# Patient Record
Sex: FEMALE | Race: WHITE | NOT HISPANIC OR LATINO | Employment: OTHER | URBAN - METROPOLITAN AREA
[De-identification: names, ages, dates, MRNs, and addresses within clinical notes are randomized per-mention and may not be internally consistent; named-entity substitution may affect disease eponyms.]

---

## 2017-01-09 ENCOUNTER — OFFICE VISIT (OUTPATIENT)
Dept: OPTOMETRY | Facility: CLINIC | Age: 82
End: 2017-01-09
Payer: MEDICARE

## 2017-01-09 DIAGNOSIS — Z96.1 PSEUDOPHAKIA: ICD-10-CM

## 2017-01-09 DIAGNOSIS — H43.393 VITREOUS FLOATERS, BILATERAL: ICD-10-CM

## 2017-01-09 DIAGNOSIS — Z98.41 S/P CATARACT SURGERY, RIGHT: ICD-10-CM

## 2017-01-09 DIAGNOSIS — H52.203 ASTIGMATISM, BILATERAL: ICD-10-CM

## 2017-01-09 DIAGNOSIS — Z98.42 S/P CATARACT SURGERY, LEFT: ICD-10-CM

## 2017-01-09 DIAGNOSIS — H35.372 EPIRETINAL MEMBRANE, LEFT EYE: Primary | ICD-10-CM

## 2017-01-09 PROCEDURE — 92015 DETERMINE REFRACTIVE STATE: CPT | Mod: S$GLB,,, | Performed by: OPTOMETRIST

## 2017-01-09 PROCEDURE — 99999 PR PBB SHADOW E&M-EST. PATIENT-LVL III: CPT | Mod: PBBFAC,,, | Performed by: OPTOMETRIST

## 2017-01-09 PROCEDURE — 92014 COMPRE OPH EXAM EST PT 1/>: CPT | Mod: S$GLB,,, | Performed by: OPTOMETRIST

## 2017-01-09 NOTE — MR AVS SNAPSHOT
[facility]  [address]  [facility] [address]  Phone: [phone]  Fax: [phone]                  Laila Hanna   2017 10:45 AM   Office Visit    Description:  Female : 1931   Provider:  Norberto Gabriel OD   Department:  [facility]           Reason for Visit     Concerns About Ocular Health                To Do List           Goals (5 Years of Data)     None      [facility]     [facility] On Call Nurse Care Line -  Assistance  Registered nurses in the [facility] On Call Center provide clinical advisement, health education, appointment booking, and other advisory services.  Call for this free service at [phone].             Medications           Message regarding Medications     Verify the changes and/or additions to your medication regime listed below are the same as discussed with your clinician today.  If any of these changes or additions are incorrect, please notify your healthcare provider.             Verify that the below list of medications is an accurate representation of the medications you are currently taking.  If none reported, the list may be blank. If incorrect, please contact your healthcare provider. Carry this list with you in case of emergency.           Current Medications     amlodipine (NORVASC) 5 MG tablet Take 1 tablet (5 mg total) by mouth once daily.    aspirin (ECOTRIN) 81 MG EC tablet Take 1 tablet (81 mg total) by mouth once daily.    atorvastatin (LIPITOR) 40 MG tablet One tablet daily    calcium-vitamin D3 (CALCIUM 500 + D) 500 mg(1,250mg) -200 unit per tablet Take 1 tablet by mouth once daily.     carvedilol (COREG) 12.5 MG tablet Take 1 tablet (12.5 mg total) by mouth 2 (two) times daily.    cyanocobalamin (VITAMIN B-12) 250 MCG tablet Take 1 tablet (250 mcg total) by mouth once daily.    dextran 70-hypromellose (ARTIFICIAL TEARS) ophthalmic solution Place 1 drop into both eyes every 2 (two) hours as needed.     levothyroxine (SYNTHROID) 150 MCG tablet Take 1 tablet (150 mcg total) by mouth once daily.    magnesium hydroxide 400 mg/5 ml (MILK OF MAGNESIA) 400 mg/5 mL Susp Take 30 mLs (2,400 mg total) by mouth every evening.    polyethylene glycol (MIRALAX) 17 gram PwPk Take 17 g by mouth 2 (two) times daily.    alprazolam (XANAX) 0.25 MG tablet Take 1 tablet (0.25 mg total) by mouth 2 (two) times daily.           Clinical Reference Information           Vital Signs - Last Recorded     LMP                   (LMP Unknown)           Allergies as of 1/9/2017     Thiazides      Immunizations Administered on Date of Encounter - 1/9/2017     None      [facility] Sign-Up     Activating your [facility] account is as easy as 1-2-3!     1) Visit [email or web address], select Sign Up Now, enter this activation code and your date of birth, then select Next.  [email or web address]  Expires: 2/23/2017 12:46 PM      2) Create a username and password to use when you visit [facility] in the future and select a security question in case you lose your password and select Next.    3) Enter your e-mail address and click Sign Up!    Additional Information  If you have questions, please e-mail [email or web address] or call [phone] to talk to our [facility] staff. Remember, [facility] is NOT to be used for urgent needs. For medical emergencies, dial 911.         Instructions    S/P cataract surgery in both eyes, with bilateral posterior chamber intraocular lens.  Residual astigmatic refractive error in each eye.  Good correctable VA in each eye.  New spectacle lens Rx issued for use as desired.  Recommend full-time wear.  Dry eye symptoms in both eyes.  Suggest regular and diligent use of a medium viscosity artificial tear eyedrops several times (every three to four hours minimally) in both eyes.  Call/return after two to three weeks of diligent use of artificial tears if symptoms not satisfactorily relieved, to discuss alternative dry eye  treatment(s).  Otherwise, continue regular use of artificial tears in both eyes.   Borderline intraocular pressure in each eye, without evidence of glaucomatous changes in the appearance of the optic nerve head in either eye.  Monitor only.  Repeat general eye examination and refraction in one year.

## 2017-01-09 NOTE — PROGRESS NOTES
HPI     Concerns About Ocular Health    Additional comments: Eye exam and refraction.  Uses glasses for reading,   but states that she notes that TV is clearer with glasses than without   glasses            Comments   Patient's age: 85 y.o.   Occupation: Retired   Approximate date of last eye examination: 02/10/2016  Name of last eye doctor seen: Dr. Ran Medina   [facility]State: [facility]   Wears glasses? Yes      If yes, wears  Full-time or part-time?  Part-time   Present glasses are: Bifocal, SV Distance, SV Reading?  Lined Bifocal   Approximate age of present glasses:  1-2 years old   Got new glasses following last exam, or subsequently?:  No    Any problem with VA with glasses?  Pt st she feels she is more dependant   on glasses now   Wears CLs?:  No   Headaches?  No   Eye pain/discomfort?  No                                                                                     Flashes?  No   Floaters?  No   Diplopia/Double vision?  No   Patient's Ocular History:          Any eye surgeries? Phaco WIOL OU X 2000          Any eye injury?  No          Any treatment for eye disease?  No   Family history of eye disease?   Maternal Aunt + Macular Degneration   Significant patient medical history:         1. Diabetes?  No   2. HBP?  Yes, controlled by medication and diet               3. Other (describe):      ! OTC eyedrops currently using:  None    ! Prescription eye meds currently using:  None    ! Any history of allergy/adverse reaction to any eye meds used   previously?  No    ! Any history of allergy/adverse reaction to eyedrops used during prior   eye exam(s)? No    ! Any history of allergy/adverse reaction to Novacaine or similar meds?   No    ! Any history of allergy/adverse reaction to Epinephrine or similar meds?   No    ! Patient okay with use of anesthetic eyedrops to check eye pressure?    Yes        ! Patient okay with use of eyedrops to dilate pupils today?  Yes    !  Allergies/Medications/Medical  "History/Family History reviewed today?    Yes       PD =   62/58   Desired reading distance =  14.5"                          Last edited by Norberto Gabriel, OD on 1/9/2017 12:02 PM. (History)            Assessment /Plan     For exam results, see Encounter Report.    1. Epiretinal membrane, left eye     2. S/P cataract surgery, left     3. S/P cataract surgery, right     4. Pseudophakia     5. Vitreous floaters, bilateral     6. Astigmatism, bilateral                  S/P cataract surgery in both eyes, with bilateral posterior chamber intraocular lens.  Residual astigmatic refractive error in each eye.  Good correctable VA in each eye.  New spectacle lens Rx issued for use as desired.  Recommend full-time wear.  Dry eye symptoms in both eyes.  Suggest regular and diligent use of a medium viscosity artificial tear eyedrops several times (every three to four hours minimally) in both eyes.  Call/return after two to three weeks of diligent use of artificial tears if symptoms not satisfactorily relieved, to discuss alternative dry eye treatment(s).  Otherwise, continue regular use of artificial tears in both eyes.   Borderline intraocular pressure in each eye, without evidence of glaucomatous changes in the appearance of the optic nerve head in either eye.  Monitor only.  Repeat general eye examination and refraction in one year.         "

## 2017-01-09 NOTE — PATIENT INSTRUCTIONS
S/P cataract surgery in both eyes, with bilateral posterior chamber intraocular lens.  Residual astigmatic refractive error in each eye.  Good correctable VA in each eye.  New spectacle lens Rx issued for use as desired.  Recommend full-time wear.  Dry eye symptoms in both eyes.  Suggest regular and diligent use of a medium viscosity artificial tear eyedrops several times (every three to four hours minimally) in both eyes.  Call/return after two to three weeks of diligent use of artificial tears if symptoms not satisfactorily relieved, to discuss alternative dry eye treatment(s).  Otherwise, continue regular use of artificial tears in both eyes.   Borderline intraocular pressure in each eye, without evidence of glaucomatous changes in the appearance of the optic nerve head in either eye.  Monitor only.  Repeat general eye examination and refraction in one year.

## 2017-05-01 ENCOUNTER — TELEPHONE (OUTPATIENT)
Dept: INTERNAL MEDICINE | Facility: CLINIC | Age: 82
End: 2017-05-01

## 2017-05-01 NOTE — TELEPHONE ENCOUNTER
----- Message from Samantha Morris MA sent at 5/1/2017  2:38 PM CDT -----  Contact: Anamika / [place] - [phone]   Type: Sooner appointment than  is able to schedule    When is the first available appointment?  6/5/17     What is the nature of the appointment? F/u - [facility]    What appointment type: EP     Comments: within 2 weeks. Please call. Thanks!

## 2017-05-08 ENCOUNTER — LAB VISIT (OUTPATIENT)
Dept: LAB | Facility: HOSPITAL | Age: 82
End: 2017-05-08
Attending: INTERNAL MEDICINE
Payer: MEDICARE

## 2017-05-08 ENCOUNTER — OFFICE VISIT (OUTPATIENT)
Dept: INTERNAL MEDICINE | Facility: CLINIC | Age: 82
End: 2017-05-08
Payer: MEDICARE

## 2017-05-08 DIAGNOSIS — I10 ESSENTIAL HYPERTENSION: ICD-10-CM

## 2017-05-08 DIAGNOSIS — F32.A DEPRESSION, UNSPECIFIED DEPRESSION TYPE: Primary | ICD-10-CM

## 2017-05-08 DIAGNOSIS — E03.4 HYPOTHYROIDISM DUE TO ACQUIRED ATROPHY OF THYROID: Chronic | ICD-10-CM

## 2017-05-08 DIAGNOSIS — D32.9 MENINGIOMA: ICD-10-CM

## 2017-05-08 LAB
ALBUMIN SERPL BCP-MCNC: 3.4 G/DL
ALP SERPL-CCNC: 85 U/L
ALT SERPL W/O P-5'-P-CCNC: 12 U/L
ANION GAP SERPL CALC-SCNC: 9 MMOL/L
AST SERPL-CCNC: 20 U/L
BASOPHILS # BLD AUTO: 0.03 K/UL
BASOPHILS NFR BLD: 0.3 %
BILIRUB SERPL-MCNC: 0.8 MG/DL
BUN SERPL-MCNC: 17 MG/DL
CALCIUM SERPL-MCNC: 8.9 MG/DL
CHLORIDE SERPL-SCNC: 98 MMOL/L
CO2 SERPL-SCNC: 26 MMOL/L
CREAT SERPL-MCNC: 0.9 MG/DL
DIFFERENTIAL METHOD: ABNORMAL
EOSINOPHIL # BLD AUTO: 0.2 K/UL
EOSINOPHIL NFR BLD: 1.7 %
ERYTHROCYTE [DISTWIDTH] IN BLOOD BY AUTOMATED COUNT: 14.2 %
EST. GFR  (AFRICAN AMERICAN): >60 ML/MIN/1.73 M^2
EST. GFR  (NON AFRICAN AMERICAN): 58.5 ML/MIN/1.73 M^2
GLUCOSE SERPL-MCNC: 102 MG/DL
HCT VFR BLD AUTO: 38.5 %
HGB BLD-MCNC: 12.8 G/DL
LYMPHOCYTES # BLD AUTO: 1.3 K/UL
LYMPHOCYTES NFR BLD: 14 %
MCH RBC QN AUTO: 31.7 PG
MCHC RBC AUTO-ENTMCNC: 33.2 %
MCV RBC AUTO: 95 FL
MONOCYTES # BLD AUTO: 0.8 K/UL
MONOCYTES NFR BLD: 9.4 %
NEUTROPHILS # BLD AUTO: 6.7 K/UL
NEUTROPHILS NFR BLD: 74.4 %
PLATELET # BLD AUTO: 234 K/UL
PMV BLD AUTO: 9.9 FL
POTASSIUM SERPL-SCNC: 4.8 MMOL/L
PROT SERPL-MCNC: 7.1 G/DL
RBC # BLD AUTO: 4.04 M/UL
SODIUM SERPL-SCNC: 133 MMOL/L
T4 FREE SERPL-MCNC: 1.18 NG/DL
TSH SERPL DL<=0.005 MIU/L-ACNC: 17.8 UIU/ML
WBC # BLD AUTO: 8.96 K/UL

## 2017-05-08 PROCEDURE — 86039 ANTINUCLEAR ANTIBODIES (ANA): CPT

## 2017-05-08 PROCEDURE — 99999 PR PBB SHADOW E&M-EST. PATIENT-LVL V: CPT | Mod: PBBFAC,,, | Performed by: INTERNAL MEDICINE

## 2017-05-08 PROCEDURE — 3079F DIAST BP 80-89 MM HG: CPT | Mod: S$GLB,,, | Performed by: INTERNAL MEDICINE

## 2017-05-08 PROCEDURE — 86235 NUCLEAR ANTIGEN ANTIBODY: CPT | Mod: 59

## 2017-05-08 PROCEDURE — 86038 ANTINUCLEAR ANTIBODIES: CPT

## 2017-05-08 PROCEDURE — 1160F RVW MEDS BY RX/DR IN RCRD: CPT | Mod: S$GLB,,, | Performed by: INTERNAL MEDICINE

## 2017-05-08 PROCEDURE — 80053 COMPREHEN METABOLIC PANEL: CPT

## 2017-05-08 PROCEDURE — 86225 DNA ANTIBODY NATIVE: CPT

## 2017-05-08 PROCEDURE — 3075F SYST BP GE 130 - 139MM HG: CPT | Mod: S$GLB,,, | Performed by: INTERNAL MEDICINE

## 2017-05-08 PROCEDURE — 84443 ASSAY THYROID STIM HORMONE: CPT

## 2017-05-08 PROCEDURE — 1159F MED LIST DOCD IN RCRD: CPT | Mod: S$GLB,,, | Performed by: INTERNAL MEDICINE

## 2017-05-08 PROCEDURE — 99215 OFFICE O/P EST HI 40 MIN: CPT | Mod: S$GLB,,, | Performed by: INTERNAL MEDICINE

## 2017-05-08 PROCEDURE — 84439 ASSAY OF FREE THYROXINE: CPT

## 2017-05-08 PROCEDURE — 85025 COMPLETE CBC W/AUTO DIFF WBC: CPT

## 2017-05-08 PROCEDURE — 36415 COLL VENOUS BLD VENIPUNCTURE: CPT

## 2017-05-08 PROCEDURE — 1126F AMNT PAIN NOTED NONE PRSNT: CPT | Mod: S$GLB,,, | Performed by: INTERNAL MEDICINE

## 2017-05-08 RX ORDER — CARVEDILOL 12.5 MG/1
12.5 TABLET ORAL 2 TIMES DAILY
Qty: 180 TABLET | Refills: 1 | Status: SHIPPED | OUTPATIENT
Start: 2017-05-08 | End: 2017-09-25 | Stop reason: SDUPTHER

## 2017-05-08 RX ORDER — BUPROPION HYDROCHLORIDE 100 MG/1
100 TABLET, EXTENDED RELEASE ORAL 2 TIMES DAILY
COMMUNITY
End: 2017-05-22 | Stop reason: SDUPTHER

## 2017-05-08 RX ORDER — HYDRALAZINE HYDROCHLORIDE 25 MG/1
25 TABLET, FILM COATED ORAL 3 TIMES DAILY
COMMUNITY
End: 2017-05-08 | Stop reason: SDUPTHER

## 2017-05-08 RX ORDER — CITALOPRAM 20 MG/1
20 TABLET, FILM COATED ORAL DAILY
COMMUNITY
End: 2018-01-26

## 2017-05-08 RX ORDER — LISINOPRIL 2.5 MG/1
2.5 TABLET ORAL DAILY
Qty: 30 TABLET | Refills: 1 | Status: SHIPPED | OUTPATIENT
Start: 2017-05-08 | End: 2017-07-03 | Stop reason: SDUPTHER

## 2017-05-08 RX ORDER — CETIRIZINE HYDROCHLORIDE 10 MG/1
10 TABLET ORAL DAILY
COMMUNITY
End: 2017-07-03

## 2017-05-08 RX ORDER — LISINOPRIL 2.5 MG/1
2.5 TABLET ORAL DAILY
COMMUNITY
End: 2017-05-08 | Stop reason: SDUPTHER

## 2017-05-08 RX ORDER — OLANZAPINE 2.5 MG/1
2.5 TABLET ORAL NIGHTLY
COMMUNITY
End: 2017-05-22

## 2017-05-08 RX ORDER — HYDRALAZINE HYDROCHLORIDE 25 MG/1
25 TABLET, FILM COATED ORAL 3 TIMES DAILY
Qty: 90 TABLET | Refills: 0 | Status: SHIPPED | OUTPATIENT
Start: 2017-05-08 | End: 2017-05-22

## 2017-05-08 RX ORDER — LEVOTHYROXINE SODIUM 150 UG/1
150 TABLET ORAL DAILY
Qty: 90 TABLET | Refills: 1 | Status: SHIPPED | OUTPATIENT
Start: 2017-05-08 | End: 2017-09-25 | Stop reason: SDUPTHER

## 2017-05-08 NOTE — MR AVS SNAPSHOT
[facility] - Internal Medicine  [address]  [place] [address]  Phone: [phone]  Fax: [phone]                  Laila Hanna   2017 10:30 AM   Office Visit    Description:  Female : 1931   Provider:  Ama Graham MD   Department:  [facility] - Internal Medicine           Reason for Visit     Follow-up           Diagnoses this Visit        Comments    Depression, unspecified depression type    -  Primary     Hypothyroidism due to acquired atrophy of thyroid         Essential hypertension         Meningioma                To Do List           Future Appointments        Provider Department Dept Phone    2017 11:30 AM LAB, APPOINTMENT [facility] [phone]    2017 1:00 PM Ama Graham MD [facility] Internal Medicine [phone]      Goals (5 Years of Data)     None      Follow-Up and Disposition     Return for [facility]  17 at 1:00.       These Medications        Disp Refills Start End    levothyroxine (SYNTHROID) 150 MCG tablet 90 tablet 1 2017    Take 1 tablet (150 mcg total) by mouth once daily. - Oral    [facility] [address] Ph #: [phone]       carvedilol (COREG) 12.5 MG tablet 180 tablet 1 2017    Take 1 tablet (12.5 mg total) by mouth 2 (two) times daily. - Oral    [facility] [facility], [place] [address] Ph #: [phone]       lisinopril (PRINIVIL,ZESTRIL) 2.5 MG tablet 30 tablet 1 2017     Take 1 tablet (2.5 mg total) by mouth once daily. - Oral    [facility] [facility] [place] [address] Ph #: [phone]       hydrALAZINE (APRESOLINE) 25 MG tablet 90 tablet 0 2017     Take 1 tablet (25 mg total) by mouth 3 (three) times daily. - Oral    [facility] [place], [place] [address] - [address] [place] [address] Ph #: [phone]          [facility]     [facility] Nurse Care Line - 24/7 Assistance  Unless otherwise directed by your provider, please contact [facility], our nurse care line that is available for 24/7 assistance.     Registered nurses in the [facility] Center provide: appointment scheduling, clinical advisement, health education, and other advisory services.  Call: [phone] (toll free)               Medications           Message regarding Medications     Verify the changes and/or additions to your medication regime listed below are the same as discussed with your clinician today.  If any of these changes or additions are incorrect, please notify your healthcare provider.        START taking these NEW medications        Refills    lisinopril (PRINIVIL,ZESTRIL) 2.5 MG tablet 1    Sig: Take 1 tablet (2.5 mg total) by mouth once daily.    Class: Normal    Route: Oral    hydrALAZINE (APRESOLINE) 25 MG tablet 0    Sig: Take 1 tablet (25 mg total) by mouth 3 (three) times daily.    Class: Normal    Route: Oral      STOP taking these medications     alprazolam (XANAX) 0.25 MG tablet Take 1 tablet (0.25 mg total) by mouth 2 (two) times daily.    amlodipine (NORVASC) 5 MG tablet Take 1 tablet (5 mg total) by mouth once daily.           Verify that the below list of medications is an accurate representation of the medications you are currently taking.  If none reported, the list may be blank. If incorrect, please contact your healthcare provider. Carry this list with you in case of emergency.           Current Medications     aspirin (ECOTRIN) 81 MG EC tablet Take 1 tablet (81 mg total) by mouth once daily.    atorvastatin (LIPITOR) 40 MG tablet One tablet daily    buPROPion (WELLBUTRIN SR) 100 MG TBSR 12 hr tablet Take 100 mg by mouth 2 (two) times daily.    calcium-vitamin D3 (CALCIUM 500 + D) 500 mg(1,250mg) -200 unit per tablet Take 1 tablet by mouth once daily.     carvedilol (COREG) 12.5 MG tablet Take 1 tablet  "(12.5 mg total) by mouth 2 (two) times daily.    cetirizine (ZYRTEC) 10 MG tablet Take 10 mg by mouth once daily.    citalopram (CELEXA) 20 MG tablet Take 20 mg by mouth once daily.    cyanocobalamin (VITAMIN B-12) 250 MCG tablet Take 1 tablet (250 mcg total) by mouth once daily.    dextran 70-hypromellose (ARTIFICIAL TEARS) ophthalmic solution Place 1 drop into both eyes every 2 (two) hours as needed.    hydrALAZINE (APRESOLINE) 25 MG tablet Take 1 tablet (25 mg total) by mouth 3 (three) times daily.    levothyroxine (SYNTHROID) 150 MCG tablet Take 1 tablet (150 mcg total) by mouth once daily.    lisinopril (PRINIVIL,ZESTRIL) 2.5 MG tablet Take 1 tablet (2.5 mg total) by mouth once daily.    olanzapine (ZYPREXA) 2.5 MG tablet Take 2.5 mg by mouth every evening.    polyethylene glycol (MIRALAX) 17 gram PwPk Take 17 g by mouth 2 (two) times daily.    magnesium hydroxide 400 mg/5 ml (MILK OF MAGNESIA) 400 mg/5 mL Susp Take 30 mLs (2,400 mg total) by mouth every evening.           Clinical Reference Information           Your Vitals Were     BP Pulse Temp Height Weight Last Period    140/88 (BP Location: Left arm, Patient Position: Sitting, BP Method: Manual) 58 97.9 °F (36.6 °C) (Oral) 5' 6" (1.676 m) 95.7 kg (211 lb) (LMP Unknown)    SpO2 BMI             96% 34.06 kg/m2         Blood Pressure          Most Recent Value    BP  (!)  140/88      Allergies as of 5/8/2017     Thiazides      Immunizations Administered on Date of Encounter - 5/8/2017     None      Orders Placed During Today's Visit      Normal Orders This Visit    Ambulatory consult to Neurology     Ambulatory consult to Psychiatry     Future Labs/Procedures Expected by Traci    LEROY  5/8/2017 7/7/2018    CBC auto differential  5/8/2017 7/7/2018    Comprehensive metabolic panel  5/8/2017 5/8/2018    TSH  5/8/2017 7/7/2018      [facility] Sign-Up     Activating your [facility] account is as easy as 1-2-3!     1) Visit [email or web address], select Sign Up Now, enter " this activation code and your date of birth, then select Next.  [email or web address]  Expires: 6/22/2017 11:18 AM      2) Create a username and password to use when you visit [facility] in the future and select a security question in case you lose your password and select Next.    3) Enter your e-mail address and click Sign Up!    Additional Information  If you have questions, please e-mail [email or web address] or call [phone] to talk to our [facility] staff. Remember, [facility] is NOT to be used for urgent needs. For medical emergencies, dial 911.         Language Assistance Services     ATTENTION: Language assistance services are available, free of charge. Please call [phone].      ATENCIÓN: Si sabrinala edson, tiene a knott disposición servicios gratuitos de asistencia lingüística. Llame al [phone].     CHÚ Ý: N?u b?n nói Ti?ng Vi?t, có các d?ch v? h? tr? ngôn ng? mi?n phí dành cho b?n. G?i s? [phone].         [facility] complies with applicable Federal civil rights laws and does not discriminate on the basis of race, color, national origin, age, disability, or sex.

## 2017-05-09 LAB
ANA SER QL IF: POSITIVE
ANA TITR SER IF: NORMAL {TITER}

## 2017-05-12 LAB
ANTI SM ANTIBODY: 0.82 EU
ANTI SM/RNP ANTIBODY: 26.54 EU
ANTI-SM INTERPRETATION: NEGATIVE
ANTI-SM/RNP INTERPRETATION: POSITIVE
ANTI-SSA ANTIBODY: 3.64 EU
ANTI-SSA INTERPRETATION: NEGATIVE
ANTI-SSB ANTIBODY: 0.18 EU
ANTI-SSB INTERPRETATION: NEGATIVE
DSDNA AB SER-ACNC: ABNORMAL [IU]/ML

## 2017-05-13 ENCOUNTER — TELEPHONE (OUTPATIENT)
Dept: INTERNAL MEDICINE | Facility: CLINIC | Age: 82
End: 2017-05-13

## 2017-05-13 VITALS
OXYGEN SATURATION: 96 % | WEIGHT: 211 LBS | SYSTOLIC BLOOD PRESSURE: 138 MMHG | TEMPERATURE: 98 F | DIASTOLIC BLOOD PRESSURE: 88 MMHG | BODY MASS INDEX: 33.91 KG/M2 | HEART RATE: 62 BPM | HEIGHT: 66 IN

## 2017-05-13 NOTE — TELEPHONE ENCOUNTER
Contacted patient about her lab result. Will have her discontinue hydralazine. Return to clinic in 1 month blood pressure check.  She had not been taking her synthroid regularly. Stressed the importance of medication compliance. Check TSH in 6 weeks

## 2017-05-14 NOTE — PROGRESS NOTES
Subjective:       Patient ID: Laila Hanna is a 85 y.o. female.    Chief Complaint: Hypertension    HPI: She returns for management of hypertension.  She has had hypertension for over a year.  Current treatment has included medications outlined in medication list.  She denies chest pain or shortness of breath.  No palpitations.  Denies left arm or neck pain.    Past medical history: Hypertension, coronary artery disease, TIA, hyperlipidemia, aortic stenosis, meningioma, osteoporosis, hypothyroidism, migraine headache,, status post cholecystectomy, status post thyroidectomy, colon adenoma. She had a colonoscopy April 2013     Medications: one aspirin daily, Lipitor 40 mg daily, Synthroid 0.15 mg daily, coreg 12.5 mg twice a day, Wellbutrin, lisinopril 2.5 mg daily, Celexa 20 mg daily, hydralazine     ALLERGIES: Thiazides       Review of Systems   Constitutional: Negative for chills, fatigue, fever and unexpected weight change.   Respiratory: Negative for chest tightness and shortness of breath.    Cardiovascular: Negative for chest pain and palpitations.   Gastrointestinal: Negative for abdominal pain and blood in stool.   Neurological: Negative for dizziness, syncope, numbness and headaches.       Objective:      Physical Exam   HENT:   Right Ear: External ear normal.   Left Ear: External ear normal.   Nose: Nose normal.   Mouth/Throat: Oropharynx is clear and moist.   Eyes: Pupils are equal, round, and reactive to light.   Neck: Normal range of motion.   Cardiovascular: Normal rate and regular rhythm.    Murmur heard.  Pulmonary/Chest: Breath sounds normal.   Abdominal: She exhibits no distension. There is no hepatosplenomegaly. There is no tenderness.   Lymphadenopathy:     She has no cervical adenopathy.     She has no axillary adenopathy.   Neurological: She has normal strength and normal reflexes. No cranial nerve deficit or sensory deficit.       Assessment:     assessment and plan: Hypertension: Check CMP,  TSH, CBC, LEROY.  She was here for an urgent care only appointment.  She will return to clinic for a physical      Plan:       As above

## 2017-05-22 ENCOUNTER — TELEPHONE (OUTPATIENT)
Dept: CARDIOLOGY | Facility: CLINIC | Age: 82
End: 2017-05-22

## 2017-05-22 ENCOUNTER — OFFICE VISIT (OUTPATIENT)
Dept: INTERNAL MEDICINE | Facility: CLINIC | Age: 82
End: 2017-05-22
Payer: MEDICARE

## 2017-05-22 VITALS
WEIGHT: 209 LBS | DIASTOLIC BLOOD PRESSURE: 82 MMHG | OXYGEN SATURATION: 96 % | TEMPERATURE: 98 F | SYSTOLIC BLOOD PRESSURE: 124 MMHG | HEART RATE: 60 BPM | BODY MASS INDEX: 33.59 KG/M2 | HEIGHT: 66 IN

## 2017-05-22 DIAGNOSIS — I25.10 CORONARY ARTERY DISEASE, ANGINA PRESENCE UNSPECIFIED, UNSPECIFIED VESSEL OR LESION TYPE, UNSPECIFIED WHETHER NATIVE OR TRANSPLANTED HEART: Primary | ICD-10-CM

## 2017-05-22 DIAGNOSIS — L98.9 SKIN LESION: ICD-10-CM

## 2017-05-22 DIAGNOSIS — M81.0 OSTEOPOROSIS, UNSPECIFIED OSTEOPOROSIS TYPE, UNSPECIFIED PATHOLOGICAL FRACTURE PRESENCE: ICD-10-CM

## 2017-05-22 DIAGNOSIS — F03.90 DEMENTIA WITHOUT BEHAVIORAL DISTURBANCE, UNSPECIFIED DEMENTIA TYPE: ICD-10-CM

## 2017-05-22 DIAGNOSIS — E78.5 HYPERLIPIDEMIA, UNSPECIFIED HYPERLIPIDEMIA TYPE: ICD-10-CM

## 2017-05-22 PROCEDURE — 99999 PR PBB SHADOW E&M-EST. PATIENT-LVL V: CPT | Mod: PBBFAC,,, | Performed by: INTERNAL MEDICINE

## 2017-05-22 PROCEDURE — 1126F AMNT PAIN NOTED NONE PRSNT: CPT | Mod: S$GLB,,, | Performed by: INTERNAL MEDICINE

## 2017-05-22 PROCEDURE — 1159F MED LIST DOCD IN RCRD: CPT | Mod: S$GLB,,, | Performed by: INTERNAL MEDICINE

## 2017-05-22 PROCEDURE — 99215 OFFICE O/P EST HI 40 MIN: CPT | Mod: S$GLB,,, | Performed by: INTERNAL MEDICINE

## 2017-05-22 RX ORDER — BUPROPION HYDROCHLORIDE 100 MG/1
100 TABLET, EXTENDED RELEASE ORAL 2 TIMES DAILY
Qty: 60 TABLET | Refills: 3 | Status: SHIPPED | OUTPATIENT
Start: 2017-05-22 | End: 2017-07-03 | Stop reason: SDUPTHER

## 2017-05-22 NOTE — TELEPHONE ENCOUNTER
----- Message from Minoo Wilson sent at 5/22/2017  2:03 PM CDT -----  Contact: Self  Please contact pt to schedule an apt with Dr. Severino     Thank You!

## 2017-05-26 ENCOUNTER — HOSPITAL ENCOUNTER (OUTPATIENT)
Dept: RADIOLOGY | Facility: CLINIC | Age: 82
Discharge: HOME | End: 2017-05-26
Attending: INTERNAL MEDICINE
Payer: MEDICARE

## 2017-05-26 DIAGNOSIS — M81.0 OSTEOPOROSIS, UNSPECIFIED OSTEOPOROSIS TYPE, UNSPECIFIED PATHOLOGICAL FRACTURE PRESENCE: ICD-10-CM

## 2017-05-26 PROCEDURE — 77080 DXA BONE DENSITY AXIAL: CPT | Mod: 26,,, | Performed by: INTERNAL MEDICINE

## 2017-05-26 PROCEDURE — 77080 DXA BONE DENSITY AXIAL: CPT | Mod: TC

## 2017-05-27 NOTE — PROGRESS NOTES
Subjective:       Patient ID: Laila Hanna is a 85 y.o. female.    Chief Complaint: Follow-up    HPI: She has hypothyroidism.  Currently on Synthroid  Review of Systems   Constitutional: Negative for chills, fatigue, fever and unexpected weight change.   Respiratory: Negative for chest tightness and shortness of breath.    Cardiovascular: Negative for chest pain and palpitations.   Gastrointestinal: Negative for abdominal pain and blood in stool.   Neurological: Negative for dizziness, syncope, numbness and headaches.       Objective:      Physical Exam   HENT:   Right Ear: External ear normal.   Left Ear: External ear normal.   Nose: Nose normal.   Mouth/Throat: Oropharynx is clear and moist.   Eyes: Pupils are equal, round, and reactive to light.   Neck: Normal range of motion.   Cardiovascular: Normal rate and regular rhythm.    Murmur heard.  Pulmonary/Chest: Breath sounds normal.   Abdominal: She exhibits no distension. There is no hepatosplenomegaly. There is no tenderness.   Lymphadenopathy:     She has no cervical adenopathy.     She has no axillary adenopathy.   Neurological: She has normal strength and normal reflexes. No cranial nerve deficit or sensory deficit.       Assessment:         assessment and plan: Hypothyroidism: Check TSH, lipid panel, B12.  Schedule bone density.  Discussed Pap smear, pelvic exam, rectal exam, mammogram, breast exam.  She refused all  Plan:       As above

## 2017-06-02 ENCOUNTER — TELEPHONE (OUTPATIENT)
Dept: INTERNAL MEDICINE | Facility: CLINIC | Age: 82
End: 2017-06-02

## 2017-06-02 RX ORDER — ALENDRONATE SODIUM 70 MG/1
70 TABLET ORAL
Qty: 4 TABLET | Refills: 3 | Status: SHIPPED | OUTPATIENT
Start: 2017-06-02 | End: 2017-09-18 | Stop reason: SDUPTHER

## 2017-06-02 NOTE — TELEPHONE ENCOUNTER
Contacted patient about her bone density result. Recommended adequate calcium and vitamin D.Start [id number] once a week

## 2017-06-13 ENCOUNTER — OFFICE VISIT (OUTPATIENT)
Dept: NEUROLOGY | Facility: CLINIC | Age: 82
End: 2017-06-13
Payer: MEDICARE

## 2017-06-13 ENCOUNTER — TELEPHONE (OUTPATIENT)
Dept: INTERNAL MEDICINE | Facility: CLINIC | Age: 82
End: 2017-06-13

## 2017-06-13 VITALS
HEART RATE: 64 BPM | WEIGHT: 207 LBS | BODY MASS INDEX: 33.27 KG/M2 | SYSTOLIC BLOOD PRESSURE: 137 MMHG | DIASTOLIC BLOOD PRESSURE: 78 MMHG | HEIGHT: 66 IN

## 2017-06-13 DIAGNOSIS — D32.9 MENINGIOMA: Primary | ICD-10-CM

## 2017-06-13 DIAGNOSIS — L60.0 INGROWN TOENAIL: Primary | ICD-10-CM

## 2017-06-13 PROCEDURE — 99213 OFFICE O/P EST LOW 20 MIN: CPT | Mod: S$GLB,,, | Performed by: PSYCHIATRY & NEUROLOGY

## 2017-06-13 PROCEDURE — 1159F MED LIST DOCD IN RCRD: CPT | Mod: S$GLB,,, | Performed by: PSYCHIATRY & NEUROLOGY

## 2017-06-13 PROCEDURE — 1126F AMNT PAIN NOTED NONE PRSNT: CPT | Mod: S$GLB,,, | Performed by: PSYCHIATRY & NEUROLOGY

## 2017-06-13 PROCEDURE — 99999 PR PBB SHADOW E&M-EST. PATIENT-LVL III: CPT | Mod: PBBFAC,,, | Performed by: PSYCHIATRY & NEUROLOGY

## 2017-06-13 RX ORDER — LEVOTHYROXINE SODIUM 75 UG/1
TABLET ORAL
COMMUNITY
Start: 2017-03-22 | End: 2017-09-25

## 2017-06-13 RX ORDER — MOMETASONE FUROATE 1 MG/G
CREAM TOPICAL
COMMUNITY
Start: 2017-03-20 | End: 2018-02-16 | Stop reason: CLARIF

## 2017-06-13 RX ORDER — POLYETHYLENE GLYCOL 3350 17 G/17G
POWDER, FOR SOLUTION ORAL
COMMUNITY
Start: 2017-04-14 | End: 2018-01-26 | Stop reason: SDUPTHER

## 2017-06-13 RX ORDER — PERMETHRIN 50 MG/G
CREAM TOPICAL
COMMUNITY
Start: 2017-03-22 | End: 2018-02-16 | Stop reason: CLARIF

## 2017-06-13 RX ORDER — DESOXIMETASONE 2.5 MG/G
CREAM TOPICAL
Refills: 0 | COMMUNITY
Start: 2017-06-01 | End: 2018-02-16 | Stop reason: CLARIF

## 2017-06-13 NOTE — TELEPHONE ENCOUNTER
----- Message from Katherin Vo sent at 6/13/2017  4:24 PM CDT -----  Contact: Care Giver/Maria G/[phone]  Patient called is asking for referral to a podiatrist for her ingrow toe nails. Please call and advise.        Thank you

## 2017-06-13 NOTE — LETTER
June 13, 2017      Ama Graham MD  [address]  [facility] [address]           [facility] - Neurology Epilepsy  [address]  [facility] [address]  Phone: [phone]  Fax: [phone]          Patient: Laila Hanna   MR Number: [id number]   YOB: 1931   Date of Visit: 6/13/2017       Dear Dr. Ama Graham:    Thank you for referring Laila Hanna to me for evaluation. Attached you will find relevant portions of my assessment and plan of care.    If you have questions, please do not hesitate to call me. I look forward to following Laila Hanna along with you.    Sincerely,    Juliano Mcnamara MD    Enclosure  CC:  No Recipients    If you would like to receive this communication electronically, please contact [email or web address] or [phone] to request more information on [facility] access.    For providers and/or their staff who would like to refer a patient to [facility], please contact us through our one-stop-shop provider referral line, [facility] [place], at [phone].    If you feel you have received this communication in error or would no longer like to receive these types of communications, please e-mail [email or web address]

## 2017-06-13 NOTE — PROGRESS NOTES
Chief Complaint: Neurologic Problem    Follow up:   No falls   HA rare   Vision - good   Memory - good   No new symptoms   Medications compliance - pill box by daughter     Prior note: She presents with cousin   She feels confused all the time   This started since Mother's day according to cousin    Able to feed and bath   Can not cook, drive   Can remember people.   Has trouble remembering apt and with decision making   zonegran stopped in the hospital      NO note 2/10/16   After discussion with Ms. Hanna, her visual auras are more consistent with mass effects of the meningioma over her right occipital cortex rather than late onset migraine. She has no visual field loss that I would attribute to the meningioma and I would not recommend intervention surgically. I found no visual damage from the clinoid meningioma. I would recommend observation at this point. She can continue her regular eye examinations with Dr. Gabriel. She will return to me as needed.     Follow up:   Silver zigzag lines in her vision which last 7-20 min. L or R side   Dates:  Dec   2,4,6, 11, 12, 23, 25, 29, 31  Greg:   3, 9, 11  No HA   No post ictal symptoms      Prior note:   No new symptoms. Lives alone, no trouble with ADLs. No ER visits.   No visual issues. Only few black spots.   Rare HA   Reports unsteady gait for last 2 months      HPI   The patient is an 82-year-old right-handed female who presents to the Neurology Clinic for evaluation of visual symptoms. She reports that first time she experienced this was approximately 10 years ago. She describes this as a   chrome-colored flashing vertical light, which moves across her left visual field. This is not associated with any pain. The symptoms last approximately 2 minutes. In the last 10 years, she has been to the Emergency Room three times for evaluation of these symptoms and to rule out stroke. In May 2014, she had an unusual episode wherein the symptoms lasted approximately one hour  and were followed by a right frontal throbbing headache. She presented to the Emergency Room and was admitted for observation for 24 hours. She was discharged with a prescription for Fioricet. Subsequently, she had occasional headaches on the right side with throbbing quality for which she took Fioricet with a good response. She also reports brief right-sided flashing pressure sensations lasting anywhere between 30 to 60 seconds. She does not take any medications for these.     Admit Date: 5/27/2014 10:57 PM  Discharge Date and Time: 5/28/2014  Admitting MD: Logan Serra MD  Discharge MD: Logan Serra MD  History of Present Illness:   Patient is a 82 y.o. female who presents with visual changes which began a couple days ago. Pt states she has had this change before, where she sees a chrome color flashing light on the left periphery of her left eye. It was resolving on its own previously, however lastnight at 8pm it began and would not go away. 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 She was recently admitted one month ago for numbness of the L hand which she states was attributed to a TIA. She denies any headaches, nausea, vomiting, weakness, numbness or tingling.   Hospital Course: Patient was admitted to the stroke service for further workup of vision changes. There was no evidence of a new lesion on Brain MRI and MRA was unremarkable, so it was determined that patient's symptoms were not caused by a stroke. It was felt that patient's vision changes were caused by a migraine, as patient reports recent diagnosis of optic nerve migraine. Vision changes improved over hospital course.. Patient was discharged with prescription for Fioricet and recommendation for follow-up with general neurology.     Review of Systems   Constitutional: Negative.   HENT: Negative.   Eyes: Negative.   Respiratory: Negative.   Cardiovascular: Negative.   Gastrointestinal: Negative.   Endocrine: Negative.    Genitourinary: Negative.   Musculoskeletal: Negative.   Skin: Negative.   Allergic/Immunologic: Negative.   Neurological: confusion +   Hematological: Negative.   Psychiatric/Behavioral: Negative.   Objective:     Physical Exam   Constitutional: She is oriented to person, place, and time. She appears well-developed and well-nourished.   EOMI  visual fields intact OU             Assessment:       1.  Meningioma     2.  Cognitive dysfunction - executive dysfunction. Pseudo-dementia    3.  Migraine aura without headache (onset - 2004)    Auras and occasional R sided HA likely related to meningioma                 Impression      Relatively stable enhancing extra-axial lesions right paraclinoid region and overlying the right occipital convexity most compatible with meningiomas. Allowing for limitations detailed above these lesions are relatively stable.    No evidence for new lesion.    Superimposed age-appropriate generalized volume loss with scattered few foci of T2/flair signal hyperintensity suggestive for chronic ischemic change. No evidence for acute infarction.    Electronically signed by: WALLY KEVIN DO  Date: 07/30/15  Time: 10:23       Impression    No acute intracranial pathology is appreciated.    Stable right paraclinoid and right occipital meningiomas.  ______________________________________     Electronically signed by resident: ASTRID MCCLURE  Date: 05/27/16  Time: 20:48        MRI   Age-appropriate cerebral volume loss with stable few scattered punctate foci of T2/flair signal abnormality supratentorial white matter all nonspecific suggestive for mild chronic ischemic change.  No evidence for acute infarction.  Stable homogeneously enhancing extra-axial lesion right paraclinoid region as well as more subtle extra-axial focus along the right aspect of the occipital lobe and possibly within the distal superior sagittal sinus most compatible with meningiomas.  No evidence for new lesion..       MRA  Unremarkable MRA of the head specifically without evidence for focal stenosis or intracranial aneurysm.  Unremarkable MRA of the neck without evidence for significant focal stenosis or occlusion.       Plan:     1, cont ASA, BB  2, Breakthrough HA - Tylenol 1000 mg  3, If she has visual loss - go to ER immediately   4, repeat MRI brain in 1 yr

## 2017-06-20 ENCOUNTER — TELEPHONE (OUTPATIENT)
Dept: PODIATRY | Facility: CLINIC | Age: 82
End: 2017-06-20

## 2017-06-20 NOTE — TELEPHONE ENCOUNTER
----- Message from Maribeth Olivas sent at 6/20/2017  1:13 PM CDT -----  Contact: Daughter  Daughter states that patient will keep appointment for 6/21 at 3:30p

## 2017-06-21 ENCOUNTER — OFFICE VISIT (OUTPATIENT)
Dept: PODIATRY | Facility: CLINIC | Age: 82
End: 2017-06-21
Payer: MEDICARE

## 2017-06-21 VITALS
HEIGHT: 66 IN | HEART RATE: 67 BPM | DIASTOLIC BLOOD PRESSURE: 77 MMHG | SYSTOLIC BLOOD PRESSURE: 131 MMHG | BODY MASS INDEX: 32.62 KG/M2 | WEIGHT: 203 LBS

## 2017-06-21 DIAGNOSIS — L60.0 ONYCHOCRYPTOSIS: ICD-10-CM

## 2017-06-21 DIAGNOSIS — B35.1 ONYCHOMYCOSIS DUE TO DERMATOPHYTE: ICD-10-CM

## 2017-06-21 PROCEDURE — 99204 OFFICE O/P NEW MOD 45 MIN: CPT | Mod: S$GLB,,, | Performed by: PODIATRIST

## 2017-06-21 PROCEDURE — 1159F MED LIST DOCD IN RCRD: CPT | Mod: S$GLB,,, | Performed by: PODIATRIST

## 2017-06-21 PROCEDURE — 1125F AMNT PAIN NOTED PAIN PRSNT: CPT | Mod: S$GLB,,, | Performed by: PODIATRIST

## 2017-06-21 PROCEDURE — 99999 PR PBB SHADOW E&M-EST. PATIENT-LVL IV: CPT | Mod: PBBFAC,,, | Performed by: PODIATRIST

## 2017-06-21 NOTE — PROGRESS NOTES
"Subjective:      Patient ID: Laila Hanna is a 85 y.o. female.    Chief Complaint: Ingrown Toenail (patient thinks it's a ingrown lt great toe rt also toes are press together)    Laila is a 85 y.o. female  has a past medical history of Basal cell carcinoma; Benign essential hypertension; Cerebral microvascular disease; Closed fracture of distal phalanx of left hand with routine healing; Closed mallet fracture of distal phalanx of finger with routine healing; Coronary artery disease; DDD (degenerative disc disease), lumbar; Fall at home; Hyperlipidemia; Hypothyroidism due to acquired atrophy of thyroid; Meningiomas, multiple; MI (myocardial infarction); Migraine aura without headache; Post PTCA; and Transient cerebral ischemia.  Pt presents to the clinic complaining of painful ingrown toenail on both feet. Pt relates she normally goes to [facility] to get her toenails cut but "they have not been doing a good job". Pt relates to increased pain and states she believes she has ingrown toe nails to 1st and 2nd toes of both feet. States she has never had ingrown toenails before. Pt daughter relates pt shoes might be too tight. No other complaints at this time.       Past Medical History:   Diagnosis Date    Basal cell carcinoma     nose    Benign essential hypertension     Cerebral microvascular disease 9/8/2014    Closed fracture of distal phalanx of left hand with routine healing 9/17/2015    Closed mallet fracture of distal phalanx of finger with routine healing 10/6/2015    Coronary artery disease     DDD (degenerative disc disease), lumbar 4/12/2016    Fall at home 5/30/2016    Hyperlipidemia     Hypothyroidism due to acquired atrophy of thyroid     Meningiomas, multiple     MI (myocardial infarction) 2004    80% blockage per patient    Migraine aura without headache 12/1/2014    Post PTCA 12/12/2012    Transient cerebral ischemia 5/4/2014         Current Outpatient Prescriptions on File Prior to " Visit   Medication Sig Dispense Refill    alendronate (FOSAMAX) 70 MG tablet Take 1 tablet (70 mg total) by mouth every 7 days. 4 tablet 3    aspirin (ECOTRIN) 81 MG EC tablet Take 1 tablet (81 mg total) by mouth once daily.      atorvastatin (LIPITOR) 40 MG tablet One tablet daily 90 tablet 4    buPROPion (WELLBUTRIN SR) 100 MG TBSR 12 hr tablet Take 1 tablet (100 mg total) by mouth 2 (two) times daily. 60 tablet 3    calcium-vitamin D3 (CALCIUM 500 + D) 500 mg(1,250mg) -200 unit per tablet Take 1 tablet by mouth once daily.       carvedilol (COREG) 12.5 MG tablet Take 1 tablet (12.5 mg total) by mouth 2 (two) times daily. 180 tablet 1    cetirizine (ZYRTEC) 10 MG tablet Take 10 mg by mouth once daily.      citalopram (CELEXA) 20 MG tablet Take 20 mg by mouth once daily.      cyanocobalamin (VITAMIN B-12) 250 MCG tablet Take 1 tablet (250 mcg total) by mouth once daily.      desoximetasone (TOPICORT) 0.25 % cream APPLY TO BODY TWICE A DAY  0    dextran 70-hypromellose (ARTIFICIAL TEARS) ophthalmic solution Place 1 drop into both eyes every 2 (two) hours as needed.  0    levothyroxine (SYNTHROID) 150 MCG tablet Take 1 tablet (150 mcg total) by mouth once daily. 90 tablet 1    levothyroxine (SYNTHROID) 75 MCG tablet       lisinopril (PRINIVIL,ZESTRIL) 2.5 MG tablet Take 1 tablet (2.5 mg total) by mouth once daily. 30 tablet 1    magnesium hydroxide 400 mg/5 ml (MILK OF MAGNESIA) 400 mg/5 mL Susp Take 30 mLs (2,400 mg total) by mouth every evening.      mometasone 0.1% (ELOCON) 0.1 % cream       permethrin (ELIMITE) 5 % cream       polyethylene glycol (GLYCOLAX) 17 gram/dose powder       polyethylene glycol (MIRALAX) 17 gram PwPk Take 17 g by mouth 2 (two) times daily.       No current facility-administered medications on file prior to visit.        Review of patient's allergies indicates:   Allergen Reactions    Thiazides Other (See Comments)     Severe hyponatremia           Review of Systems    Constitution: Negative.   HENT: Negative.    Eyes: Negative.    Cardiovascular: Negative.    Respiratory: Negative.    Endocrine: Negative.    Hematologic/Lymphatic: Negative.    Skin: Positive for color change and nail changes.   Musculoskeletal: Negative.    Gastrointestinal: Negative.    Genitourinary: Negative.    Neurological: Negative.    Psychiatric/Behavioral: Negative.    Allergic/Immunologic: Negative.            Objective:      Physical Exam   Constitutional: She is oriented to person, place, and time. She appears well-developed and well-nourished.   HENT:   Head: Normocephalic and atraumatic.   Eyes: EOM are normal. Pupils are equal, round, and reactive to light.   Neck: Normal range of motion.   Cardiovascular:   DP and PT pulses 1/4, arnulfo. CFT <3 seconds to digits 1-5, arnulfo. No erythema or edema noted, arnulfo.    Pulmonary/Chest: Effort normal.   Musculoskeletal: Normal range of motion.   ROM 5/5 to arnulfo LE muscles with inversion, eversion, plantarflexion and dorsiflexion. Mild POP noted to distal digit adjacent to offending ingrown toenail border.    Neurological: She is alert and oriented to person, place, and time. She has normal reflexes.   Sensation intact via SWMF 10/10.    Skin: Skin is warm and dry. Capillary refill takes 2 to 3 seconds. No pallor.   Arnulfo hallux nail border and arnulfo 2nd digit nail border incurvated with no paronychia, no abscess, no infection. No associated open lesion noted. Nails 1-5 arnulfo appear discolored and mycotic.    Psychiatric: She has a normal mood and affect. Her behavior is normal.             Assessment:       Encounter Diagnoses   Name Primary?    Onychocryptosis     Onychomycosis due to dermatophyte         Onychocryptosis  Onychomycosis      Plan:       Laila was seen today for ingrown toenail.    Diagnoses and all orders for this visit:    Onychocryptosis    Onychomycosis due to dermatophyte      I counseled the patient on her conditions, their implications and  medical management.  With patient's permission,  I trimmed the corner of the ingrown as a courtesy.   No complications.  Consider Kerasal Nail.  Available OTC.  Call or return to clinic prn if these symptoms worsen or fail to improve as anticipated, for procedure brief (non-covered service to trim toenails)

## 2017-06-21 NOTE — LETTER
June 24, 2017      Ama Graham MD  [address]  [place] [address]           [facility]  [address]  [place] [address]  Phone: [phone]          Patient: Laila Hanna   MR Number: [id number]   YOB: 1931   Date of Visit: 6/21/2017       Dear Dr. Ama Garham:    Thank you for referring Laila Hanna to me for evaluation. Attached you will find relevant portions of my assessment and plan of care.    If you have questions, please do not hesitate to call me. I look forward to following Laila Hanna along with you.    Sincerely,    Chelsie Lopez DPM    Enclosure  CC:  No Recipients    If you would like to receive this communication electronically, please contact [email or web address] or [phone] to request more information on [employer] access.    For providers and/or their staff who would like to refer a patient to [facility], please contact us through our one-stop-shop provider referral line, [facility], at [phone].    If you feel you have received this communication in error or would no longer like to receive these types of communications, please e-mail [email or web address]

## 2017-06-22 ENCOUNTER — LAB VISIT (OUTPATIENT)
Dept: LAB | Facility: HOSPITAL | Age: 82
End: 2017-06-22
Attending: INTERNAL MEDICINE
Payer: MEDICARE

## 2017-06-22 DIAGNOSIS — E78.5 HYPERLIPIDEMIA, UNSPECIFIED HYPERLIPIDEMIA TYPE: ICD-10-CM

## 2017-06-22 DIAGNOSIS — F03.90 DEMENTIA WITHOUT BEHAVIORAL DISTURBANCE, UNSPECIFIED DEMENTIA TYPE: ICD-10-CM

## 2017-06-22 LAB
CHOLEST/HDLC SERPL: 2.6 {RATIO}
HDL/CHOLESTEROL RATIO: 37.8 %
HDLC SERPL-MCNC: 111 MG/DL
HDLC SERPL-MCNC: 42 MG/DL
LDLC SERPL CALC-MCNC: 53.2 MG/DL
NONHDLC SERPL-MCNC: 69 MG/DL
T4 FREE SERPL-MCNC: 1.35 NG/DL
TRIGL SERPL-MCNC: 79 MG/DL
TSH SERPL DL<=0.005 MIU/L-ACNC: 5.01 UIU/ML
VIT B12 SERPL-MCNC: 383 PG/ML

## 2017-06-22 PROCEDURE — 80061 LIPID PANEL: CPT

## 2017-06-22 PROCEDURE — 36415 COLL VENOUS BLD VENIPUNCTURE: CPT

## 2017-06-22 PROCEDURE — 82607 VITAMIN B-12: CPT

## 2017-06-22 PROCEDURE — 84439 ASSAY OF FREE THYROXINE: CPT

## 2017-06-22 PROCEDURE — 84443 ASSAY THYROID STIM HORMONE: CPT

## 2017-07-03 ENCOUNTER — OFFICE VISIT (OUTPATIENT)
Dept: CARDIOLOGY | Facility: CLINIC | Age: 82
End: 2017-07-03
Payer: MEDICARE

## 2017-07-03 VITALS
WEIGHT: 200.81 LBS | HEART RATE: 72 BPM | BODY MASS INDEX: 32.27 KG/M2 | DIASTOLIC BLOOD PRESSURE: 60 MMHG | SYSTOLIC BLOOD PRESSURE: 120 MMHG | HEIGHT: 66 IN

## 2017-07-03 DIAGNOSIS — E03.4 HYPOTHYROIDISM DUE TO ACQUIRED ATROPHY OF THYROID: Chronic | ICD-10-CM

## 2017-07-03 DIAGNOSIS — I25.10 CORONARY ARTERY DISEASE INVOLVING NATIVE CORONARY ARTERY OF NATIVE HEART WITHOUT ANGINA PECTORIS: ICD-10-CM

## 2017-07-03 DIAGNOSIS — E78.00 PURE HYPERCHOLESTEROLEMIA: ICD-10-CM

## 2017-07-03 DIAGNOSIS — I10 ESSENTIAL HYPERTENSION: Primary | ICD-10-CM

## 2017-07-03 DIAGNOSIS — F09 COGNITIVE DYSFUNCTION: ICD-10-CM

## 2017-07-03 DIAGNOSIS — Z98.61 HISTORY OF PTCA: ICD-10-CM

## 2017-07-03 PROCEDURE — 1126F AMNT PAIN NOTED NONE PRSNT: CPT | Mod: S$GLB,,, | Performed by: INTERNAL MEDICINE

## 2017-07-03 PROCEDURE — 99999 PR PBB SHADOW E&M-EST. PATIENT-LVL III: CPT | Mod: PBBFAC,,, | Performed by: INTERNAL MEDICINE

## 2017-07-03 PROCEDURE — 1159F MED LIST DOCD IN RCRD: CPT | Mod: S$GLB,,, | Performed by: INTERNAL MEDICINE

## 2017-07-03 PROCEDURE — 99214 OFFICE O/P EST MOD 30 MIN: CPT | Mod: S$GLB,,, | Performed by: INTERNAL MEDICINE

## 2017-07-03 RX ORDER — LISINOPRIL 2.5 MG/1
2.5 TABLET ORAL DAILY
Qty: 30 TABLET | Refills: 1 | Status: SHIPPED | OUTPATIENT
Start: 2017-07-03 | End: 2017-09-25 | Stop reason: SDUPTHER

## 2017-07-03 RX ORDER — BUPROPION HYDROCHLORIDE 100 MG/1
100 TABLET, EXTENDED RELEASE ORAL 2 TIMES DAILY
Qty: 60 TABLET | Refills: 3 | Status: SHIPPED | OUTPATIENT
Start: 2017-07-03 | End: 2017-09-25 | Stop reason: SDUPTHER

## 2017-07-03 NOTE — PROGRESS NOTES
"Subjective:    Patient ID:  Laila Hanna is a 85 y.o. female who presents for follow-up of Hypertension      HPI            85 year old female followed for management of hypertension and CAD,post PCI to mid LAD 2004. Since her last visit she was in a nursing home but now is at home with a sitter. Her last visit was 5/16/16. She was seen by a Glazer at [facility] since her last visit and everything was "OK". She reports very occasional non exertional chest pressure but no SOB. She as lost 17# since her last visit but has good appetite accortin to her sitter who has accompanied her. She exercises daily. Her memory remains good.        Lab Results   Component Value Date     (L) 05/08/2017    K 4.8 05/08/2017    CL 98 05/08/2017    CO2 26 05/08/2017    BUN 17 05/08/2017    CREATININE 0.9 05/08/2017     05/08/2017    HGBA1C 5.8 01/29/2016    MG 1.8 07/03/2016    AST 20 05/08/2017    ALT 12 05/08/2017    ALBUMIN 3.4 (L) 05/08/2017    PROT 7.1 05/08/2017    BILITOT 0.8 05/08/2017    WBC 8.96 05/08/2017    HGB 12.8 05/08/2017    HCT 38.5 05/08/2017    MCV 95 05/08/2017     05/08/2017    INR 1.1 06/30/2016    INR 2.4 (H) 09/19/2008    TSH 5.011 (H) 06/22/2017         Lab Results   Component Value Date    CHOL 111 (L) 06/22/2017    HDL 42 06/22/2017    TRIG 79 06/22/2017       Lab Results   Component Value Date    LDLCALC 53.2 (L) 06/22/2017     Past Medical History:   Diagnosis Date    Basal cell carcinoma     nose    Benign essential hypertension     Cerebral microvascular disease 9/8/2014    Closed fracture of distal phalanx of left hand with routine healing 9/17/2015    Closed mallet fracture of distal phalanx of finger with routine healing 10/6/2015    Coronary artery disease     DDD (degenerative disc disease), lumbar 4/12/2016    Fall at home 5/30/2016    Hyperlipidemia     Hypothyroidism due to acquired atrophy of thyroid     Meningiomas, multiple     MI (myocardial infarction) 2004    " 80% blockage per patient    Migraine aura without headache 12/1/2014    Post PTCA 12/12/2012    Transient cerebral ischemia 5/4/2014     Current Outpatient Prescriptions   Medication Sig    alendronate (FOSAMAX) 70 MG tablet Take 1 tablet (70 mg total) by mouth every 7 days.    aspirin (ECOTRIN) 81 MG EC tablet Take 1 tablet (81 mg total) by mouth once daily.    atorvastatin (LIPITOR) 40 MG tablet One tablet daily    buPROPion (WELLBUTRIN SR) 100 MG TBSR 12 hr tablet Take 1 tablet (100 mg total) by mouth 2 (two) times daily.    calcium-vitamin D3 (CALCIUM 500 + D) 500 mg(1,250mg) -200 unit per tablet Take 1 tablet by mouth once daily.     carvedilol (COREG) 12.5 MG tablet Take 1 tablet (12.5 mg total) by mouth 2 (two) times daily.    cetirizine (ZYRTEC) 10 MG tablet Take 10 mg by mouth once daily.    citalopram (CELEXA) 20 MG tablet Take 20 mg by mouth once daily.    cyanocobalamin (VITAMIN B-12) 250 MCG tablet Take 1 tablet (250 mcg total) by mouth once daily.    desoximetasone (TOPICORT) 0.25 % cream APPLY TO BODY TWICE A DAY    dextran 70-hypromellose (ARTIFICIAL TEARS) ophthalmic solution Place 1 drop into both eyes every 2 (two) hours as needed.    levothyroxine (SYNTHROID) 150 MCG tablet Take 1 tablet (150 mcg total) by mouth once daily.    levothyroxine (SYNTHROID) 75 MCG tablet     lisinopril (PRINIVIL,ZESTRIL) 2.5 MG tablet Take 1 tablet (2.5 mg total) by mouth once daily.    magnesium hydroxide 400 mg/5 ml (MILK OF MAGNESIA) 400 mg/5 mL Susp Take 30 mLs (2,400 mg total) by mouth every evening.    mometasone 0.1% (ELOCON) 0.1 % cream     permethrin (ELIMITE) 5 % cream     polyethylene glycol (GLYCOLAX) 17 gram/dose powder     polyethylene glycol (MIRALAX) 17 gram PwPk Take 17 g by mouth 2 (two) times daily.     No current facility-administered medications for this visit.        Hypertension Medications                          Review of Systems   Constitution: Positive for weight loss.  Negative for decreased appetite, diaphoresis, fever, weakness, malaise/fatigue and weight gain.   HENT: Negative for congestion, ear discharge, ear pain, headaches and nosebleeds.    Eyes: Negative for blurred vision, double vision and visual disturbance.   Cardiovascular: Negative for chest pain, claudication, cyanosis, dyspnea on exertion, irregular heartbeat, leg swelling, near-syncope, orthopnea, palpitations, paroxysmal nocturnal dyspnea and syncope.   Respiratory: Negative for cough, hemoptysis, shortness of breath, sleep disturbances due to breathing, snoring, sputum production and wheezing.    Endocrine: Negative for polydipsia, polyphagia and polyuria.   Hematologic/Lymphatic: Negative for adenopathy and bleeding problem. Does not bruise/bleed easily.   Skin: Negative for color change, nail changes, poor wound healing and rash.   Musculoskeletal: Negative for muscle cramps and muscle weakness.   Gastrointestinal: Negative for abdominal pain, anorexia, change in bowel habit, hematochezia, nausea and vomiting.   Genitourinary: Negative for dysuria, frequency and hematuria.   Neurological: Negative for brief paralysis, difficulty with concentration, excessive daytime sleepiness, dizziness, focal weakness, light-headedness, seizures and vertigo.   Psychiatric/Behavioral: Negative for altered mental status and depression.   Allergic/Immunologic: Negative for persistent infections.         Physical Exam   Constitutional: She is oriented to person, place, and time. She appears well-developed and well-nourished.   HENT:   Head: Normocephalic.   Right Ear: External ear normal.   Left Ear: External ear normal.   Nose: Nose normal.   Inspection of lips, teeth and gums normal   Eyes: Conjunctivae and EOM are normal. Pupils are equal, round, and reactive to light. No scleral icterus.   Neck: Normal range of motion. No JVD present. No tracheal deviation present. No thyromegaly present.   Cardiovascular: Normal rate,  "regular rhythm and intact distal pulses.  Exam reveals no gallop and no friction rub.    Murmur heard.   Harsh midsystolic murmur is present  at the upper right sternal border radiating to the neck  Pulses:       Dorsalis pedis pulses are 0 on the right side, and 0 on the left side.        Posterior tibial pulses are 0 on the left side.   Pulmonary/Chest: Effort normal and breath sounds normal. No respiratory distress. She has no wheezes. She has no rales. She exhibits no tenderness.   Abdominal: Soft. Bowel sounds are normal. She exhibits no distension. There is no hepatosplenomegaly. There is no tenderness. There is no guarding.   Musculoskeletal: Normal range of motion. She exhibits no edema or tenderness.   Lymphadenopathy:   Palpation of lymph nodes of neck and groin normal   Neurological: She is oriented to person, place, and time. No cranial nerve deficit. She exhibits normal muscle tone. Coordination normal.   Skin: Skin is warm and dry. No rash noted. No erythema. No pallor.   Palpation of skin normal   Psychiatric: She has a normal mood and affect. Her behavior is normal. Judgment and thought content normal.    /60   Pulse 72   Ht 5' 6" (1.676 m)   Wt 91.1 kg (200 lb 13.4 oz)   LMP  (LMP Unknown)   BMI 32.42 kg/m²       Assessment:.       No diagnosis found.     Plan:       There are no diagnoses linked to this encounter.            "

## 2017-07-10 ENCOUNTER — TELEPHONE (OUTPATIENT)
Dept: INTERNAL MEDICINE | Facility: CLINIC | Age: 82
End: 2017-07-10

## 2017-07-10 DIAGNOSIS — H57.89 EYE DRAINAGE: Primary | ICD-10-CM

## 2017-07-10 NOTE — TELEPHONE ENCOUNTER
----- Message from Caitlin Menezes sent at 7/10/2017 11:59 AM CDT -----  Contact: Self [phone]  Patient would like to get a referral.  Does the patient already have the specialty clinic appointment scheduled:    If yes, what date is the appointment scheduled:   no  Referral to what specialty:  Optometry  Reason (be specific):  Drainage from   Does the patient want the referral with a specific physician: Dr Nobrerto Gabriel  Is this [facility]   Comments:

## 2017-07-11 ENCOUNTER — OFFICE VISIT (OUTPATIENT)
Dept: OPTOMETRY | Facility: CLINIC | Age: 82
End: 2017-07-11
Payer: MEDICARE

## 2017-07-11 DIAGNOSIS — H04.123 BILATERAL DRY EYES: ICD-10-CM

## 2017-07-11 DIAGNOSIS — H57.89 IRRITATION OF RIGHT EYE: Primary | ICD-10-CM

## 2017-07-11 PROCEDURE — 99999 PR PBB SHADOW E&M-EST. PATIENT-LVL II: CPT | Mod: PBBFAC,,, | Performed by: OPTOMETRIST

## 2017-07-11 PROCEDURE — 92012 INTRM OPH EXAM EST PATIENT: CPT | Mod: S$GLB,,, | Performed by: OPTOMETRIST

## 2017-07-11 NOTE — PATIENT INSTRUCTIONS
Please continue using the artificial tears (such as Refresh Optive) every 3-4 hours in both eyes.   Please let me know if your eyes are not better in about 1 week.

## 2017-07-11 NOTE — PROGRESS NOTES
HPI     Ms. Hanna is here for an urgent visit.    DLS:  1/9/17 Dr Gabriel    She reports yellow discharge in the morning OD which is getting worse,   onset a few weeks ago. She also feels like there might be a hair in her   right eye. She usually gets mild discharge in the mornings upon waking,   but it has been worse in the right eye recently.   She also says her eyes are dry. She denies burning or pain OD.  Pt states   occasional itching OD.  She uses Systane (unsure what type) 1-2 times per   day, but has not noticed any change in symptoms.       Last edited by Sheridan Guzman, OD on 7/11/2017  4:20 PM. (History)            Assessment /Plan     For exam results, see Encounter Report.    Irritation of right eye   Fine hair removed from RLL (hair was touching globe) in-office without complications. Continue artificial tears (see plan below). RTC if symptoms do not improve in 1 week.    Bilateral dry eyes   Continue artificial tears (recommended Refresh Optive) but increase to q3-4 hours as directed by Dr. Gabriel. If no improvement, then discuss other dry eye treatment with Dr. Gabriel.        RTC prn  Next comprehensive eye exam due January 2018 with Dr. Gabriel

## 2017-07-19 DIAGNOSIS — E78.5 HYPERLIPIDEMIA, UNSPECIFIED HYPERLIPIDEMIA TYPE: ICD-10-CM

## 2017-07-19 NOTE — TELEPHONE ENCOUNTER
----- Message from Katherin Vo sent at 7/19/2017 10:07 AM CDT -----  Patient called in regards needing a refill on atorvastatin (LIPITOR) 40 MG tablet.  [facility] - [place], [place] - [address]. Please call and advise.       Thank you!!!

## 2017-07-20 ENCOUNTER — TELEPHONE (OUTPATIENT)
Dept: OPHTHALMOLOGY | Facility: CLINIC | Age: 82
End: 2017-07-20

## 2017-07-20 DIAGNOSIS — E78.5 HYPERLIPIDEMIA, UNSPECIFIED HYPERLIPIDEMIA TYPE: ICD-10-CM

## 2017-07-20 RX ORDER — ATORVASTATIN CALCIUM 40 MG/1
TABLET, FILM COATED ORAL
Qty: 90 TABLET | Refills: 4 | Status: CANCELLED | OUTPATIENT
Start: 2017-07-20

## 2017-07-20 RX ORDER — ATORVASTATIN CALCIUM 40 MG/1
TABLET, FILM COATED ORAL
Qty: 30 TABLET | Refills: 0 | Status: SHIPPED | OUTPATIENT
Start: 2017-07-20 | End: 2017-07-21 | Stop reason: SDUPTHER

## 2017-07-20 NOTE — TELEPHONE ENCOUNTER
----- Message from Martha Bond sent at 7/20/2017  1:52 PM CDT -----  Contact: self/[phone]  Pt called in regards to checking the status of the pt Rx for atorvastatin (LIPITOR) 40 MG tablet.        Please advise

## 2017-07-20 NOTE — TELEPHONE ENCOUNTER
----- Message from Tomi Yi sent at 7/20/2017  2:23 PM CDT -----  Contact: Laila Hanna would like for you to call her. Dr. Guzman wanted her to call in about a week. She can be reached at [phone]

## 2017-07-21 DIAGNOSIS — E78.5 HYPERLIPIDEMIA, UNSPECIFIED HYPERLIPIDEMIA TYPE: ICD-10-CM

## 2017-07-21 RX ORDER — ATORVASTATIN CALCIUM 40 MG/1
TABLET, FILM COATED ORAL
Qty: 30 TABLET | Refills: 0 | Status: SHIPPED | OUTPATIENT
Start: 2017-07-21 | End: 2017-09-25 | Stop reason: SDUPTHER

## 2017-08-25 ENCOUNTER — OFFICE VISIT (OUTPATIENT)
Dept: OPTOMETRY | Facility: CLINIC | Age: 82
End: 2017-08-25
Payer: MEDICARE

## 2017-08-25 DIAGNOSIS — H00.015 HORDEOLUM EXTERNUM OF LEFT LOWER EYELID: Primary | ICD-10-CM

## 2017-08-25 DIAGNOSIS — Z98.41 S/P CATARACT SURGERY, RIGHT: ICD-10-CM

## 2017-08-25 DIAGNOSIS — Z98.42 S/P CATARACT SURGERY, LEFT: ICD-10-CM

## 2017-08-25 DIAGNOSIS — Z96.1 PSEUDOPHAKIA: ICD-10-CM

## 2017-08-25 PROCEDURE — 99999 PR PBB SHADOW E&M-EST. PATIENT-LVL III: CPT | Mod: PBBFAC,,, | Performed by: OPTOMETRIST

## 2017-08-25 PROCEDURE — 92012 INTRM OPH EXAM EST PATIENT: CPT | Mod: S$GLB,,, | Performed by: OPTOMETRIST

## 2017-08-25 RX ORDER — TOBRAMYCIN 3 MG/ML
1 SOLUTION/ DROPS OPHTHALMIC 3 TIMES DAILY
Qty: 5 ML | Refills: 0 | Status: SHIPPED | OUTPATIENT
Start: 2017-08-25 | End: 2017-09-04

## 2017-08-25 RX ORDER — CEPHALEXIN 500 MG/1
500 CAPSULE ORAL EVERY 12 HOURS
Qty: 20 CAPSULE | Refills: 0 | Status: SHIPPED | OUTPATIENT
Start: 2017-08-25 | End: 2017-09-04

## 2017-08-25 NOTE — PATIENT INSTRUCTIONS
S/P cataract surgery in both eyes, with bilateral posterior chamber intraocular lens.  External hordeolum lower left eyelid.  Prescribed Keflex 500 mg capsules.  Take one capsule every twelve hours.  Prescribed Tobramycin opthalmic solution for use in the left eye two to three times per day.  Warm compresses to LLL several times per day.  Recheck in one week if still symptomatic, or prior if any apparent worsening of signs/symptoms in the interim.

## 2017-08-25 NOTE — PROGRESS NOTES
HPI     Patient is in stating her OS is red, swollen and has a FB sensation (   duration 1 day ). Patient has been using refresh eye drops no relief.    Last edited by Tina Bernard on 8/25/2017 10:25 AM. (History)            Assessment /Plan     For exam results, see Encounter Report.    1. Hordeolum externum of left lower eyelid  cephALEXin (KEFLEX) 500 MG capsule    tobramycin sulfate 0.3% (TOBREX) 0.3 % ophthalmic solution   2. S/P cataract surgery, left     3. S/P cataract surgery, right     4. Pseudophakia                      S/P cataract surgery in both eyes, with bilateral posterior chamber intraocular lens.  External hordeolum lower left eyelid.  Prescribed Keflex 500 mg capsules.  Take one capsule every twelve hours.  Prescribed Tobramycin opthalmic solution for use in the left eye two to three times per day.  Warm compresses to LLL several times per day.  Recheck in one week if still symptomatic, or prior if any apparent worsening of signs/symptoms in the interim.

## 2017-09-18 RX ORDER — ALENDRONATE SODIUM 70 MG/1
TABLET ORAL
Qty: 4 TABLET | Refills: 3 | Status: SHIPPED | OUTPATIENT
Start: 2017-09-18 | End: 2017-09-25 | Stop reason: SDUPTHER

## 2017-09-19 ENCOUNTER — OFFICE VISIT (OUTPATIENT)
Dept: OPTOMETRY | Facility: CLINIC | Age: 82
End: 2017-09-19
Payer: MEDICARE

## 2017-09-19 DIAGNOSIS — Z98.41 S/P CATARACT SURGERY, RIGHT: ICD-10-CM

## 2017-09-19 DIAGNOSIS — Z98.42 S/P CATARACT SURGERY, LEFT: ICD-10-CM

## 2017-09-19 DIAGNOSIS — Z96.1 PSEUDOPHAKIA: ICD-10-CM

## 2017-09-19 DIAGNOSIS — H10.402 CHRONIC CONJUNCTIVITIS OF LEFT EYE, UNSPECIFIED CHRONIC CONJUNCTIVITIS TYPE: Primary | ICD-10-CM

## 2017-09-19 PROCEDURE — 92012 INTRM OPH EXAM EST PATIENT: CPT | Mod: S$GLB,,, | Performed by: OPTOMETRIST

## 2017-09-19 PROCEDURE — 99999 PR PBB SHADOW E&M-EST. PATIENT-LVL II: CPT | Mod: PBBFAC,,, | Performed by: OPTOMETRIST

## 2017-09-19 NOTE — PATIENT INSTRUCTIONS
Recent history of external hordeolum of the left lower eyelid - now resolved.    Ms. Hanna has finished treatment regimen of Keflex taken orally.  But, she continue to use tobramycin eyedrops recommended for use in the left eye prophylactically.    Possible sensitivity to component in the tobramycin eyedrops that she continues to use in the left eye, even thought the previously diagnosed stye/external hordeolum has resolved.  Suggest discontinue use of tobramycin drops in the left eye.  Continue to instill (Optive) artificial tears into both eyes as needed.  Monitor signs/symptoms in the left eye.  If left eye still symptomatic in seven to ten days, return for re-evaluation.  Otherwise, return for annual general eye examination and refraction in January, 2018.

## 2017-09-19 NOTE — PROGRESS NOTES
HPI     Concerns About Ocular Health    Additional comments: f/u            Comments   Patient is in for a 3 week f/u.   Patient states her stye is gone, but her OS is still red, itchy.   No   longer taking oral antibiotic (Keflex 500 mg two times per day).  Mrs. Hanna reports persistent redness in the left eye.    Patient states OD has a FB sensation. Patient has been using Tobramycin   2-3 time a day OS , slight relief.           Last edited by Norberto Gabriel, OD on 9/19/2017 12:20 PM. (History)            Assessment /Plan     For exam results, see Encounter Report.    1. Chronic conjunctivitis of left eye, unspecified chronic conjunctivitis type     2. S/P cataract surgery, left     3. S/P cataract surgery, right     4. Pseudophakia                Recent history of external hordeolum of the left lower eyelid - now resolved.    Ms. Hanna has finished treatment regimen of Keflex taken orally.  But, she continue to use tobramycin eyedrops recommended for use in the left eye prophylactically.    Possible sensitivity to component in the tobramycin eyedrops that she continues to use in the left eye, even thought the previously diagnosed stye/external hordeolum has resolved.  Suggest discontinue use of tobramycin drops in the left eye.  Continue to instill (Optive) artificial tears into both eyes as needed.  Monitor signs/symptoms in the left eye.  If left eye still symptomatic in seven to ten days, return for re-evaluation.  Otherwise, return for annual general eye examination and refraction in January, 2018.

## 2017-09-25 ENCOUNTER — IMMUNIZATION (OUTPATIENT)
Dept: INTERNAL MEDICINE | Facility: CLINIC | Age: 82
End: 2017-09-25
Payer: MEDICARE

## 2017-09-25 ENCOUNTER — OFFICE VISIT (OUTPATIENT)
Dept: INTERNAL MEDICINE | Facility: CLINIC | Age: 82
End: 2017-09-25
Payer: MEDICARE

## 2017-09-25 VITALS
HEART RATE: 60 BPM | TEMPERATURE: 98 F | OXYGEN SATURATION: 97 % | HEIGHT: 66 IN | BODY MASS INDEX: 32.14 KG/M2 | DIASTOLIC BLOOD PRESSURE: 80 MMHG | WEIGHT: 200 LBS | SYSTOLIC BLOOD PRESSURE: 126 MMHG

## 2017-09-25 DIAGNOSIS — E03.4 HYPOTHYROIDISM DUE TO ACQUIRED ATROPHY OF THYROID: Chronic | ICD-10-CM

## 2017-09-25 DIAGNOSIS — I10 ESSENTIAL HYPERTENSION: ICD-10-CM

## 2017-09-25 DIAGNOSIS — R13.10 DYSPHAGIA, UNSPECIFIED TYPE: Primary | ICD-10-CM

## 2017-09-25 DIAGNOSIS — L98.9 SKIN LESION: ICD-10-CM

## 2017-09-25 DIAGNOSIS — E78.5 HYPERLIPIDEMIA, UNSPECIFIED HYPERLIPIDEMIA TYPE: ICD-10-CM

## 2017-09-25 DIAGNOSIS — F41.9 ANXIETY: ICD-10-CM

## 2017-09-25 PROCEDURE — 3079F DIAST BP 80-89 MM HG: CPT | Mod: S$GLB,,, | Performed by: INTERNAL MEDICINE

## 2017-09-25 PROCEDURE — 90662 IIV NO PRSV INCREASED AG IM: CPT | Mod: S$GLB,,, | Performed by: INTERNAL MEDICINE

## 2017-09-25 PROCEDURE — 1159F MED LIST DOCD IN RCRD: CPT | Mod: S$GLB,,, | Performed by: INTERNAL MEDICINE

## 2017-09-25 PROCEDURE — G0008 ADMIN INFLUENZA VIRUS VAC: HCPCS | Mod: S$GLB,,, | Performed by: INTERNAL MEDICINE

## 2017-09-25 PROCEDURE — 3074F SYST BP LT 130 MM HG: CPT | Mod: S$GLB,,, | Performed by: INTERNAL MEDICINE

## 2017-09-25 PROCEDURE — 3008F BODY MASS INDEX DOCD: CPT | Mod: S$GLB,,, | Performed by: INTERNAL MEDICINE

## 2017-09-25 PROCEDURE — 99215 OFFICE O/P EST HI 40 MIN: CPT | Mod: S$GLB,,, | Performed by: INTERNAL MEDICINE

## 2017-09-25 PROCEDURE — 99999 PR PBB SHADOW E&M-EST. PATIENT-LVL V: CPT | Mod: PBBFAC,,, | Performed by: INTERNAL MEDICINE

## 2017-09-25 PROCEDURE — 1126F AMNT PAIN NOTED NONE PRSNT: CPT | Mod: S$GLB,,, | Performed by: INTERNAL MEDICINE

## 2017-09-25 RX ORDER — LISINOPRIL 2.5 MG/1
2.5 TABLET ORAL DAILY
Qty: 90 TABLET | Refills: 1 | Status: SHIPPED | OUTPATIENT
Start: 2017-09-25 | End: 2018-01-26 | Stop reason: SDUPTHER

## 2017-09-25 RX ORDER — CARVEDILOL 12.5 MG/1
12.5 TABLET ORAL 2 TIMES DAILY
Qty: 180 TABLET | Refills: 1 | Status: SHIPPED | OUTPATIENT
Start: 2017-09-25 | End: 2018-01-26 | Stop reason: SDUPTHER

## 2017-09-25 RX ORDER — BUPROPION HYDROCHLORIDE 100 MG/1
100 TABLET, EXTENDED RELEASE ORAL 2 TIMES DAILY
Qty: 180 TABLET | Refills: 1 | Status: SHIPPED | OUTPATIENT
Start: 2017-09-25 | End: 2018-01-26 | Stop reason: SDUPTHER

## 2017-09-25 RX ORDER — LEVOTHYROXINE SODIUM 150 UG/1
150 TABLET ORAL DAILY
Qty: 90 TABLET | Refills: 1 | Status: SHIPPED | OUTPATIENT
Start: 2017-09-25 | End: 2018-01-26 | Stop reason: SDUPTHER

## 2017-09-25 RX ORDER — ALENDRONATE SODIUM 70 MG/1
70 TABLET ORAL WEEKLY
Qty: 12 TABLET | Refills: 1 | Status: SHIPPED | OUTPATIENT
Start: 2017-09-25 | End: 2018-01-26 | Stop reason: SDUPTHER

## 2017-09-25 RX ORDER — ATORVASTATIN CALCIUM 40 MG/1
TABLET, FILM COATED ORAL
Qty: 90 TABLET | Refills: 1 | Status: SHIPPED | OUTPATIENT
Start: 2017-09-25 | End: 2018-01-26 | Stop reason: SDUPTHER

## 2017-09-30 NOTE — PROGRESS NOTES
Subjective:       Patient ID: Laila Hanna is a 85 y.o. female.    Chief Complaint: Hypertension    HPI: She returns for management of hypertension.  She has had hypertension for over a year.  Current treatment has included medications outlined in medication list.  She denies chest pain or shortness of breath.  No palpitations.  Denies left arm or neck pain.  She complains of dysphagia    Past medical history: Hypertension, coronary artery disease, TIA, hyperlipidemia, aortic stenosis, meningioma, osteoporosis, hypothyroidism, migraine headache,, status post cholecystectomy, status post thyroidectomy, colon adenoma. She had a colonoscopy April 2013     Medications: one aspirin daily, Lipitor 40 mg daily, Synthroid 0.15 mg daily, coreg 12.5 mg twice a day, Wellbutrin, lisinopril 2.5 mg daily, Fosamax 70 mg once a week     ALLERGIES: Thiazides       Review of Systems   Constitutional: Negative for chills, fatigue, fever and unexpected weight change.   Respiratory: Negative for chest tightness and shortness of breath.    Cardiovascular: Negative for chest pain and palpitations.   Gastrointestinal: Negative for abdominal pain and blood in stool.   Neurological: Negative for dizziness, syncope, numbness and headaches.       Objective:      Physical Exam   HENT:   Right Ear: External ear normal.   Left Ear: External ear normal.   Nose: Nose normal.   Mouth/Throat: Oropharynx is clear and moist.   Eyes: Pupils are equal, round, and reactive to light.   Neck: Normal range of motion.   Cardiovascular: Normal rate and regular rhythm.    Murmur heard.  Pulmonary/Chest: Breath sounds normal.   Abdominal: She exhibits no distension. There is no hepatosplenomegaly. There is no tenderness.   Lymphadenopathy:     She has no cervical adenopathy.     She has no axillary adenopathy.   Neurological: She has normal strength and normal reflexes. No cranial nerve deficit or sensory deficit.       Assessment:     assessment and plan: 1.   Hypertension: Check CMP and LEROY  2.  Dysphagia: Schedule gastroenterology appointment      Plan:       As above

## 2017-10-07 ENCOUNTER — TELEPHONE (OUTPATIENT)
Dept: INTERNAL MEDICINE | Facility: CLINIC | Age: 82
End: 2017-10-07

## 2017-10-07 NOTE — TELEPHONE ENCOUNTER
Have attempted to contact patient by phone numerous times about her lab result- no answer.Will mail letter asking her to contact our office

## 2017-10-18 ENCOUNTER — OFFICE VISIT (OUTPATIENT)
Dept: GASTROENTEROLOGY | Facility: CLINIC | Age: 82
End: 2017-10-18
Payer: MEDICARE

## 2017-10-18 VITALS
HEART RATE: 71 BPM | WEIGHT: 201.75 LBS | SYSTOLIC BLOOD PRESSURE: 114 MMHG | HEIGHT: 66 IN | DIASTOLIC BLOOD PRESSURE: 67 MMHG | BODY MASS INDEX: 32.42 KG/M2

## 2017-10-18 DIAGNOSIS — R13.19 ESOPHAGEAL DYSPHAGIA: Primary | ICD-10-CM

## 2017-10-18 PROCEDURE — 99999 PR PBB SHADOW E&M-EST. PATIENT-LVL IV: CPT | Mod: PBBFAC,,, | Performed by: INTERNAL MEDICINE

## 2017-10-18 PROCEDURE — 99204 OFFICE O/P NEW MOD 45 MIN: CPT | Mod: S$GLB,,, | Performed by: INTERNAL MEDICINE

## 2017-10-18 RX ORDER — ALPRAZOLAM 0.25 MG/1
TABLET ORAL
Refills: 0 | COMMUNITY
Start: 2017-08-17 | End: 2018-05-29 | Stop reason: SDUPTHER

## 2017-10-18 RX ORDER — HYDROXYZINE HYDROCHLORIDE 25 MG/1
TABLET, FILM COATED ORAL
Refills: 0 | COMMUNITY
Start: 2017-08-17 | End: 2018-02-16 | Stop reason: CLARIF

## 2017-10-18 NOTE — LETTER
October 18, 2017      Ama Graham MD  [address]  [place] [address]           [facility]enter[facility]  [address]  [place] [address]  Phone: [phone]  Fax: [phone]          Patient: Laila Hanna   MR Number: [id number]   YOB: 1931   Date of Visit: 10/18/2017       Dear Dr. Ama Graham:    Thank you for referring Laila Hanna to me for evaluation. Attached you will find relevant portions of my assessment and plan of care.    If you have questions, please do not hesitate to call me. I look forward to following Laila Hanna along with you.    Sincerely,    Jayjay Retana MD    Enclosure  CC:  No Recipients    If you would like to receive this communication electronically, please contact [email or web address] or [phone] to request more information on [employer] access.    For providers and/or their staff who would like to refer a patient to [facility], please contact us through our one-stop-shop provider referral line, [facility], at [phone].    If you feel you have received this communication in error or would no longer like to receive these types of communications, please e-mail [email or web address]

## 2017-10-18 NOTE — PROGRESS NOTES
REASON FOR VISIT:  Dysphagia.    HISTORY OF PRESENT ILLNESS:  Ms. Hanna is an 85-year-old who has been   complaining of difficulty with swallowing for several months now.  She describes   it as trouble with mostly bread or dry food.  She has difficulty initiating the   swallow.  She feels like there is a lot of mucus in the back of her throat.    Once the food goes down the throat, there is not much difficulty.  She has no   trouble with liquids.  Denies any odynophagia or hoarseness of voice.  No recent   surgeries on the neck or any radiation treatments to her chest.  She does take   alendronate once a week.    PAST MEDICAL, SURGICAL, SOCIAL AND FAMILY HISTORY:  Reviewed.    MEDICATIONS AND ALLERGIES:  Reviewed.    REVIEW OF SYSTEMS:  CONSTITUTIONAL:  No fever.  No chills.  No recent weight loss.  Appetite is   normal.  EYES:  No visual changes.  ENT:  No odynophagia or hoarseness of voice.  CARDIOVASCULAR:  No angina or palpitations.  RESPIRATORY:  No shortness of breath or wheezing.  GENITOURINARY:  No dysuria or frequency.  MUSCULOSKELETAL:  Arthralgias limiting her mobility, although she can move   around with a cane or walker.  SKIN:  No pruritus or eczema.  NEUROLOGIC:  No headache or seizures.  PSYCHIATRIC:  No anxiety or depression.  GASTROINTESTINAL:  No heartburn, acid regurgitation, abdominal pain, nausea or   vomiting.  No blood in the stool.  She does take MiraLax daily to help with   constipation, which is a chronic issue for her.    PHYSICAL EXAMINATION:  VITAL SIGNS:  See EPIC.  GENERAL:  Awake, alert and oriented x3, in no acute distress.  NECK:  Supple.  No carotid bruit.  ABDOMEN:  Obese, soft, nontender and nondistended.  No masses palpable.  No   hepatosplenomegaly appreciated.  Bowel sounds are normal.  EYES:  Conjunctivae are anicteric.  ENT:  Oropharynx, mucosa moist, Mallampati 3.  CARDIOVASCULAR:  S1 and S2 normal.  RESPIRATORY:  Bilateral air entry equal.  SKIN:  No palmar erythema or  spider angiomata.  NEUROLOGIC:  No asterixis.  PSYCHIATRY:  Affect is appropriate.  MUSCULOSKELETAL:  Lower extremity, no pedal edema.  Kyphosis noted.    IMPRESSION:  Recent onset of dysphagia, mostly difficulty with initiation of   swallow and complains of mucus in the back of the throat.    RECOMMENDATIONS:  1.  We will proceed with a barium esophagram and an upper endoscopy, especially   with history of Fosamax use.  2.  If the above investigation is unrevealing, the patient will follow up with   the Ear, Nose and Throat physician for further evaluation.      ACT/IN  dd: 10/18/2017 09:50:45 (CDT)  td: 10/18/2017 22:32:08 (CDT)  Doc ID   #[id number]  Job ID #[id number]    CC:

## 2017-10-19 ENCOUNTER — TELEPHONE (OUTPATIENT)
Dept: RADIOLOGY | Facility: HOSPITAL | Age: 82
End: 2017-10-19

## 2017-10-20 ENCOUNTER — HOSPITAL ENCOUNTER (OUTPATIENT)
Dept: RADIOLOGY | Facility: HOSPITAL | Age: 82
Discharge: HOME | End: 2017-10-20
Attending: INTERNAL MEDICINE
Payer: MEDICARE

## 2017-10-20 ENCOUNTER — TELEPHONE (OUTPATIENT)
Dept: GASTROENTEROLOGY | Facility: CLINIC | Age: 82
End: 2017-10-20

## 2017-10-20 DIAGNOSIS — R13.19 ESOPHAGEAL DYSPHAGIA: ICD-10-CM

## 2017-10-20 PROCEDURE — 74220 X-RAY XM ESOPHAGUS 1CNTRST: CPT | Mod: 26,,, | Performed by: RADIOLOGY

## 2017-10-20 PROCEDURE — 74220 X-RAY XM ESOPHAGUS 1CNTRST: CPT | Mod: TC

## 2017-10-20 NOTE — TELEPHONE ENCOUNTER
----- Message from Jayjay Retana MD sent at 10/20/2017  1:44 PM CDT -----  Barium esophagram does not reveal any blockage. Will proceed with endoscopy.

## 2017-11-01 ENCOUNTER — OFFICE VISIT (OUTPATIENT)
Dept: OPTOMETRY | Facility: CLINIC | Age: 82
End: 2017-11-01
Payer: MEDICARE

## 2017-11-01 DIAGNOSIS — Z98.42 S/P CATARACT SURGERY, LEFT: ICD-10-CM

## 2017-11-01 DIAGNOSIS — Z96.1 PSEUDOPHAKIA: ICD-10-CM

## 2017-11-01 DIAGNOSIS — H57.11 EYE PAIN, RIGHT: ICD-10-CM

## 2017-11-01 DIAGNOSIS — Z98.41 S/P CATARACT SURGERY, RIGHT: ICD-10-CM

## 2017-11-01 DIAGNOSIS — H02.003 ENTROPION AND TRICHIASIS OF EYELID, RIGHT: Primary | ICD-10-CM

## 2017-11-01 PROCEDURE — 99999 PR PBB SHADOW E&M-EST. PATIENT-LVL III: CPT | Mod: PBBFAC,,, | Performed by: OPTOMETRIST

## 2017-11-01 PROCEDURE — 92012 INTRM OPH EXAM EST PATIENT: CPT | Mod: S$GLB,,, | Performed by: OPTOMETRIST

## 2017-11-01 NOTE — PATIENT INSTRUCTIONS
S/P cataract surgery in both eyes, with bilateral pseudophakia.  VA satisfactory in each eye with present glasses.    Mrs. 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  Discussed findings with Mrs. Hanna and with care-taker.  Advised of need for surgery to correct entropion of RLL.  Generate referral to Dr. Alvarez for consult regard need for entropion repair surgery of right lower eyelid.  In the interim, use either Systane Ultra or Liquigel eyedrops in right eye to help reduce discomfort - okay to use in both eyes if desired.

## 2017-11-01 NOTE — PROGRESS NOTES
HPI     Patient is in stating her OD has been red, irritated with a FB sensation   for the past 4 days. Patient has been using Refresh Optive no relief.     Last edited by Tina Bernard on 11/1/2017 10:17 AM. (History)            Assessment /Plan     For exam results, see Encounter Report.    1. Entropion and trichiasis of eyelid, right  Ambulatory Referral to Ophthalmology   2. Eye pain, right  Ambulatory Referral to Ophthalmology   3. S/P cataract surgery, left     4. S/P cataract surgery, right     5. Pseudophakia                      S/P cataract surgery in both eyes, with bilateral pseudophakia.  VA satisfactory in each eye with present glasses.    Mrs. 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  Discussed findings with Mrs. Hanna and with care-taker.  Advised of need for surgery to correct entropion of RLL.  Generate referral to Dr. Alvarez for consult regard need for entropion repair surgery of right lower eyelid.  In the interim, use either Systane Ultra or Liquigel eyedrops in right eye to help reduce discomfort - okay to use in both eyes if desired.

## 2017-11-22 ENCOUNTER — SURGERY (OUTPATIENT)
Age: 82
End: 2017-11-22

## 2017-11-22 ENCOUNTER — ANESTHESIA EVENT (OUTPATIENT)
Dept: ENDOSCOPY | Facility: HOSPITAL | Age: 82
End: 2017-11-22
Payer: MEDICARE

## 2017-11-22 ENCOUNTER — ANESTHESIA (OUTPATIENT)
Dept: ENDOSCOPY | Facility: HOSPITAL | Age: 82
End: 2017-11-22
Payer: MEDICARE

## 2017-11-22 ENCOUNTER — HOSPITAL ENCOUNTER (OUTPATIENT)
Facility: HOSPITAL | Age: 82
Discharge: HOME | End: 2017-11-22
Attending: INTERNAL MEDICINE | Admitting: INTERNAL MEDICINE
Payer: MEDICARE

## 2017-11-22 VITALS
TEMPERATURE: 98 F | RESPIRATION RATE: 18 BRPM | SYSTOLIC BLOOD PRESSURE: 119 MMHG | HEIGHT: 66 IN | BODY MASS INDEX: 32.14 KG/M2 | DIASTOLIC BLOOD PRESSURE: 65 MMHG | HEART RATE: 72 BPM | OXYGEN SATURATION: 99 % | WEIGHT: 200 LBS

## 2017-11-22 DIAGNOSIS — R13.10 DYSPHAGIA: ICD-10-CM

## 2017-11-22 DIAGNOSIS — R13.19 ESOPHAGEAL DYSPHAGIA: Primary | ICD-10-CM

## 2017-11-22 PROCEDURE — 25000003 PHARM REV CODE 250: Performed by: NURSE ANESTHETIST, CERTIFIED REGISTERED

## 2017-11-22 PROCEDURE — D9220A PRA ANESTHESIA: Mod: ANES,,, | Performed by: ANESTHESIOLOGY

## 2017-11-22 PROCEDURE — 43249 ESOPH EGD DILATION <30 MM: CPT | Performed by: INTERNAL MEDICINE

## 2017-11-22 PROCEDURE — 63600175 PHARM REV CODE 636 W HCPCS: Performed by: NURSE ANESTHETIST, CERTIFIED REGISTERED

## 2017-11-22 PROCEDURE — 43249 ESOPH EGD DILATION <30 MM: CPT | Mod: GC,,, | Performed by: INTERNAL MEDICINE

## 2017-11-22 PROCEDURE — 37000008 HC ANESTHESIA 1ST 15 MINUTES: Performed by: INTERNAL MEDICINE

## 2017-11-22 PROCEDURE — 25000003 PHARM REV CODE 250: Performed by: INTERNAL MEDICINE

## 2017-11-22 PROCEDURE — D9220A PRA ANESTHESIA: Mod: CRNA,,, | Performed by: NURSE ANESTHETIST, CERTIFIED REGISTERED

## 2017-11-22 PROCEDURE — C1726 CATH, BAL DIL, NON-VASCULAR: HCPCS | Performed by: INTERNAL MEDICINE

## 2017-11-22 PROCEDURE — 37000009 HC ANESTHESIA EA ADD 15 MINS: Performed by: INTERNAL MEDICINE

## 2017-11-22 RX ORDER — GLYCOPYRROLATE 0.2 MG/ML
INJECTION INTRAMUSCULAR; INTRAVENOUS
Status: DISCONTINUED | OUTPATIENT
Start: 2017-11-22 | End: 2017-11-22

## 2017-11-22 RX ORDER — PROPOFOL 10 MG/ML
VIAL (ML) INTRAVENOUS
Status: DISCONTINUED | OUTPATIENT
Start: 2017-11-22 | End: 2017-11-22

## 2017-11-22 RX ORDER — SODIUM CHLORIDE 9 MG/ML
INJECTION, SOLUTION INTRAVENOUS CONTINUOUS
Status: DISCONTINUED | OUTPATIENT
Start: 2017-11-22 | End: 2017-11-22 | Stop reason: HOSPADM

## 2017-11-22 RX ORDER — LIDOCAINE HCL/PF 100 MG/5ML
SYRINGE (ML) INTRAVENOUS
Status: DISCONTINUED | OUTPATIENT
Start: 2017-11-22 | End: 2017-11-22

## 2017-11-22 RX ADMIN — SODIUM CHLORIDE: 900 INJECTION, SOLUTION INTRAVENOUS at 09:11

## 2017-11-22 RX ADMIN — LIDOCAINE HYDROCHLORIDE 100 MG: 20 INJECTION, SOLUTION INTRAVENOUS at 10:11

## 2017-11-22 RX ADMIN — PROPOFOL 25 MG: 10 INJECTION, EMULSION INTRAVENOUS at 10:11

## 2017-11-22 RX ADMIN — PROPOFOL 50 MG: 10 INJECTION, EMULSION INTRAVENOUS at 10:11

## 2017-11-22 RX ADMIN — GLYCOPYRROLATE 0.2 MG: 0.2 INJECTION, SOLUTION INTRAMUSCULAR; INTRAVENOUS at 09:11

## 2017-11-22 NOTE — PLAN OF CARE
Discharge instructions discussed with patient and patients caregiver. Both verbalized understanding and had no questions.

## 2017-11-22 NOTE — ANESTHESIA PREPROCEDURE EVALUATION
11/22/2017  Laila Hanna is a 85 y.o., female.    Anesthesia Evaluation    I have reviewed the Patient Summary Reports.     I have reviewed the Medications.     Review of Systems  Anesthesia Hx:  History of prior surgery of interest to airway management or planning: Previous anesthesia: General   Social:  Former Smoker, Social Alcohol Use    Hematology/Oncology:  Hematology Normal   Oncology Normal     EENT/Dental:EENT/Dental Normal   Cardiovascular:   Exercise tolerance: poor Hypertension, well controlled Past MI CAD asymptomatic     Pulmonary:  Pulmonary Normal    Renal/:  Renal/ Normal     Hepatic/GI:  Hepatic/GI Normal    Musculoskeletal:   Arthritis     Neurological:   Headaches    Endocrine:  Endocrine Normal    Dermatological:  Skin Normal    Psych:  Psychiatric Normal           Physical Exam  General:  Obesity    Airway/Jaw/Neck:  Airway Findings: Mouth Opening: Normal Tongue: Normal  General Airway Assessment: Adult  Mallampati: II  TM Distance: Normal, at least 6 cm  Jaw/Neck Findings:  Neck ROM: Normal ROM     Eyes/Ears/Nose:  Eyes/Ears/Nose Findings: R eye injected. Pt states from eyelash.    Dental:  Dental Findings: In tact        Mental Status:  Mental Status Findings:  Cooperative         Anesthesia Plan  Type of Anesthesia, risks & benefits discussed:  Anesthesia Type:  general  Patient's Preference: GA  Intra-op Monitoring Plan: standard ASA monitors  Intra-op Monitoring Plan Comments:   Post Op Pain Control Plan:   Post Op Pain Control Plan Comments:   Induction:    Beta Blocker:  Patient is on a Beta-Blocker and has received one dose within the past 24 hours (No further documentation required).       Informed Consent: Patient understands risks and agrees with Anesthesia plan.  Questions answered. Anesthesia consent signed with patient.  ASA Score: 3     Day of Surgery Review of  History & Physical:  There are no significant changes.  H&P update referred to the provider.         Ready For Surgery From Anesthesia Perspective.

## 2017-11-22 NOTE — ANESTHESIA POSTPROCEDURE EVALUATION
"Anesthesia Post Evaluation    Patient: Laila Hanna    Procedure(s) Performed: Procedure(s) (LRB):  ESOPHAGOGASTRODUODENOSCOPY (EGD) (N/A)    Final Anesthesia Type: general  Patient location during evaluation: PACU  Patient participation: Yes- Able to Participate  Level of consciousness: awake and alert  Post-procedure vital signs: reviewed and stable  Pain management: adequate  Airway patency: patent  PONV status at discharge: No PONV  Anesthetic complications: no      Cardiovascular status: blood pressure returned to baseline  Respiratory status: unassisted  Hydration status: euvolemic  Follow-up not needed.        Visit Vitals  /65 (BP Location: Left arm, Patient Position: Lying)   Pulse 72   Temp 36.7 °C (98 °F) (Temporal)   Resp 18   Ht 5' 6" (1.676 m)   Wt 90.7 kg (200 lb)   LMP  (LMP Unknown)   SpO2 99%   Breastfeeding? No   BMI 32.28 kg/m²       Pain/Anne Marie Score: Pain Assessment Performed: Yes (11/22/2017 10:50 AM)  Presence of Pain: denies (11/22/2017 10:50 AM)  Anne Marie Score: 10 (11/22/2017 10:50 AM)      "

## 2017-11-22 NOTE — H&P
Short Stay Endoscopy History and Physical    PCP - Ama Graham MD    Procedure - EGD  Sedation: GA  ASA - per anesthesia  Mallampati - per anesthesia  History of Anesthesia problems - no  Family history Anesthesia problems -  no     HPI:  This is a 85 y.o. female here for evaluation of : Dysphagia    Reflux - no  Dysphagia - yes  Abdominal pain - no  Diarrhea - no    ROS:  Constitutional: No fevers, chills, No weight loss  ENT: No allergies  CV: No chest pain  Pulm: No cough, No shortness of breath  Ophtho: No vision changes  GI: see HPI  Medical History:  has a past medical history of Basal cell carcinoma; Benign essential hypertension; Cerebral microvascular disease (9/8/2014); Closed fracture of distal phalanx of left hand with routine healing (9/17/2015); Closed mallet fracture of distal phalanx of finger with routine healing (10/6/2015); Coronary artery disease; DDD (degenerative disc disease), lumbar (4/12/2016); Fall at home (5/30/2016); Hyperlipidemia; Hypothyroidism due to acquired atrophy of thyroid; Meningiomas, multiple; MI (myocardial infarction) (2004); Migraine aura without headache (12/1/2014); Post PTCA (12/12/2012); and Transient cerebral ischemia (5/4/2014).    Surgical History:  has a past surgical history that includes Cholecystectomy; Total thyroidectomy; Cardiac catheterization (03/29/2004); Coronary angioplasty with stent (2004); and Cataract extraction w/  intraocular lens implant (Bilateral, 2000).    Family History: family history includes Cancer in her brother and sister; Colon cancer in her brother; Diabetes in her mother and paternal grandmother; Glaucoma in her maternal aunt; Hypertension in her father; No Known Problems in her maternal grandfather, maternal grandmother, maternal uncle, paternal aunt, paternal grandfather, and paternal uncle; Skin cancer in her brother; Stroke in her father and mother.. Otherwise no colon cancer, inflammatory bowel disease, or GI  malignancies.    Social History:  reports that she has quit smoking. Her smoking use included Cigarettes. She has a 4.50 pack-year smoking history. She has never used smokeless tobacco. She reports that she drinks alcohol. She reports that she does not use drugs.    Review of patient's allergies indicates:   Allergen Reactions    Thiazides Other (See Comments)     Severe hyponatremia       Medications:   Prescriptions Prior to Admission   Medication Sig Dispense Refill Last Dose    alendronate (FOSAMAX) 70 MG tablet Take 1 tablet (70 mg total) by mouth once a week. 12 tablet 1 Past Week at Unknown time    alprazolam (XANAX) 0.25 MG tablet take 1 tablet by mouth every 6 to 8 hours if needed  0 11/21/2017 at Unknown time    aspirin (ECOTRIN) 81 MG EC tablet Take 1 tablet (81 mg total) by mouth once daily.   Past Week at Unknown time    atorvastatin (LIPITOR) 40 MG tablet One tablet daily 90 tablet 1 11/21/2017 at Unknown time    buPROPion (WELLBUTRIN SR) 100 MG TBSR 12 hr tablet Take 1 tablet (100 mg total) by mouth 2 (two) times daily. 180 tablet 1 11/22/2017 at Unknown time    calcium-vitamin D3 (CALCIUM 500 + D) 500 mg(1,250mg) -200 unit per tablet Take 1 tablet by mouth once daily.    Past Week at Unknown time    carvedilol (COREG) 12.5 MG tablet Take 1 tablet (12.5 mg total) by mouth 2 (two) times daily. 180 tablet 1 11/22/2017 at Unknown time    hydrOXYzine HCl (ATARAX) 25 MG tablet take 1 tablet by mouth every 6 to 8 hours if needed  0 Past Week at Unknown time    levothyroxine (SYNTHROID) 150 MCG tablet Take 1 tablet (150 mcg total) by mouth once daily. 90 tablet 1 11/21/2017 at Unknown time    lisinopril (PRINIVIL,ZESTRIL) 2.5 MG tablet Take 1 tablet (2.5 mg total) by mouth once daily. 90 tablet 1 11/22/2017 at Unknown time    citalopram (CELEXA) 20 MG tablet Take 20 mg by mouth once daily.   More than a month at Unknown time    cyanocobalamin (VITAMIN B-12) 250 MCG tablet Take 1 tablet (250 mcg  total) by mouth once daily.   More than a month at Unknown time    desoximetasone (TOPICORT) 0.25 % cream APPLY TO BODY TWICE A DAY  0 Taking    dextran 70-hypromellose (ARTIFICIAL TEARS) ophthalmic solution Place 1 drop into both eyes every 2 (two) hours as needed.  0 Taking    magnesium hydroxide 400 mg/5 ml (MILK OF MAGNESIA) 400 mg/5 mL Susp Take 30 mLs (2,400 mg total) by mouth every evening.   More than a month at Unknown time    mometasone 0.1% (ELOCON) 0.1 % cream    Taking    permethrin (ELIMITE) 5 % cream    Taking    polyethylene glycol (GLYCOLAX) 17 gram/dose powder    Taking    polyethylene glycol (MIRALAX) 17 gram PwPk Take 17 g by mouth 2 (two) times daily.   Taking       Objective Findings:    Vital Signs: Per nursing notes.    Physical Exam:  General Appearance: Well appearing in no acute distress  Head:   Normocephalic, without obvious abnormality  Eyes:    No scleral icterus  Airway: Open  Neck: No restriction in mobility  Lungs: CTA bilaterally in anterior and posterior fields, no wheezes, no crackles.  Heart:  Regular rate and rhythm, S1, S2 normal, no murmurs heard  Abdomen: Soft, non tender, non distended      Labs:  Lab Results   Component Value Date    WBC 8.96 05/08/2017    HGB 12.8 05/08/2017    HCT 38.5 05/08/2017     05/08/2017    CHOL 111 (L) 06/22/2017    TRIG 79 06/22/2017    HDL 42 06/22/2017    ALT 14 09/25/2017    AST 16 09/25/2017     (L) 09/25/2017    K 5.0 09/25/2017    CL 97 09/25/2017    CREATININE 0.9 09/25/2017    BUN 19 09/25/2017    CO2 28 09/25/2017    TSH 5.011 (H) 06/22/2017    INR 1.1 06/30/2016    GLUF 112 (H) 04/20/2009    HGBA1C 5.8 01/29/2016         I have explained the risks and benefits of endoscopy procedures to the patient including but not limited to bleeding, perforation, infection, and death.    Thank you so much for allowing me to participate in the care of Laila Retana MD

## 2017-11-22 NOTE — TRANSFER OF CARE
"Anesthesia Transfer of Care Note    Patient: Laila Hanna    Procedure(s) Performed: Procedure(s) (LRB):  ESOPHAGOGASTRODUODENOSCOPY (EGD) (N/A)    Patient location: PACU    Anesthesia Type: general    Transport from OR: Transported from OR on room air with adequate spontaneous ventilation    Post pain: adequate analgesia    Post assessment: no apparent anesthetic complications and tolerated procedure well    Post vital signs: stable    Level of consciousness: sedated    Nausea/Vomiting: no nausea/vomiting    Complications: none    Transfer of care protocol was followed      Last vitals:   Visit Vitals  BP (!) 147/68 (Patient Position: Lying)   Pulse 60   Temp 36.6 °C (97.9 °F) (Temporal)   Resp 16   Ht 5' 6" (1.676 m)   Wt 90.7 kg (200 lb)   LMP  (LMP Unknown)   SpO2 97%   Breastfeeding? No   BMI 32.28 kg/m²     "

## 2017-11-22 NOTE — PATIENT INSTRUCTIONS
Discharge Summary/Instructions after an Endoscopic Procedure  Patient Name: Laila Hanna  Patient MRN: [id number]  Patient YOB: 1931 Wednesday, November 22, 2017  Jayjay Retana MD  RESTRICTIONS:  During your procedure today, you received medications for sedation.  These   medications may affect your judgment, balance and coordination.  Therefore,   for 24 hours, you have the following restrictions:   - DO NOT drive a car, operate machinery, make legal/financial decisions,   sign important papers or drink alcohol.    ACTIVITY:  The following day: return to full activity including work, except no heavy   lifting, straining or running for 3 days if polyps were removed.  DIET:  Eat and drink normally unless instructed otherwise.     TREATMENT FOR COMMON SIDE EFFECTS:  - Mild abdominal pain, belching, bloating or excessive gas: rest, eat   lightly and use a heating pad.  - Sore Throat: treat with throat lozenges and/or gargle with warm salt   water.  SYMPTOMS TO WATCH FOR AND REPORT TO YOUR PHYSICIAN:  1. Abdominal pain or bloating, other than gas cramps.  2. Chest pain.  3. Back pain.  4. Chills or fever occurring within 24 hours after the procedure.  5. Rectal bleeding, which would show as bright red, maroon, or black stools.   (A tablespoon of blood from the rectum is not serious, especially if   hemorrhoids are present.)  6. Vomiting.  7. Weakness or dizziness.  8. Because air was used during the procedure, expelling large amounts of air   from your rectum or belching is normal.  9. If a bowel prep was taken, you may not have a bowel movement for 1-3   days.  This is normal.  GO DIRECTLY TO THE NEAREST EMERGENCY ROOM IF YOU HAVE ANY OF THE FOLLOWING:      Difficulty breathing  Chills and/or fever over 101 F   Persistent vomiting and/or vomiting blood   Severe abdominal pain   Severe chest pain   Black, tarry stools   Bleeding- more than one tablespoon   Any other symptom or condition that you may feel  needs urgent attention  Your doctor recommends these additional instructions:  If any biopsies were taken, your doctor s clinic will contact you in 1 to 2   weeks with any results.  You have a contact number available for emergencies.  The signs and symptoms   of potential delayed complications were discussed with you.  You may return   to normal activities tomorrow.  Written discharge instructions were   provided to you.   You are being discharged to home.   Resume your previous diet.   Continue your present medications.  For questions, problems or results please call your physician - Jayjay Retana MD at Work:  [phone].  [facility] [place], EMERGENCY ROOM PHONE NUMBER: [phone]  IF A COMPLICATION OR EMERGENCY SITUATION ARISES AND YOU ARE UNABLE TO REACH   YOUR PHYSICIAN - GO DIRECTLY TO THE EMERGENCY ROOM.  Jayjay Retana MD  11/22/2017 10:26:07 AM  This report has been verified and signed electronically.

## 2017-11-29 ENCOUNTER — TELEPHONE (OUTPATIENT)
Dept: ENDOSCOPY | Facility: HOSPITAL | Age: 82
End: 2017-11-29

## 2017-12-05 ENCOUNTER — OFFICE VISIT (OUTPATIENT)
Dept: OPTOMETRY | Facility: CLINIC | Age: 82
End: 2017-12-05
Payer: MEDICARE

## 2017-12-05 ENCOUNTER — INITIAL CONSULT (OUTPATIENT)
Dept: DERMATOLOGY | Facility: CLINIC | Age: 82
End: 2017-12-05
Payer: MEDICARE

## 2017-12-05 DIAGNOSIS — Z96.1 PSEUDOPHAKIA: ICD-10-CM

## 2017-12-05 DIAGNOSIS — L72.0 MILIA: ICD-10-CM

## 2017-12-05 DIAGNOSIS — H02.003 ENTROPION AND TRICHIASIS OF EYELID, RIGHT: Primary | ICD-10-CM

## 2017-12-05 DIAGNOSIS — D18.00 HEMANGIOMA: ICD-10-CM

## 2017-12-05 DIAGNOSIS — Z98.41 S/P CATARACT SURGERY, RIGHT: ICD-10-CM

## 2017-12-05 DIAGNOSIS — L29.9 PRURITUS: Primary | ICD-10-CM

## 2017-12-05 DIAGNOSIS — H57.11 EYE PAIN, RIGHT: ICD-10-CM

## 2017-12-05 DIAGNOSIS — Z98.42 S/P CATARACT SURGERY, LEFT: ICD-10-CM

## 2017-12-05 DIAGNOSIS — Z85.828 PERSONAL HISTORY OF OTHER MALIGNANT NEOPLASM OF SKIN (CODE): ICD-10-CM

## 2017-12-05 PROCEDURE — 92012 INTRM OPH EXAM EST PATIENT: CPT | Mod: S$GLB,,, | Performed by: OPTOMETRIST

## 2017-12-05 PROCEDURE — 99999 PR PBB SHADOW E&M-EST. PATIENT-LVL II: CPT | Mod: PBBFAC,,, | Performed by: OPTOMETRIST

## 2017-12-05 PROCEDURE — 99214 OFFICE O/P EST MOD 30 MIN: CPT | Mod: S$GLB,,, | Performed by: DERMATOLOGY

## 2017-12-05 PROCEDURE — 99999 PR PBB SHADOW E&M-EST. PATIENT-LVL II: CPT | Mod: PBBFAC,,, | Performed by: DERMATOLOGY

## 2017-12-05 NOTE — PROGRESS NOTES
Subjective:       Patient ID:  Laila Hanna is a 85 y.o. female who presents for   Chief Complaint   Patient presents with    Itching     on R upper arm x sev mo      HPI   84 yo F presents with her caregiver for evaluation of itching mostly located on upper extremities, back, and chest. She had scabies back in March and has been treated with permethrin x2 (last treatment in August) Denies any changes in medications or rash associated with itch. Uses Purex detergent and uses Lubriderm. Using mometasone 0.1 % from [facility] dermatologist which has improved itching. Per care giver also was given xanax to help with itching as seems to be more itchy when agitated or anxious.     Also concerned about spots on her chest that have been there for several months. The bumps are asymptomatic and no treatments have been tried in the past.     Past Medical History:   Diagnosis Date    Basal cell carcinoma     nose    Benign essential hypertension     Cerebral microvascular disease 9/8/2014    Closed fracture of distal phalanx of left hand with routine healing 9/17/2015    Closed mallet fracture of distal phalanx of finger with routine healing 10/6/2015    Coronary artery disease     DDD (degenerative disc disease), lumbar 4/12/2016    Fall at home 5/30/2016    Hyperlipidemia     Hypothyroidism due to acquired atrophy of thyroid     Meningiomas, multiple     MI (myocardial infarction) 2004    80% blockage per patient    Migraine aura without headache 12/1/2014    Post PTCA 12/12/2012    Transient cerebral ischemia 5/4/2014       Review of Systems   Constitutional: Negative for fever, chills, fatigue and malaise.   Skin: Positive for itching. Negative for rash.   Hematologic/Lymphatic: Negative for adenopathy.   Allergic/Immunologic: Positive for environmental allergies.        Objective:    Physical Exam   Constitutional: She appears well-developed and well-nourished. No distress.   Neurological: She is alert and  oriented to person, place, and time. She is not disoriented.   Psychiatric: She has a normal mood and affect.   Skin:   Areas Examined (abnormalities noted in diagram):   Head / Face Inspection Performed  Neck Inspection Performed  Chest / Axilla Inspection Performed  Abdomen Inspection Performed  Back Inspection Performed  RUE Inspected  LUE Inspection Performed                   Diagram Legend     Erythematous scaling macule/papule c/w actinic keratosis       Vascular papule c/w angioma      Pigmented verrucoid papule/plaque c/w seborrheic keratosis      Yellow umbilicated papule c/w sebaceous hyperplasia      Irregularly shaped tan macule c/w lentigo     1-2 mm smooth white papules consistent with Milia      Movable subcutaneous cyst with punctum c/w epidermal inclusion cyst      Subcutaneous movable cyst c/w pilar cyst      Firm pink to brown papule c/w dermatofibroma      Pedunculated fleshy papule(s) c/w skin tag(s)      Evenly pigmented macule c/w junctional nevus     Mildly variegated pigmented, slightly irregular-bordered macule c/w mildly atypical nevus      Flesh colored to evenly pigmented papule c/w intradermal nevus       Pink pearly papule/plaque c/w basal cell carcinoma      Erythematous hyperkeratotic cursted plaque c/w SCC      Surgical scar with no sign of skin cancer recurrence      Open and closed comedones      Inflammatory papules and pustules      Verrucoid papule consistent consistent with wart     Erythematous eczematous patches and plaques     Dystrophic onycholytic nail with subungual debris c/w onychomycosis     Umbilicated papule    Erythematous-base heme-crusted tan verrucoid plaque consistent with inflamed seborrheic keratosis     Erythematous Silvery Scaling Plaque c/w Psoriasis     See annotation      Assessment / Plan:    Pruritus. No associated dermatitis.   -Dry skin tips provided  -Recommended Cetaphil with pramoxine. Instructed to leave in refrigerator and use prn   -May  continue to use mometasone prn to itchy areas     Milia  -Benign. Reassurance given   -Discussed extraction if patient desires     Angioma  -Benign. Reassurance given     Personal history of other malignant neoplasm of skin  Scar condition and fibrosis of skin  - No evidence of recurrence of BCC   - No other concerning lesions on UBSE today.  - Will continue q 1 year skin checks.  - Patient instructed in importance in daily sun protection of at least spf 30. Sun avoidance and topical protection discussed.      RTC in 2 months

## 2017-12-05 NOTE — PATIENT INSTRUCTIONS
S/P cataract surgery in both eyes, with bilateral pseudophakia.  VA satisfactory in each eye with present glasses.     Mrs. 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    Not scheduled to see Dr. Alvarez until 01/11/2018 (rescheduled from December, 2017).  Discussed treatment for chronic irritation of the right eye.    Suggested taping lower eyelid with paper tape to help prevent entropion of right lower lid - demonstrated to caregiver how to apply tape, but Ms. Hanna states that she does not want to do this, as she finds the tape bothersome.  Alternatively, suggest application of Systane ointment into the lower cul-de-sac on the right eye in the evening, or as many times per day as she would like to achieve comfort.  However, if she finds the Systane ointment too bothersome to vision, then okay to use gel drops, or Refresh Optive artificial tears during the day.    Keep appointment with Dr. Alvarez as scheduled.   Return sooner, if she notes any other problems in the interim.

## 2017-12-05 NOTE — PROGRESS NOTES
HPI     Patient is in stating her OD has been red, irritated with a mucous   discharge. Patient has been using Refresh Optive no relief.     Last edited by Tina Bernard on 12/5/2017 11:23 AM. (History)            Assessment /Plan     For exam results, see Encounter Report.    1. Entropion and trichiasis of eyelid, right     2. Eye pain, right     3. S/P cataract surgery, left     4. S/P cataract surgery, right     5. Pseudophakia                      S/P cataract surgery in both eyes, with bilateral pseudophakia.  VA satisfactory in each eye with present glasses.     Mrs. 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    Not scheduled to see Dr. Alvarez until 01/11/2018 (rescheduled from December, 2017).  Discussed treatment for chronic irritation of the right eye.    Suggested taping lower eyelid with paper tape to help prevent entropion of right lower lid - demonstrated to caregiver how to apply tape, but Ms. Hanna states that she does not want to do this, as she finds the tape bothersome.  Alternatively, suggest application of Systane ointment into the lower cul-de-sac on the right eye in the evening, or as many times per day as she would like to achieve comfort.  However, if she finds the Systane ointment too bothersome to vision, then okay to use gel drops, or Refresh Optive artificial tears during the day.    Keep appointment with Dr. Alvarez as scheduled.   Return sooner, if she notes any other problems in the interim.

## 2017-12-05 NOTE — LETTER
December 7, 2017      Ama Graham MD  [address]  [place] [address]           [facility] - Dermatology  [address]  [place] [address]  Phone: [phone]  Fax: [phone]          Patient: Laila Hanna   MR Number: [id number]   YOB: 1931   Date of Visit: 12/5/2017       Dear Dr. Ama Graham:    Thank you for referring Laila Hanna to me for evaluation. Attached you will find relevant portions of my assessment and plan of care.    If you have questions, please do not hesitate to call me. I look forward to following Laila Hanna along with you.    Sincerely,    Sanam Kumar MD    Enclosure  CC:  No Recipients    If you would like to receive this communication electronically, please contact [email or web address] or [phone] to request more information on [facility] Link access.    For providers and/or their staff who would like to refer a patient to [facility], please contact us through our one-stop-shop provider referral line, [facility], at [phone].    If you feel you have received this communication in error or would no longer like to receive these types of communications, please e-mail [email or web address]

## 2017-12-26 ENCOUNTER — INITIAL CONSULT (OUTPATIENT)
Dept: OPHTHALMOLOGY | Facility: CLINIC | Age: 82
End: 2017-12-26
Payer: MEDICARE

## 2017-12-26 DIAGNOSIS — H02.003 ENTROPION AND TRICHIASIS OF EYELID, RIGHT: Primary | ICD-10-CM

## 2017-12-26 PROCEDURE — 92285 EXTERNAL OCULAR PHOTOGRAPHY: CPT | Mod: S$GLB,,, | Performed by: OPHTHALMOLOGY

## 2017-12-26 PROCEDURE — 92014 COMPRE OPH EXAM EST PT 1/>: CPT | Mod: S$GLB,,, | Performed by: OPHTHALMOLOGY

## 2017-12-26 PROCEDURE — 99999 PR PBB SHADOW E&M-EST. PATIENT-LVL III: CPT | Mod: PBBFAC,,, | Performed by: OPHTHALMOLOGY

## 2017-12-26 NOTE — PROGRESS NOTES
HPI     Eye Problem    Additional comments: Entropion OD            Comments   Mrs. Ulloa is here today with her caregiver Maria G. She states she saw Dr. Gabriel in October and he stated she needed to see a specialist as her   lower eyelid is turning inward. She states her right eye is constantly   scratchy and irritated with a discharge since 9/2017. She denies any eye   pain. She is using Optive OD QID.        Last edited by Derrick Harding MD on 12/26/2017  8:56 AM. (History)            Assessment /Plan      For exam results, see Encounter Report.      Entropion Right lower lid with trichiasis  - symptomatic onset 9/2017, persistent despite AT's QID, PEE on exam  -Informed consent obtained after extensive risks/benefits/alternatives were discussed with the patient including but not limited to pain, bleeding, infection, ocular injury, loss of the eye, asymmetry, need for revision in future, scarring.  Alternatives such as taping and waiting were discussed.  All questions were answered.    - Plan for right lower eyelid entropion repair.   - Hx of CAD s/p stenting, currently on ASA daily. Hold ASA, NSAIDS, and fish oil 5 to 7 days prior to procedure.    I have reviewed and concur with the resident's history, physical, assessment, and plan.  I have personally interviewed and examined the patient.

## 2017-12-26 NOTE — LETTER
December 26, 2017      Norberto Gabriel, OD  [address]  [place] [address]           [facility] - Ophthalmology  [address]  [place] [address]  Phone: [phone]  Fax: [phone]          Patient: Laila Hanna   MR Number: [id number]   YOB: 1931   Date of Visit: 12/26/2017       Dear Dr. Norberto Gabriel:    Thank you for referring Laila Hanna to me for evaluation. Attached you will find relevant portions of my assessment and plan of care.    If you have questions, please do not hesitate to call me. I look forward to following Laila Hanna along with you.    Sincerely,    Funmilayo Alvarez MD    Enclosure  CC:  No Recipients    If you would like to receive this communication electronically, please contact [email or web address] or [phone] to request more information on [email or web address] Link access.    For providers and/or their staff who would like to refer a patient to [facility], please contact us through our one-stop-shop provider referral line, [facility], at [phone].    If you feel you have received this communication in error or would no longer like to receive these types of communications, please e-mail [email or web address]

## 2018-01-03 ENCOUNTER — TELEPHONE (OUTPATIENT)
Dept: OPHTHALMOLOGY | Facility: CLINIC | Age: 83
End: 2018-01-03

## 2018-01-03 DIAGNOSIS — H02.003 ENTROPION OF RIGHT EYELID: Primary | ICD-10-CM

## 2018-01-12 ENCOUNTER — PES CALL (OUTPATIENT)
Dept: ADMINISTRATIVE | Facility: CLINIC | Age: 83
End: 2018-01-12

## 2018-01-15 ENCOUNTER — OFFICE VISIT (OUTPATIENT)
Dept: OPHTHALMOLOGY | Facility: CLINIC | Age: 83
End: 2018-01-15
Payer: MEDICARE

## 2018-01-15 DIAGNOSIS — H00.022 HORDEOLUM INTERNUM OF RIGHT LOWER EYELID: ICD-10-CM

## 2018-01-15 PROCEDURE — 92012 INTRM OPH EXAM EST PATIENT: CPT | Mod: S$GLB,,, | Performed by: OPHTHALMOLOGY

## 2018-01-15 PROCEDURE — 99999 PR PBB SHADOW E&M-EST. PATIENT-LVL III: CPT | Mod: PBBFAC,,, | Performed by: OPHTHALMOLOGY

## 2018-01-15 RX ORDER — ERYTHROMYCIN 5 MG/G
OINTMENT OPHTHALMIC 3 TIMES DAILY
Qty: 1 TUBE | Refills: 1 | Status: SHIPPED | OUTPATIENT
Start: 2018-01-15 | End: 2018-01-22

## 2018-01-15 NOTE — PATIENT INSTRUCTIONS
Warm compresses up to four times a day.  Apply erythromycin ointment to infected area three times a day for one week.  Return in one week if not healed or sooner if worsens.

## 2018-01-15 NOTE — PROGRESS NOTES
HPI     Triage pt  Hx of Entropion OD.(Schedule surg 02/19)  Pt here w/caretaker which states bump RLL that has gotten irritated and   swollen.  OD feels scratchy due to the lashes rubbing.    Eye Drops:Systane gel daily OD    I have personally interviewed the patient, reviewed the history and   examined the patient and agree with the technician's exam.    Last edited by Ran Medina MD on 1/15/2018  1:40 PM. (History)            Assessment /Plan     For exam results, see Encounter Report.    Hordeolum internum of right lower eyelid  -     erythromycin (ROMYCIN) ophthalmic ointment; Place into the right eye 3 (three) times daily.  Dispense: 1 Tube; Refill: 1      Warm compresses up to four times a day.  Apply erythromycin ointment to infected area three times a day for one week.  Return in one week if not healed or sooner if worsens.

## 2018-01-24 ENCOUNTER — TELEPHONE (OUTPATIENT)
Dept: OPHTHALMOLOGY | Facility: CLINIC | Age: 83
End: 2018-01-24

## 2018-01-26 ENCOUNTER — OFFICE VISIT (OUTPATIENT)
Dept: INTERNAL MEDICINE | Facility: CLINIC | Age: 83
End: 2018-01-26
Payer: MEDICARE

## 2018-01-26 ENCOUNTER — LAB VISIT (OUTPATIENT)
Dept: LAB | Facility: HOSPITAL | Age: 83
End: 2018-01-26
Attending: INTERNAL MEDICINE
Payer: MEDICARE

## 2018-01-26 VITALS
WEIGHT: 197.31 LBS | SYSTOLIC BLOOD PRESSURE: 122 MMHG | BODY MASS INDEX: 31.71 KG/M2 | DIASTOLIC BLOOD PRESSURE: 62 MMHG | HEIGHT: 66 IN | HEART RATE: 65 BPM | OXYGEN SATURATION: 97 %

## 2018-01-26 VITALS
HEIGHT: 66 IN | DIASTOLIC BLOOD PRESSURE: 62 MMHG | HEART RATE: 65 BPM | SYSTOLIC BLOOD PRESSURE: 122 MMHG | BODY MASS INDEX: 31.74 KG/M2 | WEIGHT: 197.5 LBS

## 2018-01-26 DIAGNOSIS — E03.4 HYPOTHYROIDISM DUE TO ACQUIRED ATROPHY OF THYROID: Chronic | ICD-10-CM

## 2018-01-26 DIAGNOSIS — F09 COGNITIVE DYSFUNCTION: ICD-10-CM

## 2018-01-26 DIAGNOSIS — I70.0 AORTIC ATHEROSCLEROSIS: ICD-10-CM

## 2018-01-26 DIAGNOSIS — Z00.00 ENCOUNTER FOR PREVENTIVE HEALTH EXAMINATION: Primary | ICD-10-CM

## 2018-01-26 DIAGNOSIS — I10 ESSENTIAL HYPERTENSION: ICD-10-CM

## 2018-01-26 DIAGNOSIS — I27.9 PULMONARY HEART DISEASE: ICD-10-CM

## 2018-01-26 DIAGNOSIS — R13.10 DYSPHAGIA, UNSPECIFIED TYPE: ICD-10-CM

## 2018-01-26 DIAGNOSIS — Z86.73 HISTORY OF TRANSIENT ISCHEMIC ATTACK (TIA): ICD-10-CM

## 2018-01-26 DIAGNOSIS — F33.42 RECURRENT MAJOR DEPRESSIVE DISORDER, IN FULL REMISSION: ICD-10-CM

## 2018-01-26 DIAGNOSIS — I51.89 LEFT VENTRICULAR DIASTOLIC DYSFUNCTION WITH PRESERVED SYSTOLIC FUNCTION: ICD-10-CM

## 2018-01-26 DIAGNOSIS — I25.10 CORONARY ARTERY DISEASE INVOLVING NATIVE CORONARY ARTERY OF NATIVE HEART WITHOUT ANGINA PECTORIS: ICD-10-CM

## 2018-01-26 DIAGNOSIS — N18.30 STAGE 3 CHRONIC KIDNEY DISEASE: ICD-10-CM

## 2018-01-26 DIAGNOSIS — H61.20 IMPACTED CERUMEN, UNSPECIFIED LATERALITY: ICD-10-CM

## 2018-01-26 DIAGNOSIS — I77.9 BILATERAL CAROTID ARTERY DISEASE: ICD-10-CM

## 2018-01-26 DIAGNOSIS — D32.9 MENINGIOMA: ICD-10-CM

## 2018-01-26 DIAGNOSIS — R76.8 POSITIVE ANA (ANTINUCLEAR ANTIBODY): Primary | ICD-10-CM

## 2018-01-26 DIAGNOSIS — I35.0 NONRHEUMATIC AORTIC VALVE STENOSIS: ICD-10-CM

## 2018-01-26 DIAGNOSIS — M81.0 SENILE OSTEOPOROSIS: ICD-10-CM

## 2018-01-26 DIAGNOSIS — Z98.61 HISTORY OF PTCA: ICD-10-CM

## 2018-01-26 DIAGNOSIS — E87.1 HYPONATREMIA: ICD-10-CM

## 2018-01-26 DIAGNOSIS — E78.5 HYPERLIPIDEMIA, UNSPECIFIED HYPERLIPIDEMIA TYPE: ICD-10-CM

## 2018-01-26 DIAGNOSIS — E22.2 SIADH (SYNDROME OF INAPPROPRIATE ADH PRODUCTION): ICD-10-CM

## 2018-01-26 LAB
ALBUMIN SERPL BCP-MCNC: 3.3 G/DL
ALP SERPL-CCNC: 99 U/L
ALT SERPL W/O P-5'-P-CCNC: 13 U/L
ANION GAP SERPL CALC-SCNC: 6 MMOL/L
AST SERPL-CCNC: 17 U/L
BILIRUB SERPL-MCNC: 0.4 MG/DL
BUN SERPL-MCNC: 15 MG/DL
CALCIUM SERPL-MCNC: 9.6 MG/DL
CHLORIDE SERPL-SCNC: 95 MMOL/L
CO2 SERPL-SCNC: 29 MMOL/L
CREAT SERPL-MCNC: 0.8 MG/DL
EST. GFR  (AFRICAN AMERICAN): >60 ML/MIN/1.73 M^2
EST. GFR  (NON AFRICAN AMERICAN): >60 ML/MIN/1.73 M^2
GLUCOSE SERPL-MCNC: 108 MG/DL
POTASSIUM SERPL-SCNC: 4.5 MMOL/L
PROT SERPL-MCNC: 7 G/DL
SODIUM SERPL-SCNC: 130 MMOL/L

## 2018-01-26 PROCEDURE — 36415 COLL VENOUS BLD VENIPUNCTURE: CPT

## 2018-01-26 PROCEDURE — 1159F MED LIST DOCD IN RCRD: CPT | Mod: S$GLB,,, | Performed by: INTERNAL MEDICINE

## 2018-01-26 PROCEDURE — 99214 OFFICE O/P EST MOD 30 MIN: CPT | Mod: S$GLB,,, | Performed by: INTERNAL MEDICINE

## 2018-01-26 PROCEDURE — 1126F AMNT PAIN NOTED NONE PRSNT: CPT | Mod: S$GLB,,, | Performed by: INTERNAL MEDICINE

## 2018-01-26 PROCEDURE — G0439 PPPS, SUBSEQ VISIT: HCPCS | Mod: S$GLB,,, | Performed by: NURSE PRACTITIONER

## 2018-01-26 PROCEDURE — 80053 COMPREHEN METABOLIC PANEL: CPT

## 2018-01-26 PROCEDURE — 3008F BODY MASS INDEX DOCD: CPT | Mod: S$GLB,,, | Performed by: INTERNAL MEDICINE

## 2018-01-26 PROCEDURE — 99499 UNLISTED E&M SERVICE: CPT | Mod: S$GLB,,, | Performed by: NURSE PRACTITIONER

## 2018-01-26 PROCEDURE — 99999 PR PBB SHADOW E&M-EST. PATIENT-LVL V: CPT | Mod: PBBFAC,,, | Performed by: NURSE PRACTITIONER

## 2018-01-26 PROCEDURE — 99999 PR PBB SHADOW E&M-EST. PATIENT-LVL V: CPT | Mod: PBBFAC,,, | Performed by: INTERNAL MEDICINE

## 2018-01-26 PROCEDURE — 99499 UNLISTED E&M SERVICE: CPT | Mod: S$GLB,,, | Performed by: INTERNAL MEDICINE

## 2018-01-26 RX ORDER — BUPROPION HYDROCHLORIDE 100 MG/1
100 TABLET, EXTENDED RELEASE ORAL 2 TIMES DAILY
Qty: 180 TABLET | Refills: 1 | Status: SHIPPED | OUTPATIENT
Start: 2018-01-26 | End: 2018-05-29 | Stop reason: ALTCHOICE

## 2018-01-26 RX ORDER — ATORVASTATIN CALCIUM 40 MG/1
TABLET, FILM COATED ORAL
Qty: 90 TABLET | Refills: 1 | Status: SHIPPED | OUTPATIENT
Start: 2018-01-26 | End: 2018-05-29 | Stop reason: SDUPTHER

## 2018-01-26 RX ORDER — LISINOPRIL 2.5 MG/1
2.5 TABLET ORAL DAILY
Qty: 90 TABLET | Refills: 1 | Status: SHIPPED | OUTPATIENT
Start: 2018-01-26 | End: 2018-01-27

## 2018-01-26 RX ORDER — POLYETHYLENE GLYCOL 3350 17 G/17G
POWDER, FOR SOLUTION ORAL DAILY PRN
COMMUNITY
End: 2018-02-16 | Stop reason: CLARIF

## 2018-01-26 RX ORDER — LEVOTHYROXINE SODIUM 150 UG/1
150 TABLET ORAL DAILY
Qty: 90 TABLET | Refills: 1 | Status: SHIPPED | OUTPATIENT
Start: 2018-01-26 | End: 2018-05-29 | Stop reason: SDUPTHER

## 2018-01-26 RX ORDER — ALENDRONATE SODIUM 70 MG/1
70 TABLET ORAL WEEKLY
Qty: 12 TABLET | Refills: 1 | Status: SHIPPED | OUTPATIENT
Start: 2018-01-26 | End: 2018-05-29

## 2018-01-26 RX ORDER — POLYETHYLENE GLYCOL 3350 17 G/17G
17 POWDER, FOR SOLUTION ORAL DAILY PRN
Qty: 1 BOTTLE | Refills: 0 | Status: SHIPPED | OUTPATIENT
Start: 2018-01-26 | End: 2018-01-26 | Stop reason: SDUPTHER

## 2018-01-26 RX ORDER — KETOCONAZOLE 20 MG/G
CREAM TOPICAL DAILY PRN
Qty: 15 G | Refills: 1 | Status: SHIPPED | OUTPATIENT
Start: 2018-01-26 | End: 2018-02-16 | Stop reason: CLARIF

## 2018-01-26 RX ORDER — CARVEDILOL 12.5 MG/1
12.5 TABLET ORAL 2 TIMES DAILY
Qty: 180 TABLET | Refills: 1 | Status: SHIPPED | OUTPATIENT
Start: 2018-01-26 | End: 2018-05-29 | Stop reason: SDUPTHER

## 2018-01-26 NOTE — PATIENT INSTRUCTIONS
Counseling and Referral of Other Preventative  (Italic type indicates deductible and co-insurance are waived)    Patient Name: Laila Hanna  Today's Date: 1/26/2018    Health Maintenance       Date Due Completion Date    TETANUS VACCINE 03/28/2018 3/28/2008    Lipid Panel 06/22/2018 6/22/2017    DEXA SCAN 05/26/2019 5/26/2017    Override on 8/23/2012: Not Clinically Appropriate        No orders of the defined types were placed in this encounter.    The following information is provided to all patients.  This information is to help you find resources for any of the problems found today that may be affecting your health:                Living healthy guide: www.[facility].[place].[facility]      Understanding Diabetes: www.diabetes.org      Eating healthy: www.cdc.gov/healthyweight      CDC home safety checklist: www.cdc.gov/steadi/patient.html      Agency on Aging: www.goea.[place]      Alcoholics anonymous (AA): www.aa.org      Physical Activity: www.sage.nih.gov/vd0fruy      Tobacco use: www.quitwithusla.org

## 2018-01-26 NOTE — PROGRESS NOTES
"Laila Hanna presented for a  Medicare AWV and comprehensive Health Risk Assessment today. The following components were reviewed and updated:    · Medical history  · Family History  · Social history  · Allergies and Current Medications  · Health Risk Assessment  · Health Maintenance  · Care Team     ** See Completed Assessments for Annual Wellness Visit within the encounter summary.**       The following assessments were completed:  · Living Situation  · CAGE  · Depression Screening  · Timed Get Up and Go  · Whisper Test  · Cognitive Function Screening - not administered secondary to diagnosis of cognitive dysfunction  · Nutrition Screening  · ADL Screening  · PAQ Screening    Vitals:    01/26/18 1459   BP: 122/62   BP Location: Left arm   Patient Position: Sitting   BP Method: Large (Manual)   Pulse: 65   Weight: 89.6 kg (197 lb 8 oz)   Height: 5' 6" (1.676 m)     Body mass index is 31.88 kg/m².  Physical Exam   Constitutional: She is oriented to person, place, and time. She appears well-developed and well-nourished.   Musculoskeletal:   Gait antalgic, ambulates with cane   Neurological: She is alert and oriented to person, place, and time.   Skin: Skin is warm and dry.   Psychiatric: She has a normal mood and affect.   Nursing note and vitals reviewed.        Diagnoses and health risks identified today and associated recommendations/orders:    1. Encounter for preventive health examination  Here for Health Risk Assessment/Annual Wellness Visit.  Follow up in one year.    2. Essential hypertension  Chronic, stable on current medications. Followed by PCP.    3. Aortic atherosclerosis  Chronic, stable on current medications. Noted on CXR 5/31/16. Followed by PCP.    4. Coronary artery disease involving native coronary artery of native heart without angina pectoris  Chronic, stable on current medications. Followed by Cardiology.    5. History of PTCA  Stable on current medications. Followed by Cardiology.    6. Left " ventricular diastolic dysfunction with preserved systolic function  Chronic, stable. Noted on ECHO 5/03/16. Followed by Cardiology.    7. Pulmonary heart disease  Chronic, stable. Estimated PA Systolic pressure 43 noted on ECHO 5/03/16. . Followed by Cardiology.    8. Nonrheumatic aortic valve stenosis  Chronic, stable. Followed by Cardiology.    9. Bilateral carotid artery disease  Chronic, stable. 1-39% stenosis bilaterally noted on U/S 6/04/16. Followed by PCP.    10. Cognitive dysfunction  Chronic, stable. Followed by Neurology.    11. Meningioma  Chronic, stable.  Followed by Neurology    12. Recurrent major depressive disorder, in full remission  Chronic, stable on current medications. {HQ-2 score 0. Followed by PCP.    13. SIADH (syndrome of inappropriate ADH production)  Chronic, stable. Followed by PCP.    14. Hyponatremia  Chronic, stable. Followed by PCP.    15. History of transient ischemic attack (TIA)  Stable on current medications. Followed by PCP, Neurology.    16. Dysphagia, unspecified type  Chronic, stable.  Followed by Gastroenterology.    17. Hypothyroidism due to acquired atrophy of thyroid  Chronic, stable on current medication. Followed by PCP.    18. Stage 3 chronic kidney disease  Chronic, stable. Followed by PCP.    19. Senile osteoporosis  Chronic, stable on current medication. Followed by PCP.      Provided Laila with a 5-10 year written screening schedule and personal prevention plan. Recommendations were developed using the USPSTF age appropriate recommendations. Education, counseling, and referrals were provided as needed. After Visit Summary printed and given to patient which includes a list of additional screenings\tests needed.    Follow-up in 4 months (on 5/29/2018).with PCP    Radha Zurita NP

## 2018-01-27 ENCOUNTER — TELEPHONE (OUTPATIENT)
Dept: INTERNAL MEDICINE | Facility: CLINIC | Age: 83
End: 2018-01-27

## 2018-01-27 NOTE — TELEPHONE ENCOUNTER
Please contact patient and inform her that her sodium is slightly decreased. Please advise her to discontinue lisinopril.

## 2018-01-30 ENCOUNTER — OFFICE VISIT (OUTPATIENT)
Dept: OPHTHALMOLOGY | Facility: CLINIC | Age: 83
End: 2018-01-30
Payer: MEDICARE

## 2018-01-30 DIAGNOSIS — H00.022 HORDEOLUM INTERNUM OF RIGHT LOWER EYELID: Primary | ICD-10-CM

## 2018-01-30 PROCEDURE — 99999 PR PBB SHADOW E&M-EST. PATIENT-LVL III: CPT | Mod: PBBFAC,,, | Performed by: OPHTHALMOLOGY

## 2018-01-30 PROCEDURE — 92012 INTRM OPH EXAM EST PATIENT: CPT | Mod: S$GLB,,, | Performed by: OPHTHALMOLOGY

## 2018-01-30 NOTE — PROGRESS NOTES
HPI     DLS:01/18/2018 Carol  Patient here w/caretaker which states patient having anxiety about having   surgery.  Pt states OD seem to be getting better.    Eye Drops:Erythromycin TID OD                    Optivia daily OD                    Warm compresses     I have personally interviewed the patient, reviewed the history and   examined the patient and agree with the technician's exam.    Last edited by Ran Medina MD on 1/30/2018 11:21 AM. (History)            Assessment /Plan     For exam results, see Encounter Report.    Hordeolum internum of right lower eyelid      Continue erythromycin ointment and lubricating ointment.  Continue hot compresses.  Keep appointment for eyelid surgery with Dr. Alvarez.  Return to me as requested.

## 2018-01-30 NOTE — PATIENT INSTRUCTIONS
Continue hot compresses.  Continue erythromycin ointment.  Keep appointment for surgery with Dr. Alvarez.  Return to me as requested.

## 2018-02-02 NOTE — PROGRESS NOTES
Subjective:       Patient ID: Laila Hanna is a 86 y.o. female.    Chief Complaint: Hypertension    HPI  She returns for management of hypertension.  She has had hypertension for over a year.  Current treatment has included medications outlined in medication list.  She denies chest pain or shortness of breath.  No palpitations.  Denies left arm or neck pain.    Past medical history: Hypertension, coronary artery disease, TIA, hyperlipidemia, aortic stenosis, meningioma, osteoporosis, hypothyroidism, migraine headache,, status post cholecystectomy, status post thyroidectomy, colon adenoma. She had a colonoscopy April 2013     Medications: one aspirin daily, Lipitor 40 mg daily, Synthroid 0.15 mg daily, coreg 12.5 mg twice a day, Wellbutrin, lisinopril 2.5 mg daily, Fosamax 70 mg once a week     ALLERGIES: Thiazides       Review of Systems   Constitutional: Negative for chills, fatigue, fever and unexpected weight change.   Respiratory: Negative for chest tightness and shortness of breath.    Cardiovascular: Negative for chest pain and palpitations.   Gastrointestinal: Negative for abdominal pain and blood in stool.   Neurological: Negative for dizziness, syncope, numbness and headaches.       Objective:      Physical Exam   HENT:   Right Ear: External ear normal.   Left Ear: External ear normal.   Nose: Nose normal.   Mouth/Throat: Oropharynx is clear and moist.   Eyes: Pupils are equal, round, and reactive to light.   Neck: Normal range of motion.   Cardiovascular: Normal rate and regular rhythm.    Murmur heard.  Pulmonary/Chest: Breath sounds normal.   Abdominal: She exhibits no distension. There is no hepatosplenomegaly. There is no tenderness.   Lymphadenopathy:     She has no cervical adenopathy.     She has no axillary adenopathy.   Neurological: She has normal strength and normal reflexes. No cranial nerve deficit or sensory deficit.       Assessment/Plan       Assessment and plan: Hypertension: Check  CMP

## 2018-02-12 ENCOUNTER — TELEPHONE (OUTPATIENT)
Dept: OPHTHALMOLOGY | Facility: CLINIC | Age: 83
End: 2018-02-12

## 2018-02-12 NOTE — TELEPHONE ENCOUNTER
----- Message from Payton Retana sent at 2/12/2018  9:24 AM CST -----  Contact: Maria G (zeferino)  Zeferino calling to confirm if everything is a go for her surgery on 02/19/18.  She can be reached at [phone].

## 2018-02-14 ENCOUNTER — TELEPHONE (OUTPATIENT)
Dept: OPHTHALMOLOGY | Facility: CLINIC | Age: 83
End: 2018-02-14

## 2018-02-15 ENCOUNTER — TELEPHONE (OUTPATIENT)
Dept: OPHTHALMOLOGY | Facility: CLINIC | Age: 83
End: 2018-02-15

## 2018-02-19 ENCOUNTER — HOSPITAL ENCOUNTER (OUTPATIENT)
Facility: HOSPITAL | Age: 83
Discharge: HOME | End: 2018-02-19
Attending: OPHTHALMOLOGY | Admitting: OPHTHALMOLOGY
Payer: MEDICARE

## 2018-02-19 ENCOUNTER — SURGERY (OUTPATIENT)
Age: 83
End: 2018-02-19

## 2018-02-19 ENCOUNTER — ANESTHESIA (OUTPATIENT)
Dept: SURGERY | Facility: HOSPITAL | Age: 83
End: 2018-02-19
Payer: MEDICARE

## 2018-02-19 ENCOUNTER — ANESTHESIA EVENT (OUTPATIENT)
Dept: SURGERY | Facility: HOSPITAL | Age: 83
End: 2018-02-19
Payer: MEDICARE

## 2018-02-19 VITALS
RESPIRATION RATE: 16 BRPM | HEIGHT: 66 IN | SYSTOLIC BLOOD PRESSURE: 154 MMHG | BODY MASS INDEX: 32.14 KG/M2 | OXYGEN SATURATION: 98 % | WEIGHT: 200 LBS | HEART RATE: 70 BPM | DIASTOLIC BLOOD PRESSURE: 76 MMHG | TEMPERATURE: 98 F

## 2018-02-19 DIAGNOSIS — H02.003 ENTROPION OF RIGHT EYELID: Primary | ICD-10-CM

## 2018-02-19 PROCEDURE — 27201423 OPTIME MED/SURG SUP & DEVICES STERILE SUPPLY: Performed by: OPHTHALMOLOGY

## 2018-02-19 PROCEDURE — 37000009 HC ANESTHESIA EA ADD 15 MINS: Performed by: OPHTHALMOLOGY

## 2018-02-19 PROCEDURE — 63600175 PHARM REV CODE 636 W HCPCS: Performed by: NURSE ANESTHETIST, CERTIFIED REGISTERED

## 2018-02-19 PROCEDURE — 25000003 PHARM REV CODE 250: Performed by: NURSE ANESTHETIST, CERTIFIED REGISTERED

## 2018-02-19 PROCEDURE — D9220A PRA ANESTHESIA: Mod: ANES,,, | Performed by: ANESTHESIOLOGY

## 2018-02-19 PROCEDURE — 67924 REPAIR EYELID DEFECT: CPT | Mod: E4,,, | Performed by: OPHTHALMOLOGY

## 2018-02-19 PROCEDURE — 36000706: Performed by: OPHTHALMOLOGY

## 2018-02-19 PROCEDURE — 71000033 HC RECOVERY, INTIAL HOUR: Performed by: OPHTHALMOLOGY

## 2018-02-19 PROCEDURE — 25000003 PHARM REV CODE 250: Performed by: OPHTHALMOLOGY

## 2018-02-19 PROCEDURE — 63600175 PHARM REV CODE 636 W HCPCS: Performed by: OPHTHALMOLOGY

## 2018-02-19 PROCEDURE — 36000707: Performed by: OPHTHALMOLOGY

## 2018-02-19 PROCEDURE — 25000003 PHARM REV CODE 250: Performed by: ANESTHESIOLOGY

## 2018-02-19 PROCEDURE — S0020 INJECTION, BUPIVICAINE HYDRO: HCPCS | Performed by: OPHTHALMOLOGY

## 2018-02-19 PROCEDURE — 71000015 HC POSTOP RECOV 1ST HR: Performed by: OPHTHALMOLOGY

## 2018-02-19 PROCEDURE — D9220A PRA ANESTHESIA: Mod: CRNA,,, | Performed by: NURSE ANESTHETIST, CERTIFIED REGISTERED

## 2018-02-19 PROCEDURE — 37000008 HC ANESTHESIA 1ST 15 MINUTES: Performed by: OPHTHALMOLOGY

## 2018-02-19 RX ORDER — PROPARACAINE HYDROCHLORIDE 5 MG/ML
SOLUTION/ DROPS OPHTHALMIC
Status: DISCONTINUED | OUTPATIENT
Start: 2018-02-19 | End: 2018-02-19 | Stop reason: HOSPADM

## 2018-02-19 RX ORDER — HYDROCODONE BITARTRATE AND ACETAMINOPHEN 5; 325 MG/1; MG/1
1 TABLET ORAL EVERY 4 HOURS PRN
Status: DISCONTINUED | OUTPATIENT
Start: 2018-02-19 | End: 2018-02-19 | Stop reason: HOSPADM

## 2018-02-19 RX ORDER — SODIUM CHLORIDE, SODIUM LACTATE, POTASSIUM CHLORIDE, CALCIUM CHLORIDE 600; 310; 30; 20 MG/100ML; MG/100ML; MG/100ML; MG/100ML
INJECTION, SOLUTION INTRAVENOUS CONTINUOUS
Status: DISCONTINUED | OUTPATIENT
Start: 2018-02-19 | End: 2018-02-19 | Stop reason: HOSPADM

## 2018-02-19 RX ORDER — NEOMYCIN SULFATE, POLYMYXIN B SULFATE, AND DEXAMETHASONE 3.5; 10000; 1 MG/G; [USP'U]/G; MG/G
OINTMENT OPHTHALMIC 3 TIMES DAILY
Qty: 1 TUBE | Refills: 2 | Status: SHIPPED | OUTPATIENT
Start: 2018-02-19 | End: 2018-02-26

## 2018-02-19 RX ORDER — SODIUM CHLORIDE 0.9 % (FLUSH) 0.9 %
3 SYRINGE (ML) INJECTION
Status: DISCONTINUED | OUTPATIENT
Start: 2018-02-19 | End: 2018-02-19 | Stop reason: HOSPADM

## 2018-02-19 RX ORDER — LIDOCAINE HYDROCHLORIDE 10 MG/ML
1 INJECTION, SOLUTION EPIDURAL; INFILTRATION; INTRACAUDAL; PERINEURAL ONCE
Status: DISCONTINUED | OUTPATIENT
Start: 2018-02-19 | End: 2018-02-19 | Stop reason: HOSPADM

## 2018-02-19 RX ORDER — LIDOCAINE HYDROCHLORIDE 10 MG/ML
1 INJECTION, SOLUTION EPIDURAL; INFILTRATION; INTRACAUDAL; PERINEURAL ONCE
Status: COMPLETED | OUTPATIENT
Start: 2018-02-19 | End: 2018-02-19

## 2018-02-19 RX ORDER — TETRACAINE HYDROCHLORIDE 5 MG/ML
1 SOLUTION OPHTHALMIC
Status: DISCONTINUED | OUTPATIENT
Start: 2018-02-19 | End: 2018-02-19 | Stop reason: HOSPADM

## 2018-02-19 RX ORDER — LIDOCAINE HCL/EPINEPHRINE/PF 2%-1:200K
VIAL (ML) INJECTION
Status: DISCONTINUED | OUTPATIENT
Start: 2018-02-19 | End: 2018-02-19 | Stop reason: HOSPADM

## 2018-02-19 RX ORDER — LIDOCAINE HCL/EPINEPHRINE/PF 2%-1:200K
VIAL (ML) INJECTION
Status: DISCONTINUED
Start: 2018-02-19 | End: 2018-02-19 | Stop reason: HOSPADM

## 2018-02-19 RX ORDER — ONDANSETRON 2 MG/ML
4 INJECTION INTRAMUSCULAR; INTRAVENOUS EVERY 12 HOURS PRN
Status: DISCONTINUED | OUTPATIENT
Start: 2018-02-19 | End: 2018-02-19 | Stop reason: HOSPADM

## 2018-02-19 RX ORDER — FENTANYL CITRATE 50 UG/ML
INJECTION, SOLUTION INTRAMUSCULAR; INTRAVENOUS
Status: DISCONTINUED | OUTPATIENT
Start: 2018-02-19 | End: 2018-02-19

## 2018-02-19 RX ORDER — GLYCOPYRROLATE 0.2 MG/ML
INJECTION INTRAMUSCULAR; INTRAVENOUS
Status: DISCONTINUED | OUTPATIENT
Start: 2018-02-19 | End: 2018-02-19

## 2018-02-19 RX ORDER — ACETAMINOPHEN 325 MG/1
650 TABLET ORAL EVERY 4 HOURS PRN
Status: DISCONTINUED | OUTPATIENT
Start: 2018-02-19 | End: 2018-02-19 | Stop reason: HOSPADM

## 2018-02-19 RX ORDER — BUPIVACAINE HYDROCHLORIDE 5 MG/ML
INJECTION, SOLUTION EPIDURAL; INTRACAUDAL
Status: DISCONTINUED
Start: 2018-02-19 | End: 2018-02-19 | Stop reason: HOSPADM

## 2018-02-19 RX ORDER — BUPIVACAINE HYDROCHLORIDE 5 MG/ML
INJECTION, SOLUTION EPIDURAL; INTRACAUDAL
Status: DISCONTINUED | OUTPATIENT
Start: 2018-02-19 | End: 2018-02-19 | Stop reason: HOSPADM

## 2018-02-19 RX ORDER — CEFAZOLIN SODIUM 1 G/3ML
INJECTION, POWDER, FOR SOLUTION INTRAMUSCULAR; INTRAVENOUS
Status: DISCONTINUED | OUTPATIENT
Start: 2018-02-19 | End: 2018-02-19

## 2018-02-19 RX ORDER — TETRACAINE HYDROCHLORIDE 5 MG/ML
SOLUTION OPHTHALMIC
Status: DISCONTINUED
Start: 2018-02-19 | End: 2018-02-19 | Stop reason: HOSPADM

## 2018-02-19 RX ORDER — LIDOCAINE HCL/PF 100 MG/5ML
SYRINGE (ML) INTRAVENOUS
Status: DISCONTINUED | OUTPATIENT
Start: 2018-02-19 | End: 2018-02-19

## 2018-02-19 RX ORDER — PROPOFOL 10 MG/ML
VIAL (ML) INTRAVENOUS
Status: DISCONTINUED | OUTPATIENT
Start: 2018-02-19 | End: 2018-02-19

## 2018-02-19 RX ORDER — NEOMYCIN SULFATE, POLYMYXIN B SULFATE AND DEXAMETHASONE 3.5; 10000; 1 MG/ML; [USP'U]/ML; MG/ML
1 SUSPENSION/ DROPS OPHTHALMIC EVERY 6 HOURS
Qty: 5 ML | Refills: 0 | Status: SHIPPED | OUTPATIENT
Start: 2018-02-19 | End: 2018-04-24

## 2018-02-19 RX ORDER — HYDROCODONE BITARTRATE AND ACETAMINOPHEN 5; 325 MG/1; MG/1
1 TABLET ORAL EVERY 6 HOURS PRN
Qty: 16 TABLET | Refills: 0 | Status: SHIPPED | OUTPATIENT
Start: 2018-02-19 | End: 2018-03-14

## 2018-02-19 RX ORDER — SODIUM CHLORIDE 9 MG/ML
INJECTION, SOLUTION INTRAVENOUS CONTINUOUS
Status: DISCONTINUED | OUTPATIENT
Start: 2018-02-19 | End: 2018-02-19 | Stop reason: HOSPADM

## 2018-02-19 RX ORDER — OXYCODONE HYDROCHLORIDE 5 MG/1
10 TABLET ORAL EVERY 4 HOURS PRN
Status: DISCONTINUED | OUTPATIENT
Start: 2018-02-19 | End: 2018-02-19 | Stop reason: HOSPADM

## 2018-02-19 RX ADMIN — SODIUM CHLORIDE: 0.9 INJECTION, SOLUTION INTRAVENOUS at 12:02

## 2018-02-19 RX ADMIN — LIDOCAINE HYDROCHLORIDE 50 MG: 20 INJECTION, SOLUTION INTRAVENOUS at 12:02

## 2018-02-19 RX ADMIN — BUPIVACAINE HYDROCHLORIDE 4.5 ML: 5 INJECTION, SOLUTION EPIDURAL; INTRACAUDAL; PERINEURAL at 01:02

## 2018-02-19 RX ADMIN — TOBRAMYCIN AND DEXAMETHASONE 1 APPLICATION: 3; 1 OINTMENT OPHTHALMIC at 01:02

## 2018-02-19 RX ADMIN — ONDANSETRON 4 MG: 2 INJECTION INTRAMUSCULAR; INTRAVENOUS at 02:02

## 2018-02-19 RX ADMIN — CEFAZOLIN 2 G: 330 INJECTION, POWDER, FOR SOLUTION INTRAMUSCULAR; INTRAVENOUS at 01:02

## 2018-02-19 RX ADMIN — LIDOCAINE HYDROCHLORIDE: 10 INJECTION, SOLUTION EPIDURAL; INFILTRATION; INTRACAUDAL; PERINEURAL at 11:02

## 2018-02-19 RX ADMIN — LIDOCAINE HYDROCHLORIDE AND EPINEPHRINE 4.5 ML: 20; 5 INJECTION, SOLUTION EPIDURAL; INFILTRATION; INTRACAUDAL; PERINEURAL at 01:02

## 2018-02-19 RX ADMIN — TETRACAINE HYDROCHLORIDE 1 DROP: 5 SOLUTION OPHTHALMIC at 01:02

## 2018-02-19 RX ADMIN — ACETAMINOPHEN 650 MG: 325 TABLET ORAL at 02:02

## 2018-02-19 RX ADMIN — HYALURONIDASE, OVINE 1 ML: 200 INJECTION, SOLUTION SUBCUTANEOUS at 01:02

## 2018-02-19 RX ADMIN — PROPOFOL 100 MG: 10 INJECTION, EMULSION INTRAVENOUS at 12:02

## 2018-02-19 RX ADMIN — FENTANYL CITRATE 50 MCG: 50 INJECTION, SOLUTION INTRAMUSCULAR; INTRAVENOUS at 01:02

## 2018-02-19 RX ADMIN — GLYCOPYRROLATE 0.2 MG: 0.2 INJECTION, SOLUTION INTRAMUSCULAR; INTRAVENOUS at 12:02

## 2018-02-19 NOTE — H&P
Past Medical History:   Diagnosis Date    Basal cell carcinoma     nose    Benign essential hypertension     Cerebral microvascular disease 9/8/2014    Closed fracture of distal phalanx of left hand with routine healing 9/17/2015    Closed mallet fracture of distal phalanx of finger with routine healing 10/6/2015    Coronary artery disease     DDD (degenerative disc disease), lumbar 4/12/2016    Depression     Fall at home 5/30/2016    Hyperlipidemia     Hypothyroidism due to acquired atrophy of thyroid     Meningiomas, multiple     MI (myocardial infarction) 2004    80% blockage per patient    Migraine aura without headache 12/1/2014    Post PTCA 12/12/2012    Transient cerebral ischemia 5/4/2014       Past Surgical History:   Procedure Laterality Date    CARDIAC CATHETERIZATION  03/29/2004    S/P LAD PTCA, coated stent    CATARACT EXTRACTION W/  INTRAOCULAR LENS IMPLANT Bilateral 2000    Dr Youssef     CHOLECYSTECTOMY      CORONARY ANGIOPLASTY WITH STENT PLACEMENT  2004    LAD    EYE SURGERY      TOTAL THYROIDECTOMY         Family History   Problem Relation Age of Onset    Stroke Mother     Diabetes Mother     Hypertension Father     Stroke Father     Cancer Brother      colon    Skin cancer Brother     Colon cancer Brother     Diabetes Paternal Grandmother     Cancer Sister      breast    Glaucoma Maternal Aunt     No Known Problems Maternal Uncle     No Known Problems Paternal Aunt     No Known Problems Paternal Uncle     No Known Problems Maternal Grandmother     No Known Problems Maternal Grandfather     No Known Problems Paternal Grandfather     Amblyopia Neg Hx     Blindness Neg Hx     Cataracts Neg Hx     Macular degeneration Neg Hx     Retinal detachment Neg Hx     Strabismus Neg Hx     Thyroid disease Neg Hx     Esophageal cancer Neg Hx        Social History     Social History    Marital status: Single     Spouse name: N/A    Number of children: N/A     Years of education: N/A     Occupational History     [email or web address]     Social History Main Topics    Smoking status: Former Smoker     Packs/day: 0.30     Years: 15.00     Types: Cigarettes     Quit date: 1/26/1973    Smokeless tobacco: Never Used    Alcohol use No    Drug use: No    Sexual activity: No     Other Topics Concern    None     Social History Narrative    None       Current Facility-Administered Medications   Medication Dose Route Frequency Provider Last Rate Last Dose    0.9%  NaCl infusion   Intravenous Continuous Poppy Corona MD        lactated ringers infusion   Intravenous Continuous Funmilayo Alvarez MD        lidocaine (PF) 10 mg/ml (1%) injection 10 mg  1 mL Intradermal Once Poppy Corona MD        sodium chloride 0.9% flush 3 mL  3 mL Intravenous PRN Poppy Corona MD        tetracaine HCl (PF) 0.5 % Drop 1 drop  1 drop Right Eye On Call Procedure Funmilayo Alvarez MD           Review of patient's allergies indicates:   Allergen Reactions    Thiazides Other (See Comments)     Severe hyponatremia       86 y.o. Female with right lower eyelid entropion causing chronic irritation and tearing.     PE:    HEENT: Normocephalic, atraumatic  CV: RRR nl s1 and s2  Chest: CTA-B  Abd: s/nt/nd  Ext: warm, perfused    A/p: To OR for right lower eyelid entropion repair  Informed consent obtained after extensive risks/benefits/alternatives were discussed with the patient including but not limited to pain, bleeding, infection, ocular injury, loss of the eye, asymmetry, need for revision in future, scarring.  Alternatives such as waiting were discussed.  All questions were answered.

## 2018-02-19 NOTE — PLAN OF CARE
Patient is in bed awake and alert. No acute distress is noted. Respirations are even and unlabored on room air. Plan of care reviewed with patient. No questions or concerns expressed at this time. Patient denies pain. Bed is in low, locked position with side rails upx2. Call light is in reach. Will continue to monitor

## 2018-02-19 NOTE — OP NOTE
Attending: Funmilayo Alvarez  Resident: none  Pre-op Dx: right lower lid entropion  Post-op Dx: same  Procedure: right lower lid entropion repair  Anesthesia: Local and General 2% lidocaine with epinephrine, 0.5% marcaine, and vitrase  Ebl: less than 5 cc  Specimens: none   Complications: none     Indications for surgery: 86 y.o. Female with chronic irritation and tearing of the right eye from her right lower eyelid entropion. Informed consent obtained after extensive risks/benefits/alternatives were discussed with the patient including but not limited to pain, bleeding, infection, ocular injury, loss of the eye, asymmetry, need for revision in future, scarring.  Alternatives such as waiting were discussed.  All questions were answered.     Procedure in detail: Informed consent was obtained prior to surgery. The patient was brought to the operating room. After adequate anesthesia was achieved, subcutaneous lidocaine 2% with epinephrine with 0.5% marcaine and vitrase was injected into the right lower eyelid and right lateral canthus. The face was prepped and draped in sterile fashion using topical Betadine.A lateral canthal incision was made using a #15 blade. Straight Bermudez scissors were used to perform a canthotomy and monopolar cautery was used to incise the inferior ricarda of the lateral canthal tendon. Hemostasis was maintained.     A 4-0 silk traction suture was placed in the lower lid margin. Desmarres retractors were used to polly the lower eyelid. The conjunctiva was marked using a sterile marking pen at the level of the inferior border of the tarsus. A coated Jaegar lid plate was used to protect the eye. Monopolar electrocautery was used to incise the conjunctiva and lid retractors. The pretarsal orbicularis muscle was identified and tented using Bishops forceps. Monopolar electrocautery was used to excise a strip of pretarsal orbicularis muscle. Two interrupted 6-0 Vicryl sutures in buried fashion were passed  from the edge of the conjunctiva and retractors to the anterior inferior edge of the tarsus.      The eyelid was draped laterally over the zygoma, and a full thickness lid excision was taken of the portion of the lid overlying the zygoma. To fixate the lid down to the rim, a 5-0 Vicryl suture on P-2 semicircular needle was passed anterior-inferiorly through the tarsus exiting through the tarsus post-sup, then passed 3 mm posterior to the orbital rim and passed back through the superior ricarda of the lateral canthal tendon and tied tightly, allowing the knot to lie flat as a horizontal mattress suture. To reform the lateral canthal angle, a 6-0 Vicryl suture on a S-14 was sewn through the wound exiting the grayline of the lower lid and and then in the reverse through the upper lid. The skin of the lateral canthal incision was then re-apposed using a running 6-0 Prolene suture. Copious Tobradex ointment was placed onto the wound and into the right eye. The patient tolerated the procedure well without any complication.      The lateral canthal incision was closed with a running 6-0 Prolene suture. Tobradex ointment was applied to the eye and eyelid. The patient tolerated the procedure well without complications. She was awoken and extubated and taken to the recovery room in stable condition.

## 2018-02-19 NOTE — DISCHARGE SUMMARY
Discharge Note  Short Stay      SUMMARY     Admit Date: 2/19/2018    Attending Physician: Funmilayo Alvarez MD    Discharge Physician: Funmilayo Alvarez MD    Discharge Date: 2/19/2018 2:09 PM    Final Diagnosis: Post-Op Diagnosis Codes:     * Entropion of right eyelid [H02.003]    Hospital Course: The patient underwent uncomplicated eyelid surgery under general anesthesia and was discharged after recovery to be followed as an outpatient in the clinic.    Disposition: discharged home    Patient Instructions:   Current Discharge Medication List      START taking these medications    Details   hydrocodone-acetaminophen 5-325mg (NORCO) 5-325 mg per tablet Take 1 tablet by mouth every 6 (six) hours as needed for Pain.  Qty: 16 tablet, Refills: 0      neomycin-polymyxin-dexamethasone (DEXACINE) 3.5 mg/g-10,000 unit/g-0.1 % Oint Place into the right eye 3 (three) times daily.  Qty: 1 Tube, Refills: 2      neomycin-polymyxin-dexamethasone (MAXITROL) 3.5mg/mL-10,000 unit/mL-0.1 % DrpS Place 1 drop into the right eye every 6 (six) hours.  Qty: 5 mL, Refills: 0         CONTINUE these medications which have NOT CHANGED    Details   alendronate (FOSAMAX) 70 MG tablet Take 1 tablet (70 mg total) by mouth once a week.  Qty: 12 tablet, Refills: 1      alprazolam (XANAX) 0.25 MG tablet take 1 tablet by mouth every 6 to 8 hours if needed  Refills: 0      aspirin (ECOTRIN) 81 MG EC tablet Take 1 tablet (81 mg total) by mouth once daily.      atorvastatin (LIPITOR) 40 MG tablet One tablet daily  Qty: 90 tablet, Refills: 1    Associated Diagnoses: Hyperlipidemia, unspecified hyperlipidemia type      buPROPion (WELLBUTRIN SR) 100 MG TBSR 12 hr tablet Take 1 tablet (100 mg total) by mouth 2 (two) times daily.  Qty: 180 tablet, Refills: 1      calcium-vitamin D3 (CALCIUM 500 + D) 500 mg(1,250mg) -200 unit per tablet Take 1 tablet by mouth once daily.       carvedilol (COREG) 12.5 MG tablet Take 1 tablet (12.5 mg total) by mouth 2 (two) times  daily.  Qty: 180 tablet, Refills: 1    Associated Diagnoses: Essential hypertension      dextran 70-hypromellose (ARTIFICIAL TEARS) ophthalmic solution Place 1 drop into both eyes every 2 (two) hours as needed.  Refills: 0    Associated Diagnoses: Sicca syndrome      levothyroxine (SYNTHROID) 150 MCG tablet Take 1 tablet (150 mcg total) by mouth once daily.  Qty: 90 tablet, Refills: 1    Associated Diagnoses: Hypothyroidism due to acquired atrophy of thyroid      polyethylene glycol (MIRALAX) 17 gram PwPk Take 17 g by mouth 2 (two) times daily.    Associated Diagnoses: Slow transit constipation      cyanocobalamin (VITAMIN B-12) 250 MCG tablet Take 1 tablet (250 mcg total) by mouth once daily.      magnesium hydroxide 400 mg/5 ml (MILK OF MAGNESIA) 400 mg/5 mL Susp Take 30 mLs (2,400 mg total) by mouth every evening.    Associated Diagnoses: Slow transit constipation             Discharge Procedure Orders (must include Diet, Follow-up, Activity):    Discharge Procedure Orders (must include Diet, Follow-up, Activity)  Diet general     Activity as tolerated     Keep surgical extremity elevated     Ice to affected area     Lifting restrictions     Call MD for:  temperature >100.4     Call MD for:  persistent nausea and vomiting     Call MD for:  severe uncontrolled pain     Call MD for:  difficulty breathing, headache or visual disturbances     Call MD for:  redness, tenderness, or signs of infection (pain, swelling, redness, odor or green/yellow discharge around incision site)     Call MD for:  hives     Call MD for:  persistent dizziness or light-headedness     Call MD for:  extreme fatigue

## 2018-02-19 NOTE — ANESTHESIA RELEASE NOTE
"Anesthesia Release from PACU Note    Patient: Laila Hanna    Procedure(s) Performed: Procedure(s) (LRB):  REPAIR-ENTROPION/RIGHT EYE (Right)    Anesthesia type: general    Post pain: Adequate analgesia    Post assessment: no apparent anesthetic complications    Last Vitals:   Visit Vitals  BP (!) 154/76   Pulse 70   Temp 36.6 °C (97.9 °F) (Temporal)   Resp 16   Ht 5' 6" (1.676 m)   Wt 90.7 kg (200 lb)   LMP  (LMP Unknown)   SpO2 98%   Breastfeeding? No   BMI 32.28 kg/m²       Post vital signs: stable    Level of consciousness: awake    Nausea/Vomiting: no nausea/no vomiting    Complications: none    Airway Patency: patent    Respiratory: unassisted       Cardiovascular: stable    Hydration: euvolemic  "

## 2018-02-19 NOTE — BRIEF OP NOTE
Attending: Funmilayo Alvarez  Resident: none  Pre-op Dx: right lower lid entropion  Post-op Dx: same  Procedure: right lower lid entropion repair  Anesthesia: Local and General 2% lidocaine with epinephrine, 0.5% marcaine, and vitrase  Ebl: less than 5 cc  Specimens: none   Complications: none

## 2018-02-19 NOTE — ANESTHESIA POSTPROCEDURE EVALUATION
"Anesthesia Post Evaluation    Patient: Laila Hanna    Procedure(s) Performed: Procedure(s) (LRB):  REPAIR-ENTROPION/RIGHT EYE (Right)    Final Anesthesia Type: general  Patient location during evaluation: PACU  Patient participation: Yes- Able to Participate  Level of consciousness: awake and alert  Post-procedure vital signs: reviewed and stable  Pain management: adequate  Airway patency: patent  PONV status at discharge: No PONV  Anesthetic complications: no      Cardiovascular status: blood pressure returned to baseline  Respiratory status: unassisted  Hydration status: euvolemic  Follow-up not needed.        Visit Vitals  BP (!) 154/76   Pulse 70   Temp 36.6 °C (97.9 °F) (Temporal)   Resp 16   Ht 5' 6" (1.676 m)   Wt 90.7 kg (200 lb)   LMP  (LMP Unknown)   SpO2 98%   Breastfeeding? No   BMI 32.28 kg/m²       Pain/Anne Marie Score: Pain Assessment Performed: Yes (2/19/2018  3:01 PM)  Presence of Pain: denies (2/19/2018  3:01 PM)  Pain Rating Prior to Med Admin: 3 (2/19/2018  2:33 PM)  Anne Marie Score: 10 (2/19/2018  2:07 PM)      "

## 2018-02-19 NOTE — TRANSFER OF CARE
"Anesthesia Transfer of Care Note    Patient: Laila Hanna    Procedure(s) Performed: Procedure(s) (LRB):  REPAIR-ENTROPION/RIGHT EYE (Right)    Patient location: PACU    Anesthesia Type: general    Transport from OR: Transported from OR on 2-3 L/min O2 by NC with adequate spontaneous ventilation    Post pain: adequate analgesia    Post assessment: no apparent anesthetic complications    Post vital signs: stable    Level of consciousness: awake and alert    Nausea/Vomiting: no nausea/vomiting    Complications: none    Transfer of care protocol was followed      Last vitals:   Visit Vitals  BP (!) 185/83 (BP Location: Right arm, Patient Position: Lying)   Pulse (!) 56   Temp 36.7 °C (98 °F) (Oral)   Resp 18   Ht 5' 6" (1.676 m)   Wt 90.7 kg (200 lb)   LMP  (LMP Unknown)   SpO2 98%   Breastfeeding? No   BMI 32.28 kg/m²     "

## 2018-02-19 NOTE — ANESTHESIA PREPROCEDURE EVALUATION
02/19/2018  Laila Hanna is a 86 y.o., female with extensive medical history including mild aortic stenosis who has been in her usual state of health, ambulating without complaints of chest pain, SOB or other. Today here for correction of ectropion.                                            Patient Active Problem List   Diagnosis    Hypothyroidism due to acquired atrophy of thyroid    Hyponatremia    Meningioma    Low back pain without sciatica    SIADH (syndrome of inappropriate ADH production)    Essential hypertension    Anxiety    Hyperlipemia    Slow transit constipation    Cognitive dysfunction    Onychocryptosis    Onychomycosis due to dermatophyte    Coronary artery disease involving native coronary artery of native heart without angina pectoris    History of PTCA    Dysphagia    Hordeolum internum of right lower eyelid    Aortic atherosclerosis    Left ventricular diastolic dysfunction with preserved systolic function    Pulmonary heart disease    Bilateral carotid artery disease    Recurrent major depressive disorder, in full remission    History of transient ischemic attack (TIA)    Stage 3 chronic kidney disease    Senile osteoporosis    Nonrheumatic aortic valve stenosis    Entropion of right eyelid     Pre-operative evaluation for REPAIR-ENTROPION/RIGHT EYE (Right)    Chief Complaint:    PMH:    Past Surgical History:   Procedure Laterality Date    CARDIAC CATHETERIZATION  03/29/2004    S/P LAD PTCA, coated stent    CATARACT EXTRACTION W/  INTRAOCULAR LENS IMPLANT Bilateral 2000    Dr Youssef     CHOLECYSTECTOMY      CORONARY ANGIOPLASTY WITH STENT PLACEMENT  2004    LAD    EYE SURGERY      TOTAL THYROIDECTOMY           Vital Signs Range (Last 24H):  Temp:  [36.7 °C (98 °F)]   Pulse:  [56]   Resp:  [18]   BP: (185)/(83)   SpO2:  [98 %]       CBC:   No  results for input(s): WBC, RBC, HGB, HCT, PLT, MCV, MCH, MCHC in the last 720 hours.    CMP:   Recent Labs  Lab 18  1430   *   K 4.5   CL 95   CO2 29   BUN 15   CREATININE 0.8      CALCIUM 9.6   ALBUMIN 3.3*   PROT 7.0   ALKPHOS 99   ALT 13   AST 17   BILITOT 0.4       INR:  No results for input(s): PT, INR, PROTIME, APTT in the last 720 hours.      Diagnostic Studies:      EKD Echo:      Anesthesia Evaluation    I have reviewed the Patient Summary Reports.     I have reviewed the Nursing Notes.   I have reviewed the Medications.     Review of Systems  Anesthesia Hx:  No problems with previous Anesthesia   Social:  Former Smoker    Hematology/Oncology:  Hematology Normal   Oncology Normal     EENT/Dental:EENT/Dental Normal   Cardiovascular:   Hypertension Valvular problems/Murmurs, AS Past MI CAD  CABG/stent  hyperlipidemia Mild AS   Pulmonary:  Pulmonary Normal    Renal/:   Chronic Renal Disease, CRI    Hepatic/GI:  Hepatic/GI Normal    Musculoskeletal:  Musculoskeletal Normal    Neurological:   TIA, Headaches    Endocrine:   Hypothyroidism    Dermatological:  Skin Normal    Psych:   anxiety depression          Physical Exam  General:  Obesity, Well nourished    Airway/Jaw/Neck:  Airway Findings: Mouth Opening: Normal Tongue: Normal  General Airway Assessment: Adult  Mallampati: II  Improves to II with phonation.  TM Distance: Normal, at least 6 cm      Dental:  Dental Findings: In tact   Chest/Lungs:  Chest/Lungs Findings: Clear to auscultation     Heart/Vascular:  Heart Findings: Rate: Normal  Rhythm: Regular Rhythm  Sounds: Normal        Mental Status:  Mental Status Findings:  Cooperative, Alert and Oriented         Anesthesia Plan  Type of Anesthesia, risks & benefits discussed:  Anesthesia Type:  general  Patient's Preference:   Intra-op Monitoring Plan: standard ASA monitors  Intra-op Monitoring Plan Comments:   Post Op Pain Control Plan: multimodal analgesia, IV/PO Opioids PRN  and per primary service following discharge from PACU  Post Op Pain Control Plan Comments:   Induction:   IV  Beta Blocker:  Patient is on a Beta-Blocker and has received one dose within the past 24 hours (No further documentation required).       Informed Consent: Patient understands risks and agrees with Anesthesia plan.  Questions answered. Anesthesia consent signed with patient.  ASA Score: 3     Day of Surgery Review of History & Physical:            Ready For Surgery From Anesthesia Perspective.

## 2018-02-23 RX ORDER — POLYETHYLENE GLYCOL 3350 17 G/17G
POWDER, FOR SOLUTION ORAL
Qty: 527 G | Refills: 0 | Status: SHIPPED | OUTPATIENT
Start: 2018-02-23 | End: 2018-05-29

## 2018-02-26 ENCOUNTER — OFFICE VISIT (OUTPATIENT)
Dept: OPHTHALMOLOGY | Facility: CLINIC | Age: 83
End: 2018-02-26
Payer: MEDICARE

## 2018-02-26 DIAGNOSIS — Z98.890 POST-OPERATIVE STATE: Primary | ICD-10-CM

## 2018-02-26 DIAGNOSIS — H02.003 ENTROPION OF RIGHT EYELID: ICD-10-CM

## 2018-02-26 PROCEDURE — 99024 POSTOP FOLLOW-UP VISIT: CPT | Mod: S$GLB,,, | Performed by: OPHTHALMOLOGY

## 2018-02-26 PROCEDURE — 99999 PR PBB SHADOW E&M-EST. PATIENT-LVL II: CPT | Mod: PBBFAC,,, | Performed by: OPHTHALMOLOGY

## 2018-03-06 ENCOUNTER — HOSPITAL ENCOUNTER (OUTPATIENT)
Dept: RADIOLOGY | Facility: HOSPITAL | Age: 83
Discharge: HOME | End: 2018-03-06
Attending: PHYSICIAN ASSISTANT
Payer: MEDICARE

## 2018-03-06 ENCOUNTER — OFFICE VISIT (OUTPATIENT)
Dept: INTERNAL MEDICINE | Facility: CLINIC | Age: 83
End: 2018-03-06
Payer: MEDICARE

## 2018-03-06 VITALS
SYSTOLIC BLOOD PRESSURE: 136 MMHG | HEIGHT: 67 IN | DIASTOLIC BLOOD PRESSURE: 78 MMHG | BODY MASS INDEX: 31.38 KG/M2 | HEART RATE: 61 BPM | WEIGHT: 199.94 LBS

## 2018-03-06 DIAGNOSIS — S69.91XA HAND INJURY, RIGHT, INITIAL ENCOUNTER: Primary | ICD-10-CM

## 2018-03-06 DIAGNOSIS — S69.91XA HAND INJURY, RIGHT, INITIAL ENCOUNTER: ICD-10-CM

## 2018-03-06 PROCEDURE — 73130 X-RAY EXAM OF HAND: CPT | Mod: 26,RT,, | Performed by: RADIOLOGY

## 2018-03-06 PROCEDURE — 73130 X-RAY EXAM OF HAND: CPT | Mod: TC,RT

## 2018-03-06 PROCEDURE — 99213 OFFICE O/P EST LOW 20 MIN: CPT | Mod: S$GLB,,, | Performed by: PHYSICIAN ASSISTANT

## 2018-03-06 PROCEDURE — 99999 PR PBB SHADOW E&M-EST. PATIENT-LVL V: CPT | Mod: PBBFAC,,, | Performed by: PHYSICIAN ASSISTANT

## 2018-03-06 NOTE — PROGRESS NOTES
Subjective:       Patient ID: Laila Hanna is a 86 y.o. female.        Chief Complaint: Hand Injury    Laila Hanna is an established patient of Ama Graham MD here today for urgent care visit.    Hit right hand on sink quite hard.  Occurred 3 days ago.  Immediately with pain after.  Developed swelling that has improved significantly.  She has bruising on the hand as well, which looks better today.  No numbness/tingling.  No weakness.             Review of Systems   Constitutional: Negative for chills, diaphoresis, fatigue and fever.   HENT: Negative for congestion and sore throat.    Eyes: Negative for visual disturbance.   Respiratory: Negative for cough, chest tightness and shortness of breath.    Cardiovascular: Negative for chest pain, palpitations and leg swelling.   Gastrointestinal: Negative for abdominal pain, blood in stool, constipation, diarrhea, nausea and vomiting.   Genitourinary: Negative for dysuria, frequency, hematuria and urgency.   Musculoskeletal: Positive for arthralgias and joint swelling. Negative for back pain.   Skin: Positive for color change (bruising). Negative for rash.   Neurological: Negative for dizziness, syncope, weakness and headaches.   Psychiatric/Behavioral: Negative for dysphoric mood and sleep disturbance. The patient is not nervous/anxious.        Objective:      Physical Exam   Constitutional: She appears well-developed and well-nourished. No distress.   HENT:   Head: Normocephalic and atraumatic.   Right Ear: External ear normal.   Left Ear: External ear normal.   Neck: Neck supple.   Pulmonary/Chest: Effort normal.   Abdominal: Soft.   Musculoskeletal: She exhibits no edema.        Hands:  Neurological: She is alert.   Skin: Skin is warm and dry. She is not diaphoretic.   Psychiatric: She has a normal mood and affect.       Assessment:       1. Hand injury, right, initial encounter        Plan:       Laila was seen today for hand injury.    Diagnoses and all  "orders for this visit:    Hand injury, right, initial encounter  -     X-Ray Hand Complete Right; Future    X-ray negative for fracture.  Rest, ice, elevation, f/u poor results.    Pt has been given instructions populated from [employer] database and has verbalized understanding of the after visit summary and information contained wherein.    Follow up with a primary care provider. May go to ER for acute shortness of breath, lightheadedness, fever, or any other emergent complaints or changes in condition.    "This note will be shared with the patient"    Future Appointments  Date Time Provider Department Center   3/7/2018 10:00 AM [facility]   3/14/2018 3:00 PM Bubba Severino MD [facility] CARDIO [facility]   3/20/2018 9:00 AM Funmilayo Alvarez MD [facility] OPHTHAL [facility]   5/29/2018 1:30 PM Ama Graham MD [facility]               "

## 2018-03-06 NOTE — PATIENT INSTRUCTIONS
RICE     Rest an injury, elevate it, and use ice and compression as directed.   RICE stands for rest, ice, compression, and elevation. These can limit pain and swelling after an injury. RICE may be recommended to help treat fractures, sprains, strains, and bruises or bumps.   Home care  The following explain the details of RICE:  · Rest. Limit the use of the injured body part. This helps prevent further damage to the body part and gives it time to heal. In some cases, you may need a sling, brace, splint, or cast to help keep the body part still until it has healed.  · Ice. Applying ice right after an injury helps relieve pain and swelling. Wrap a bag of ice in a thin towel. Then, place it over the injured area. Do this for 10 to 15 minutes every 3 to 4 hours. Continue for the next 1 to 3 days or until your symptoms improve. Never put ice directly on your skin or ice an area longer than 15 minutes at a time.  · Compression. Putting pressure on an injury helps reduce swelling and provides support. Wrap the injured area firmly with an elastic bandage/wrap. Make sure not to wrap the bandage too tightly or you will cut off blood flow to the injured area. If your bandage loosens, rewrap it.  · Elevation. Keeping an injury raised above the level of your heart reduces swelling, pain, and throbbing. For instance, if you have a broken leg, it may help to rest your leg on several pillows when sitting or lying down. Try to keep the injured area elevated for at least 2 to 3 hours per day.  Follow-up care  Follow up with your healthcare provider, or as advised.  When to seek medical advice  Call your healthcare provider right away if any of these occur:  · Fever of 100.4°F (38°C) or higher, or as directed by your healthcare provider  · Increased pain or swelling in the injured body part  · Injured body part becomes cold, blue, numb, or tingly  · Signs of infection. These include warmth in the skin, redness, drainage, or bad  smell coming from the injured body part.  Date Last Reviewed: 1/18/2016  © 9844-7044 [employer]. [address] [place] [address], [place] [address]. All rights reserved. This information is not intended as a substitute for professional medical care. Always follow your healthcare professional's instructions.

## 2018-03-07 ENCOUNTER — LAB VISIT (OUTPATIENT)
Dept: LAB | Facility: HOSPITAL | Age: 83
End: 2018-03-07
Attending: INTERNAL MEDICINE
Payer: MEDICARE

## 2018-03-07 DIAGNOSIS — E03.4 HYPOTHYROIDISM DUE TO ACQUIRED ATROPHY OF THYROID: Chronic | ICD-10-CM

## 2018-03-07 DIAGNOSIS — I10 ESSENTIAL HYPERTENSION: ICD-10-CM

## 2018-03-07 DIAGNOSIS — I25.10 CORONARY ARTERY DISEASE INVOLVING NATIVE CORONARY ARTERY OF NATIVE HEART WITHOUT ANGINA PECTORIS: ICD-10-CM

## 2018-03-07 DIAGNOSIS — E78.00 PURE HYPERCHOLESTEROLEMIA: ICD-10-CM

## 2018-03-07 LAB
ALBUMIN SERPL BCP-MCNC: 3.4 G/DL
ALP SERPL-CCNC: 84 U/L
ALT SERPL W/O P-5'-P-CCNC: 12 U/L
ANION GAP SERPL CALC-SCNC: 7 MMOL/L
AST SERPL-CCNC: 16 U/L
BASOPHILS # BLD AUTO: 0.02 K/UL
BASOPHILS NFR BLD: 0.2 %
BILIRUB SERPL-MCNC: 0.7 MG/DL
BUN SERPL-MCNC: 16 MG/DL
CALCIUM SERPL-MCNC: 9.1 MG/DL
CHLORIDE SERPL-SCNC: 99 MMOL/L
CHOLEST SERPL-MCNC: 115 MG/DL
CHOLEST/HDLC SERPL: 2.4 {RATIO}
CO2 SERPL-SCNC: 28 MMOL/L
CREAT SERPL-MCNC: 0.8 MG/DL
DIFFERENTIAL METHOD: ABNORMAL
EOSINOPHIL # BLD AUTO: 0.2 K/UL
EOSINOPHIL NFR BLD: 2.3 %
ERYTHROCYTE [DISTWIDTH] IN BLOOD BY AUTOMATED COUNT: 13.7 %
EST. GFR  (AFRICAN AMERICAN): >60 ML/MIN/1.73 M^2
EST. GFR  (NON AFRICAN AMERICAN): >60 ML/MIN/1.73 M^2
GLUCOSE SERPL-MCNC: 112 MG/DL
HCT VFR BLD AUTO: 39.4 %
HDLC SERPL-MCNC: 47 MG/DL
HDLC SERPL: 40.9 %
HGB BLD-MCNC: 13.1 G/DL
LDLC SERPL CALC-MCNC: 51.4 MG/DL
LYMPHOCYTES # BLD AUTO: 1.2 K/UL
LYMPHOCYTES NFR BLD: 15.1 %
MCH RBC QN AUTO: 30.9 PG
MCHC RBC AUTO-ENTMCNC: 33.2 G/DL
MCV RBC AUTO: 93 FL
MONOCYTES # BLD AUTO: 0.8 K/UL
MONOCYTES NFR BLD: 10 %
NEUTROPHILS # BLD AUTO: 5.9 K/UL
NEUTROPHILS NFR BLD: 71.9 %
NONHDLC SERPL-MCNC: 68 MG/DL
PLATELET # BLD AUTO: 252 K/UL
PMV BLD AUTO: 11.5 FL
POTASSIUM SERPL-SCNC: 4.3 MMOL/L
PROT SERPL-MCNC: 6.9 G/DL
RBC # BLD AUTO: 4.24 M/UL
SODIUM SERPL-SCNC: 134 MMOL/L
T4 FREE SERPL-MCNC: 1.38 NG/DL
TRIGL SERPL-MCNC: 83 MG/DL
TSH SERPL DL<=0.005 MIU/L-ACNC: 4.3 UIU/ML
WBC # BLD AUTO: 8.23 K/UL

## 2018-03-07 PROCEDURE — 84443 ASSAY THYROID STIM HORMONE: CPT

## 2018-03-07 PROCEDURE — 80053 COMPREHEN METABOLIC PANEL: CPT

## 2018-03-07 PROCEDURE — 85025 COMPLETE CBC W/AUTO DIFF WBC: CPT

## 2018-03-07 PROCEDURE — 36415 COLL VENOUS BLD VENIPUNCTURE: CPT

## 2018-03-07 PROCEDURE — 84439 ASSAY OF FREE THYROXINE: CPT

## 2018-03-07 PROCEDURE — 80061 LIPID PANEL: CPT

## 2018-03-09 NOTE — PROGRESS NOTES
HPI     Mrs. Ulloa is here today for a post-op visit, right lower lid entropion   repair on 2/19/2018. She states she is doing well, she does complain of   diplopia when she glances to the left. She has some pain OD (scale 2). She   is using  Dexacine SHAHLA OD TID and Maxitrol OD TID drops.     Last edited by Ila Lyon, PCT on 2/26/2018 10:28 AM. (History)            Assessment /Plan     For exam results, see Encounter Report.    Post-operative state  -     External/Slit Lamp Photography    Entropion of right eyelid      Patient doing well! Post-operative instructions reviewed. Sutures removed. All questions answered.  Return in 3 weeks prn sooner any worsening of vision/symptoms or any concerns.

## 2018-03-14 ENCOUNTER — OFFICE VISIT (OUTPATIENT)
Dept: CARDIOLOGY | Facility: CLINIC | Age: 83
End: 2018-03-14
Payer: MEDICARE

## 2018-03-14 VITALS
HEIGHT: 67 IN | WEIGHT: 203.25 LBS | SYSTOLIC BLOOD PRESSURE: 142 MMHG | HEART RATE: 61 BPM | DIASTOLIC BLOOD PRESSURE: 67 MMHG | BODY MASS INDEX: 31.9 KG/M2 | OXYGEN SATURATION: 98 %

## 2018-03-14 DIAGNOSIS — Z98.61 HISTORY OF PTCA: ICD-10-CM

## 2018-03-14 DIAGNOSIS — I35.0 NONRHEUMATIC AORTIC VALVE STENOSIS: ICD-10-CM

## 2018-03-14 DIAGNOSIS — I25.10 CORONARY ARTERY DISEASE INVOLVING NATIVE CORONARY ARTERY OF NATIVE HEART WITHOUT ANGINA PECTORIS: ICD-10-CM

## 2018-03-14 DIAGNOSIS — I77.9 BILATERAL CAROTID ARTERY DISEASE: ICD-10-CM

## 2018-03-14 DIAGNOSIS — D32.9 MENINGIOMA: ICD-10-CM

## 2018-03-14 DIAGNOSIS — F09 COGNITIVE DYSFUNCTION: ICD-10-CM

## 2018-03-14 DIAGNOSIS — E78.00 PURE HYPERCHOLESTEROLEMIA: ICD-10-CM

## 2018-03-14 DIAGNOSIS — I10 ESSENTIAL HYPERTENSION: Primary | ICD-10-CM

## 2018-03-14 DIAGNOSIS — E03.4 HYPOTHYROIDISM DUE TO ACQUIRED ATROPHY OF THYROID: ICD-10-CM

## 2018-03-14 DIAGNOSIS — I70.0 AORTIC ATHEROSCLEROSIS: ICD-10-CM

## 2018-03-14 PROCEDURE — 99499 UNLISTED E&M SERVICE: CPT | Mod: S$GLB,,, | Performed by: INTERNAL MEDICINE

## 2018-03-14 PROCEDURE — 99214 OFFICE O/P EST MOD 30 MIN: CPT | Mod: S$GLB,,, | Performed by: INTERNAL MEDICINE

## 2018-03-14 PROCEDURE — 99999 PR PBB SHADOW E&M-EST. PATIENT-LVL III: CPT | Mod: PBBFAC,,, | Performed by: INTERNAL MEDICINE

## 2018-03-14 NOTE — PROGRESS NOTES
Subjective:    Patient ID:  Laila Hanna is a 86 y.o. female who presents for follow-up of Hypertension (6 month f/u ) and Shortness of Breath (with exertion )      HPI   85 year old female followed for management of hypertension and CAD,post PCI to mid LAD 2004. She continues to do well and reports not chest pain but has mild AMOS.  Lab Results   Component Value Date     (L) 03/07/2018    K 4.3 03/07/2018    CL 99 03/07/2018    CO2 28 03/07/2018    BUN 16 03/07/2018    CREATININE 0.8 03/07/2018     (H) 03/07/2018    HGBA1C 5.8 01/29/2016    MG 1.8 07/03/2016    AST 16 03/07/2018    ALT 12 03/07/2018    ALBUMIN 3.4 (L) 03/07/2018    PROT 6.9 03/07/2018    BILITOT 0.7 03/07/2018    WBC 8.23 03/07/2018    HGB 13.1 03/07/2018    HCT 39.4 03/07/2018    MCV 93 03/07/2018     03/07/2018    INR 1.1 06/30/2016    INR 2.4 (H) 09/19/2008    TSH 4.299 (H) 03/07/2018         Lab Results   Component Value Date    CHOL 115 (L) 03/07/2018    HDL 47 03/07/2018    TRIG 83 03/07/2018       Lab Results   Component Value Date    LDLCALC 51.4 (L) 03/07/2018       Past Medical History:   Diagnosis Date    Basal cell carcinoma     nose    Benign essential hypertension     Cerebral microvascular disease 9/8/2014    Closed fracture of distal phalanx of left hand with routine healing 9/17/2015    Closed mallet fracture of distal phalanx of finger with routine healing 10/6/2015    Coronary artery disease     DDD (degenerative disc disease), lumbar 4/12/2016    Depression     Fall at home 5/30/2016    Hyperlipidemia     Hypothyroidism due to acquired atrophy of thyroid     Meningiomas, multiple     MI (myocardial infarction) 2004    80% blockage per patient    Migraine aura without headache 12/1/2014    Post PTCA 12/12/2012    Transient cerebral ischemia 5/4/2014       Current Outpatient Prescriptions:     alendronate (FOSAMAX) 70 MG tablet, Take 1 tablet (70 mg total) by mouth once a week., Disp: 12  tablet, Rfl: 1    alprazolam (XANAX) 0.25 MG tablet, take 1 tablet by mouth every 6 to 8 hours if needed, Disp: , Rfl: 0    aspirin (ECOTRIN) 81 MG EC tablet, Take 1 tablet (81 mg total) by mouth once daily., Disp: , Rfl:     atorvastatin (LIPITOR) 40 MG tablet, One tablet daily, Disp: 90 tablet, Rfl: 1    buPROPion (WELLBUTRIN SR) 100 MG TBSR 12 hr tablet, Take 1 tablet (100 mg total) by mouth 2 (two) times daily., Disp: 180 tablet, Rfl: 1    calcium-vitamin D3 (CALCIUM 500 + D) 500 mg(1,250mg) -200 unit per tablet, Take 1 tablet by mouth once daily. , Disp: , Rfl:     carvedilol (COREG) 12.5 MG tablet, Take 1 tablet (12.5 mg total) by mouth 2 (two) times daily., Disp: 180 tablet, Rfl: 1    dextran 70-hypromellose (ARTIFICIAL TEARS) ophthalmic solution, Place 1 drop into both eyes every 2 (two) hours as needed., Disp: , Rfl: 0    levothyroxine (SYNTHROID) 150 MCG tablet, Take 1 tablet (150 mcg total) by mouth once daily., Disp: 90 tablet, Rfl: 1    magnesium hydroxide 400 mg/5 ml (MILK OF MAGNESIA) 400 mg/5 mL Susp, Take 30 mLs (2,400 mg total) by mouth every evening. (Patient taking differently: Take 30 mLs by mouth daily as needed. ), Disp: , Rfl:     neomycin-polymyxin-dexamethasone (MAXITROL) 3.5mg/mL-10,000 unit/mL-0.1 % DrpS, Place 1 drop into the right eye every 6 (six) hours., Disp: 5 mL, Rfl: 0    polyethylene glycol (GLYCOLAX) 17 gram/dose powder, MIX 1 CAPFUL (17GM) IN LIQUID AND DRINK EVERY DAY AS NEEDED, Disp: 527 g, Rfl: 0        Review of Systems   Constitution: Negative for decreased appetite, diaphoresis, fever, weakness, malaise/fatigue, weight gain and weight loss.   HENT: Negative for congestion, ear discharge, ear pain and nosebleeds.    Eyes: Negative for blurred vision, double vision and visual disturbance.   Cardiovascular: Positive for dyspnea on exertion. Negative for chest pain, claudication, cyanosis, irregular heartbeat, leg swelling, near-syncope, orthopnea, palpitations,  "paroxysmal nocturnal dyspnea and syncope.   Respiratory: Negative for cough, hemoptysis, shortness of breath, sleep disturbances due to breathing, snoring, sputum production and wheezing.    Endocrine: Negative for polydipsia, polyphagia and polyuria.   Hematologic/Lymphatic: Negative for adenopathy and bleeding problem. Does not bruise/bleed easily.   Skin: Negative for color change, nail changes, poor wound healing and rash.   Musculoskeletal: Negative for muscle cramps and muscle weakness.   Gastrointestinal: Negative for abdominal pain, anorexia, change in bowel habit, hematochezia, nausea and vomiting.   Genitourinary: Negative for dysuria, frequency and hematuria.   Neurological: Negative for brief paralysis, difficulty with concentration, excessive daytime sleepiness, dizziness, focal weakness, headaches, light-headedness, seizures and vertigo.   Psychiatric/Behavioral: Positive for memory loss. Negative for altered mental status and depression. The patient has insomnia.    Allergic/Immunologic: Negative for persistent infections.        Objective:BP (!) 142/67 (BP Location: Left arm, Patient Position: Sitting, BP Method: Large (Automatic))   Pulse 61   Ht 5' 7" (1.702 m)   Wt 92.2 kg (203 lb 4.2 oz)   LMP  (LMP Unknown) Comment: years ago  SpO2 98%   BMI 31.84 kg/m²           Physical Exam   Constitutional: She is oriented to person, place, and time. She appears well-developed and well-nourished.   HENT:   Head: Normocephalic.   Right Ear: External ear normal.   Left Ear: External ear normal.   Nose: Nose normal.   Inspection of lips, teeth and gums normal   Eyes: Conjunctivae and EOM are normal. Pupils are equal, round, and reactive to light. No scleral icterus.   Neck: Normal range of motion. No JVD present. No tracheal deviation present. No thyromegaly present.   Cardiovascular: Normal rate, regular rhythm, normal heart sounds and intact distal pulses.  Exam reveals no gallop and no friction rub.  "   No murmur heard.  Pulmonary/Chest: Effort normal and breath sounds normal. No respiratory distress. She has no wheezes. She has no rales. She exhibits no tenderness.   Abdominal: Soft. Bowel sounds are normal. She exhibits no distension. There is no hepatosplenomegaly. There is no tenderness. There is no guarding.   Musculoskeletal: Normal range of motion. She exhibits no edema or tenderness.   Lymphadenopathy:   Palpation of lymph nodes of neck and groin normal   Neurological: She is oriented to person, place, and time. No cranial nerve deficit. She exhibits normal muscle tone. Coordination normal.   Skin: Skin is warm and dry. No rash noted. No erythema. No pallor.   Palpation of skin normal   Psychiatric: She has a normal mood and affect. Her behavior is normal. Judgment and thought content normal.         Assessment:       1. Essential hypertension    2. Coronary artery disease involving native coronary artery of native heart without angina pectoris    3. Hypothyroidism due to acquired atrophy of thyroid    4. Pure hypercholesterolemia    5. History of PTCA    6. Cognitive dysfunction    7. Meningioma    8. Aortic atherosclerosis    9. Bilateral carotid artery disease    10. Nonrheumatic aortic valve stenosis         Plan:       Laila was seen today for hypertension and shortness of breath.    Diagnoses and all orders for this visit:    Essential hypertension  -     CBC auto differential; Future; Expected date: 09/13/2018  -     Comprehensive metabolic panel; Future; Expected date: 09/13/2018    Coronary artery disease involving native coronary artery of native heart without angina pectoris  -     Lipid panel; Future; Expected date: 09/13/2018    Hypothyroidism due to acquired atrophy of thyroid    Pure hypercholesterolemia  -     Lipid panel; Future; Expected date: 09/13/2018    History of PTCA    Cognitive dysfunction    Meningioma    Aortic atherosclerosis    Bilateral carotid artery  disease    Nonrheumatic aortic valve stenosis

## 2018-03-19 RX ORDER — POLYETHYLENE GLYCOL 3350 17 G/17G
POWDER, FOR SOLUTION ORAL
Qty: 527 G | Refills: 0 | OUTPATIENT
Start: 2018-03-19

## 2018-03-20 ENCOUNTER — OFFICE VISIT (OUTPATIENT)
Dept: OPHTHALMOLOGY | Facility: CLINIC | Age: 83
End: 2018-03-20
Payer: MEDICARE

## 2018-03-20 DIAGNOSIS — Z98.890 POST-OPERATIVE STATE: Primary | ICD-10-CM

## 2018-03-20 DIAGNOSIS — H02.003 ENTROPION OF RIGHT EYELID: ICD-10-CM

## 2018-03-20 PROCEDURE — 99999 PR PBB SHADOW E&M-EST. PATIENT-LVL II: CPT | Mod: PBBFAC,,, | Performed by: OPHTHALMOLOGY

## 2018-03-20 PROCEDURE — 99024 POSTOP FOLLOW-UP VISIT: CPT | Mod: S$GLB,,, | Performed by: OPHTHALMOLOGY

## 2018-03-20 NOTE — PROGRESS NOTES
HPI       Mrs. Ulloa is here today for a  3 week post-op visit, right lower lid   entropion repair on 2/19/2018. She states she is doing well,She is using    Dexacine SHAHLA OD  q morning, finished Maxitrol yesterday, no complaints of   pain or vision changes     Last edited by Meghan Higgins on 3/20/2018  9:18 AM. (History)            Assessment /Plan     For exam results, see Encounter Report.    Post-operative state  -     External/Slit Lamp Photography    Entropion of right eyelid      Patient doing well! Suture removed (1 from lateral canthal angle). All questions answered. Off all drops. Return in 4 weeks prn sooner any worsening of vision/symptoms or any concerns.    I have reviewed and concur with the resident's history, physical, assessment, and plan.  I have personally interviewed and examined the patient.

## 2018-04-03 ENCOUNTER — TELEPHONE (OUTPATIENT)
Dept: OPHTHALMOLOGY | Facility: CLINIC | Age: 83
End: 2018-04-03

## 2018-04-03 RX ORDER — POLYETHYLENE GLYCOL 3350 17 G/17G
POWDER, FOR SOLUTION ORAL
Qty: 527 G | Refills: 0 | OUTPATIENT
Start: 2018-04-03

## 2018-04-03 NOTE — TELEPHONE ENCOUNTER
----- Message from Tomi Yi sent at 4/3/2018  9:03 AM CDT -----  Contact: Maria G Phillips was calling to find out if Ms. Hanna should still be using the drops or should she stop. Ms. Jacqueline can be reached at [phone]

## 2018-04-03 NOTE — TELEPHONE ENCOUNTER
----- Message from Palma Marina sent at 4/2/2018  1:36 PM CDT -----  Contact: Shay/Maria G [phone]  Prescription Request:     Name of medication: polyethylene glycol (GLYCOLAX) 17 gram/dose powder    Reason for request: Refill    Pharmacy: [facility] - [facility] [id number]    Please advise.    Thank You

## 2018-04-24 ENCOUNTER — OFFICE VISIT (OUTPATIENT)
Dept: OPHTHALMOLOGY | Facility: CLINIC | Age: 83
End: 2018-04-24
Payer: MEDICARE

## 2018-04-24 DIAGNOSIS — Z98.890 POST-OPERATIVE STATE: Primary | ICD-10-CM

## 2018-04-24 DIAGNOSIS — H01.003 BLEPHARITIS OF BOTH EYES, UNSPECIFIED EYELID, UNSPECIFIED TYPE: ICD-10-CM

## 2018-04-24 DIAGNOSIS — H01.006 BLEPHARITIS OF BOTH EYES, UNSPECIFIED EYELID, UNSPECIFIED TYPE: ICD-10-CM

## 2018-04-24 DIAGNOSIS — H02.003 ENTROPION OF RIGHT EYELID: ICD-10-CM

## 2018-04-24 PROCEDURE — 99024 POSTOP FOLLOW-UP VISIT: CPT | Mod: S$GLB,,, | Performed by: OPHTHALMOLOGY

## 2018-04-24 PROCEDURE — 99999 PR PBB SHADOW E&M-EST. PATIENT-LVL II: CPT | Mod: PBBFAC,,, | Performed by: OPHTHALMOLOGY

## 2018-04-24 RX ORDER — DOXYCYCLINE 100 MG/1
100 CAPSULE ORAL 2 TIMES DAILY
Qty: 60 CAPSULE | Refills: 0 | Status: SHIPPED | OUTPATIENT
Start: 2018-04-24 | End: 2018-04-24 | Stop reason: SDUPTHER

## 2018-04-24 RX ORDER — DOXYCYCLINE 100 MG/1
100 CAPSULE ORAL 2 TIMES DAILY
Qty: 60 CAPSULE | Refills: 0 | Status: SHIPPED | OUTPATIENT
Start: 2018-04-24 | End: 2018-05-24

## 2018-04-24 NOTE — PROGRESS NOTES
HPI     S/p entropion repair od 2/19/18  Pt states od still feels irritated, and dry. Using AT bid    Last edited by Franny Fountain on 4/24/2018 11:20 AM. (History)            Assessment /Plan     For exam results, see Encounter Report.    Post-operative state  -     External/Slit Lamp Photography    Blepharitis of both eyes, unspecified eyelid, unspecified type    Entropion of right eyelid    Other orders  -     Discontinue: doxycycline (MONODOX) 100 MG capsule; Take 1 capsule (100 mg total) by mouth 2 (two) times daily.  Dispense: 60 capsule; Refill: 0  -     doxycycline (MONODOX) 100 MG capsule; Take 1 capsule (100 mg total) by mouth 2 (two) times daily.  Dispense: 60 capsule; Refill: 0      Status-post entropion repair OD on 02/19/2018    Patient doing well! All questions answered.      Patient is having some blepharitis, will start WC and lid hygiene. Also recommend Doxycyline 100mg BID for 30 days. Given precautions (GI and sensitivity to sunlight).    Return in 1 month sooner prn if any worsening of vision/symptoms or any concerns.      I have reviewed and concur with the resident's history, physical, assessment, and plan.  I have personally interviewed and examined the patient.

## 2018-05-02 RX ORDER — POLYETHYLENE GLYCOL 3350 17 G/17G
POWDER, FOR SOLUTION ORAL
Qty: 527 G | Refills: 0 | OUTPATIENT
Start: 2018-05-02

## 2018-05-02 NOTE — TELEPHONE ENCOUNTER
"----- Message from Paty Krishna sent at 5/2/2018  8:42 AM CDT -----  Contact:  care giver/shady [phone]  RX request - refill or new RX.  Is this a refill or new RX: refill   RX name and strength: polyethylene glycol (GLYCOLAX) 17 gram/dose powder  Directions: MIX 1 CAPFUL (17GM) IN LIQUID AND DRINK EVERY DAY AS NEEDED  Is this a 30 day or 90 day RX:    Pharmacy name and phone # (DON'T enter "on file" or "in chart"): [facility] - [place], [place] - [address] [phone] (Phone)  [phone] (Fax)      Comments:       "

## 2018-05-29 ENCOUNTER — LAB VISIT (OUTPATIENT)
Dept: LAB | Facility: HOSPITAL | Age: 83
End: 2018-05-29
Attending: INTERNAL MEDICINE
Payer: MEDICARE

## 2018-05-29 ENCOUNTER — OFFICE VISIT (OUTPATIENT)
Dept: INTERNAL MEDICINE | Facility: CLINIC | Age: 83
End: 2018-05-29
Payer: MEDICARE

## 2018-05-29 VITALS
HEART RATE: 60 BPM | TEMPERATURE: 97 F | SYSTOLIC BLOOD PRESSURE: 132 MMHG | HEIGHT: 67 IN | BODY MASS INDEX: 32.49 KG/M2 | WEIGHT: 207 LBS | DIASTOLIC BLOOD PRESSURE: 80 MMHG | OXYGEN SATURATION: 97 %

## 2018-05-29 DIAGNOSIS — N39.0 URINARY TRACT INFECTION WITHOUT HEMATURIA, SITE UNSPECIFIED: Primary | ICD-10-CM

## 2018-05-29 DIAGNOSIS — I10 ESSENTIAL HYPERTENSION: ICD-10-CM

## 2018-05-29 DIAGNOSIS — E03.4 HYPOTHYROIDISM DUE TO ACQUIRED ATROPHY OF THYROID: Chronic | ICD-10-CM

## 2018-05-29 DIAGNOSIS — E78.5 HYPERLIPIDEMIA, UNSPECIFIED HYPERLIPIDEMIA TYPE: ICD-10-CM

## 2018-05-29 LAB
ALBUMIN SERPL BCP-MCNC: 3.3 G/DL
ALP SERPL-CCNC: 80 U/L
ALT SERPL W/O P-5'-P-CCNC: 15 U/L
ANION GAP SERPL CALC-SCNC: 6 MMOL/L
AST SERPL-CCNC: 20 U/L
BILIRUB SERPL-MCNC: 0.4 MG/DL
BUN SERPL-MCNC: 13 MG/DL
CALCIUM SERPL-MCNC: 9.2 MG/DL
CHLORIDE SERPL-SCNC: 94 MMOL/L
CO2 SERPL-SCNC: 29 MMOL/L
CREAT SERPL-MCNC: 0.8 MG/DL
EST. GFR  (AFRICAN AMERICAN): >60 ML/MIN/1.73 M^2
EST. GFR  (NON AFRICAN AMERICAN): >60 ML/MIN/1.73 M^2
GLUCOSE SERPL-MCNC: 97 MG/DL
POTASSIUM SERPL-SCNC: 4.6 MMOL/L
PROT SERPL-MCNC: 7 G/DL
SODIUM SERPL-SCNC: 129 MMOL/L

## 2018-05-29 PROCEDURE — 36415 COLL VENOUS BLD VENIPUNCTURE: CPT

## 2018-05-29 PROCEDURE — 86235 NUCLEAR ANTIGEN ANTIBODY: CPT | Mod: 59

## 2018-05-29 PROCEDURE — 86039 ANTINUCLEAR ANTIBODIES (ANA): CPT

## 2018-05-29 PROCEDURE — 99215 OFFICE O/P EST HI 40 MIN: CPT | Mod: S$GLB,,, | Performed by: INTERNAL MEDICINE

## 2018-05-29 PROCEDURE — 99499 UNLISTED E&M SERVICE: CPT | Mod: S$GLB,,, | Performed by: INTERNAL MEDICINE

## 2018-05-29 PROCEDURE — 86038 ANTINUCLEAR ANTIBODIES: CPT

## 2018-05-29 PROCEDURE — 80053 COMPREHEN METABOLIC PANEL: CPT

## 2018-05-29 PROCEDURE — 99999 PR PBB SHADOW E&M-EST. PATIENT-LVL III: CPT | Mod: PBBFAC,,, | Performed by: INTERNAL MEDICINE

## 2018-05-29 RX ORDER — LEVOTHYROXINE SODIUM 150 UG/1
150 TABLET ORAL DAILY
Qty: 90 TABLET | Refills: 1 | Status: SHIPPED | OUTPATIENT
Start: 2018-05-29 | End: 2019-01-30 | Stop reason: SDUPTHER

## 2018-05-29 RX ORDER — ALPRAZOLAM 0.25 MG/1
TABLET ORAL
Qty: 20 TABLET | Refills: 1 | Status: SHIPPED | OUTPATIENT
Start: 2018-05-29 | End: 2018-08-07

## 2018-05-29 RX ORDER — VENLAFAXINE HYDROCHLORIDE 37.5 MG/1
37.5 CAPSULE, EXTENDED RELEASE ORAL DAILY
Qty: 90 CAPSULE | Refills: 1 | Status: SHIPPED | OUTPATIENT
Start: 2018-05-29 | End: 2018-08-07

## 2018-05-29 RX ORDER — ATORVASTATIN CALCIUM 40 MG/1
TABLET, FILM COATED ORAL
Qty: 90 TABLET | Refills: 1 | Status: SHIPPED | OUTPATIENT
Start: 2018-05-29 | End: 2018-10-15 | Stop reason: SDUPTHER

## 2018-05-29 RX ORDER — CARVEDILOL 12.5 MG/1
12.5 TABLET ORAL 2 TIMES DAILY
Qty: 180 TABLET | Refills: 1 | Status: SHIPPED | OUTPATIENT
Start: 2018-05-29 | End: 2019-01-30 | Stop reason: SDUPTHER

## 2018-05-30 LAB
ANA SER QL IF: POSITIVE
ANA TITR SER IF: NORMAL {TITER}

## 2018-05-31 LAB
ANTI SM ANTIBODY: 1.1 EU
ANTI SM/RNP ANTIBODY: 30.02 EU
ANTI-SM INTERPRETATION: NEGATIVE
ANTI-SM/RNP INTERPRETATION: POSITIVE
ANTI-SSA ANTIBODY: 5.75 EU
ANTI-SSA INTERPRETATION: NEGATIVE
ANTI-SSB ANTIBODY: 0 EU
ANTI-SSB INTERPRETATION: NEGATIVE
DSDNA AB SER-ACNC: ABNORMAL [IU]/ML

## 2018-06-03 NOTE — PROGRESS NOTES
Subjective:       Patient ID: Laila Hanna is a 86 y.o. female.    Chief Complaint: Hypertension    HPI  She returns for management of hypertension.  She has had hypertension for over a year.  Current treatment has included medications outlined in medication list.  She denies chest pain or shortness of breath.  No palpitations.  Denies left arm or neck pain.    Past medical history: Hypertension, coronary artery disease, TIA, hyperlipidemia, aortic stenosis, meningioma, osteoporosis, hypothyroidism, migraine headache,, status post cholecystectomy, status post thyroidectomy, colon adenoma. She had a colonoscopy April 2013     Medications: one aspirin daily, Lipitor 40 mg daily, Synthroid 0.15 mg daily, coreg 12.5 mg twice a day, effexor     ALLERGIES: Thiazides       Review of Systems   Constitutional: Negative for chills, fatigue, fever and unexpected weight change.   Respiratory: Negative for chest tightness and shortness of breath.    Cardiovascular: Negative for chest pain and palpitations.   Gastrointestinal: Negative for abdominal pain and blood in stool.   Neurological: Negative for dizziness, syncope, numbness and headaches.       Objective:      Physical Exam   HENT:   Right Ear: External ear normal.   Left Ear: External ear normal.   Nose: Nose normal.   Mouth/Throat: Oropharynx is clear and moist.   Eyes: Pupils are equal, round, and reactive to light.   Neck: Normal range of motion.   Cardiovascular: Normal rate and regular rhythm.    Murmur heard.  Pulmonary/Chest: Breath sounds normal.   Abdominal: She exhibits no distension. There is no hepatosplenomegaly. There is no tenderness.   Lymphadenopathy:     She has no cervical adenopathy.     She has no axillary adenopathy.   Neurological: She has normal strength and normal reflexes. No cranial nerve deficit or sensory deficit.       Assessment/Plan       Assessment and plan:  Hypertension:  Check CMP and LEROY.  Urine sent for urinalysis and culture.   Discussed colonoscopy, Pap smear, pelvic exam, mammogram, breast exam.  She declined all

## 2018-06-04 ENCOUNTER — OFFICE VISIT (OUTPATIENT)
Dept: OTOLARYNGOLOGY | Facility: CLINIC | Age: 83
End: 2018-06-04
Payer: MEDICARE

## 2018-06-04 ENCOUNTER — TELEPHONE (OUTPATIENT)
Dept: INTERNAL MEDICINE | Facility: CLINIC | Age: 83
End: 2018-06-04

## 2018-06-04 VITALS
BODY MASS INDEX: 31.64 KG/M2 | WEIGHT: 202 LBS | DIASTOLIC BLOOD PRESSURE: 82 MMHG | SYSTOLIC BLOOD PRESSURE: 153 MMHG | HEART RATE: 67 BPM

## 2018-06-04 DIAGNOSIS — R76.8 POSITIVE ANA (ANTINUCLEAR ANTIBODY): Primary | ICD-10-CM

## 2018-06-04 DIAGNOSIS — H93.8X3 EAR FULLNESS, BILATERAL: Primary | ICD-10-CM

## 2018-06-04 PROCEDURE — 99999 PR PBB SHADOW E&M-EST. PATIENT-LVL III: CPT | Mod: PBBFAC,,, | Performed by: OTOLARYNGOLOGY

## 2018-06-04 PROCEDURE — 69210 REMOVE IMPACTED EAR WAX UNI: CPT | Mod: S$GLB,,, | Performed by: OTOLARYNGOLOGY

## 2018-06-04 PROCEDURE — 99213 OFFICE O/P EST LOW 20 MIN: CPT | Mod: 25,S$GLB,, | Performed by: OTOLARYNGOLOGY

## 2018-06-04 NOTE — PROGRESS NOTES
Subjective:       Patient ID: Laila Hanna is a 86 y.o. female.    Chief Complaint: Ear Fullness    Ear Fullness    There is pain in both ears. This is a recurrent problem. The current episode started 1 to 4 weeks ago. The problem occurs constantly. The problem has been unchanged. There has been no fever. The patient is experiencing no pain. Associated symptoms include hearing loss. Pertinent negatives include no coughing, diarrhea, ear discharge, headaches, neck pain, rash, rhinorrhea or vomiting. She has tried nothing for the symptoms. Her past medical history is significant for hearing loss.     Review of Systems   Constitutional: Negative for activity change, appetite change and fever.   HENT: Positive for hearing loss. Negative for congestion, ear discharge, ear pain, nosebleeds, postnasal drip, rhinorrhea and sneezing.    Eyes: Negative for redness and visual disturbance.   Respiratory: Negative for apnea, cough, shortness of breath and wheezing.    Cardiovascular: Negative for chest pain and palpitations.   Gastrointestinal: Negative for diarrhea and vomiting.   Genitourinary: Negative for difficulty urinating and frequency.   Musculoskeletal: Negative for arthralgias, back pain, gait problem and neck pain.   Skin: Negative for color change and rash.   Neurological: Negative for dizziness, speech difficulty, weakness and headaches.   Hematological: Negative for adenopathy. Does not bruise/bleed easily.   Psychiatric/Behavioral: Negative for agitation and behavioral problems.       Objective:        Constitutional:   Vital signs are normal. She appears well-developed and well-nourished. She is active. Normal speech.      Head:  Normocephalic and atraumatic. Salivary glands normal.  Facial strength is normal.      Nose:  Nose normal including turbinates, nasal mucosa, sinuses and nasal septum.     Mouth/Throat  Oropharynx clear and moist without lesions or asymmetry and normal uvula midline.     Neck:  Neck  normal without thyromegaly masses, asymmetry, normal tracheal structure, crepitus, and tenderness, thyroid normal, trachea normal, phonation normal and full range of motion with neck supple.     Psychiatric:   She has a normal mood and affect. Her speech is normal and behavior is normal.     Neurological:   She has neurological normal, alert and oriented.     Skin:   No abrasions, lacerations, lesions, or rashes.           PROCEDURE NOTE:  Ceruminosis is noted in both EACs.  Wax was removed by manual debridement and suctioning utilizing the assistance of binocular microscopy revealing EACs and TMs WNL. The patient tolerated this procedure without difficulty. The subjective decrease noted in hearing pre-cleaning was resolved post-cleaning.      Assessment:       1. Ear fullness, bilateral        Plan:       1. Hearing conservation strongly recommended.  2. Trial of amplification bilaterally also recommended (patient not interested).  3. Re-check of hearing in 18-24 months or sooner if subjective change noted.  4. F/U with PCP as per schedule.

## 2018-06-05 ENCOUNTER — OFFICE VISIT (OUTPATIENT)
Dept: RHEUMATOLOGY | Facility: CLINIC | Age: 83
End: 2018-06-05
Payer: MEDICARE

## 2018-06-05 VITALS
HEIGHT: 66 IN | DIASTOLIC BLOOD PRESSURE: 85 MMHG | BODY MASS INDEX: 32.9 KG/M2 | SYSTOLIC BLOOD PRESSURE: 163 MMHG | HEART RATE: 61 BPM | WEIGHT: 204.69 LBS

## 2018-06-05 DIAGNOSIS — M35.00 SICCA SYNDROME: ICD-10-CM

## 2018-06-05 DIAGNOSIS — Z86.718 HISTORY OF DVT (DEEP VEIN THROMBOSIS): ICD-10-CM

## 2018-06-05 DIAGNOSIS — Z55.9 EDUCATIONAL CIRCUMSTANCE: ICD-10-CM

## 2018-06-05 DIAGNOSIS — R76.8 POSITIVE ANA (ANTINUCLEAR ANTIBODY): Primary | ICD-10-CM

## 2018-06-05 PROCEDURE — 99499 UNLISTED E&M SERVICE: CPT | Mod: S$GLB,,, | Performed by: INTERNAL MEDICINE

## 2018-06-05 PROCEDURE — 99999 PR PBB SHADOW E&M-EST. PATIENT-LVL III: CPT | Mod: PBBFAC,,, | Performed by: INTERNAL MEDICINE

## 2018-06-05 PROCEDURE — 99204 OFFICE O/P NEW MOD 45 MIN: CPT | Mod: S$GLB,,, | Performed by: INTERNAL MEDICINE

## 2018-06-05 SDOH — SOCIAL DETERMINANTS OF HEALTH (SDOH): PROBLEMS RELATED TO EDUCATION AND LITERACY, UNSPECIFIED: Z55.9

## 2018-06-05 ASSESSMENT — ROUTINE ASSESSMENT OF PATIENT INDEX DATA (RAPID3)
FATIGUE SCORE: 3.5
TOTAL RAPID3 SCORE: 2.44
MDHAQ FUNCTION SCORE: .7
PATIENT GLOBAL ASSESSMENT SCORE: 3
AM STIFFNESS SCORE: 0, NO
PSYCHOLOGICAL DISTRESS SCORE: 6.6
PAIN SCORE: 2

## 2018-06-05 NOTE — PROGRESS NOTES
Subjective:     Patient ID: Laila Hanna is a 86 y.o. female sent by Dr. Ama Graham     Chief Complaint: No chief complaint on file.       HPI   86 yr old lady with multiple morbidities who was apparently sent for a + LEROY. Patient does not know the reason.  Has had + LEROY with neg pro since last year; now RNP low+; also gives hx of upper dysphagia but denies weakness .  Has iatrogenic hypothyroidism due to goiter removal. Unknown if she had Grave's disease. Patient states she has some problems remembering things.  She had no joint pain or swelling. No AM stiffness Denies Raynaud's, tight skin, alopecia, oral ulcers, pleurisy, pericarditis, photosensitivity, skin rashes, miscarriages (0/0);  Has dry eyes and dry mouth and uses OTC artificial tears. Taking meds that can cause dryness.  Had what sounds like a DVT L leg following a fall 10 -15 yrs and was on coumadin x 6 months. No other thrombotic episodes. MI at age 72.  No known hx of CTD in family.    Current Outpatient Prescriptions   Medication Sig Dispense Refill    ALPRAZolam (XANAX) 0.25 MG tablet take 1 tablet by mouth BID PRN 20 tablet 1    aspirin (ECOTRIN) 81 MG EC tablet Take 1 tablet (81 mg total) by mouth once daily.      atorvastatin (LIPITOR) 40 MG tablet One tablet daily 90 tablet 1    calcium-vitamin D3 (CALCIUM 500 + D) 500 mg(1,250mg) -200 unit per tablet Take 1 tablet by mouth once daily.       carvedilol (COREG) 12.5 MG tablet Take 1 tablet (12.5 mg total) by mouth 2 (two) times daily. 180 tablet 1    dextran 70-hypromellose (ARTIFICIAL TEARS) ophthalmic solution Place 1 drop into both eyes every 2 (two) hours as needed.  0    levothyroxine (SYNTHROID) 150 MCG tablet Take 1 tablet (150 mcg total) by mouth once daily. 90 tablet 1    venlafaxine (EFFEXOR-XR) 37.5 MG 24 hr capsule Take 1 capsule (37.5 mg total) by mouth once daily. 90 capsule 1     No current facility-administered medications for this visit.        Review of patient's  allergies indicates:   Allergen Reactions    Thiazides Other (See Comments)     Severe hyponatremia       Review of Systems   Constitutional: Negative.  Negative for diaphoresis, fatigue and fever.   HENT: Positive for trouble swallowing. Negative for mouth sores, sore throat and tinnitus.         Dry mouth   Eyes: Positive for redness. Negative for visual disturbance.        Dry eyes   Respiratory: Negative.  Negative for cough, choking, chest tightness and shortness of breath.    Cardiovascular: Negative for chest pain, palpitations and leg swelling.   Gastrointestinal: Positive for constipation. Negative for abdominal distention, abdominal pain, blood in stool, diarrhea, nausea and vomiting.   Endocrine: Negative.  Negative for cold intolerance and heat intolerance.   Genitourinary: Negative.  Negative for frequency, hematuria and menstrual problem.   Musculoskeletal: Negative.  Negative for back pain, joint swelling, myalgias, neck pain and neck stiffness.   Skin: Negative.  Negative for rash.   Allergic/Immunologic: Negative.  Negative for food allergies.   Neurological: Negative.  Negative for dizziness, syncope, weakness, light-headedness and numbness.   Hematological: Negative.  Negative for adenopathy. Does not bruise/bleed easily.   Psychiatric/Behavioral: Positive for dysphoric mood and sleep disturbance. The patient is nervous/anxious.        Past Medical History:   Diagnosis Date    Basal cell carcinoma     nose    Benign essential hypertension     Cerebral microvascular disease 9/8/2014    Closed fracture of distal phalanx of left hand with routine healing 9/17/2015    Closed mallet fracture of distal phalanx of finger with routine healing 10/6/2015    Coronary artery disease     DDD (degenerative disc disease), lumbar 4/12/2016    Depression     Fall at home 5/30/2016    Hyperlipidemia     Hypothyroidism due to acquired atrophy of thyroid     Meningiomas, multiple     MI (myocardial  "infarction) 2004    80% blockage per patient    Migraine aura without headache 12/1/2014    Post PTCA 12/12/2012    Transient cerebral ischemia 5/4/2014       Past Surgical History:   Procedure Laterality Date    CARDIAC CATHETERIZATION  03/29/2004    S/P LAD PTCA, coated stent    CATARACT EXTRACTION W/  INTRAOCULAR LENS IMPLANT Bilateral 2000    Dr Youssef     CHOLECYSTECTOMY      CORONARY ANGIOPLASTY WITH STENT PLACEMENT  2004    LAD    EYE SURGERY      TOTAL THYROIDECTOMY         Family History   Problem Relation Age of Onset    Stroke Mother     Diabetes Mother     Hypertension Father     Stroke Father     Cancer Brother         colon    Skin cancer Brother     Colon cancer Brother     Diabetes Paternal Grandmother     Cancer Sister         breast    Glaucoma Maternal Aunt     No Known Problems Maternal Uncle     No Known Problems Paternal Aunt     No Known Problems Paternal Uncle     No Known Problems Maternal Grandmother     No Known Problems Maternal Grandfather     No Known Problems Paternal Grandfather     Amblyopia Neg Hx     Blindness Neg Hx     Cataracts Neg Hx     Macular degeneration Neg Hx     Retinal detachment Neg Hx     Strabismus Neg Hx     Thyroid disease Neg Hx     Esophageal cancer Neg Hx      Dementia & breast cancer in sister.    Social History   Substance Use Topics    Smoking status: Former Smoker     Packs/day: 0.30     Years: 15.00     Types: Cigarettes     Quit date: 1/26/1973    Smokeless tobacco: Never Used    Alcohol use No   Used to work for [employer]now [employer]  Lives alone but has attendant 10 hrs/day M-F    Objective:     LMP  (LMP Unknown) Comment: years ago  BP (!) 163/85   Pulse 61   Ht 5' 6" (1.676 m)   Wt 92.9 kg (204 lb 11.2 oz)   LMP  (LMP Unknown) Comment: years ago  BMI 33.04 kg/m²     Physical Exam   Vitals reviewed.  Constitutional: She is oriented to person, place, and time and well-developed, well-nourished, and in " no distress. No distress.   HENT:   Head: Normocephalic and atraumatic.   Mouth/Throat: Oropharynx is clear and moist. No oropharyngeal exudate.   No facial rashes  Parotids not enlarged  No oral ulcers  Adequate moisture of oral mucosa but no pooling.  Missing teeth.   Eyes: Conjunctivae and EOM are normal. Pupils are equal, round, and reactive to light. Right eye exhibits no discharge. Left eye exhibits no discharge. No scleral icterus.   Neck: Neck supple. No JVD present. No tracheal deviation present. No thyromegaly present.   Cardiovascular: Normal rate, regular rhythm and intact distal pulses.  Exam reveals no gallop and no friction rub.    Murmur heard.  Pulmonary/Chest: Effort normal and breath sounds normal. No respiratory distress. She has no wheezes. She has no rales. She exhibits no tenderness.   Abdominal: Soft. Bowel sounds are normal. She exhibits no distension and no mass. There is no splenomegaly or hepatomegaly. There is no tenderness. There is no rebound and no guarding.   Lymphadenopathy:     She has no cervical adenopathy.        Right: No inguinal adenopathy present.        Left: No inguinal adenopathy present.   Neurological: She is alert and oriented to person, place, and time. She has normal reflexes. No cranial nerve deficit. Gait normal.   Proximal and distal muscle strength 5/5.   Skin: Skin is warm and dry. No rash noted. She is not diaphoretic.     Psychiatric: Mood, affect and judgment normal.   Musculoskeletal: Normal range of motion. She exhibits edema. She exhibits no tenderness.   No synovitis anywhere.  Degenerative changes of hands and knees.          5/29/18:CMP Na 129; alb. 3.3; LEROY 1:160 H; +RNP; UA ok; U C&S multiple orgs  3/7/18: CBC ok; TSH 4.299; FT4 1.38    3/6/28: R hand: personally reviewed w patient: OA DIPs & PIPs w mild narrowing RC jt.  10/20/17: Esophagram: Incomplete clearing of contrast the primary and secondary esophageal contractions; tertiary esophageal  contractions present; small diverticular outpouching in the proximal 3rd of the esophagus; moderately sized hiatal hernia.  5/27/16: L hip: personally reviewed: bilateral hip jsn; SI joints & sacrum ok; OA lower LS.   3/9/16: R wrist: personally reviewed: ok;  Assessment:   Positive LEROY 1:160 H w low +RNP  Sicca syndrome--not Sjogren's  Hx of LLE DVT following a fall [id number]   On coumadin x 6 months.  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  However, doubt CTD. Likely LEROY represents state of autoimmunity (as possibly dose thyroid issue)  This was explained to patient and her attendant.  RTC prn.      CC: Ama Graham MD

## 2018-06-05 NOTE — LETTER
June 5, 2018      Ama Graham MD  [address]  [place] [address]           [facility] - Rheumatology  [address]  [place] [address]  Phone: [phone]  Fax: [phone]          Patient: Laila Hanna   MR Number: [id number]   YOB: 1931   Date of Visit: 6/5/2018       Dear Dr. Ama Graham:    Thank you for referring Laila Hanna to me for evaluation. Attached you will find relevant portions of my assessment and plan of care.    If you have questions, please do not hesitate to call me. I look forward to following Laila Hanna along with you.    Sincerely,    Marsha Flores MD    Enclosure  CC:  No Recipients    If you would like to receive this communication electronically, please contact [email or web address] or [phone] to request more information on [employer] Link access.    For providers and/or their staff who would like to refer a patient to [facility], please contact us through our one-stop-shop provider referral line, [facility], at [phone].    If you feel you have received this communication in error or would no longer like to receive these types of communications, please e-mail [email or web address]

## 2018-06-14 ENCOUNTER — OFFICE VISIT (OUTPATIENT)
Dept: OPHTHALMOLOGY | Facility: CLINIC | Age: 83
End: 2018-06-14
Payer: MEDICARE

## 2018-06-14 DIAGNOSIS — H00.022 HORDEOLUM INTERNUM OF RIGHT LOWER EYELID: Primary | ICD-10-CM

## 2018-06-14 DIAGNOSIS — H01.006 BLEPHARITIS OF BOTH EYES, UNSPECIFIED EYELID, UNSPECIFIED TYPE: ICD-10-CM

## 2018-06-14 DIAGNOSIS — H01.003 BLEPHARITIS OF BOTH EYES, UNSPECIFIED EYELID, UNSPECIFIED TYPE: ICD-10-CM

## 2018-06-14 PROCEDURE — 99499 UNLISTED E&M SERVICE: CPT | Mod: S$GLB,,, | Performed by: OPHTHALMOLOGY

## 2018-06-14 PROCEDURE — 99999 PR PBB SHADOW E&M-EST. PATIENT-LVL II: CPT | Mod: PBBFAC,,, | Performed by: OPHTHALMOLOGY

## 2018-06-14 NOTE — LETTER
June 14, 2018    Laila Hanna  [address] [facility] [address]             [facility] - Ophthalmology  [address]  [place] [address]  Phone: [phone]  Fax: [phone] Dear Mrs. Hanna:    Use warm compresses twice daily for about 5-10 minutes. Please use the Tobradex ointment on both eyes at bedtime. When showering use Tyrell's baby shampoo on your eyelashes.       If you have any questions or concerns, please don't hesitate to call.    Sincerely,        Ila Lyon OT

## 2018-06-14 NOTE — PROGRESS NOTES
HPI     Right eye is still red and irritated.no improvement with doxycycline  Has not used WC or done any lid hygiene  S/p entropion repair 2/9/18    Last edited by Franny Fountain on 6/14/2018 10:50 AM. (History)            Assessment /Plan     For exam results, see Encounter Report.    Hordeolum internum of right lower eyelid    Blepharitis of both eyes, unspecified eyelid, unspecified type      WC's, lid hygiene. Explained to patient that there is a focal area of inflammation which is the hordeolum. Tobradex pierre given to use qhs od. Reiterated importance of warm compresses and lid hygiene.    Return in one month prn sooner any worsening

## 2018-06-18 ENCOUNTER — TELEPHONE (OUTPATIENT)
Dept: INTERNAL MEDICINE | Facility: CLINIC | Age: 83
End: 2018-06-18

## 2018-06-18 ENCOUNTER — LAB VISIT (OUTPATIENT)
Dept: LAB | Facility: HOSPITAL | Age: 83
End: 2018-06-18
Attending: INTERNAL MEDICINE
Payer: MEDICARE

## 2018-06-18 DIAGNOSIS — M35.00 SICCA SYNDROME: ICD-10-CM

## 2018-06-18 DIAGNOSIS — E87.1 HYPONATREMIA: Primary | ICD-10-CM

## 2018-06-18 DIAGNOSIS — R76.8 POSITIVE ANA (ANTINUCLEAR ANTIBODY): ICD-10-CM

## 2018-06-18 DIAGNOSIS — Z86.718 HISTORY OF DVT (DEEP VEIN THROMBOSIS): ICD-10-CM

## 2018-06-18 LAB
ANION GAP SERPL CALC-SCNC: 6 MMOL/L
BUN SERPL-MCNC: 15 MG/DL
C3 SERPL-MCNC: 113 MG/DL
C4 SERPL-MCNC: 29 MG/DL
CALCIUM SERPL-MCNC: 9.3 MG/DL
CCP AB SER IA-ACNC: <0.5 U/ML
CHLORIDE SERPL-SCNC: 96 MMOL/L
CK SERPL-CCNC: 94 U/L
CO2 SERPL-SCNC: 27 MMOL/L
CREAT SERPL-MCNC: 0.8 MG/DL
CRP SERPL-MCNC: 1.3 MG/L
ERYTHROCYTE [SEDIMENTATION RATE] IN BLOOD BY WESTERGREN METHOD: 11 MM/HR
EST. GFR  (AFRICAN AMERICAN): >60 ML/MIN/1.73 M^2
EST. GFR  (NON AFRICAN AMERICAN): >60 ML/MIN/1.73 M^2
GLUCOSE SERPL-MCNC: 114 MG/DL
POTASSIUM SERPL-SCNC: 4.4 MMOL/L
RHEUMATOID FACT SERPL-ACNC: <10 IU/ML
SODIUM SERPL-SCNC: 129 MMOL/L

## 2018-06-18 PROCEDURE — 80048 BASIC METABOLIC PNL TOTAL CA: CPT

## 2018-06-18 PROCEDURE — 86160 COMPLEMENT ANTIGEN: CPT

## 2018-06-18 PROCEDURE — 86140 C-REACTIVE PROTEIN: CPT

## 2018-06-18 PROCEDURE — 86200 CCP ANTIBODY: CPT

## 2018-06-18 PROCEDURE — 86431 RHEUMATOID FACTOR QUANT: CPT

## 2018-06-18 PROCEDURE — 85613 RUSSELL VIPER VENOM DILUTED: CPT

## 2018-06-18 PROCEDURE — 86235 NUCLEAR ANTIGEN ANTIBODY: CPT

## 2018-06-18 PROCEDURE — 82550 ASSAY OF CK (CPK): CPT

## 2018-06-18 PROCEDURE — 86160 COMPLEMENT ANTIGEN: CPT | Mod: 59

## 2018-06-18 PROCEDURE — 86146 BETA-2 GLYCOPROTEIN ANTIBODY: CPT

## 2018-06-18 PROCEDURE — 85651 RBC SED RATE NONAUTOMATED: CPT

## 2018-06-18 PROCEDURE — 86147 CARDIOLIPIN ANTIBODY EA IG: CPT

## 2018-06-18 NOTE — TELEPHONE ENCOUNTER
Please contact patient and inform her that her sodium is low. I will need for her to see nephrology for this. Order in,please schedule

## 2018-06-19 LAB
CARDIOLIPIN IGG SER IA-ACNC: <9.4 GPL
CARDIOLIPIN IGM SER IA-ACNC: <9.4 MPL
LA PPP-IMP: NEGATIVE

## 2018-06-20 LAB — ENA SCL70 AB SER-ACNC: 9 UNITS

## 2018-06-21 ENCOUNTER — TELEPHONE (OUTPATIENT)
Dept: INTERNAL MEDICINE | Facility: CLINIC | Age: 83
End: 2018-06-21

## 2018-06-21 LAB
B2 GLYCOPROT1 IGA SER QL: <9 SAU
B2 GLYCOPROT1 IGG SER QL: <9 SGU
B2 GLYCOPROT1 IGM SER QL: <9 SMU

## 2018-06-21 NOTE — TELEPHONE ENCOUNTER
----- Message from Martha Bond sent at 6/21/2018  8:46 AM CDT -----  Contact: osvaldo gardiner/nishant/[phone]  Pt osvaldo gardiner called in regards to wanting to know if the pt appointment for the nEPHROLOGY (8-16-18) is ok or should the pt get a sooner appointment.      Please advise

## 2018-06-22 NOTE — TELEPHONE ENCOUNTER
Spoke with taylor in nephrology and she stated they didn't have anything sooner but pt was placed on the wait list and a message was sent to the head nurse ana who will call pt

## 2018-07-09 ENCOUNTER — TELEPHONE (OUTPATIENT)
Dept: INTERNAL MEDICINE | Facility: CLINIC | Age: 83
End: 2018-07-09

## 2018-07-09 ENCOUNTER — OFFICE VISIT (OUTPATIENT)
Dept: NEPHROLOGY | Facility: CLINIC | Age: 83
End: 2018-07-09
Payer: MEDICARE

## 2018-07-09 ENCOUNTER — LAB VISIT (OUTPATIENT)
Dept: LAB | Facility: HOSPITAL | Age: 83
End: 2018-07-09
Attending: INTERNAL MEDICINE
Payer: MEDICARE

## 2018-07-09 VITALS
HEART RATE: 72 BPM | HEIGHT: 66 IN | BODY MASS INDEX: 32.14 KG/M2 | DIASTOLIC BLOOD PRESSURE: 70 MMHG | SYSTOLIC BLOOD PRESSURE: 110 MMHG | WEIGHT: 200 LBS | OXYGEN SATURATION: 96 %

## 2018-07-09 DIAGNOSIS — E78.49 OTHER HYPERLIPIDEMIA: ICD-10-CM

## 2018-07-09 DIAGNOSIS — E87.1 HYPONATREMIA: ICD-10-CM

## 2018-07-09 DIAGNOSIS — I10 ESSENTIAL HYPERTENSION: ICD-10-CM

## 2018-07-09 DIAGNOSIS — E03.8 OTHER SPECIFIED HYPOTHYROIDISM: ICD-10-CM

## 2018-07-09 DIAGNOSIS — M89.8X9 METABOLIC BONE DISEASE: ICD-10-CM

## 2018-07-09 DIAGNOSIS — N18.2 CKD (CHRONIC KIDNEY DISEASE) STAGE 2, GFR 60-89 ML/MIN: ICD-10-CM

## 2018-07-09 DIAGNOSIS — N18.2 CKD (CHRONIC KIDNEY DISEASE) STAGE 2, GFR 60-89 ML/MIN: Primary | ICD-10-CM

## 2018-07-09 DIAGNOSIS — F32.A DEPRESSION, UNSPECIFIED DEPRESSION TYPE: Primary | ICD-10-CM

## 2018-07-09 PROBLEM — E03.9 HYPOTHYROID: Status: ACTIVE | Noted: 2018-07-09

## 2018-07-09 LAB
ALBUMIN SERPL BCP-MCNC: 3.5 G/DL
ALBUMIN SERPL BCP-MCNC: 3.5 G/DL
ANION GAP SERPL CALC-SCNC: 5 MMOL/L
ANION GAP SERPL CALC-SCNC: 5 MMOL/L
BUN SERPL-MCNC: 15 MG/DL
BUN SERPL-MCNC: 15 MG/DL
CALCIUM SERPL-MCNC: 9.6 MG/DL
CALCIUM SERPL-MCNC: 9.6 MG/DL
CHLORIDE SERPL-SCNC: 96 MMOL/L
CHLORIDE SERPL-SCNC: 96 MMOL/L
CO2 SERPL-SCNC: 31 MMOL/L
CO2 SERPL-SCNC: 31 MMOL/L
CORTIS SERPL-MCNC: 14 UG/DL
CREAT SERPL-MCNC: 0.8 MG/DL
CREAT SERPL-MCNC: 0.8 MG/DL
EST. GFR  (AFRICAN AMERICAN): >60 ML/MIN/1.73 M^2
EST. GFR  (AFRICAN AMERICAN): >60 ML/MIN/1.73 M^2
EST. GFR  (NON AFRICAN AMERICAN): >60 ML/MIN/1.73 M^2
EST. GFR  (NON AFRICAN AMERICAN): >60 ML/MIN/1.73 M^2
GLUCOSE SERPL-MCNC: 103 MG/DL
GLUCOSE SERPL-MCNC: 103 MG/DL
PHOSPHATE SERPL-MCNC: 3.7 MG/DL
PHOSPHATE SERPL-MCNC: 3.7 MG/DL
POTASSIUM SERPL-SCNC: 4.5 MMOL/L
POTASSIUM SERPL-SCNC: 4.5 MMOL/L
SODIUM SERPL-SCNC: 132 MMOL/L
SODIUM SERPL-SCNC: 132 MMOL/L
TSH SERPL DL<=0.005 MIU/L-ACNC: 3.05 UIU/ML
URATE SERPL-MCNC: 3.4 MG/DL

## 2018-07-09 PROCEDURE — 99204 OFFICE O/P NEW MOD 45 MIN: CPT | Mod: S$GLB,,, | Performed by: INTERNAL MEDICINE

## 2018-07-09 PROCEDURE — 84443 ASSAY THYROID STIM HORMONE: CPT

## 2018-07-09 PROCEDURE — 36415 COLL VENOUS BLD VENIPUNCTURE: CPT

## 2018-07-09 PROCEDURE — 82533 TOTAL CORTISOL: CPT

## 2018-07-09 PROCEDURE — 80069 RENAL FUNCTION PANEL: CPT

## 2018-07-09 PROCEDURE — 99999 PR PBB SHADOW E&M-EST. PATIENT-LVL III: CPT | Mod: PBBFAC,,, | Performed by: INTERNAL MEDICINE

## 2018-07-09 PROCEDURE — 84550 ASSAY OF BLOOD/URIC ACID: CPT

## 2018-07-09 NOTE — TELEPHONE ENCOUNTER
----- Message from Katherin Vo sent at 7/9/2018  2:30 PM CDT -----  Contact: Maria Isabel/Shay/[phone]  Maria Isabel called in regrds needing to talk with Dr Graham about patient was seen the Nephrologist and was told to be taken off from the antidepressant venlafaxine (EFFEXOR-XR) 37.5 MG 24 hr capsule. Please call and advise. Thank you

## 2018-07-09 NOTE — LETTER
July 9, 2018      Ama Graham MD  [address]  [place] [address]           [facility] - Nephrology  [address]  [place] [address]  Phone: [phone]  Fax: [phone]          Patient: Laila Hanna   MR Number: [id number]   YOB: 1931   Date of Visit: 7/9/2018       Dear Dr. Ama Graham:    Thank you for referring Laila Hanna to me for evaluation. Attached you will find relevant portions of my assessment and plan of care.    If you have questions, please do not hesitate to call me. I look forward to following Laila Hanna along with you.    Sincerely,    Jacquelin Bundy MD    Enclosure  CC:  No Recipients    If you would like to receive this communication electronically, please contact [email or web address] or [phone] to request more information on [employer] Link access.    For providers and/or their staff who would like to refer a patient to [facility], please contact us through our one-stop-shop provider referral line, [facility], at [phone].    If you feel you have received this communication in error or would no longer like to receive these types of communications, please e-mail [email or web address]

## 2018-07-09 NOTE — PATIENT INSTRUCTIONS
Lab and urine today and labs in one month     strongly advise stopping the effexor as this may contribute to low serum sodiums    Please call Dr. Graham and discuss other options to treat your depression    Please take at leas 2 extra sources of protein daily including  Fresh egg whites  beneprotein   zone or quest bars    rtc 3 mo labs prior

## 2018-07-09 NOTE — PROGRESS NOTES
Subjective:       Patient ID: Laila Hanna is a 86 y.o. White female who presents for new evaluation of renal function.She is using a walker, confused and unable to complete thoughts and sentences and is with her sitter. She is at her usual baseline as per the sitter. She lives by herself with a sitter during the week from 8-6 and at night she is alone.    HPI   85 yo wf with HTN, HPL, hypothyroidism, Chronic hyponatremia, intermittent dating back to at least 2006 with serum sodiums in past ranging from 130-136 and 5/2018 and 6/2018 it was 129.  She is not on a diuretic, TSH wnl,  cortisol NA. BNP in past not elevated, + hepatomegaly on abd US 2013, LFTs wnl, + veronika 1:160, DS DNA neg, SM/RNP+ 30, serum crt 0.8 bun 15, albumin 3.3, uric acid NA  She was switched from Wellbutrin to Effexor as an antidepressant in the last several months. She today complains of decreased memory and confusion   she uses miralax daily and at [facility] has loose stools   She has been on fluid restriction 1 bottle of water and 1-2  cups of liquid daily    Review of Systems   Constitutional: Positive for fatigue. Negative for appetite change, chills, fever and unexpected weight change.   HENT: Negative for congestion, facial swelling, hearing loss, nosebleeds and trouble swallowing.    Eyes: Negative for pain, discharge, redness and visual disturbance.   Respiratory: Negative for cough, chest tightness, shortness of breath and wheezing.    Cardiovascular: Negative for chest pain, palpitations and leg swelling.   Gastrointestinal: Negative for abdominal pain, constipation, diarrhea, nausea and vomiting.   Endocrine: Negative for cold intolerance, heat intolerance and polydipsia.   Genitourinary: Negative for decreased urine volume, difficulty urinating, dysuria, flank pain, hematuria and urgency.   Musculoskeletal: Negative for arthralgias, back pain, joint swelling and myalgias.   Skin: Negative for color change, pallor, rash and wound.  "  Neurological: Negative for dizziness, tremors, seizures, syncope, weakness and headaches.        Memory loss, not new but worse as per patient      Hematological: Negative for adenopathy. Does not bruise/bleed easily.   Psychiatric/Behavioral: Negative for agitation, behavioral problems, dysphoric mood, self-injury and sleep disturbance.       Objective:     Blood pressure 110/70, pulse 72, height 5' 6" (1.676 m), weight 90.7 kg (200 lb), SpO2 96 %.    Physical Exam   Constitutional: She is oriented to person, place, and time. She appears well-developed and well-nourished. No distress.   HENT:   Head: Normocephalic and atraumatic.   Mouth/Throat: No oropharyngeal exudate.   Eyes: Conjunctivae and EOM are normal. Pupils are equal, round, and reactive to light. Right eye exhibits no discharge. Left eye exhibits no discharge. No scleral icterus.   Neck: Normal range of motion. Neck supple. No JVD present. No tracheal deviation present. No thyromegaly present.   Cardiovascular: Normal rate, regular rhythm and intact distal pulses.  Exam reveals no gallop.    No murmur heard.  Pulmonary/Chest: Effort normal and breath sounds normal. No stridor. No respiratory distress. She has no wheezes. She has no rales. She exhibits no tenderness.   Abdominal: Soft. Bowel sounds are normal. She exhibits no distension and no mass. There is no tenderness. There is no rebound and no guarding. No hernia.   Musculoskeletal: She exhibits no edema or tenderness.   Lymphadenopathy:     She has no cervical adenopathy.   Neurological: She is alert and oriented to person, place, and time. She exhibits normal muscle tone.   Skin: Skin is warm and dry. No rash noted. She is not diaphoretic. No erythema.   Psychiatric: She has a normal mood and affect. Judgment and thought content normal.   Nursing note and vitals reviewed.      Assessment:       1. CKD (chronic kidney disease) stage 2, GFR 60-89 ml/min    2. Hyponatremia    3. Metabolic bone " disease    4. Essential hypertension    5. Other hyperlipidemia    6. Other specified hypothyroidism        Plan:   85 yo WF with chronic hyponatremia on SSRI now with worsening serum sodium, no history fo CHF, but possible underlying liver disease with hepatomegaly seen on US 2013, laxative use, and borderline hypoalbuminemia  With GFR > 60 cc/min, hypothyroidism,     Hyponatremia most likely she has an underlying concentrating/diluting defect of the renal tubules related to age manifesting as an SIADH like syndrome at baseline for several years,  and exacerbated by hypoalbuminemia, land    by watery stools from daily laxative use. Would repeat cortisol and TSH   SSRI's may have a greater propensity to contribute to hyponatrmeia in the  Elderly,and the recent change to effexor may also be contributing to worsening hyponatremia.    Suggest discontinuing all SSRI's now and considering other treatments for depression   check serum sodium today and guide further water restriction based on level.  Increase protein in diet     This patient with chronic hyponatremia, memory loss, confusion and gait instability is at significant risk for traumatic falls and bone fractures. I am concerned that she needs 24 hour supervision.    rtc 3 mo labs today, and 1 mo and as these labs dictate further workup

## 2018-07-09 NOTE — TELEPHONE ENCOUNTER
She can wean off of that by taking one pill every other day for a week, then discontinuing. However, will need for her to see psychiatry for other recommendations in place of the effexor. Order in,please schedule psychiatry appt

## 2018-07-10 NOTE — TELEPHONE ENCOUNTER
Spoke to zeferino she said she tried to call and their next available isn't until November. They want to know what to do in the mean time

## 2018-07-10 NOTE — TELEPHONE ENCOUNTER
They can contact their insurance company to see what psychiatrists are on their insurance who are not at [facility]- they may have earlier availablility

## 2018-07-10 NOTE — TELEPHONE ENCOUNTER
Spoke to pt.'s zeferino Cummins she verbalized understanding of the weaning off of Effexor but she wants to know if pt. Can take Wellbutrin she said pt. Has a lot of them

## 2018-07-14 ENCOUNTER — HOSPITAL ENCOUNTER (EMERGENCY)
Facility: HOSPITAL | Age: 83
Discharge: HOME | End: 2018-07-14
Attending: EMERGENCY MEDICINE
Payer: MEDICARE

## 2018-07-14 VITALS
BODY MASS INDEX: 31.39 KG/M2 | WEIGHT: 200 LBS | DIASTOLIC BLOOD PRESSURE: 88 MMHG | OXYGEN SATURATION: 96 % | RESPIRATION RATE: 18 BRPM | TEMPERATURE: 98 F | HEART RATE: 67 BPM | HEIGHT: 67 IN | SYSTOLIC BLOOD PRESSURE: 188 MMHG

## 2018-07-14 DIAGNOSIS — R21 RASH AND NONSPECIFIC SKIN ERUPTION: Primary | ICD-10-CM

## 2018-07-14 PROCEDURE — 99283 EMERGENCY DEPT VISIT LOW MDM: CPT

## 2018-07-14 PROCEDURE — 99284 EMERGENCY DEPT VISIT MOD MDM: CPT | Mod: ,,, | Performed by: EMERGENCY MEDICINE

## 2018-07-14 RX ORDER — CETIRIZINE HYDROCHLORIDE 10 MG/1
10 TABLET ORAL EVERY 12 HOURS PRN
Qty: 60 TABLET | Refills: 0 | Status: SHIPPED | OUTPATIENT
Start: 2018-07-14 | End: 2020-08-25

## 2018-07-14 RX ORDER — CLOTRIMAZOLE AND BETAMETHASONE DIPROPIONATE 10; .64 MG/G; MG/G
CREAM TOPICAL 2 TIMES DAILY
Qty: 45 G | Refills: 0 | Status: SHIPPED | OUTPATIENT
Start: 2018-07-14 | End: 2019-07-18 | Stop reason: ALTCHOICE

## 2018-07-14 NOTE — DISCHARGE INSTRUCTIONS
Apply lotrisone twice daily, with a telfa dressing over it. Cover with an ace wrap, coban or a glove.

## 2018-07-14 NOTE — ED NOTES
Patient identifiers verified and correct for Ms Hanna  C/C: Rash to outer right wrist   APPEARANCE: awake and alert in NAD.  SKIN: warm, dry. Pink color rash to right wrist with min edema, positive itching  MUSCULOSKELETAL: Patient moving all extremities spontaneously, no obvious swelling or deformities noted. Ambulates with assist, walker  RESPIRATORY: Denies shortness of breath.Respirations unlabored.   CARDIAC: Denies CP, 2+ distal pulses; no peripheral edema  ABDOMEN: S/ND/NT, Denies nausea  : voids spontaneously, denies difficulty  Neurologic: AAO x 4; follows commands equal strength in all extremities; denies numbness/tingling. Denies dizziness Denies weakness

## 2018-07-14 NOTE — ED PROVIDER NOTES
Encounter Date: 7/14/2018    SCRIBE #1 NOTE: I, Mirian English, am scribing for, and in the presence of, Dr. Millard.       History     Chief Complaint   Patient presents with    Skin Problem     right hand skin irritation. pt has been scratching.     Time seen by provider: 3:22 PM    This is a 86 y.o. female with medical conditions including HLD, meningiomas, CAD, MI, DDD, who presents with complaint of an itchy rash to the right hand, worsening over the past few weeks. The patient's family does report the patient has been scratching the area quite a bit and making worse. She also states the patient has a history of many skin issues and sees her dermatologist frequently. The patient denies any knowledge of a source of this rash. She is being weaned off of Effexor but otherwise, no medication changes.       The history is provided by the patient.     Review of patient's allergies indicates:   Allergen Reactions    Thiazides Other (See Comments)     Severe hyponatremia     Past Medical History:   Diagnosis Date    Basal cell carcinoma     nose    Benign essential hypertension     Cerebral microvascular disease 9/8/2014    Closed fracture of distal phalanx of left hand with routine healing 9/17/2015    Closed mallet fracture of distal phalanx of finger with routine healing 10/6/2015    Coronary artery disease     DDD (degenerative disc disease), lumbar 4/12/2016    Depression     Fall at home 5/30/2016    Hyperlipidemia     Hypothyroidism due to acquired atrophy of thyroid     Meningiomas, multiple     MI (myocardial infarction) 2004    80% blockage per patient    Migraine aura without headache 12/1/2014    Post PTCA 12/12/2012    Transient cerebral ischemia 5/4/2014     Past Surgical History:   Procedure Laterality Date    CARDIAC CATHETERIZATION  03/29/2004    S/P LAD PTCA, coated stent    CATARACT EXTRACTION W/  INTRAOCULAR LENS IMPLANT Bilateral 2000    Dr Youssef     CHOLECYSTECTOMY       CORONARY ANGIOPLASTY WITH STENT PLACEMENT  2004    LAD    EYE SURGERY      TOTAL THYROIDECTOMY       Family History   Problem Relation Age of Onset    Stroke Mother     Diabetes Mother     Hypertension Father     Stroke Father     Cancer Brother         colon    Skin cancer Brother     Colon cancer Brother     Diabetes Paternal Grandmother     Cancer Sister         breast    Glaucoma Maternal Aunt     No Known Problems Maternal Uncle     No Known Problems Paternal Aunt     No Known Problems Paternal Uncle     No Known Problems Maternal Grandmother     No Known Problems Maternal Grandfather     No Known Problems Paternal Grandfather     Amblyopia Neg Hx     Blindness Neg Hx     Cataracts Neg Hx     Macular degeneration Neg Hx     Retinal detachment Neg Hx     Strabismus Neg Hx     Thyroid disease Neg Hx     Esophageal cancer Neg Hx      Social History   Substance Use Topics    Smoking status: Former Smoker     Packs/day: 0.30     Years: 15.00     Types: Cigarettes     Quit date: 1/26/1973    Smokeless tobacco: Never Used    Alcohol use No     Review of Systems   Constitutional: Negative for chills and fever.   Skin: Positive for rash (to right hand ).   All other systems reviewed and are negative.      Physical Exam     Initial Vitals [07/14/18 1513]   BP Pulse Resp Temp SpO2   (!) 188/88 67 18 97.5 °F (36.4 °C) 96 %      MAP       --         Physical Exam    Nursing note and vitals reviewed.  Constitutional: She appears well-developed and well-nourished. No distress.   HENT:   Head: Normocephalic and atraumatic.   Mouth/Throat: Oropharynx is clear and moist.   Eyes: EOM are normal. Pupils are equal, round, and reactive to light.   Neck: Normal range of motion. Neck supple.   Cardiovascular: Normal rate.   Pulmonary/Chest: No respiratory distress.   Abdominal: She exhibits no distension.   Musculoskeletal: Normal range of motion. She exhibits no edema.   Neurological: She is alert.    Skin: Rash noted.   Superficial excoriations noted diffusely around right dorsal hand and right distal wrist. Mild ichthyosis. Few superficial wheals, central sparing. No epitrochlear lymph nodes or axillary lymphadenopathy.          ED Course   Procedures  Labs Reviewed - No data to display       Imaging Results    None          Medical Decision Making:   History:   Old Medical Records: I decided to obtain old medical records.  ED Management:  ? Contact derm, or psychogenic urticaria. Will treat with lotrisone and recc covering area to avoid further excoriation.             Scribe Attestation:   Scribe #1: I performed the above scribed service and the documentation accurately describes the services I performed. I attest to the accuracy of the note.               Clinical Impression:   The encounter diagnosis was Rash and nonspecific skin eruption.                             Laureano Millard MD  07/14/18 [id number]

## 2018-07-14 NOTE — ED TRIAGE NOTES
Patient with rash to right hand x 2 weeks, worse x 2-3 days, today with increased swelling (per family). Per patient itching and scratching hand. No Benadryl PTA.

## 2018-07-16 DIAGNOSIS — E87.1 HYPONATREMIA: Primary | ICD-10-CM

## 2018-07-19 ENCOUNTER — OFFICE VISIT (OUTPATIENT)
Dept: OPHTHALMOLOGY | Facility: CLINIC | Age: 83
End: 2018-07-19
Payer: MEDICARE

## 2018-07-19 DIAGNOSIS — H01.00B BLEPHARITIS OF UPPER AND LOWER EYELIDS OF BOTH EYES, UNSPECIFIED TYPE: Primary | ICD-10-CM

## 2018-07-19 DIAGNOSIS — H01.00A BLEPHARITIS OF UPPER AND LOWER EYELIDS OF BOTH EYES, UNSPECIFIED TYPE: Primary | ICD-10-CM

## 2018-07-19 DIAGNOSIS — H00.022 HORDEOLUM INTERNUM OF RIGHT LOWER EYELID: ICD-10-CM

## 2018-07-19 PROCEDURE — 92285 EXTERNAL OCULAR PHOTOGRAPHY: CPT | Mod: S$GLB,,, | Performed by: OPHTHALMOLOGY

## 2018-07-19 PROCEDURE — 99999 PR PBB SHADOW E&M-EST. PATIENT-LVL III: CPT | Mod: PBBFAC,,, | Performed by: OPHTHALMOLOGY

## 2018-07-19 PROCEDURE — 92012 INTRM OPH EXAM EST PATIENT: CPT | Mod: S$GLB,,, | Performed by: OPHTHALMOLOGY

## 2018-07-19 NOTE — PROGRESS NOTES
HPI     87 YO female presents today for a  3 week post-op visit, right lower lid   entropion repair on 2/19/2018. C/O gritty feeling in OS after putting in   Systane. Some tenderness to the touch in OD since surgery.     Last edited by Ila Lyon, PCT on 7/19/2018 10:59 AM. (History)            Assessment /Plan     For exam results, see Encounter Report.    Blepharitis of upper and lower eyelids of both eyes, unspecified type  -     External/Slit Lamp Photography    Hordeolum internum of right lower eyelid      Much improved. WC's bid ou. Continue Tyrell's baby shampoo ou. May have early entropion left lower eyelid. Unable to bring about entropion today with forced closure. No signs of keratopathy on exam today. If worsens, return for evaluation.

## 2018-08-01 ENCOUNTER — LAB VISIT (OUTPATIENT)
Dept: LAB | Facility: HOSPITAL | Age: 83
End: 2018-08-01
Attending: INTERNAL MEDICINE
Payer: MEDICARE

## 2018-08-01 DIAGNOSIS — E87.1 HYPONATREMIA: ICD-10-CM

## 2018-08-01 DIAGNOSIS — E87.1 HYPONATREMIA: Primary | ICD-10-CM

## 2018-08-01 LAB
ALBUMIN SERPL BCP-MCNC: 3.3 G/DL
ANION GAP SERPL CALC-SCNC: 5 MMOL/L
BUN SERPL-MCNC: 22 MG/DL
CALCIUM SERPL-MCNC: 9.2 MG/DL
CHLORIDE SERPL-SCNC: 98 MMOL/L
CO2 SERPL-SCNC: 30 MMOL/L
CREAT SERPL-MCNC: 0.7 MG/DL
EST. GFR  (AFRICAN AMERICAN): >60 ML/MIN/1.73 M^2
EST. GFR  (NON AFRICAN AMERICAN): >60 ML/MIN/1.73 M^2
GLUCOSE SERPL-MCNC: 93 MG/DL
PHOSPHATE SERPL-MCNC: 3.4 MG/DL
POTASSIUM SERPL-SCNC: 4.5 MMOL/L
SODIUM SERPL-SCNC: 133 MMOL/L

## 2018-08-01 PROCEDURE — 80069 RENAL FUNCTION PANEL: CPT

## 2018-08-01 PROCEDURE — 36415 COLL VENOUS BLD VENIPUNCTURE: CPT

## 2018-08-07 ENCOUNTER — LAB VISIT (OUTPATIENT)
Dept: LAB | Facility: HOSPITAL | Age: 83
End: 2018-08-07
Attending: INTERNAL MEDICINE
Payer: MEDICARE

## 2018-08-07 ENCOUNTER — OFFICE VISIT (OUTPATIENT)
Dept: INTERNAL MEDICINE | Facility: CLINIC | Age: 83
End: 2018-08-07
Payer: MEDICARE

## 2018-08-07 DIAGNOSIS — R41.3 MEMORY DISTURBANCE: Primary | ICD-10-CM

## 2018-08-07 DIAGNOSIS — R41.3 MEMORY DISTURBANCE: ICD-10-CM

## 2018-08-07 LAB
FOLATE SERPL-MCNC: 9.5 NG/ML
VIT B12 SERPL-MCNC: 293 PG/ML

## 2018-08-07 PROCEDURE — 82607 VITAMIN B-12: CPT

## 2018-08-07 PROCEDURE — 36415 COLL VENOUS BLD VENIPUNCTURE: CPT

## 2018-08-07 PROCEDURE — 82746 ASSAY OF FOLIC ACID SERUM: CPT

## 2018-08-07 PROCEDURE — 99215 OFFICE O/P EST HI 40 MIN: CPT | Mod: S$GLB,,, | Performed by: INTERNAL MEDICINE

## 2018-08-07 PROCEDURE — 99999 PR PBB SHADOW E&M-EST. PATIENT-LVL V: CPT | Mod: PBBFAC,,, | Performed by: INTERNAL MEDICINE

## 2018-08-08 ENCOUNTER — OUTPATIENT CASE MANAGEMENT (OUTPATIENT)
Dept: ADMINISTRATIVE | Facility: OTHER | Age: 83
End: 2018-08-08

## 2018-08-08 NOTE — PROGRESS NOTES
Thank you for the referral.  Patient has been assigned to Josi Srivastava LMSW for low risk screening for Outpatient Case Management.     Reason for referral: Memory disturbance    Please contact \[facility]\"" at ext. [phone] with any questions.    Thank you,    Saida Downing    \[facility]\""

## 2018-08-09 ENCOUNTER — OUTPATIENT CASE MANAGEMENT (OUTPATIENT)
Dept: ADMINISTRATIVE | Facility: OTHER | Age: 83
End: 2018-08-09

## 2018-08-09 NOTE — PROGRESS NOTES
This LMSW received a referral on the above patient.   Reason for referral: Low risk, Memory disturbance   Name of the community resource that was provided: Mental Health/Counseling Resources, [facility], [facility]   Resource given to: Patient's caregiver via E-mail to: [email or web address]     LMSW contacted patient's daughter, Ham regarding referral. Patient's daughter reports patient currently living alone and reports patient needs assistance with IADLs. Patient's daughter reports that patient does not use assistive devices to ambulate. Patient's daughter reports that patient has caregiver coming to her home 5 days week to assist patient with various activities. Patient's daughter reports patient has severe anxiety which has affected her ability to function. Patient's daughter state's that patient's anxiety is the primary issue and denies patient having major memory problems. Patient's daughter reports patient was tried living in assisted living facility in the past however she hated the experience and stated she never wants to go back to another facility. LMSW discussed options for patient getting help with her anxiety and recommended patient getting reconnected with psychiatry and also weekly mental health counseling. Patient's daughter was agreeable with recommendation and reports she will discuss these options with patient. No other needs identified. Referral source notified.

## 2018-08-11 VITALS
DIASTOLIC BLOOD PRESSURE: 72 MMHG | TEMPERATURE: 99 F | BODY MASS INDEX: 32.92 KG/M2 | WEIGHT: 204.81 LBS | HEART RATE: 62 BPM | HEIGHT: 66 IN | OXYGEN SATURATION: 97 % | SYSTOLIC BLOOD PRESSURE: 128 MMHG

## 2018-08-11 NOTE — PROGRESS NOTES
Subjective:       Patient ID: Laila Hanna is a 86 y.o. female.    Chief Complaint: Follow-up    HPI  She is having issues with her memory    Past medical history: Hypertension, coronary artery disease, TIA, hyperlipidemia, aortic stenosis, meningioma, osteoporosis, hypothyroidism, migraine headache,, status post cholecystectomy, status post thyroidectomy, colon adenoma. She had a colonoscopy April 2013     Medications: one aspirin daily, Lipitor 40 mg daily, Synthroid 0.15 mg daily, coreg 12.5 mg twice a day     ALLERGIES: Thiazides       Review of Systems   Constitutional: Negative for chills, fatigue, fever and unexpected weight change.   Respiratory: Negative for chest tightness and shortness of breath.    Cardiovascular: Negative for chest pain and palpitations.   Gastrointestinal: Negative for abdominal pain and blood in stool.   Neurological: Negative for dizziness, syncope, numbness and headaches.       Objective:      Physical Exam   HENT:   Right Ear: External ear normal.   Left Ear: External ear normal.   Nose: Nose normal.   Mouth/Throat: Oropharynx is clear and moist.   Eyes: Pupils are equal, round, and reactive to light.   Neck: Normal range of motion.   Cardiovascular: Normal rate and regular rhythm.    Murmur heard.  Pulmonary/Chest: Breath sounds normal.   Abdominal: She exhibits no distension. There is no hepatosplenomegaly. There is no tenderness.   Lymphadenopathy:     She has no cervical adenopathy.     She has no axillary adenopathy.   Neurological: She has normal strength and normal reflexes. No cranial nerve deficit or sensory deficit.       Assessment/Plan     assessment and plan:  Memory disturbance:  Check B12 folate.  Scheduled neurology appointment.  Recommended that she not live at home alone.  Consult case management

## 2018-08-16 ENCOUNTER — OFFICE VISIT (OUTPATIENT)
Dept: OPTOMETRY | Facility: CLINIC | Age: 83
End: 2018-08-16
Payer: MEDICARE

## 2018-08-16 DIAGNOSIS — H04.123 BILATERAL DRY EYES: Primary | ICD-10-CM

## 2018-08-16 DIAGNOSIS — H52.203 ASTIGMATISM OF BOTH EYES, UNSPECIFIED TYPE: ICD-10-CM

## 2018-08-16 DIAGNOSIS — Z96.1 PSEUDOPHAKIA: ICD-10-CM

## 2018-08-16 DIAGNOSIS — Z98.41 S/P CATARACT SURGERY, RIGHT: ICD-10-CM

## 2018-08-16 DIAGNOSIS — Z98.42 S/P CATARACT SURGERY, LEFT: ICD-10-CM

## 2018-08-16 DIAGNOSIS — H35.372 EPIRETINAL MEMBRANE, LEFT EYE: ICD-10-CM

## 2018-08-16 PROCEDURE — 99999 PR PBB SHADOW E&M-EST. PATIENT-LVL II: CPT | Mod: PBBFAC,,, | Performed by: OPTOMETRIST

## 2018-08-16 PROCEDURE — 92014 COMPRE OPH EXAM EST PT 1/>: CPT | Mod: S$GLB,,, | Performed by: OPTOMETRIST

## 2018-08-16 PROCEDURE — 92015 DETERMINE REFRACTIVE STATE: CPT | Mod: S$GLB,,, | Performed by: OPTOMETRIST

## 2018-08-16 NOTE — PROGRESS NOTES
HPI     Concerns About Ocular Health      Additional comments: yearly chk.Saw Dr. Alvarez on 07/19/2018 - diagnosed   blepharitis of upper and lower lid.  Was told to apply warm compresses,   and to continue lid scrubs.Was told to use ointment previously prescribed   as needed on lid margins.   S/p entropion repair RLL               Comments     Patient feels like she may want a new rx for glasses.    Patient's age: 86 y.o.   Occupation: Retired   Approximate date of last eye examination: 7/19/2018  Name of last eye doctor seen: Dr. Alvarez  City/State: [facility]   Wears glasses? Yes      If yes, wears  Full-time or part-time?  Part-time   Present glasses are: Bifocal, SV Distance, SV Reading?  Lined Bifocal   Approximate age of present glasses:  3 years old   Got new glasses following last exam, or subsequently?:  No    Any problem with VA with glasses? She may want new rx for glasses. Va   blurry at time for the distance and near.  Wears CLs?:  No   Headaches?  No   Eye pain/discomfort?  No                                                                                     Flashes?  No   Floaters?  No   Diplopia/Double vision?  No   Patient's Ocular History:          Any eye surgeries? Phaco WIOL OU X 2000, Lid sx Dr. Alvarez-Ectropion   Repair 2/2018         Any eye injury?  No          Any treatment for eye disease?  No   Family history of eye disease?   Maternal Aunt + Macular Degneration   Significant patient medical history:         1. Diabetes?  No   2. HBP?  Yes, controlled by medication and diet               3. Other (describe):      ! OTC eyedrops currently using:  None    ! Prescription eye meds currently using:  Systane PRN OU, Warm Compresses   for Blepharitis OU   ! Any history of allergy/adverse reaction to any eye meds used   previously?  No    ! Any history of allergy/adverse reaction to eyedrops used during prior   eye exam(s)? No    ! Any history of allergy/adverse reaction to Novacaine or similar meds?   No  "   ! Any history of allergy/adverse reaction to Epinephrine or similar meds?   No    ! Patient okay with use of anesthetic eyedrops to check eye pressure?    Yes        ! Patient okay with use of eyedrops to dilate pupils today?  Yes    !  Allergies/Medications/Medical History/Family History reviewed today?    Yes       PD =   62/58   Desired reading distance =  14.5"                             Last edited by Norberto Gabriel, OD on 8/16/2018 12:04 PM. (History)            Assessment /Plan     For exam results, see Encounter Report.    1. Bilateral dry eyes     2. S/P cataract surgery, left     3. S/P cataract surgery, right     4. Pseudophakia     5. Astigmatism of both eyes, unspecified type     6. Epiretinal membrane, left eye                Bilateral dry eye symptoms.  Encouraged Mrs. Hanna to use artificial tears in both eyes as often as desired throughout the day.  Prior diagnosis of bilateral blepharitis.  Lid margins appear satisfactory today.  Suggest regular (daily) lid scrubs, followed by one to two drops of artificial tears in both eyes.   S/P entropion repair of right lower eyelid (Dr. Alvarez).  Good surgical outcome.    Mild epiretinal membrane at macula/posteior pole of the left eye, as noted previously.  Monitor.    S/p cataract surgery in both eyes, with bilateral posterior chamber intraocular lens.   Residual astigmatic refractive error in each eye, with satisfactory best-corrected VA in each eye.  New spectacle lens Rx issued for full-time wear.    Return for recheck in twelve months - or prior if any problems noted in the interim         "

## 2018-08-16 NOTE — PATIENT INSTRUCTIONS
Bilateral dry eye symptoms.  Encouraged Mrs. Hanna to use artificial tears in both eyes as often as desired throughout the day.  Prior diagnosis of bilateral blepharitis.  Lid margins appear satisfactory today.  Suggest regular (daily) lid scrubs, followed by one to two drops of artificial tears in both eyes.   S/P entropion repair of right lower eyelid (Dr. Alvarez).  Good surgical outcome.    Mild epiretinal membrane at macula/posteior pole of the left eye, as noted previously.  Monitor.    S/p cataract surgery in both eyes, with bilateral posterior chamber intraocular lens.   Residual astigmatic refractive error in each eye, with satisfactory best-corrected VA in each eye.  New spectacle lens Rx issued for full-time wear.    Return for recheck in twelve months - or prior if any problems noted in the interim

## 2018-09-05 ENCOUNTER — OFFICE VISIT (OUTPATIENT)
Dept: NEUROLOGY | Facility: CLINIC | Age: 83
End: 2018-09-05
Payer: MEDICARE

## 2018-09-05 VITALS
SYSTOLIC BLOOD PRESSURE: 160 MMHG | WEIGHT: 206 LBS | HEIGHT: 66 IN | BODY MASS INDEX: 33.11 KG/M2 | DIASTOLIC BLOOD PRESSURE: 78 MMHG | HEART RATE: 65 BPM

## 2018-09-05 DIAGNOSIS — F33.41 RECURRENT MAJOR DEPRESSIVE DISORDER, IN PARTIAL REMISSION: ICD-10-CM

## 2018-09-05 DIAGNOSIS — F41.9 ANXIETY: ICD-10-CM

## 2018-09-05 DIAGNOSIS — E87.1 HYPONATREMIA: Primary | ICD-10-CM

## 2018-09-05 PROCEDURE — 99213 OFFICE O/P EST LOW 20 MIN: CPT | Mod: HCNC,GC,S$GLB, | Performed by: STUDENT IN AN ORGANIZED HEALTH CARE EDUCATION/TRAINING PROGRAM

## 2018-09-05 PROCEDURE — 99215 OFFICE O/P EST HI 40 MIN: CPT | Mod: PBBFAC | Performed by: STUDENT IN AN ORGANIZED HEALTH CARE EDUCATION/TRAINING PROGRAM

## 2018-09-05 PROCEDURE — 99999 PR PBB SHADOW E&M-EST. PATIENT-LVL V: CPT | Mod: PBBFAC,GC,, | Performed by: STUDENT IN AN ORGANIZED HEALTH CARE EDUCATION/TRAINING PROGRAM

## 2018-09-05 PROCEDURE — 1101F PT FALLS ASSESS-DOCD LE1/YR: CPT | Mod: CPTII,HCNC,GC,S$GLB | Performed by: STUDENT IN AN ORGANIZED HEALTH CARE EDUCATION/TRAINING PROGRAM

## 2018-09-05 RX ORDER — CYANOCOBALAMIN 1000 UG/ML
INJECTION, SOLUTION INTRAMUSCULAR; SUBCUTANEOUS
Qty: 23 ML | Refills: 0 | Status: SHIPPED | OUTPATIENT
Start: 2018-09-05 | End: 2018-09-19 | Stop reason: ALTCHOICE

## 2018-09-10 NOTE — PROGRESS NOTES
NEUROLOGY OUTPATIENT CLINIC NOTE    Patient Name:  Laila Hanna  Patient MRN:  [id number]    HPI:  Patient is a 86 y.o. female with PMHx of hypothyroidism, HLD, incidental finding of meningioma who presents to [facility] 9/9/2018 for concern of memory loss.  Caretaker is present today, she comes 5 days per week.  Patient lives alone, has a niece who is her POA but is not very involved in her life due to her own obligations.  Patient is aware of this and cites loneliness and anxiety.  She has noticed having trouble with finding words and expressing thoughts.  Patient's caretaker agrees with this and states that it gets better when she is in a comfortable environment.  She also had a trial on 0.25 mg alprazolam and this did wonders for her per the caretaker.  Patient also endorses this and thinks that she is mostly afraid.  When asked about ADLs, patient notes that she is able to dress herself and groom herself.  Her caretaker helps with cooking and cleaning though patient is capable of this.  Caretaker also puts out medications but patient will correct her if she is ever wrong, patient and caretaker agree that she is very astute and knows her medications well.  She pays her own bills with the help of the caretaker.  Does not drive anymore, but denies getting lost or having accidents.  Says she would be too afraid to drive.  No family hx of dementia.  Has an older brother who is 95, living on his own, calls her every weekend.  Patient denies AH/VH, gait disturbance (walker is for support during long walks so she feels more steady, she ambulates at home without it fairly frequently), tremors, incontinence.  Patient cites some depression related to living alone, but mostly notes anxiety.  Sleeps fairly well, some waking up throughout the night but gets back to sleep okay.  Patient has some psychomotor retardation during history and exam.    ROS:  General:  No fever, no chills, no fatigue, no change in  weight  HEENT:  No headache, no changes in vision  Respiratory:  No cough, no SOB  Cardiovascular:  No chest pain, no palpitations  GI:  No abdominal pain, no n/v/c/d  Skin:  No rashes, no pruritus, no wounds  Musculoskeletal:  No myalgias, no arthralgias  Hematologic:  No easy bruising or bleeding  Neuro:  No tremors, no focal weakness, no paresthesias  Psych:  + anxiety, no depression    PMHx:  Patient Active Problem List   Diagnosis    Hypothyroidism due to acquired atrophy of thyroid    Hyponatremia    Meningioma    Low back pain without sciatica    SIADH (syndrome of inappropriate ADH production)    Essential hypertension    Anxiety    Hyperlipemia    Slow transit constipation    Cognitive dysfunction    Onychocryptosis    Onychomycosis due to dermatophyte    Coronary artery disease involving native coronary artery of native heart without angina pectoris    History of PTCA    Dysphagia    Hordeolum internum of right lower eyelid    Aortic atherosclerosis    Left ventricular diastolic dysfunction with preserved systolic function    Pulmonary heart disease    Bilateral carotid artery disease    Recurrent major depressive disorder, in full remission    History of transient ischemic attack (TIA)    CKD (chronic kidney disease) stage 2, GFR 60-89 ml/min    Senile osteoporosis    Nonrheumatic aortic valve stenosis    Entropion of right eyelid    Metabolic bone disease    Hypothyroid     PSHx:  Past Surgical History:   Procedure Laterality Date    CARDIAC CATHETERIZATION  03/29/2004    S/P LAD PTCA, coated stent    CATARACT EXTRACTION W/  INTRAOCULAR LENS IMPLANT Bilateral 2000    Dr Youssef     CHOLECYSTECTOMY      COLONOSCOPY N/A 4/24/2013    [facility]I [facility])    CORONARY ANGIOPLASTY WITH STENT PLACEMENT  2004    LAD    ESOPHAGOGASTRODUODENOSCOPY (EGD) N/A 11/22/2017    Performed by Jayjay Retana MD at [facility] END[facility] (4TH FLR)    EYE SURGERY   "    REPAIR-ENTROPION/RIGHT EYE Right 2/19/2018    Performed by Funmilayo Alvarez MD at [facility] OR [address] [place] [address]    TOTAL THYROIDECTOMY       Medications:  Current Outpatient Medications on File Prior to Visit   Medication Sig Dispense Refill    aspirin (ECOTRIN) 81 MG EC tablet Take 1 tablet (81 mg total) by mouth once daily.      atorvastatin (LIPITOR) 40 MG tablet One tablet daily 90 tablet 1    calcium-vitamin D3 (CALCIUM 500 + D) 500 mg(1,250mg) -200 unit per tablet Take 1 tablet by mouth once daily.       carvedilol (COREG) 12.5 MG tablet Take 1 tablet (12.5 mg total) by mouth 2 (two) times daily. 180 tablet 1    clotrimazole-betamethasone 1-0.05% (LOTRISONE) cream Apply topically 2 (two) times daily. 45 g 0    dextran 70-hypromellose (ARTIFICIAL TEARS) ophthalmic solution Place 1 drop into both eyes every 2 (two) hours as needed.  0    levothyroxine (SYNTHROID) 150 MCG tablet Take 1 tablet (150 mcg total) by mouth once daily. 90 tablet 1    cetirizine (ZYRTEC) 10 MG tablet Take 1 tablet (10 mg total) by mouth every 12 (twelve) hours as needed for Allergies. 60 tablet 0     No current facility-administered medications on file prior to visit.      Allergies:  Review of patient's allergies indicates:   Allergen Reactions    Thiazides Other (See Comments)     Severe hyponatremia     Social Hx:  Patient lives alone, has a caretaker 5 days per week.  Denies tobacco, denies EtOH, denies illicits.  Has a niece who visits occasionally.  Patient's brother calls every weekend and she looks forward to this call very much.    Physical Exam:  BP (!) 160/78   Pulse 65   Ht 5' 6" (1.676 m)   Wt 93.5 kg (206 lb 0.3 oz)   LMP  (LMP Unknown) Comment: years ago  BMI 33.25 kg/m²   General:  Well-developed, well-nourished, nad  HEENT:  NCAT, PERRL, EOMI, oropharygneal membranes non-erythematous/without exudate  Neck:  Supple, normal ROM without nuchal rigidity  Respiratory:  Symmetric expansion, no increased wob  CVS:  No LE " edema. Extremities warm, well-perfused.  GI:  Abd soft, non-distended  Skin:  No visible rashes or wounds  Psych:  Pleasant, cooperative with exam.  Speech and thought content appropriate.  Neurologic Exam:  Mental Status:  AAOx3.  Converses easily.  Able to spell 'world' forward.  Refuses backward spelling.  MoCA below, 26.  Cranial Nerves: PERRLA, EOMI. Facial movement intact and symmetric.Tongue protrudes midline, palate raises symmetrically. Trapezius 5/5 bilaterally.  Motor:  Normal muscle bulk and tone.  Strength 5/5 throughout.  Sensory:  Sensation intact to light touch at all extremities. Vibratory sensation intact and symmetric at BUE/BLE digits.  Reflexes:  Reflexes 2+--biceps, brachioradialis, patellar.  No ankle clonus.  Coordination:  No resting tremor or myoclonus.  FTN, HTS, OZZIE wnl--no ataxia, dysmetria, or dysdiadochokinesia.  Gait:  Appropriate gait, appropriate arm swing.  No shuffling, magnetic gait, imbalance, weakness, or foot drop noted.  Ambulates w/ walker occasionally          Labs:  Results: CBC:   Lab Results   Component Value Date/Time    WBC 8.23 2018 08:48 AM    RBC 4.24 2018 08:48 AM    HGB 13.1 2018 08:48 AM    HCT 39.4 2018 08:48 AM     2018 08:48 AM    MCV 93 2018 08:48 AM    MCH 30.9 2018 08:48 AM    MCHC 33.2 2018 08:48 AM     CMP:   Lab Results   Component Value Date/Time     (H) 2018 03:44 PM    CALCIUM 9.4 2018 03:44 PM    ALBUMIN 3.3 (L) 2018 09:10 AM    PROT 7.0 2018 02:45 PM     (L) 2018 03:44 PM    K 4.1 2018 03:44 PM    CO2 30 (H) 2018 03:44 PM    CL 98 2018 03:44 PM    BUN 15 2018 03:44 PM    CREATININE 0.8 2018 03:44 PM    ALKPHOS 80 2018 02:45 PM    ALT 15 2018 02:45 PM    AST 20 2018 02:45 PM    BILITOT 0.4 2018 02:45 PM     Imagin18 MRI Brain w/o contrast:  No acute intracranial process identified to explain  this patient's disorientation.  Chronic white matter disease and mild cerebral volume loss.  Stable right paraclinoid meningioma.     Additional Diagnotic Testing:  N/a    ASSESSMENT/PLAN:  Patient is a 86 y.o. female with a PMHx of hypothyroidism, HLD, incidental finding of meningioma who presents to [facility] 9/9/2018 due to concern for memory loss.    Chief Complaint   Patient presents with    Altered Mental Status     Memory issues  -MoCA normal 26/30, recent imaging with no indication of clear cause of dementia  -Exam consistent more with psychomotor retardation +/- pseudodementia\  -Discussed Psychiatry with patient, she is willing to do one-on-one counseling/treatment with a psychiatrist   -Referral placed for Geriatric Psychiatry  -Patient reassured about exam and imaging.  Discussed supplementing B12 given her low and LLN levels previously.   -Patient hesitant but agreed to trial of B12 injections, advised to call the office with any issues.  -No need for follow up but patient and caretaker given contact information.  Will call niece per patient's request.    Radha Camacho MD  Pager:  [phone] [place] [address]  [phone]

## 2018-09-18 ENCOUNTER — TELEPHONE (OUTPATIENT)
Dept: NEUROLOGY | Facility: CLINIC | Age: 83
End: 2018-09-18

## 2018-09-18 NOTE — TELEPHONE ENCOUNTER
----- Message from Rosalinda English sent at 9/18/2018  9:45 AM CDT -----  Contact: self @ [phone]  Would like to speak with Dr Camacho concerning her medication.  Pls call to discuss.  Pt says she does not want to take the B-12 shots.

## 2018-09-19 ENCOUNTER — TELEPHONE (OUTPATIENT)
Dept: NEUROLOGY | Facility: CLINIC | Age: 83
End: 2018-09-19

## 2018-09-19 NOTE — TELEPHONE ENCOUNTER
Spoke to patient and her caretaker about vitamin B12 shots.  Patient is not wanting to continue vitamin B12 shots though she took a whole week of them.  Advised that since she was borderline low and had gotten a whole week of IM cyanocobalamin, that we can have her start a multivitamin and recheck her vitamin B12 with next set of labs.    Offered to prescribe 1000 mcg dose of B12 but patient and caretaker would prefer multivitamin approach with recheck.  If B12 is still an issue on recheck, patient may have to have increased supplementation vs retrying injections.    Patient otherwise doing well, will follow up [facility] recs.    [facility] – [place] MD Doug  PGY3, Neurology  Pager:  [phone]

## 2018-10-15 DIAGNOSIS — E78.5 HYPERLIPIDEMIA, UNSPECIFIED HYPERLIPIDEMIA TYPE: ICD-10-CM

## 2018-10-15 RX ORDER — ATORVASTATIN CALCIUM 40 MG/1
TABLET, FILM COATED ORAL
Qty: 90 TABLET | Refills: 0 | Status: SHIPPED | OUTPATIENT
Start: 2018-10-15 | End: 2019-01-30 | Stop reason: SDUPTHER

## 2018-11-16 ENCOUNTER — OFFICE VISIT (OUTPATIENT)
Dept: DERMATOLOGY | Facility: CLINIC | Age: 83
End: 2018-11-16
Payer: MEDICARE

## 2018-11-16 DIAGNOSIS — L72.0 MILIA: ICD-10-CM

## 2018-11-16 DIAGNOSIS — L30.9 ECZEMA, UNSPECIFIED TYPE: Primary | ICD-10-CM

## 2018-11-16 DIAGNOSIS — D18.00 ANGIOMA: ICD-10-CM

## 2018-11-16 PROCEDURE — 99999 PR PBB SHADOW E&M-EST. PATIENT-LVL II: CPT | Mod: PBBFAC,HCNC,, | Performed by: DERMATOLOGY

## 2018-11-16 PROCEDURE — 1101F PT FALLS ASSESS-DOCD LE1/YR: CPT | Mod: CPTII,HCNC,S$GLB, | Performed by: DERMATOLOGY

## 2018-11-16 PROCEDURE — 99213 OFFICE O/P EST LOW 20 MIN: CPT | Mod: HCNC,S$GLB,, | Performed by: DERMATOLOGY

## 2018-11-16 RX ORDER — PNV NO.95/FERROUS FUM/FOLIC AC 28MG-0.8MG
100 TABLET ORAL DAILY
COMMUNITY

## 2018-11-16 RX ORDER — TRIAMCINOLONE ACETONIDE 1 MG/G
CREAM TOPICAL
Qty: 60 G | Refills: 3 | Status: SHIPPED | OUTPATIENT
Start: 2018-11-16 | End: 2021-04-01

## 2018-11-16 NOTE — PROGRESS NOTES
Subjective:       Patient ID:  Laila Hanna is a 86 y.o. female who presents for   Chief Complaint   Patient presents with    Rash     both arms     Rash  - Initial  Affected locations: left arm, right arm, left hand and right hand  Signs / symptoms: itching and dryness  Aggravated by: nothing  Relieving factors/Treatments tried: OTC moisturizer    Also given Lotrisone RX; this helps.   Concerned about bump by right lip, and a red jamie on her left cheek.  The patient denies any moles or growths of the skin that are rapidly growing, hurting, itching, bleeding, or changing colors.      Review of Systems   Skin: Positive for rash.   Hematologic/Lymphatic: Bruises/bleeds easily.        Objective:    Physical Exam   Constitutional: She appears well-developed and well-nourished. No distress.   Neurological: She is alert and oriented to person, place, and time. She is not disoriented.   Psychiatric: She has a normal mood and affect.   Skin:   Areas Examined (abnormalities noted in diagram):   Head / Face Inspection Performed  Neck Inspection Performed  RUE Inspected  LUE Inspection Performed                   Diagram Legend     Erythematous scaling macule/papule c/w actinic keratosis       Vascular papule c/w angioma      Pigmented verrucoid papule/plaque c/w seborrheic keratosis      Yellow umbilicated papule c/w sebaceous hyperplasia      Irregularly shaped tan macule c/w lentigo     1-2 mm smooth white papules consistent with Milia      Movable subcutaneous cyst with punctum c/w epidermal inclusion cyst      Subcutaneous movable cyst c/w pilar cyst      Firm pink to brown papule c/w dermatofibroma      Pedunculated fleshy papule(s) c/w skin tag(s)      Evenly pigmented macule c/w junctional nevus     Mildly variegated pigmented, slightly irregular-bordered macule c/w mildly atypical nevus      Flesh colored to evenly pigmented papule c/w intradermal nevus       Pink pearly papule/plaque c/w basal cell carcinoma       Erythematous hyperkeratotic cursted plaque c/w SCC      Surgical scar with no sign of skin cancer recurrence      Open and closed comedones      Inflammatory papules and pustules      Verrucoid papule consistent consistent with wart     Erythematous eczematous patches and plaques     Dystrophic onycholytic nail with subungual debris c/w onychomycosis     Umbilicated papule    Erythematous-base heme-crusted tan verrucoid plaque consistent with inflamed seborrheic keratosis     Erythematous Silvery Scaling Plaque c/w Psoriasis     See annotation      Assessment / Plan:        Eczema, unspecified type  -     triamcinolone acetonide 0.1% (KENALOG) 0.1 % cream; AAA bid  Dispense: 60 g; Refill: 3  Good skin care regimen discussed including limiting to one bath or shower/day, using lukewarm water with mild soap and moisturizing cream to skin 1 - 2x/day. Brochure was provided and reviewed with patient.    Milia  Reassurance given to patient. No treatment is necessary.   Treatment of benign, asymptomatic lesions may be considered cosmetic.    Angioma  This is a benign vascular lesion. Reassurance given. No treatment required.              No Follow-up on file.

## 2018-11-18 NOTE — PROGRESS NOTES
I have personally seen and examined the patient with Dr. Camacho , and agree with assessment and recommendations.    Jayleen Canela MD

## 2018-12-05 ENCOUNTER — OFFICE VISIT (OUTPATIENT)
Dept: OPTOMETRY | Facility: CLINIC | Age: 83
End: 2018-12-05
Payer: MEDICARE

## 2018-12-05 DIAGNOSIS — Z98.42 S/P CATARACT SURGERY, LEFT: ICD-10-CM

## 2018-12-05 DIAGNOSIS — L30.9 DERMATITIS: Primary | ICD-10-CM

## 2018-12-05 DIAGNOSIS — Z98.41 S/P CATARACT SURGERY, RIGHT: ICD-10-CM

## 2018-12-05 DIAGNOSIS — Z96.1 PSEUDOPHAKIA: ICD-10-CM

## 2018-12-05 DIAGNOSIS — H57.89 IRRITATION OF RIGHT EYE: ICD-10-CM

## 2018-12-05 PROCEDURE — 92012 INTRM OPH EXAM EST PATIENT: CPT | Mod: HCNC,S$GLB,, | Performed by: OPTOMETRIST

## 2018-12-05 PROCEDURE — 99999 PR PBB SHADOW E&M-EST. PATIENT-LVL III: CPT | Mod: PBBFAC,HCNC,, | Performed by: OPTOMETRIST

## 2018-12-05 NOTE — PATIENT INSTRUCTIONS
S/P cataract surgery in both eyes, with bilateral posterior chamber intraocular lens.    S/P ectropion repair surgery right lower eyelid.      Ms. Hanna presents today with right lower lid inflammation/periocular dermatitis.  Suggest applyTobradex ointment which she already has) to external RLL two times per day for seven to ten days. following gentle cleansing.    Call/return if signs/symptoms not improved/resolved in ten days.

## 2018-12-06 NOTE — PROGRESS NOTES
HPI     Patient in for progress check.    Patient complains of redness, irritation, swollen, no discharge. Pt using   warm once a day OD.     Being followed for (diagnosis):      Date last seen: 12/05/2018    Doctor last seen:      Prescribed eye medications(s) using:      OTC eye medication(s) using:  Systane prn OU    Signs/symptoms of condition resolved/better/stable/worse?:  About the same    Allergies/Medications reviewed today?  Yes           Last edited by Charly Macias MA on 12/5/2018  1:51 PM. (History)            Assessment /Plan     For exam results, see Encounter Report.    1. Dermatitis     2. S/P cataract surgery, left     3. S/P cataract surgery, right     4. Pseudophakia     5. Irritation of right eye                    S/P cataract surgery in both eyes, with bilateral posterior chamber intraocular lens.    S/P ectropion repair surgery right lower eyelid.      Ms. Hanna presents today with right lower lid inflammation/periocular dermatitis.  Suggest applyTobradex ointment which she already has) to external RLL two times per day for seven to ten days. following gentle cleansing.    Call/return if signs/symptoms not improved/resolved in ten days.

## 2019-01-15 ENCOUNTER — PES CALL (OUTPATIENT)
Dept: ADMINISTRATIVE | Facility: CLINIC | Age: 84
End: 2019-01-15

## 2019-01-22 ENCOUNTER — LAB VISIT (OUTPATIENT)
Dept: LAB | Facility: HOSPITAL | Age: 84
End: 2019-01-22
Attending: INTERNAL MEDICINE
Payer: MEDICARE

## 2019-01-22 DIAGNOSIS — E78.00 PURE HYPERCHOLESTEROLEMIA: ICD-10-CM

## 2019-01-22 DIAGNOSIS — I25.10 CORONARY ARTERY DISEASE INVOLVING NATIVE CORONARY ARTERY OF NATIVE HEART WITHOUT ANGINA PECTORIS: ICD-10-CM

## 2019-01-22 DIAGNOSIS — I10 ESSENTIAL HYPERTENSION: ICD-10-CM

## 2019-01-22 LAB
ALBUMIN SERPL BCP-MCNC: 3.5 G/DL
ALP SERPL-CCNC: 76 U/L
ALT SERPL W/O P-5'-P-CCNC: 14 U/L
ANION GAP SERPL CALC-SCNC: 7 MMOL/L
AST SERPL-CCNC: 19 U/L
BASOPHILS # BLD AUTO: 0.03 K/UL
BASOPHILS NFR BLD: 0.4 %
BILIRUB SERPL-MCNC: 0.7 MG/DL
BUN SERPL-MCNC: 15 MG/DL
CALCIUM SERPL-MCNC: 9.2 MG/DL
CHLORIDE SERPL-SCNC: 99 MMOL/L
CHOLEST SERPL-MCNC: 113 MG/DL
CHOLEST/HDLC SERPL: 2.5 {RATIO}
CO2 SERPL-SCNC: 28 MMOL/L
CREAT SERPL-MCNC: 0.7 MG/DL
DIFFERENTIAL METHOD: ABNORMAL
EOSINOPHIL # BLD AUTO: 0.2 K/UL
EOSINOPHIL NFR BLD: 2.4 %
ERYTHROCYTE [DISTWIDTH] IN BLOOD BY AUTOMATED COUNT: 13.7 %
EST. GFR  (AFRICAN AMERICAN): >60 ML/MIN/1.73 M^2
EST. GFR  (NON AFRICAN AMERICAN): >60 ML/MIN/1.73 M^2
GLUCOSE SERPL-MCNC: 105 MG/DL
HCT VFR BLD AUTO: 39.9 %
HDLC SERPL-MCNC: 45 MG/DL
HDLC SERPL: 39.8 %
HGB BLD-MCNC: 12.5 G/DL
LDLC SERPL CALC-MCNC: 51 MG/DL
LYMPHOCYTES # BLD AUTO: 1.6 K/UL
LYMPHOCYTES NFR BLD: 19.7 %
MCH RBC QN AUTO: 29.4 PG
MCHC RBC AUTO-ENTMCNC: 31.3 G/DL
MCV RBC AUTO: 94 FL
MONOCYTES # BLD AUTO: 0.8 K/UL
MONOCYTES NFR BLD: 10.5 %
NEUTROPHILS # BLD AUTO: 5.3 K/UL
NEUTROPHILS NFR BLD: 66.7 %
NONHDLC SERPL-MCNC: 68 MG/DL
PLATELET # BLD AUTO: 254 K/UL
PMV BLD AUTO: 10.7 FL
POTASSIUM SERPL-SCNC: 4.4 MMOL/L
PROT SERPL-MCNC: 7 G/DL
RBC # BLD AUTO: 4.25 M/UL
SODIUM SERPL-SCNC: 134 MMOL/L
TRIGL SERPL-MCNC: 85 MG/DL
WBC # BLD AUTO: 7.91 K/UL

## 2019-01-22 PROCEDURE — 80053 COMPREHEN METABOLIC PANEL: CPT | Mod: HCNC

## 2019-01-22 PROCEDURE — 36415 COLL VENOUS BLD VENIPUNCTURE: CPT | Mod: HCNC

## 2019-01-22 PROCEDURE — 80061 LIPID PANEL: CPT | Mod: HCNC

## 2019-01-22 PROCEDURE — 85025 COMPLETE CBC W/AUTO DIFF WBC: CPT | Mod: HCNC

## 2019-01-30 ENCOUNTER — IMMUNIZATION (OUTPATIENT)
Dept: INTERNAL MEDICINE | Facility: CLINIC | Age: 84
End: 2019-01-30
Payer: MEDICARE

## 2019-01-30 ENCOUNTER — OFFICE VISIT (OUTPATIENT)
Dept: INTERNAL MEDICINE | Facility: CLINIC | Age: 84
End: 2019-01-30
Payer: MEDICARE

## 2019-01-30 DIAGNOSIS — F41.9 ANXIETY: Primary | ICD-10-CM

## 2019-01-30 DIAGNOSIS — E03.4 HYPOTHYROIDISM DUE TO ACQUIRED ATROPHY OF THYROID: Chronic | ICD-10-CM

## 2019-01-30 DIAGNOSIS — I10 ESSENTIAL HYPERTENSION: ICD-10-CM

## 2019-01-30 DIAGNOSIS — E78.5 HYPERLIPIDEMIA, UNSPECIFIED HYPERLIPIDEMIA TYPE: ICD-10-CM

## 2019-01-30 PROCEDURE — 90662 IIV NO PRSV INCREASED AG IM: CPT | Mod: HCNC,S$GLB,, | Performed by: INTERNAL MEDICINE

## 2019-01-30 PROCEDURE — 99397 PER PM REEVAL EST PAT 65+ YR: CPT | Mod: HCNC,25,S$GLB, | Performed by: INTERNAL MEDICINE

## 2019-01-30 PROCEDURE — G0008 ADMIN INFLUENZA VIRUS VAC: HCPCS | Mod: HCNC,S$GLB,, | Performed by: INTERNAL MEDICINE

## 2019-01-30 PROCEDURE — 99999 PR PBB SHADOW E&M-EST. PATIENT-LVL IV: ICD-10-PCS | Mod: PBBFAC,HCNC,, | Performed by: INTERNAL MEDICINE

## 2019-01-30 PROCEDURE — 90662 FLU VACCINE - HIGH DOSE (65+) PRESERVATIVE FREE IM: ICD-10-PCS | Mod: HCNC,S$GLB,, | Performed by: INTERNAL MEDICINE

## 2019-01-30 PROCEDURE — G0008 FLU VACCINE - HIGH DOSE (65+) PRESERVATIVE FREE IM: ICD-10-PCS | Mod: HCNC,S$GLB,, | Performed by: INTERNAL MEDICINE

## 2019-01-30 PROCEDURE — 99999 PR PBB SHADOW E&M-EST. PATIENT-LVL IV: CPT | Mod: PBBFAC,HCNC,, | Performed by: INTERNAL MEDICINE

## 2019-01-30 PROCEDURE — 99397 PR PREVENTIVE VISIT,EST,65 & OVER: ICD-10-PCS | Mod: HCNC,25,S$GLB, | Performed by: INTERNAL MEDICINE

## 2019-01-30 RX ORDER — ATORVASTATIN CALCIUM 40 MG/1
TABLET, FILM COATED ORAL
Qty: 90 TABLET | Refills: 0 | Status: SHIPPED | OUTPATIENT
Start: 2019-01-30 | End: 2019-05-08 | Stop reason: SDUPTHER

## 2019-01-30 RX ORDER — CARVEDILOL 12.5 MG/1
12.5 TABLET ORAL 2 TIMES DAILY
Qty: 180 TABLET | Refills: 1 | Status: SHIPPED | OUTPATIENT
Start: 2019-01-30 | End: 2019-08-13 | Stop reason: SDUPTHER

## 2019-01-30 RX ORDER — LEVOTHYROXINE SODIUM 150 UG/1
150 TABLET ORAL DAILY
Qty: 90 TABLET | Refills: 1 | Status: SHIPPED | OUTPATIENT
Start: 2019-01-30 | End: 2019-08-13 | Stop reason: SDUPTHER

## 2019-02-03 VITALS
SYSTOLIC BLOOD PRESSURE: 138 MMHG | WEIGHT: 210.56 LBS | HEIGHT: 67 IN | DIASTOLIC BLOOD PRESSURE: 82 MMHG | HEART RATE: 60 BPM | OXYGEN SATURATION: 99 % | BODY MASS INDEX: 33.05 KG/M2

## 2019-02-04 NOTE — PROGRESS NOTES
Subjective:       Patient ID: Laila Hanna is a 87 y.o. female.    Chief Complaint: Annual Exam    HPI  She is here for annual exam.  Currently without complaint  Review of Systems   Constitutional: Negative for chills, fatigue, fever and unexpected weight change.   Respiratory: Negative for chest tightness and shortness of breath.    Cardiovascular: Negative for chest pain and palpitations.   Gastrointestinal: Negative for abdominal pain and blood in stool.   Neurological: Negative for dizziness, syncope, numbness and headaches.       Objective:      Physical Exam   HENT:   Right Ear: External ear normal.   Left Ear: External ear normal.   Nose: Nose normal.   Mouth/Throat: Oropharynx is clear and moist.   Eyes: Pupils are equal, round, and reactive to light.   Neck: Normal range of motion.   Cardiovascular: Normal rate and regular rhythm.   Murmur heard.  Pulmonary/Chest: Breath sounds normal.   Abdominal: She exhibits no distension. There is no hepatosplenomegaly. There is no tenderness.   Lymphadenopathy:     She has no cervical adenopathy.     She has no axillary adenopathy.   Neurological: She has normal strength and normal reflexes. No cranial nerve deficit or sensory deficit.       Assessment/Plan       Assessment and plan:  Annual exam.

## 2019-02-06 ENCOUNTER — TELEPHONE (OUTPATIENT)
Dept: OPHTHALMOLOGY | Facility: CLINIC | Age: 84
End: 2019-02-06

## 2019-02-06 NOTE — TELEPHONE ENCOUNTER
Called patient and schedule her an appointment to come in       ----- Message from Araceli Graham sent at 2/6/2019  1:48 PM CST -----  Contact: Lalita (care taker)  Needs Advice    Reason for call: pt care taker stated pt is having trouble with her right eye same eye that pt had surgery on. Pt care taker stated it's red above the eye lid. Pt care taker stated this have been going on for awhile now. It clears up and comes right back. Pt didn't want to be seen by anyone else. Pt needed to be seen soon.          Communication Preference: [phone]    Additional Information:

## 2019-02-07 ENCOUNTER — OFFICE VISIT (OUTPATIENT)
Dept: OPTOMETRY | Facility: CLINIC | Age: 84
End: 2019-02-07
Payer: MEDICARE

## 2019-02-07 DIAGNOSIS — L30.9 DERMATITIS: Primary | ICD-10-CM

## 2019-02-07 DIAGNOSIS — Z96.1 PSEUDOPHAKIA: ICD-10-CM

## 2019-02-07 DIAGNOSIS — Z98.42 S/P CATARACT SURGERY, LEFT: ICD-10-CM

## 2019-02-07 DIAGNOSIS — Z98.41 S/P CATARACT SURGERY, RIGHT: ICD-10-CM

## 2019-02-07 DIAGNOSIS — H04.123 BILATERAL DRY EYES: ICD-10-CM

## 2019-02-07 PROCEDURE — 92012 INTRM OPH EXAM EST PATIENT: CPT | Mod: HCNC,S$GLB,, | Performed by: OPTOMETRIST

## 2019-02-07 PROCEDURE — 99999 PR PBB SHADOW E&M-EST. PATIENT-LVL III: CPT | Mod: PBBFAC,HCNC,, | Performed by: OPTOMETRIST

## 2019-02-07 PROCEDURE — 99999 PR PBB SHADOW E&M-EST. PATIENT-LVL III: ICD-10-PCS | Mod: PBBFAC,HCNC,, | Performed by: OPTOMETRIST

## 2019-02-07 PROCEDURE — 92012 PR EYE EXAM, EST PATIENT,INTERMED: ICD-10-PCS | Mod: HCNC,S$GLB,, | Performed by: OPTOMETRIST

## 2019-02-07 NOTE — PATIENT INSTRUCTIONS
S/P cataract surgery in both eyes, with bilateral posterior chamber intraocular lens.     S/P ectropion repair surgery right lower eyelid.       Ms. Hanna presents today with right lower lid inflammation/periocular dermatitis.  Notes similar symptoms on the left side, but not as bothersome.     Suggest applyTobradex ointment to affected external eyelid tissue two times per day for seven to ten days, following gentle cleansing.   Explained chronic nature of problem, and suggest she resume treatment as needed in the future.  Okay to use Systane or Optive artificial tears in both eyes as needed throughout the day.    Call/return if signs/symptoms not improved/resolved in ten days.

## 2019-02-07 NOTE — PROGRESS NOTES
HPI     Eye Problem      Additional comments: Ms. Hanna reports ongoing/recurrent problems with   periocular irritation of RLL   Also reports similar symptoms on left side, but not as bothersome.   States signs/symptoms do clear satisfactorily when she applies TobraDex   ointment to external lid tissue              Comments     Patient in for progress check.    Patient complains of recuuring redness, itching, and irritated RLL.  Pt   using warm prn  OD. Pt has been using the Dove soap to help an help with   irritation.    Being followed for (diagnosis):      Date last seen: 12/05/2018    Doctor last seen:      Prescribed eye medications(s) using:  Tobradex pierre once    OTC eye medication(s) using:  Systane prn OU    Signs/symptoms of condition resolved/better/stable/worse?:  Off and on    Allergies/Medications reviewed today?  Yes                 Last edited by Norberto Gabriel, OD on 2/7/2019 11:40 AM. (History)            Assessment /Plan     For exam results, see Encounter Report.    1. Dermatitis  tobramycin-dexamethasone 0.3-0.1% (TOBRADEX) 0.3-0.1 % Oint   2. S/P cataract surgery, left     3. S/P cataract surgery, right     4. Pseudophakia     5. Bilateral dry eyes                    S/P cataract surgery in both eyes, with bilateral posterior chamber intraocular lens.     S/P ectropion repair surgery right lower eyelid.       Ms. Hanna presents today with right lower lid inflammation/periocular dermatitis.  Notes similar symptoms on the left side, but not as bothersome.     Suggest applyTobradex ointment to affected external eyelid tissue two times per day for seven to ten days, following gentle cleansing.   Explained chronic nature of problem, and suggest she resume treatment as needed in the future.  Okay to use Systane or Optive artificial tears in both eyes as needed throughout the day.    Call/return if signs/symptoms not improved/resolved in ten days.

## 2019-02-11 ENCOUNTER — TELEPHONE (OUTPATIENT)
Dept: OPTOMETRY | Facility: CLINIC | Age: 84
End: 2019-02-11

## 2019-02-15 DIAGNOSIS — L30.9 DERMATITIS: ICD-10-CM

## 2019-02-18 ENCOUNTER — TELEPHONE (OUTPATIENT)
Dept: OPTOMETRY | Facility: CLINIC | Age: 84
End: 2019-02-18

## 2019-02-21 ENCOUNTER — TELEPHONE (OUTPATIENT)
Dept: OPHTHALMOLOGY | Facility: CLINIC | Age: 84
End: 2019-02-21

## 2019-02-21 DIAGNOSIS — L30.9 DERMATITIS: Primary | ICD-10-CM

## 2019-02-21 RX ORDER — NEOMYCIN SULFATE, POLYMYXIN B SULFATE, AND DEXAMETHASONE 3.5; 10000; 1 MG/G; [USP'U]/G; MG/G
OINTMENT OPHTHALMIC
Qty: 3.5 G | Refills: 0 | Status: SHIPPED | OUTPATIENT
Start: 2019-02-21 | End: 2019-03-03

## 2019-02-22 ENCOUNTER — TELEPHONE (OUTPATIENT)
Dept: OPTOMETRY | Facility: CLINIC | Age: 84
End: 2019-02-22

## 2019-02-22 NOTE — TELEPHONE ENCOUNTER
Once again, faxed clarification on pierre pt is using since tobradex not covered- now w/use shilpa/poly/dex- faxed to [employer], [phone]

## 2019-05-08 DIAGNOSIS — E78.5 HYPERLIPIDEMIA, UNSPECIFIED HYPERLIPIDEMIA TYPE: ICD-10-CM

## 2019-05-08 RX ORDER — ATORVASTATIN CALCIUM 40 MG/1
TABLET, FILM COATED ORAL
Qty: 90 TABLET | Refills: 0 | Status: SHIPPED | OUTPATIENT
Start: 2019-05-08 | End: 2019-08-13 | Stop reason: SDUPTHER

## 2019-06-06 ENCOUNTER — OFFICE VISIT (OUTPATIENT)
Dept: PSYCHIATRY | Facility: CLINIC | Age: 84
End: 2019-06-06
Payer: MEDICARE

## 2019-06-06 VITALS
BODY MASS INDEX: 33.04 KG/M2 | SYSTOLIC BLOOD PRESSURE: 164 MMHG | WEIGHT: 211 LBS | HEART RATE: 60 BPM | DIASTOLIC BLOOD PRESSURE: 90 MMHG

## 2019-06-06 DIAGNOSIS — F41.9 ANXIETY: Primary | ICD-10-CM

## 2019-06-06 PROCEDURE — 99999 PR PBB SHADOW E&M-EST. PATIENT-LVL II: ICD-10-PCS | Mod: PBBFAC,HCNC,, | Performed by: PSYCHIATRY & NEUROLOGY

## 2019-06-06 PROCEDURE — 90792 PSYCH DIAG EVAL W/MED SRVCS: CPT | Mod: HCNC,S$GLB,, | Performed by: PSYCHIATRY & NEUROLOGY

## 2019-06-06 PROCEDURE — 99999 PR PBB SHADOW E&M-EST. PATIENT-LVL II: CPT | Mod: PBBFAC,HCNC,, | Performed by: PSYCHIATRY & NEUROLOGY

## 2019-06-06 PROCEDURE — 90792 PR PSYCHIATRIC DIAGNOSTIC EVALUATION W/MEDICAL SERVICES: ICD-10-PCS | Mod: HCNC,S$GLB,, | Performed by: PSYCHIATRY & NEUROLOGY
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
## 2019-06-07 ENCOUNTER — TELEPHONE (OUTPATIENT)
Dept: OTOLARYNGOLOGY | Facility: CLINIC | Age: 84
End: 2019-06-07

## 2019-06-07 NOTE — TELEPHONE ENCOUNTER
----- Message from Carolyn Reynolds sent at 6/7/2019 11:02 AM CDT -----  Contact: Pt caretaker - Maria G  Same Day Appointment Request    Was an appointment with another provider offered?   Yes 6/25  Reason for FST appt.: pt is constantly  complaining about ball of wax in her ears. Caretaker says its driving the pt crazy.  Communication Preference: [phone]  Additional Information:anybody available

## 2019-06-13 ENCOUNTER — OFFICE VISIT (OUTPATIENT)
Dept: OTOLARYNGOLOGY | Facility: CLINIC | Age: 84
End: 2019-06-13
Payer: MEDICARE

## 2019-06-13 VITALS
DIASTOLIC BLOOD PRESSURE: 77 MMHG | HEART RATE: 60 BPM | SYSTOLIC BLOOD PRESSURE: 153 MMHG | BODY MASS INDEX: 32.8 KG/M2 | WEIGHT: 209.44 LBS

## 2019-06-13 DIAGNOSIS — H93.8X3 EAR FULLNESS, BILATERAL: Primary | ICD-10-CM

## 2019-06-13 PROCEDURE — 99213 OFFICE O/P EST LOW 20 MIN: CPT | Mod: 25,HCNC,S$GLB, | Performed by: OTOLARYNGOLOGY

## 2019-06-13 PROCEDURE — 1101F PT FALLS ASSESS-DOCD LE1/YR: CPT | Mod: HCNC,CPTII,S$GLB, | Performed by: OTOLARYNGOLOGY

## 2019-06-13 PROCEDURE — 69210 REMOVE IMPACTED EAR WAX UNI: CPT | Mod: HCNC,S$GLB,, | Performed by: OTOLARYNGOLOGY

## 2019-06-13 PROCEDURE — 1101F PR PT FALLS ASSESS DOC 0-1 FALLS W/OUT INJ PAST YR: ICD-10-PCS | Mod: HCNC,CPTII,S$GLB, | Performed by: OTOLARYNGOLOGY

## 2019-06-13 PROCEDURE — 99213 PR OFFICE/OUTPT VISIT, EST, LEVL III, 20-29 MIN: ICD-10-PCS | Mod: 25,HCNC,S$GLB, | Performed by: OTOLARYNGOLOGY

## 2019-06-13 PROCEDURE — 69210 PR REMOVAL IMPACTED CERUMEN REQUIRING INSTRUMENTATION, UNILATERAL: ICD-10-PCS | Mod: HCNC,S$GLB,, | Performed by: OTOLARYNGOLOGY

## 2019-06-13 PROCEDURE — 99999 PR PBB SHADOW E&M-EST. PATIENT-LVL III: CPT | Mod: PBBFAC,HCNC,, | Performed by: OTOLARYNGOLOGY

## 2019-06-13 PROCEDURE — 99999 PR PBB SHADOW E&M-EST. PATIENT-LVL III: ICD-10-PCS | Mod: PBBFAC,HCNC,, | Performed by: OTOLARYNGOLOGY

## 2019-06-13 RX ORDER — AMOXICILLIN 500 MG/1
CAPSULE ORAL
Refills: 0 | COMMUNITY
Start: 2019-05-08 | End: 2019-07-03

## 2019-06-13 NOTE — PROGRESS NOTES
Subjective:       Patient ID: Laila Hanna is a 87 y.o. female.    Chief Complaint: Ear Fullness    Ear Fullness    There is pain in both ears. This is a recurrent problem. The current episode started more than 1 month ago. The problem occurs constantly. The problem has been waxing and waning. There has been no fever. The patient is experiencing no pain. Associated symptoms include hearing loss. Pertinent negatives include no coughing, diarrhea, ear discharge, headaches, neck pain, rash, rhinorrhea or vomiting. She has tried nothing for the symptoms. Her past medical history is significant for hearing loss.     Review of Systems   Constitutional: Negative for activity change, appetite change and fever.   HENT: Positive for hearing loss. Negative for congestion, ear discharge, ear pain, nosebleeds, postnasal drip, rhinorrhea and sneezing.    Eyes: Negative for redness and visual disturbance.   Respiratory: Negative for apnea, cough, shortness of breath and wheezing.    Cardiovascular: Negative for chest pain and palpitations.   Gastrointestinal: Negative for diarrhea and vomiting.   Genitourinary: Negative for difficulty urinating and frequency.   Musculoskeletal: Positive for arthralgias. Negative for back pain, gait problem and neck pain.   Skin: Negative for color change and rash.   Neurological: Negative for dizziness, speech difficulty, weakness and headaches.   Hematological: Negative for adenopathy. Does not bruise/bleed easily.   Psychiatric/Behavioral: Negative for agitation and behavioral problems. The patient is nervous/anxious.        Objective:        Constitutional:   Vital signs are normal. She appears well-developed and well-nourished. She is active. Normal speech.      Head:  Normocephalic and atraumatic. Salivary glands normal.  Facial strength is normal.      Nose:  Nose normal including turbinates, nasal mucosa, sinuses and nasal septum.     Mouth/Throat  Oropharynx clear and moist without  lesions or asymmetry and normal uvula midline.     Neck:  Neck normal without thyromegaly masses, asymmetry, normal tracheal structure, crepitus, and tenderness, thyroid normal, trachea normal, phonation normal and full range of motion with neck supple.     Psychiatric:   She has a normal mood and affect. Her speech is normal and behavior is normal.     Neurological:   She has neurological normal, alert and oriented.     Skin:   No abrasions, lacerations, lesions, or rashes.         PROCEDURE NOTE:  Ceruminosis is noted in both EACs.  Wax was removed by manual debridement and suctioning utilizing the assistance of binocular microscopy revealing EACs and TMs WNL. The patient tolerated this procedure without difficulty. The subjective decrease noted in hearing pre-cleaning was resolved post-cleaning.       Assessment:       1. Ear fullness, bilateral        Plan:       1. Hearing conservation strongly recommended.  2. Trial of amplification bilaterally also recommended (patient not interested).  3. Re-check of hearing in 18-24 months or sooner if subjective change noted.  4. F/U with PCP as per schedule.

## 2019-07-02 ENCOUNTER — TELEPHONE (OUTPATIENT)
Dept: INTERNAL MEDICINE | Facility: CLINIC | Age: 84
End: 2019-07-02

## 2019-07-02 NOTE — TELEPHONE ENCOUNTER
----- Message from Alyson Bro sent at 7/2/2019  9:08 AM CDT -----  Contact: Maria Isabel/Caregiver/[phone] HOME /[phone] CELL   Pt needs to put in a urine specimen. Pt states that she thinks she has a bladder infection. Pt's sitter states that she can come and  the cup and bring it back for collecting. Please call and advise.        Thank You

## 2019-07-02 NOTE — TELEPHONE ENCOUNTER
Spoke with pt and Ms. Nicolas they scheduled latha for tomorrow as per Dr. Graham. Pt agreed verbally, along with Ms. Nicolas.

## 2019-07-02 NOTE — TELEPHONE ENCOUNTER
Spoke with Maria Isabel and with pt and they both stated that she has been having a burning sensation, with no pain and no fever, but when she goes to the bathroom she lets out about a tablespoon of urine. Pt would like to know can you put in orders for a home collect an they would come in and  the belongings.       Please advise!

## 2019-07-03 ENCOUNTER — OFFICE VISIT (OUTPATIENT)
Dept: INTERNAL MEDICINE | Facility: CLINIC | Age: 84
End: 2019-07-03
Payer: MEDICARE

## 2019-07-03 VITALS
DIASTOLIC BLOOD PRESSURE: 64 MMHG | WEIGHT: 210.31 LBS | HEIGHT: 67 IN | TEMPERATURE: 98 F | SYSTOLIC BLOOD PRESSURE: 128 MMHG | BODY MASS INDEX: 33.01 KG/M2 | OXYGEN SATURATION: 96 % | HEART RATE: 66 BPM

## 2019-07-03 DIAGNOSIS — N39.0 URINARY TRACT INFECTION WITH HEMATURIA, SITE UNSPECIFIED: Primary | ICD-10-CM

## 2019-07-03 DIAGNOSIS — R31.9 URINARY TRACT INFECTION WITH HEMATURIA, SITE UNSPECIFIED: Primary | ICD-10-CM

## 2019-07-03 LAB
BILIRUB SERPL-MCNC: ABNORMAL MG/DL
BLOOD URINE, POC: ABNORMAL
COLOR, POC UA: YELLOW
GLUCOSE UR QL STRIP: NORMAL
KETONES UR QL STRIP: ABNORMAL
LEUKOCYTE ESTERASE URINE, POC: ABNORMAL
NITRITE, POC UA: ABNORMAL
PH, POC UA: 5
PROTEIN, POC: 30
SPECIFIC GRAVITY, POC UA: 1.02
UROBILINOGEN, POC UA: 1

## 2019-07-03 PROCEDURE — 81002 POCT URINE DIPSTICK WITHOUT MICROSCOPE: ICD-10-PCS | Mod: HCNC,S$GLB,, | Performed by: INTERNAL MEDICINE

## 2019-07-03 PROCEDURE — 99999 PR PBB SHADOW E&M-EST. PATIENT-LVL III: ICD-10-PCS | Mod: PBBFAC,HCNC,, | Performed by: INTERNAL MEDICINE

## 2019-07-03 PROCEDURE — 99999 PR PBB SHADOW E&M-EST. PATIENT-LVL III: CPT | Mod: PBBFAC,HCNC,, | Performed by: INTERNAL MEDICINE

## 2019-07-03 PROCEDURE — 1101F PT FALLS ASSESS-DOCD LE1/YR: CPT | Mod: HCNC,CPTII,S$GLB, | Performed by: INTERNAL MEDICINE

## 2019-07-03 PROCEDURE — 1101F PR PT FALLS ASSESS DOC 0-1 FALLS W/OUT INJ PAST YR: ICD-10-PCS | Mod: HCNC,CPTII,S$GLB, | Performed by: INTERNAL MEDICINE

## 2019-07-03 PROCEDURE — 99213 PR OFFICE/OUTPT VISIT, EST, LEVL III, 20-29 MIN: ICD-10-PCS | Mod: HCNC,25,S$GLB, | Performed by: INTERNAL MEDICINE

## 2019-07-03 PROCEDURE — 81002 URINALYSIS NONAUTO W/O SCOPE: CPT | Mod: HCNC,S$GLB,, | Performed by: INTERNAL MEDICINE

## 2019-07-03 PROCEDURE — 87086 URINE CULTURE/COLONY COUNT: CPT | Mod: HCNC

## 2019-07-03 PROCEDURE — 99213 OFFICE O/P EST LOW 20 MIN: CPT | Mod: HCNC,25,S$GLB, | Performed by: INTERNAL MEDICINE

## 2019-07-03 RX ORDER — CIPROFLOXACIN 500 MG/1
500 TABLET ORAL 2 TIMES DAILY
Qty: 14 TABLET | Refills: 0 | Status: SHIPPED | OUTPATIENT
Start: 2019-07-03 | End: 2019-07-10

## 2019-07-03 NOTE — PROGRESS NOTES
Subjective:       Patient ID: Laila Hanna is a 87 y.o. female.    Chief Complaint: Urinary Tract Infection    87 year old lady complains of dysuria and urgency. Does not recognize increased frequency.  NO fever or chills, no back pain    Review of Systems   Constitutional: Negative for activity change, chills, fatigue and fever.   HENT: Negative for congestion, ear pain, nosebleeds, postnasal drip, sinus pressure and sore throat.    Eyes: Negative.  Negative for visual disturbance.   Respiratory: Negative for cough, chest tightness, shortness of breath and wheezing.    Cardiovascular: Negative for chest pain.   Gastrointestinal: Negative for abdominal pain, diarrhea, nausea and vomiting.   Genitourinary: Negative for difficulty urinating, dysuria, frequency and urgency.   Musculoskeletal: Negative for arthralgias and neck stiffness.   Skin: Negative for rash.   Neurological: Negative for dizziness, weakness and headaches.   Psychiatric/Behavioral: Negative for sleep disturbance. The patient is not nervous/anxious.        Objective:      Physical Exam   Constitutional: She is oriented to person, place, and time. She appears well-developed and well-nourished.  Non-toxic appearance. No distress.   HENT:   Head: Normocephalic and atraumatic.   Right Ear: Tympanic membrane, external ear and ear canal normal.   Left Ear: Tympanic membrane, external ear and ear canal normal.   Eyes: Pupils are equal, round, and reactive to light. EOM are normal. No scleral icterus.   Neck: Normal range of motion. Neck supple. No thyromegaly present.   Cardiovascular: Normal rate, regular rhythm and normal heart sounds.   Pulmonary/Chest: Effort normal and breath sounds normal.   Abdominal: Soft. Bowel sounds are normal. She exhibits no mass. There is no tenderness. There is no rebound.   Musculoskeletal: Normal range of motion.   Lymphadenopathy:     She has no cervical adenopathy.   Neurological: She is alert and oriented to person,  place, and time. She has normal reflexes. She displays normal reflexes. No cranial nerve deficit. She exhibits normal muscle tone. Coordination normal.   Skin: Skin is warm and dry.   Psychiatric: She has a normal mood and affect. Her behavior is normal.       Assessment:       1. Urinary tract infection with hematuria, site unspecified        Plan:   Laila was seen today for urinary tract infection.    Diagnoses and all orders for this visit:    Urinary tract infection with hematuria, site unspecified  -     POCT urine dipstick without microscope  -     Urine culture    Other orders  -     ciprofloxacin HCl (CIPRO) 500 MG tablet; Take 1 tablet (500 mg total) by mouth 2 (two) times daily. for 7 days

## 2019-07-05 LAB — BACTERIA UR CULT: NORMAL

## 2019-07-18 ENCOUNTER — OFFICE VISIT (OUTPATIENT)
Dept: INTERNAL MEDICINE | Facility: CLINIC | Age: 84
End: 2019-07-18
Payer: MEDICARE

## 2019-07-18 VITALS
HEIGHT: 67 IN | HEART RATE: 80 BPM | BODY MASS INDEX: 32.98 KG/M2 | DIASTOLIC BLOOD PRESSURE: 74 MMHG | SYSTOLIC BLOOD PRESSURE: 128 MMHG | OXYGEN SATURATION: 95 % | WEIGHT: 210.13 LBS

## 2019-07-18 DIAGNOSIS — I25.10 CORONARY ARTERY DISEASE INVOLVING NATIVE CORONARY ARTERY OF NATIVE HEART WITHOUT ANGINA PECTORIS: ICD-10-CM

## 2019-07-18 DIAGNOSIS — E03.4 HYPOTHYROIDISM DUE TO ACQUIRED ATROPHY OF THYROID: Chronic | ICD-10-CM

## 2019-07-18 DIAGNOSIS — I51.89 LEFT VENTRICULAR DIASTOLIC DYSFUNCTION WITH PRESERVED SYSTOLIC FUNCTION: ICD-10-CM

## 2019-07-18 DIAGNOSIS — F33.42 RECURRENT MAJOR DEPRESSIVE DISORDER, IN FULL REMISSION: ICD-10-CM

## 2019-07-18 DIAGNOSIS — R13.10 DYSPHAGIA, UNSPECIFIED TYPE: ICD-10-CM

## 2019-07-18 DIAGNOSIS — I70.0 AORTIC ATHEROSCLEROSIS: ICD-10-CM

## 2019-07-18 DIAGNOSIS — Z78.0 POST-MENOPAUSAL: ICD-10-CM

## 2019-07-18 DIAGNOSIS — M89.8X9 METABOLIC BONE DISEASE: ICD-10-CM

## 2019-07-18 DIAGNOSIS — I27.9 PULMONARY HEART DISEASE: ICD-10-CM

## 2019-07-18 DIAGNOSIS — D32.9 MENINGIOMA: ICD-10-CM

## 2019-07-18 DIAGNOSIS — I35.0 NONRHEUMATIC AORTIC VALVE STENOSIS: ICD-10-CM

## 2019-07-18 DIAGNOSIS — I77.9 BILATERAL CAROTID ARTERY DISEASE, UNSPECIFIED TYPE: ICD-10-CM

## 2019-07-18 DIAGNOSIS — M81.0 SENILE OSTEOPOROSIS: ICD-10-CM

## 2019-07-18 DIAGNOSIS — E78.49 OTHER HYPERLIPIDEMIA: ICD-10-CM

## 2019-07-18 DIAGNOSIS — N18.2 CKD (CHRONIC KIDNEY DISEASE) STAGE 2, GFR 60-89 ML/MIN: ICD-10-CM

## 2019-07-18 DIAGNOSIS — E22.2 SIADH (SYNDROME OF INAPPROPRIATE ADH PRODUCTION): ICD-10-CM

## 2019-07-18 DIAGNOSIS — I10 ESSENTIAL HYPERTENSION: ICD-10-CM

## 2019-07-18 DIAGNOSIS — Z00.00 ENCOUNTER FOR PREVENTIVE HEALTH EXAMINATION: Primary | ICD-10-CM

## 2019-07-18 PROCEDURE — 99999 PR PBB SHADOW E&M-EST. PATIENT-LVL IV: ICD-10-PCS | Mod: PBBFAC,HCNC,, | Performed by: NURSE PRACTITIONER

## 2019-07-18 PROCEDURE — 99499 RISK ADDL DX/OHS AUDIT: ICD-10-PCS | Mod: HCNC,S$GLB,, | Performed by: NURSE PRACTITIONER

## 2019-07-18 PROCEDURE — G0439 PPPS, SUBSEQ VISIT: HCPCS | Mod: HCNC,S$GLB,, | Performed by: NURSE PRACTITIONER

## 2019-07-18 PROCEDURE — G0439 PR MEDICARE ANNUAL WELLNESS SUBSEQUENT VISIT: ICD-10-PCS | Mod: HCNC,S$GLB,, | Performed by: NURSE PRACTITIONER

## 2019-07-18 PROCEDURE — 99499 UNLISTED E&M SERVICE: CPT | Mod: HCNC,S$GLB,, | Performed by: NURSE PRACTITIONER

## 2019-07-18 PROCEDURE — 99999 PR PBB SHADOW E&M-EST. PATIENT-LVL IV: CPT | Mod: PBBFAC,HCNC,, | Performed by: NURSE PRACTITIONER

## 2019-07-18 NOTE — PATIENT INSTRUCTIONS
Counseling and Referral of Other Preventative  (Italic type indicates deductible and co-insurance are waived)    Patient Name: Laila Hanna  Today's Date: 7/18/2019    Health Maintenance       Date Due Completion Date    Shingles Vaccine (2 of 3) 03/27/2009 1/30/2009    TETANUS VACCINE 03/28/2018 3/28/2008    DEXA SCAN 05/26/2019 5/26/2017    Override on 8/23/2012: Not Clinically Appropriate    Influenza Vaccine 08/01/2019 1/30/2019    Lipid Panel 01/22/2020 1/22/2019    Aspirin/Antiplatelet Therapy 07/18/2020 7/18/2019        Orders Placed This Encounter   Procedures    DXA Bone Density Spine And Hip     The following information is provided to all patients.  This information is to help you find resources for any of the problems found today that may be affecting your health:                Living healthy guide: www.[facility].[place].[facility]      Understanding Diabetes: www.diabetes.org      Eating healthy: www.cdc.gov/healthyweight      [facility] home safety checklist: www.cdc.gov/steadi/patient.html      Agency on Aging: www.goea.[place].[facility]      Alcoholics anonymous (AA): www.aa.org      Physical Activity: www.sage.nih.gov/tz6ufaq      Tobacco use: www.quitwithusla.org

## 2019-07-18 NOTE — PROGRESS NOTES
"Laila Hanna presented for a  Medicare AWV and comprehensive Health Risk Assessment today. The following components were reviewed and updated:    · Medical history  · Family History  · Social history  · Allergies and Current Medications  · Health Risk Assessment  · Health Maintenance  · Care Team     ** See Completed Assessments for Annual Wellness Visit within the encounter summary.**       The following assessments were completed:  · Living Situation  · CAGE  · Depression Screening  · Timed Get Up and Go  · Whisper Test  · Cognitive Function Screening*cognitive dysfunction  · Nutrition Screening  · ADL Screening  · PAQ Screening    Vitals:    07/18/19 1450   BP: 128/74   Pulse: 80   SpO2: 95%   Weight: 95.3 kg (210 lb 1.6 oz)   Height: 5' 7" (1.702 m)     Body mass index is 32.91 kg/m².  Physical Exam   Constitutional: She appears well-developed.   obese   HENT:   Head: Normocephalic and atraumatic.   Nose: Nose normal.   Eyes: Conjunctivae and EOM are normal.   Cardiovascular: Normal rate, regular rhythm, normal heart sounds and intact distal pulses.   No murmur heard.  1+ edema BLE   Pulmonary/Chest: Effort normal and breath sounds normal.   Musculoskeletal:   Walker use   Neurological: She is alert.   Skin: Skin is warm and dry.   Psychiatric: She has a normal mood and affect. Her behavior is normal. Judgment and thought content normal.   Nursing note and vitals reviewed.        Diagnoses and health risks identified today and associated recommendations/orders:    1. Encounter for preventive health examination  Assessment performed. Health maintenance updated. Chart review completed.  Discussed shingles and tetanus vaccine. Patient will schedule bone density along with eye exam.    2. Recurrent major depressive disorder, in full remission  Chronic. Stable. Followed by PCP.    3. Bilateral carotid artery disease, unspecified type  Chronic. Stable with current regimen. Followed by PCP.  Noted on imaging    4. " Aortic atherosclerosis  Chronic. Stable with current regimen. Followed by PCP.  Noted on imaging.    5. SIADH (syndrome of inappropriate ADH production)  Chronic. Continue as instructed. Followed by Nephrology and PCP.    6. Pulmonary heart disease  Chronic. Continue as instructed. Followed by Cardiology.    7. Meningioma  Chronic. Followed by PCP.  Continue as instructed.    8. Post-menopausal  - DXA Bone Density Spine And Hip; Future    9. Coronary artery disease involving native coronary artery of native heart without angina pectoris  Chronic. Continue as instructed. Followed by Cardiology.    10. Essential hypertension  Chronic. Stable. Followed by PCP.    11. Other hyperlipidemia  Chronic. Stable. Followed by PCP.    12. Left ventricular diastolic dysfunction with preserved systolic function  Chronic. Continue as instructed. Followed by Cardiology.    13. Nonrheumatic aortic valve stenosis  Chronic. Continue as instructed. Followed by Cardiology.    14. CKD (chronic kidney disease) stage 2, GFR 60-89 ml/min  Chronic. Stable. Followed by PCP.    15. Hypothyroidism due to acquired atrophy of thyroid  Chronic. Stable. Followed by PCP.    16. Metabolic bone disease  Chronic. Continue as instructed. Followed by Nephrology and PCP.    17. Dysphagia, unspecified type  Chronic. Stable. Followed by PCP.  See nutrition screen.    18. Senile osteoporosis  Chronic. Stable. Followed by PCP.    Provided Laila with a 5-10 year written screening schedule and personal prevention plan. Recommendations were developed using the USPSTF age appropriate recommendations. Education, counseling, and referrals were provided as needed. After Visit Summary printed and given to patient which includes a list of additional screenings\tests needed.    Follow up for Annual Wellness Visit in 1 year, follow up with PCP as instructed, ;sooner if problems.    RAFFAELE Sullivan       I offered to discuss end of life issues, including information  on how to make advance directives that the patient could use to name someone who would make medical decisions on their behalf if they became too ill to make themselves.    ___Patient declined  _X_Patient is interested, I provided paper work and offered to discuss.

## 2019-07-19 ENCOUNTER — TELEPHONE (OUTPATIENT)
Dept: INTERNAL MEDICINE | Facility: CLINIC | Age: 84
End: 2019-07-19

## 2019-07-19 NOTE — TELEPHONE ENCOUNTER
Recommend OTC stool softener such as Colace PRN, can also try OTC magnesium citrate vs. Miralax if no relief with stool softener.  If no improvement with above, recommend urgent care appointment.  Please notify.

## 2019-07-19 NOTE — TELEPHONE ENCOUNTER
----- Message from Alena Day sent at 7/19/2019 10:20 AM CDT -----  Contact: Daksha caregiver [phone]  Patient would like to get medical advice.  Symptoms (please be specific):  Constipation   How long has patient had these symptoms:  3 days   Pharmacy name and phone # (copy/paste from chart):  [facility] [address] [place] [address] - [address] AT [facility] [address] [phone] (Phone)  [phone] (Fax)  Any drug allergies (copy/paste from chart):      Would the patient rather a call back or a response via [facility]?:  Call back  Comments:

## 2019-07-19 NOTE — TELEPHONE ENCOUNTER
Caregiver stated pt has been constipated for 3 days. Stated pt had one small stool and it was a little hard. Offered caregiver an appt with Dr. Frazier on tomorrow for pt. Caregiver stated she usually is off on the weekends. Wanted to know if something could be called in for pt or can she take something OTC.     Please Advise

## 2019-07-19 NOTE — TELEPHONE ENCOUNTER
"Spoke with caregiver. Informed her of Dr. Gonzalez's recommendations. Caregiver verbally understood. Stated "we did have some success". Advised her if any more problems pt can use the medications Dr. Gonzalez suggested.  "

## 2019-08-13 DIAGNOSIS — E03.4 HYPOTHYROIDISM DUE TO ACQUIRED ATROPHY OF THYROID: Chronic | ICD-10-CM

## 2019-08-13 DIAGNOSIS — I10 ESSENTIAL HYPERTENSION: ICD-10-CM

## 2019-08-13 DIAGNOSIS — E78.5 HYPERLIPIDEMIA, UNSPECIFIED HYPERLIPIDEMIA TYPE: ICD-10-CM

## 2019-08-13 RX ORDER — LEVOTHYROXINE SODIUM 150 UG/1
TABLET ORAL
Qty: 90 TABLET | Refills: 0 | Status: SHIPPED | OUTPATIENT
Start: 2019-08-13 | End: 2019-10-16 | Stop reason: SDUPTHER

## 2019-08-13 RX ORDER — ATORVASTATIN CALCIUM 40 MG/1
TABLET, FILM COATED ORAL
Qty: 90 TABLET | Refills: 0 | Status: SHIPPED | OUTPATIENT
Start: 2019-08-13 | End: 2019-10-16 | Stop reason: SDUPTHER

## 2019-08-13 RX ORDER — CARVEDILOL 12.5 MG/1
TABLET ORAL
Qty: 180 TABLET | Refills: 0 | Status: SHIPPED | OUTPATIENT
Start: 2019-08-13 | End: 2019-10-16 | Stop reason: SDUPTHER

## 2019-08-21 ENCOUNTER — HOSPITAL ENCOUNTER (OUTPATIENT)
Dept: RADIOLOGY | Facility: CLINIC | Age: 84
Discharge: HOME | End: 2019-08-21
Attending: NURSE PRACTITIONER
Payer: MEDICARE

## 2019-08-21 ENCOUNTER — OFFICE VISIT (OUTPATIENT)
Dept: OPTOMETRY | Facility: CLINIC | Age: 84
End: 2019-08-21
Payer: MEDICARE

## 2019-08-21 DIAGNOSIS — H43.393 VITREOUS FLOATERS OF BOTH EYES: Primary | ICD-10-CM

## 2019-08-21 DIAGNOSIS — Z78.0 POST-MENOPAUSAL: ICD-10-CM

## 2019-08-21 DIAGNOSIS — H52.203 ASTIGMATISM OF BOTH EYES, UNSPECIFIED TYPE: ICD-10-CM

## 2019-08-21 DIAGNOSIS — Z13.5 SCREENING FOR EYE CONDITION: ICD-10-CM

## 2019-08-21 DIAGNOSIS — Z96.1 PSEUDOPHAKIA: ICD-10-CM

## 2019-08-21 DIAGNOSIS — Z98.41 S/P CATARACT SURGERY, RIGHT: ICD-10-CM

## 2019-08-21 DIAGNOSIS — Z98.42 S/P CATARACT SURGERY, LEFT: ICD-10-CM

## 2019-08-21 PROCEDURE — 92015 DETERMINE REFRACTIVE STATE: CPT | Mod: HCNC,S$GLB,, | Performed by: OPTOMETRIST

## 2019-08-21 PROCEDURE — 77080 DXA BONE DENSITY AXIAL: CPT | Mod: 26,HCNC,, | Performed by: INTERNAL MEDICINE

## 2019-08-21 PROCEDURE — 92015 PR REFRACTION: ICD-10-PCS | Mod: HCNC,S$GLB,, | Performed by: OPTOMETRIST

## 2019-08-21 PROCEDURE — 92014 PR EYE EXAM, EST PATIENT,COMPREHESV: ICD-10-PCS | Mod: HCNC,S$GLB,, | Performed by: OPTOMETRIST

## 2019-08-21 PROCEDURE — 77080 DEXA BONE DENSITY SPINE HIP: ICD-10-PCS | Mod: 26,HCNC,, | Performed by: INTERNAL MEDICINE

## 2019-08-21 PROCEDURE — 99999 PR PBB SHADOW E&M-EST. PATIENT-LVL III: CPT | Mod: PBBFAC,HCNC,, | Performed by: OPTOMETRIST

## 2019-08-21 PROCEDURE — 77080 DXA BONE DENSITY AXIAL: CPT | Mod: TC,HCNC

## 2019-08-21 PROCEDURE — 99999 PR PBB SHADOW E&M-EST. PATIENT-LVL III: ICD-10-PCS | Mod: PBBFAC,HCNC,, | Performed by: OPTOMETRIST

## 2019-08-21 PROCEDURE — 92014 COMPRE OPH EXAM EST PT 1/>: CPT | Mod: HCNC,S$GLB,, | Performed by: OPTOMETRIST

## 2019-08-21 NOTE — PATIENT INSTRUCTIONS
S/P cataract surgery in both eyes, with bilateral posterior chamber intraocular lens.     S/P ectropion repair surgery right lower eyelid.      Vitreous floaters in both eyes, without evidence of retinal etiology.    Residual astigmatic refractive error in each eye, with very satisfactory best-corrected VA in each eye.  New spectacle lens Rx issued for full-time wear.      Ms. Hanna presented previously with right lower lid inflammation/periocular dermatitis.     Applying Tobradex ointment to affected external eyelid tissue two times per day as needed.  Lids appear fine today.    Okay to use Systane or Optive artificial tears in both eyes as needed throughout the day.     Recheck in one year - or prior if any problems noted in the interim.

## 2019-08-21 NOTE — PROGRESS NOTES
"HPI     Concerns About Ocular Health      Additional comments: Annual general eye exam and refraction.  Wears glasses "off and on" - uses for reading and watching TV sometimes.   Pt states vision is stable with glasses.   RUL has occasional flareup               Comments     Patient's age: 87 y.o.  WF   Occupation: Retired   Approximate date of last eye examination: 02/07/2019  Name of last eye doctor seen: Dr. Gabriel   City/State: [facility]  Wears glasses? Yes      If yes, wears  Full-time or part-time?  Part-time   Present glasses are: Bifocal, SV Distance, SV Reading?  Lined Bifocal   Approximate age of present glasses:  [age] year old    Any problem with VA with glasses? occasional blur va w/glasses  Wears CLs?:  No   Headaches?  No   Eye pain/discomfort?  No                                                                                     Flashes?  No   Floaters?  yes   Diplopia/Double vision?  No   Patient's Ocular History:          Any eye surgeries? Phaco WIOL OU X 2000, Lid sx Dr. Alvarez -   Ectropion Repair 02/2018         Any eye injury?  No          Any treatment for eye disease?  No   Family history of eye disease?   Maternal Aunt + Macular Degneration   Significant patient medical history:         1. Diabetes?  No             2. HBP?  Yes, controlled with meds                ! OTC eyedrops currently using:  Systane prn   ! Prescription eye meds currently using:   Neomycin and Polymyxin   sulfates and Dexamethasone pierre prn/Tobradex prn RUL   ! Any history of allergy/adverse reaction to any eye meds used   previously?  No    ! Any history of allergy/adverse reaction to eyedrops used during prior   eye exam(s)? No    ! Any history of allergy/adverse reaction to Novacaine or similar meds?   No    ! Any history of allergy/adverse reaction to Epinephrine or similar meds?   No    ! Patient okay with use of anesthetic eyedrops to check eye pressure?    Yes        ! Patient okay with use of eyedrops to dilate " "pupils today?  Yes    !  Allergies/Medications/Medical History/Family History reviewed today?    Yes       PD =   62/58  Desired reading distance =  14.5"                             Last edited by Norberto Gabriel, OD on 8/21/2019  1:54 PM. (History)            Assessment /Plan     For exam results, see Encounter Report.    1. Vitreous floaters of both eyes     2. S/P cataract surgery, left     3. S/P cataract surgery, right     4. Pseudophakia     5. Screening for eye condition     6. Astigmatism of both eyes, unspecified type                  S/P cataract surgery in both eyes, with bilateral posterior chamber intraocular lens.     S/P ectropion repair surgery right lower eyelid.      Vitreous floaters in both eyes, without evidence of retinal etiology.    Residual astigmatic refractive error in each eye, with very satisfactory best-corrected VA in each eye.  New spectacle lens Rx issued for full-time wear.      Ms. Hanna presented previously with right lower lid inflammation/periocular dermatitis.     Applying Tobradex ointment to affected external eyelid tissue two times per day as needed.  Lids appear fine today.    Okay to use Systane or Optive artificial tears in both eyes as needed throughout the day.     Recheck in one year - or prior if any problems noted in the interim.            "

## 2019-09-07 ENCOUNTER — TELEPHONE (OUTPATIENT)
Dept: INTERNAL MEDICINE | Facility: CLINIC | Age: 84
End: 2019-09-07

## 2019-09-07 NOTE — TELEPHONE ENCOUNTER
Please contact patient and inform her that her bone density reveals she has osteoporosis.  I will need to give her a medication for this.  Please ask her what pharmacy she would like that sent to

## 2019-09-09 ENCOUNTER — TELEPHONE (OUTPATIENT)
Dept: INTERNAL MEDICINE | Facility: CLINIC | Age: 84
End: 2019-09-09

## 2019-09-09 NOTE — TELEPHONE ENCOUNTER
----- Message from Anna Mitchell sent at 9/9/2019 12:47 PM CDT -----  Contact: Daksha   Daksha is calling about finding the lamisil in the spray powder. She states she found the lotrimin spray powder but the patient will not accept the substitute without direction from the nurse of doctor. Please call and advise

## 2019-09-09 NOTE — TELEPHONE ENCOUNTER
Pt informed that her bone density reveals she has osteoporosis. Pt verbally understood. Please send rx to [facility].

## 2019-09-09 NOTE — TELEPHONE ENCOUNTER
----- Message from Renata Hirsch sent at 9/9/2019 11:16 AM CDT -----  Contact: Lalita [phone]  Lalita will like to speak to the nurse in regards to fungal infection

## 2019-09-10 RX ORDER — ALENDRONATE SODIUM 70 MG/1
70 TABLET ORAL
Qty: 12 TABLET | Refills: 1 | Status: SHIPPED | OUTPATIENT
Start: 2019-09-10 | End: 2020-02-14 | Stop reason: SDUPTHER

## 2019-09-10 NOTE — TELEPHONE ENCOUNTER
Pt notified rx was sent to the pharmacy and to take it once weekly not daily. Pt verbally understood.

## 2019-09-17 ENCOUNTER — OFFICE VISIT (OUTPATIENT)
Dept: INTERNAL MEDICINE | Facility: CLINIC | Age: 84
End: 2019-09-17
Payer: MEDICARE

## 2019-09-17 VITALS
HEIGHT: 67 IN | HEART RATE: 64 BPM | BODY MASS INDEX: 33.74 KG/M2 | OXYGEN SATURATION: 97 % | SYSTOLIC BLOOD PRESSURE: 138 MMHG | TEMPERATURE: 98 F | DIASTOLIC BLOOD PRESSURE: 70 MMHG | WEIGHT: 214.94 LBS

## 2019-09-17 DIAGNOSIS — E53.8 B12 DEFICIENCY: Primary | ICD-10-CM

## 2019-09-17 DIAGNOSIS — I10 HYPERTENSION, UNSPECIFIED TYPE: ICD-10-CM

## 2019-09-17 DIAGNOSIS — I25.10 CORONARY ARTERY DISEASE, ANGINA PRESENCE UNSPECIFIED, UNSPECIFIED VESSEL OR LESION TYPE, UNSPECIFIED WHETHER NATIVE OR TRANSPLANTED HEART: ICD-10-CM

## 2019-09-17 PROCEDURE — 99215 PR OFFICE/OUTPT VISIT, EST, LEVL V, 40-54 MIN: ICD-10-PCS | Mod: HCNC,S$GLB,, | Performed by: INTERNAL MEDICINE

## 2019-09-17 PROCEDURE — 99215 OFFICE O/P EST HI 40 MIN: CPT | Mod: HCNC,S$GLB,, | Performed by: INTERNAL MEDICINE

## 2019-09-17 PROCEDURE — 99999 PR PBB SHADOW E&M-EST. PATIENT-LVL V: ICD-10-PCS | Mod: PBBFAC,HCNC,, | Performed by: INTERNAL MEDICINE

## 2019-09-17 PROCEDURE — 1101F PR PT FALLS ASSESS DOC 0-1 FALLS W/OUT INJ PAST YR: ICD-10-PCS | Mod: HCNC,CPTII,S$GLB, | Performed by: INTERNAL MEDICINE

## 2019-09-17 PROCEDURE — 99999 PR PBB SHADOW E&M-EST. PATIENT-LVL V: CPT | Mod: PBBFAC,HCNC,, | Performed by: INTERNAL MEDICINE

## 2019-09-17 PROCEDURE — 1101F PT FALLS ASSESS-DOCD LE1/YR: CPT | Mod: HCNC,CPTII,S$GLB, | Performed by: INTERNAL MEDICINE

## 2019-09-17 RX ORDER — FLUCONAZOLE 150 MG/1
150 TABLET ORAL DAILY
Qty: 1 TABLET | Refills: 0 | Status: SHIPPED | OUTPATIENT
Start: 2019-09-17 | End: 2019-09-18

## 2019-09-17 RX ORDER — CLOTRIMAZOLE 1 %
CREAM (GRAM) TOPICAL 2 TIMES DAILY PRN
Qty: 15 G | Refills: 1 | Status: SHIPPED | OUTPATIENT
Start: 2019-09-17 | End: 2021-04-01 | Stop reason: ALTCHOICE

## 2019-09-20 ENCOUNTER — OFFICE VISIT (OUTPATIENT)
Dept: PSYCHIATRY | Facility: CLINIC | Age: 84
End: 2019-09-20
Payer: MEDICARE

## 2019-09-20 VITALS
HEART RATE: 67 BPM | BODY MASS INDEX: 33.62 KG/M2 | WEIGHT: 214.19 LBS | DIASTOLIC BLOOD PRESSURE: 80 MMHG | SYSTOLIC BLOOD PRESSURE: 172 MMHG | HEIGHT: 67 IN

## 2019-09-20 DIAGNOSIS — F41.9 ANXIETY: Primary | ICD-10-CM

## 2019-09-20 PROCEDURE — 99213 OFFICE O/P EST LOW 20 MIN: CPT | Mod: HCNC,S$GLB,, | Performed by: PSYCHIATRY & NEUROLOGY

## 2019-09-20 PROCEDURE — 1101F PR PT FALLS ASSESS DOC 0-1 FALLS W/OUT INJ PAST YR: ICD-10-PCS | Mod: HCNC,CPTII,S$GLB, | Performed by: PSYCHIATRY & NEUROLOGY

## 2019-09-20 PROCEDURE — 99999 PR PBB SHADOW E&M-EST. PATIENT-LVL II: ICD-10-PCS | Mod: PBBFAC,HCNC,, | Performed by: PSYCHIATRY & NEUROLOGY

## 2019-09-20 PROCEDURE — 99999 PR PBB SHADOW E&M-EST. PATIENT-LVL II: CPT | Mod: PBBFAC,HCNC,, | Performed by: PSYCHIATRY & NEUROLOGY

## 2019-09-20 PROCEDURE — 1101F PT FALLS ASSESS-DOCD LE1/YR: CPT | Mod: HCNC,CPTII,S$GLB, | Performed by: PSYCHIATRY & NEUROLOGY

## 2019-09-20 PROCEDURE — 99213 PR OFFICE/OUTPT VISIT, EST, LEVL III, 20-29 MIN: ICD-10-PCS | Mod: HCNC,S$GLB,, | Performed by: PSYCHIATRY & NEUROLOGY

## 2019-09-20 RX ORDER — MIRTAZAPINE 15 MG/1
15 TABLET, FILM COATED ORAL NIGHTLY
Qty: 90 TABLET | Refills: 1 | Status: SHIPPED | OUTPATIENT
Start: 2019-09-20 | End: 2021-04-01

## 2019-09-20 NOTE — PROGRESS NOTES
ESTABLISHED OUTPATIENT VISIT   E/M LEVEL 3: [id number]    ENCOUNTER DATE: 9/20/2019  SITE: [facility], [address]    HISTORY    CHIEF COMPLAINT   Laila Hanna is a 87 y.o. female who presents for follow up of anxiety.    HPI     Pt. Reports anxiety, especially when she leaves the house.    Risks and benefits of Remeron were discussed.    Caretaker, Maria G Phillips, [phone], present for part of today's visit.    Psychiatric Review Of Systems - Is patient experiencing or having changes in:  sleep: poor, worries  appetite: no  weight: no  energy/anergy: no  interest/pleasure/anhedonia: no  somatic symptoms: no  libido: no  anxiety/panic: no  guilty/hopelessness: no  concentration: no  S.I.B.s/risky behavior: no  Irritability: no  Racing thoughts: no  Impulsive behaviors: no  Paranoia:no  AVH:no    Recent alcohol: no  Recent drug: no    Medical ROS    General ROS: walks with walker  ENT ROS: negative  Cardiovascular ROS: negative  Respiratory ROS: negative  Gastrointestinal ROS: negative  Neurological ROS: negative  Dermatological ROS: negative  Endocrine ROS: negative    PAST MEDICAL, FAMILY AND SOCIAL HISTORY: The patient's past medical, family and social history have been reviewed and updated as appropriate within the electronic medical record - see encounter notes.    PSYCHOTROPIC MEDICATIONS   Melatonin 10 mg prn at bedtime    Scheduled and PRN Medications     Current Outpatient Medications:     alendronate (FOSAMAX) 70 MG tablet, Take 1 tablet (70 mg total) by mouth every 7 days., Disp: 12 tablet, Rfl: 1    aspirin (ECOTRIN) 81 MG EC tablet, Take 1 tablet (81 mg total) by mouth once daily., Disp: , Rfl:     atorvastatin (LIPITOR) 40 MG tablet, TAKE 1 TABLET EVERY DAY, Disp: 90 tablet, Rfl: 0    calcium-vitamin D3 (CALCIUM 500 + D) 500 mg(1,250mg) -200 unit per tablet, Take 1 tablet by mouth once daily. , Disp: , Rfl:     carvedilol (COREG) 12.5 MG tablet, TAKE 1 TABLET TWICE DAILY, Disp: 180  tablet, Rfl: 0    cetirizine (ZYRTEC) 10 MG tablet, Take 1 tablet (10 mg total) by mouth every 12 (twelve) hours as needed for Allergies., Disp: 60 tablet, Rfl: 0    clotrimazole (LOTRIMIN) 1 % cream, Apply topically 2 (two) times daily as needed., Disp: 15 g, Rfl: 1    cyanocobalamin (VITAMIN B-12) 100 MCG tablet, Take 100 mcg by mouth once daily., Disp: , Rfl:     dextran 70-hypromellose (ARTIFICIAL TEARS) ophthalmic solution, Place 1 drop into both eyes every 2 (two) hours as needed., Disp: , Rfl: 0    levothyroxine (SYNTHROID) 150 MCG tablet, TAKE 1 TABLET EVERY DAY, Disp: 90 tablet, Rfl: 0    polyethylene glycol 3350 (MIRALAX ORAL), Take by mouth once daily., Disp: , Rfl:     triamcinolone acetonide 0.1% (KENALOG) 0.1 % cream, AAA bid, Disp: 60 g, Rfl: 3    EXAMINATION  Wt Readings from Last 3 Encounters:   09/20/19 97.1 kg (214 lb 2.8 oz)   09/17/19 97.5 kg (214 lb 15.2 oz)   07/18/19 95.3 kg (210 lb 1.6 oz)     Temp Readings from Last 3 Encounters:   09/17/19 97.8 °F (36.6 °C) (Oral)   07/03/19 97.6 °F (36.4 °C)   08/07/18 98.5 °F (36.9 °C)     BP Readings from Last 3 Encounters:   09/20/19 (!) 172/80   09/17/19 138/70   07/18/19 128/74     Pulse Readings from Last 3 Encounters:   09/20/19 67   09/17/19 64   07/18/19 80       CONSTITUTIONAL  General Appearance: well nourished    MUSCULOSKELETAL  Muscle Strength and Tone: normal strength and tone  Abnormal Involuntary Movements: no abnormal movement noted  Gait and Station: normal gait    PSYCHIATRIC   Level of Consciousness: alert  Orientation: oriented to person, place and time  Grooming: well groomed  Psychomotor Behavior: no restlessness/agitation  Speech: normal in rate, rhythm and volume  Language: normal vocabulary  Mood: anxious at times  Affect: full range and appropriate  Thought Process: logical and goal directed  Associations: intact associations  Thought Content: no SI/HI  Memory: grossly intact  Attention: intact to content of  interview  Fund of Knowledge: appears adequate  Insight: good  Judgement: good    MEDICAL DECISION MAKING    DIAGNOSES  Anxiety, unspecified    PROBLEM LIST AND MANAGEMENT PLANS    - anxiety: start Mirtazapine 15 mg at bedtime  - rtc 3 months     Time with patient: 15 min  More than 50% of the time was spent counseling/coordinating care.  LABORATORY DATA    Office Visit on 07/03/2019   Component Date Value Ref Range Status    Color, UA 07/03/2019 yellow   Final    Spec Grav UA 07/03/2019 1.025   Final    pH, UA 07/03/2019 5   Final    WBC, UA 07/03/2019 trace   Final    Nitrite, UA 07/03/2019 neg   Final    Protein 07/03/2019 30   Final    Glucose, UA 07/03/2019 normal   Final    Ketones, UA 07/03/2019 neg   Final    Urobilinogen, UA 07/03/2019 1   Final    Bilirubin 07/03/2019 neg   Final    Blood, UA 07/03/2019 about 50   Final    Urine Culture, Routine 07/03/2019 No significant growth   Final           Hair [place]all

## 2019-09-22 NOTE — PROGRESS NOTES
Subjective:       Patient ID: Laila Hanna is a 87 y.o. female.    Chief Complaint: Hypertension    HPI  She returns for management of hypertension.  She has had hypertension for over a year.  Current treatment has included medications outlined in medication list.  She denies chest pain or shortness of breath.  No palpitations.  Denies left arm or neck pain.       Past medical history: Hypertension, coronary artery disease, TIA, hyperlipidemia, aortic stenosis,osteoporosis, hypothyroidism, migraine headache,, status post cholecystectomy, status post thyroidectomy, colon adenoma. She had a colonoscopy April 2013     Medications: one aspirin daily, Lipitor 40 mg daily, Synthroid 0.15 mg daily, coreg 12.5 mg twice a day, Fosamax     ALLERGIES: Thiazides        Review of Systems   Constitutional: Negative for chills, fatigue, fever and unexpected weight change.   Respiratory: Negative for chest tightness and shortness of breath.    Cardiovascular: Negative for chest pain and palpitations.   Gastrointestinal: Negative for abdominal pain and blood in stool.   Neurological: Negative for dizziness, syncope, numbness and headaches.       Objective:      Physical Exam   HENT:   Right Ear: External ear normal.   Left Ear: External ear normal.   Nose: Nose normal.   Mouth/Throat: Oropharynx is clear and moist.   Eyes: Pupils are equal, round, and reactive to light.   Neck: Normal range of motion.   Cardiovascular: Normal rate and regular rhythm.   Murmur heard.  Pulmonary/Chest: Breath sounds normal.   Abdominal: She exhibits no distension. There is no hepatosplenomegaly. There is no tenderness.   Lymphadenopathy:     She has no cervical adenopathy.     She has no axillary adenopathy.   Neurological: She has normal strength and normal reflexes. No cranial nerve deficit or sensory deficit.       Assessment/Plan       Assessment and plan:  Hypertension:  Check CMP, lipid panel, CBC, B12, TSH.  Discussed mammogram, breast exam,  colonoscopy, Pap smear, pelvic exam.  She declined all

## 2019-09-24 ENCOUNTER — LAB VISIT (OUTPATIENT)
Dept: LAB | Facility: HOSPITAL | Age: 84
End: 2019-09-24
Attending: INTERNAL MEDICINE
Payer: MEDICARE

## 2019-09-24 DIAGNOSIS — I10 HYPERTENSION, UNSPECIFIED TYPE: ICD-10-CM

## 2019-09-24 DIAGNOSIS — E53.8 B12 DEFICIENCY: ICD-10-CM

## 2019-09-24 LAB
ALBUMIN SERPL BCP-MCNC: 3.5 G/DL (ref 3.5–5.2)
ALP SERPL-CCNC: 82 U/L (ref 55–135)
ALT SERPL W/O P-5'-P-CCNC: 11 U/L (ref 10–44)
ANION GAP SERPL CALC-SCNC: 6 MMOL/L (ref 8–16)
AST SERPL-CCNC: 17 U/L (ref 10–40)
BASOPHILS # BLD AUTO: 0.04 K/UL (ref 0–0.2)
BASOPHILS NFR BLD: 0.5 % (ref 0–1.9)
BILIRUB SERPL-MCNC: 0.5 MG/DL (ref 0.1–1)
BUN SERPL-MCNC: 20 MG/DL (ref 8–23)
CALCIUM SERPL-MCNC: 8.8 MG/DL (ref 8.7–10.5)
CHLORIDE SERPL-SCNC: 98 MMOL/L (ref 95–110)
CHOLEST SERPL-MCNC: 111 MG/DL (ref 120–199)
CHOLEST/HDLC SERPL: 2.4 {RATIO} (ref 2–5)
CO2 SERPL-SCNC: 28 MMOL/L (ref 23–29)
CREAT SERPL-MCNC: 0.8 MG/DL (ref 0.5–1.4)
DIFFERENTIAL METHOD: ABNORMAL
EOSINOPHIL # BLD AUTO: 0.3 K/UL (ref 0–0.5)
EOSINOPHIL NFR BLD: 3.6 % (ref 0–8)
ERYTHROCYTE [DISTWIDTH] IN BLOOD BY AUTOMATED COUNT: 13.4 % (ref 11.5–14.5)
EST. GFR  (AFRICAN AMERICAN): >60 ML/MIN/1.73 M^2
EST. GFR  (NON AFRICAN AMERICAN): >60 ML/MIN/1.73 M^2
GLUCOSE SERPL-MCNC: 104 MG/DL (ref 70–110)
HCT VFR BLD AUTO: 38.9 % (ref 37–48.5)
HDLC SERPL-MCNC: 46 MG/DL (ref 40–75)
HDLC SERPL: 41.4 % (ref 20–50)
HGB BLD-MCNC: 13 G/DL (ref 12–16)
LDLC SERPL CALC-MCNC: 50.8 MG/DL (ref 63–159)
LYMPHOCYTES # BLD AUTO: 1.4 K/UL (ref 1–4.8)
LYMPHOCYTES NFR BLD: 15.8 % (ref 18–48)
MCH RBC QN AUTO: 30.7 PG (ref 27–31)
MCHC RBC AUTO-ENTMCNC: 33.4 G/DL (ref 32–36)
MCV RBC AUTO: 92 FL (ref 82–98)
MONOCYTES # BLD AUTO: 0.9 K/UL (ref 0.3–1)
MONOCYTES NFR BLD: 10.6 % (ref 4–15)
NEUTROPHILS # BLD AUTO: 6 K/UL (ref 1.8–7.7)
NEUTROPHILS NFR BLD: 69.5 % (ref 38–73)
NONHDLC SERPL-MCNC: 65 MG/DL
PLATELET # BLD AUTO: 242 K/UL (ref 150–350)
PMV BLD AUTO: 10.5 FL (ref 9.2–12.9)
POTASSIUM SERPL-SCNC: 4.4 MMOL/L (ref 3.5–5.1)
PROT SERPL-MCNC: 7 G/DL (ref 6–8.4)
RBC # BLD AUTO: 4.24 M/UL (ref 4–5.4)
SODIUM SERPL-SCNC: 132 MMOL/L (ref 136–145)
TRIGL SERPL-MCNC: 71 MG/DL (ref 30–150)
TSH SERPL DL<=0.005 MIU/L-ACNC: 3.38 UIU/ML (ref 0.4–4)
VIT B12 SERPL-MCNC: 1087 PG/ML (ref 210–950)
WBC # BLD AUTO: 8.69 K/UL (ref 3.9–12.7)

## 2019-09-24 PROCEDURE — 80061 LIPID PANEL: CPT | Mod: HCNC

## 2019-09-24 PROCEDURE — 85025 COMPLETE CBC W/AUTO DIFF WBC: CPT | Mod: HCNC

## 2019-09-24 PROCEDURE — 36415 COLL VENOUS BLD VENIPUNCTURE: CPT | Mod: HCNC

## 2019-09-24 PROCEDURE — 82607 VITAMIN B-12: CPT | Mod: HCNC

## 2019-09-24 PROCEDURE — 80053 COMPREHEN METABOLIC PANEL: CPT | Mod: HCNC

## 2019-09-24 PROCEDURE — 84443 ASSAY THYROID STIM HORMONE: CPT | Mod: HCNC

## 2019-10-05 ENCOUNTER — TELEPHONE (OUTPATIENT)
Dept: INTERNAL MEDICINE | Facility: CLINIC | Age: 84
End: 2019-10-05

## 2019-10-05 NOTE — TELEPHONE ENCOUNTER
Have attempted to contact patient by phone numerous times about her laboratory result.  No answer.  Will mail letter asking her to contact our office

## 2019-10-14 ENCOUNTER — IMMUNIZATION (OUTPATIENT)
Dept: PHARMACY | Facility: CLINIC | Age: 84
End: 2019-10-14
Payer: MEDICARE

## 2019-10-16 DIAGNOSIS — E03.4 HYPOTHYROIDISM DUE TO ACQUIRED ATROPHY OF THYROID: Chronic | ICD-10-CM

## 2019-10-16 DIAGNOSIS — E78.5 HYPERLIPIDEMIA, UNSPECIFIED HYPERLIPIDEMIA TYPE: ICD-10-CM

## 2019-10-16 DIAGNOSIS — I10 ESSENTIAL HYPERTENSION: ICD-10-CM

## 2019-10-17 RX ORDER — LEVOTHYROXINE SODIUM 150 UG/1
TABLET ORAL
Qty: 90 TABLET | Refills: 0 | Status: SHIPPED | OUTPATIENT
Start: 2019-10-17 | End: 2020-02-04

## 2019-10-17 RX ORDER — CARVEDILOL 12.5 MG/1
TABLET ORAL
Qty: 180 TABLET | Refills: 0 | Status: SHIPPED | OUTPATIENT
Start: 2019-10-17 | End: 2020-02-14 | Stop reason: SDUPTHER

## 2019-10-17 RX ORDER — ATORVASTATIN CALCIUM 40 MG/1
TABLET, FILM COATED ORAL
Qty: 90 TABLET | Refills: 0 | Status: SHIPPED | OUTPATIENT
Start: 2019-10-17 | End: 2020-02-14 | Stop reason: SDUPTHER

## 2019-11-27 ENCOUNTER — PATIENT OUTREACH (OUTPATIENT)
Dept: ADMINISTRATIVE | Facility: OTHER | Age: 84
End: 2019-11-27

## 2019-12-02 ENCOUNTER — OFFICE VISIT (OUTPATIENT)
Dept: CARDIOLOGY | Facility: CLINIC | Age: 84
End: 2019-12-02
Payer: MEDICARE

## 2019-12-02 VITALS
BODY MASS INDEX: 34.12 KG/M2 | DIASTOLIC BLOOD PRESSURE: 79 MMHG | HEIGHT: 67 IN | OXYGEN SATURATION: 99 % | HEART RATE: 59 BPM | SYSTOLIC BLOOD PRESSURE: 168 MMHG | WEIGHT: 217.38 LBS

## 2019-12-02 DIAGNOSIS — D32.9 MENINGIOMA: ICD-10-CM

## 2019-12-02 DIAGNOSIS — I35.0 NONRHEUMATIC AORTIC VALVE STENOSIS: ICD-10-CM

## 2019-12-02 DIAGNOSIS — Z98.61 HISTORY OF PTCA: ICD-10-CM

## 2019-12-02 DIAGNOSIS — I10 ESSENTIAL HYPERTENSION: ICD-10-CM

## 2019-12-02 DIAGNOSIS — R00.2 PALPITATIONS: ICD-10-CM

## 2019-12-02 DIAGNOSIS — R60.9 DEPENDENT EDEMA: Primary | ICD-10-CM

## 2019-12-02 DIAGNOSIS — N18.2 CKD (CHRONIC KIDNEY DISEASE) STAGE 2, GFR 60-89 ML/MIN: ICD-10-CM

## 2019-12-02 DIAGNOSIS — E78.49 OTHER HYPERLIPIDEMIA: ICD-10-CM

## 2019-12-02 DIAGNOSIS — I51.89 LEFT VENTRICULAR DIASTOLIC DYSFUNCTION WITH PRESERVED SYSTOLIC FUNCTION: ICD-10-CM

## 2019-12-02 DIAGNOSIS — E22.2 SIADH (SYNDROME OF INAPPROPRIATE ADH PRODUCTION): ICD-10-CM

## 2019-12-02 DIAGNOSIS — I25.10 CORONARY ARTERY DISEASE INVOLVING NATIVE CORONARY ARTERY OF NATIVE HEART WITHOUT ANGINA PECTORIS: ICD-10-CM

## 2019-12-02 PROCEDURE — 99214 OFFICE O/P EST MOD 30 MIN: CPT | Mod: HCNC,S$GLB,, | Performed by: INTERNAL MEDICINE

## 2019-12-02 PROCEDURE — 1101F PT FALLS ASSESS-DOCD LE1/YR: CPT | Mod: HCNC,CPTII,S$GLB, | Performed by: INTERNAL MEDICINE

## 2019-12-02 PROCEDURE — 1159F PR MEDICATION LIST DOCUMENTED IN MEDICAL RECORD: ICD-10-PCS | Mod: HCNC,S$GLB,, | Performed by: INTERNAL MEDICINE

## 2019-12-02 PROCEDURE — 1101F PR PT FALLS ASSESS DOC 0-1 FALLS W/OUT INJ PAST YR: ICD-10-PCS | Mod: HCNC,CPTII,S$GLB, | Performed by: INTERNAL MEDICINE

## 2019-12-02 PROCEDURE — 93000 EKG 12-LEAD: ICD-10-PCS | Mod: HCNC,S$GLB,, | Performed by: INTERNAL MEDICINE

## 2019-12-02 PROCEDURE — 99214 PR OFFICE/OUTPT VISIT, EST, LEVL IV, 30-39 MIN: ICD-10-PCS | Mod: HCNC,S$GLB,, | Performed by: INTERNAL MEDICINE

## 2019-12-02 PROCEDURE — 99499 UNLISTED E&M SERVICE: CPT | Mod: HCNC,S$GLB,, | Performed by: INTERNAL MEDICINE

## 2019-12-02 PROCEDURE — 1126F PR PAIN SEVERITY QUANTIFIED, NO PAIN PRESENT: ICD-10-PCS | Mod: HCNC,S$GLB,, | Performed by: INTERNAL MEDICINE

## 2019-12-02 PROCEDURE — 1159F MED LIST DOCD IN RCRD: CPT | Mod: HCNC,S$GLB,, | Performed by: INTERNAL MEDICINE

## 2019-12-02 PROCEDURE — 99999 PR PBB SHADOW E&M-EST. PATIENT-LVL III: CPT | Mod: PBBFAC,HCNC,, | Performed by: INTERNAL MEDICINE

## 2019-12-02 PROCEDURE — 99499 RISK ADDL DX/OHS AUDIT: ICD-10-PCS | Mod: HCNC,S$GLB,, | Performed by: INTERNAL MEDICINE

## 2019-12-02 PROCEDURE — 1126F AMNT PAIN NOTED NONE PRSNT: CPT | Mod: HCNC,S$GLB,, | Performed by: INTERNAL MEDICINE

## 2019-12-02 PROCEDURE — 93000 ELECTROCARDIOGRAM COMPLETE: CPT | Mod: HCNC,S$GLB,, | Performed by: INTERNAL MEDICINE

## 2019-12-02 PROCEDURE — 99999 PR PBB SHADOW E&M-EST. PATIENT-LVL III: ICD-10-PCS | Mod: PBBFAC,HCNC,, | Performed by: INTERNAL MEDICINE

## 2019-12-02 RX ORDER — TORSEMIDE 10 MG/1
10 TABLET ORAL DAILY
Qty: 30 TABLET | Refills: 11 | Status: SHIPPED | OUTPATIENT
Start: 2019-12-02 | End: 2020-11-19 | Stop reason: SDUPTHER

## 2019-12-02 NOTE — PROGRESS NOTES
Subjective:    Patient ID:  Laila Hanna is a 87 y.o. female who presents for follow-up of Coronary artery disease, angina presence unspecified, unspec and Leg Swelling      HPI     87 year old female followed for management of hypertension and CAD,post PCI to mid LAD 2004. She has been noting dependent edame for the past 2 months. She as gained 14# since her last visit. She denies chest pain or increased SOB. EKG reveals LVH unchanged from previous.  Lab Results   Component Value Date     (L) 09/24/2019    K 4.4 09/24/2019    CL 98 09/24/2019    CO2 28 09/24/2019    BUN 20 09/24/2019    CREATININE 0.8 09/24/2019     09/24/2019    HGBA1C 5.8 01/29/2016    MG 1.8 07/03/2016    AST 17 09/24/2019    ALT 11 09/24/2019    ALBUMIN 3.5 09/24/2019    PROT 7.0 09/24/2019    BILITOT 0.5 09/24/2019    WBC 8.69 09/24/2019    HGB 13.0 09/24/2019    HCT 38.9 09/24/2019    MCV 92 09/24/2019     09/24/2019    INR 1.1 06/30/2016    INR 2.4 (H) 09/19/2008    TSH 3.381 09/24/2019         Lab Results   Component Value Date    CHOL 111 (L) 09/24/2019    HDL 46 09/24/2019    TRIG 71 09/24/2019       Lab Results   Component Value Date    LDLCALC 50.8 (L) 09/24/2019       Past Medical History:   Diagnosis Date    Basal cell carcinoma     nose    Benign essential hypertension     Cerebral microvascular disease 9/8/2014    Closed fracture of distal phalanx of left hand with routine healing 9/17/2015    Closed mallet fracture of distal phalanx of finger with routine healing 10/6/2015    Coronary artery disease     DDD (degenerative disc disease), lumbar 4/12/2016    Depression     Fall at home 5/30/2016    Hyperlipidemia     Hypothyroidism due to acquired atrophy of thyroid     Meningiomas, multiple     MI (myocardial infarction) 2004    80% blockage per patient    Migraine aura without headache 12/1/2014    Post PTCA 12/12/2012    Transient cerebral ischemia 5/4/2014       Current Outpatient  Medications:     alendronate (FOSAMAX) 70 MG tablet, Take 1 tablet (70 mg total) by mouth every 7 days., Disp: 12 tablet, Rfl: 1    aspirin (ECOTRIN) 81 MG EC tablet, Take 1 tablet (81 mg total) by mouth once daily., Disp: , Rfl:     atorvastatin (LIPITOR) 40 MG tablet, TAKE 1 TABLET EVERY DAY, Disp: 90 tablet, Rfl: 0    calcium-vitamin D3 (CALCIUM 500 + D) 500 mg(1,250mg) -200 unit per tablet, Take 1 tablet by mouth once daily. , Disp: , Rfl:     carvedilol (COREG) 12.5 MG tablet, TAKE 1 TABLET TWICE DAILY, Disp: 180 tablet, Rfl: 0    clotrimazole (LOTRIMIN) 1 % cream, Apply topically 2 (two) times daily as needed., Disp: 15 g, Rfl: 1    cyanocobalamin (VITAMIN B-12) 100 MCG tablet, Take 100 mcg by mouth once daily., Disp: , Rfl:     dextran 70-hypromellose (ARTIFICIAL TEARS) ophthalmic solution, Place 1 drop into both eyes every 2 (two) hours as needed., Disp: , Rfl: 0    levothyroxine (SYNTHROID) 150 MCG tablet, TAKE 1 TABLET EVERY DAY, Disp: 90 tablet, Rfl: 0    polyethylene glycol 3350 (MIRALAX ORAL), Take by mouth once daily., Disp: , Rfl:     triamcinolone acetonide 0.1% (KENALOG) 0.1 % cream, AAA bid, Disp: 60 g, Rfl: 3    cetirizine (ZYRTEC) 10 MG tablet, Take 1 tablet (10 mg total) by mouth every 12 (twelve) hours as needed for Allergies., Disp: 60 tablet, Rfl: 0    mirtazapine (REMERON) 15 MG tablet, Take 1 tablet (15 mg total) by mouth every evening. (Patient not taking: Reported on 12/2/2019), Disp: 90 tablet, Rfl: 1    torsemide (DEMADEX) 10 MG Tab, Take 1 tablet (10 mg total) by mouth once daily., Disp: 30 tablet, Rfl: 11          Review of Systems   Constitution: Positive for weight gain. Negative for decreased appetite, diaphoresis, fever, malaise/fatigue and weight loss.   HENT: Negative for congestion, ear discharge, ear pain and nosebleeds.    Eyes: Negative for blurred vision, double vision and visual disturbance.   Cardiovascular: Positive for leg swelling. Negative for chest  "pain, claudication, cyanosis, dyspnea on exertion, irregular heartbeat, near-syncope, orthopnea, palpitations, paroxysmal nocturnal dyspnea and syncope.   Respiratory: Negative for cough, hemoptysis, shortness of breath, sleep disturbances due to breathing, snoring, sputum production and wheezing.    Endocrine: Negative for polydipsia, polyphagia and polyuria.   Hematologic/Lymphatic: Negative for adenopathy and bleeding problem. Does not bruise/bleed easily.   Skin: Negative for color change, nail changes, poor wound healing and rash.   Musculoskeletal: Negative for muscle cramps and muscle weakness.   Gastrointestinal: Negative for abdominal pain, anorexia, change in bowel habit, hematochezia, nausea and vomiting.   Genitourinary: Negative for dysuria, frequency and hematuria.   Neurological: Negative for brief paralysis, difficulty with concentration, excessive daytime sleepiness, dizziness, focal weakness, headaches, light-headedness, seizures, vertigo and weakness.   Psychiatric/Behavioral: Negative for altered mental status and depression.   Allergic/Immunologic: Negative for persistent infections.        Objective:BP (!) 168/79 (BP Location: Left arm, Patient Position: Sitting, BP Method: Large (Automatic))   Pulse (!) 59   Ht 5' 7" (1.702 m)   Wt 98.6 kg (217 lb 6 oz)   LMP  (LMP Unknown) Comment: years ago  SpO2 99%   BMI 34.05 kg/m²             Physical Exam   Constitutional: She is oriented to person, place, and time. She appears well-developed and well-nourished.   HENT:   Head: Normocephalic.   Right Ear: External ear normal.   Left Ear: External ear normal.   Nose: Nose normal.   Inspection of lips, teeth and gums normal   Eyes: Pupils are equal, round, and reactive to light. Conjunctivae and EOM are normal. No scleral icterus.   Neck: Normal range of motion. No JVD present. No tracheal deviation present. No thyromegaly present.   Cardiovascular: Normal rate, regular rhythm, S1 normal, S2 normal " and intact distal pulses. Exam reveals no gallop and no friction rub.   No murmur heard.  JVP normal   Pulmonary/Chest: Effort normal and breath sounds normal. No respiratory distress. She has no wheezes. She has no rales. She exhibits no tenderness.   Abdominal: Soft. Bowel sounds are normal. She exhibits no distension. There is no hepatosplenomegaly. There is no tenderness. There is no guarding.   Musculoskeletal: Normal range of motion. She exhibits edema (plus 1 edema right plus 2 ankle edema left). She exhibits no tenderness.   Lymphadenopathy:   Palpation of lymph nodes of neck and groin normal   Neurological: She is oriented to person, place, and time. No cranial nerve deficit. She exhibits normal muscle tone. Coordination normal.   Skin: Skin is warm and dry. No rash noted. No erythema. No pallor.   No ankle nor pretibial edema   Psychiatric: She has a normal mood and affect. Her behavior is normal. Judgment and thought content normal.         Assessment:       1. Dependent edema    2. Left ventricular diastolic dysfunction with preserved systolic function    3. Nonrheumatic aortic valve stenosis    4. Essential hypertension    5. History of PTCA    6. Other hyperlipidemia    7. Coronary artery disease involving native coronary artery of native heart without angina pectoris    8. CKD (chronic kidney disease) stage 2, GFR 60-89 ml/min    9. Meningioma    10. SIADH (syndrome of inappropriate ADH production)         Plan:       Laila was seen today for coronary artery disease, angina presence unspecified, unspec and leg swelling.    Diagnoses and all orders for this visit:    Dependent edema  -     Basic metabolic panel; Future; Expected date: 12/16/2019  -     Echo Color Flow Doppler? Yes; Future    Left ventricular diastolic dysfunction with preserved systolic function  -     Echo Color Flow Doppler? Yes; Future    Nonrheumatic aortic valve stenosis    Essential hypertension    History of PTCA    Other  hyperlipidemia    Coronary artery disease involving native coronary artery of native heart without angina pectoris    CKD (chronic kidney disease) stage 2, GFR 60-89 ml/min  -     Basic metabolic panel; Future; Expected date: 12/16/2019    Meningioma    SIADH (syndrome of inappropriate ADH production)    Other orders  -     torsemide (DEMADEX) 10 MG Tab; Take 1 tablet (10 mg total) by mouth once daily.

## 2019-12-02 NOTE — LETTER
December 2, 2019      Ama Graham MD  [address]  [place] [address]           [facility]  [address]  [place] [address]  Phone: [phone]          Patient: Laila Hanna   MR Number: [id number]   YOB: 1931   Date of Visit: 12/2/2019       Dear Dr. Ama Graham:    Thank you for referring Laila Hanna to me for evaluation. Attached you will find relevant portions of my assessment and plan of care.    If you have questions, please do not hesitate to call me. I look forward to following Laila Hanna along with you.    Sincerely,    Bubba Severino MD    Enclosure  CC:  No Recipients    If you would like to receive this communication electronically, please contact [email or web address] or [phone] to request more information on [employer] access.    For providers and/or their staff who would like to refer a patient to [facility], please contact us through our one-stop-shop provider referral line, [facility], at [phone].    If you feel you have received this communication in error or would no longer like to receive these types of communications, please e-mail [email or web address]

## 2019-12-11 ENCOUNTER — TELEPHONE (OUTPATIENT)
Dept: CARDIOLOGY | Facility: CLINIC | Age: 84
End: 2019-12-11

## 2019-12-11 DIAGNOSIS — R00.2 PALPITATIONS: Primary | ICD-10-CM

## 2019-12-12 ENCOUNTER — PATIENT OUTREACH (OUTPATIENT)
Dept: ADMINISTRATIVE | Facility: OTHER | Age: 84
End: 2019-12-12

## 2019-12-17 ENCOUNTER — HOSPITAL ENCOUNTER (OUTPATIENT)
Dept: CARDIOLOGY | Facility: CLINIC | Age: 84
Discharge: HOME | End: 2019-12-17
Attending: INTERNAL MEDICINE
Payer: MEDICARE

## 2019-12-17 ENCOUNTER — OFFICE VISIT (OUTPATIENT)
Dept: OTOLARYNGOLOGY | Facility: CLINIC | Age: 84
End: 2019-12-17
Payer: MEDICARE

## 2019-12-17 VITALS
BODY MASS INDEX: 33.84 KG/M2 | DIASTOLIC BLOOD PRESSURE: 86 MMHG | SYSTOLIC BLOOD PRESSURE: 167 MMHG | WEIGHT: 216.06 LBS | HEART RATE: 63 BPM

## 2019-12-17 VITALS
DIASTOLIC BLOOD PRESSURE: 80 MMHG | BODY MASS INDEX: 34.06 KG/M2 | SYSTOLIC BLOOD PRESSURE: 140 MMHG | HEART RATE: 80 BPM | HEIGHT: 67 IN | WEIGHT: 217 LBS

## 2019-12-17 DIAGNOSIS — R60.9 DEPENDENT EDEMA: ICD-10-CM

## 2019-12-17 DIAGNOSIS — H93.8X3 EAR FULLNESS, BILATERAL: Primary | ICD-10-CM

## 2019-12-17 DIAGNOSIS — I51.89 LEFT VENTRICULAR DIASTOLIC DYSFUNCTION WITH PRESERVED SYSTOLIC FUNCTION: ICD-10-CM

## 2019-12-17 LAB
ASCENDING AORTA: 4.39 CM
AV INDEX (PROSTH): 0.6
AV MEAN GRADIENT: 10 MMHG
AV PEAK GRADIENT: 17 MMHG
AV VALVE AREA: 1.79 CM2
AV VELOCITY RATIO: 0.56
BSA FOR ECHO PROCEDURE: 2.16 M2
CV ECHO LV RWT: 0.54 CM
DOP CALC AO PEAK VEL: 2.09 M/S
DOP CALC AO VTI: 53.52 CM
DOP CALC LVOT AREA: 3 CM2
DOP CALC LVOT DIAMETER: 1.95 CM
DOP CALC LVOT PEAK VEL: 1.18 M/S
DOP CALC LVOT STROKE VOLUME: 96.03 CM3
DOP CALCLVOT PEAK VEL VTI: 32.17 CM
E WAVE DECELERATION TIME: 414.67 MSEC
E/A RATIO: 0.76
ECHO LV POSTERIOR WALL: 0.86 CM (ref 0.6–1.1)
FRACTIONAL SHORTENING: 28 % (ref 28–44)
INTERVENTRICULAR SEPTUM: 0.86 CM (ref 0.6–1.1)
IVRT: 0.09 MSEC
LA MAJOR: 6.54 CM
LA MINOR: 6.5 CM
LA WIDTH: 5.54 CM
LEFT ATRIUM SIZE: 5.25 CM
LEFT ATRIUM VOLUME INDEX: 77 ML/M2
LEFT ATRIUM VOLUME: 161.19 CM3
LEFT INTERNAL DIMENSION IN SYSTOLE: 2.32 CM (ref 2.1–4)
LEFT VENTRICLE DIASTOLIC VOLUME INDEX: 19.68 ML/M2
LEFT VENTRICLE DIASTOLIC VOLUME: 41.19 ML
LEFT VENTRICLE MASS INDEX: 35 G/M2
LEFT VENTRICLE SYSTOLIC VOLUME INDEX: 8.8 ML/M2
LEFT VENTRICLE SYSTOLIC VOLUME: 18.45 ML
LEFT VENTRICULAR INTERNAL DIMENSION IN DIASTOLE: 3.21 CM (ref 3.5–6)
LEFT VENTRICULAR MASS: 72.77 G
LV LATERAL E/E' RATIO: 24.4 M/S
MV PEAK A VEL: 1.6 M/S
MV PEAK E VEL: 1.22 M/S
PISA TR MAX VEL: 2.93 M/S
PULM VEIN S/D RATIO: 1.74
PV PEAK D VEL: 0.31 M/S
PV PEAK S VEL: 0.54 M/S
RA MAJOR: 5.07 CM
RA PRESSURE: 3 MMHG
RA WIDTH: 3.67 CM
RIGHT VENTRICULAR END-DIASTOLIC DIMENSION: 4.16 CM
SINUS: 3.45 CM
STJ: 3.23 CM
TDI LATERAL: 0.05 M/S
TR MAX PG: 34 MMHG
TRICUSPID ANNULAR PLANE SYSTOLIC EXCURSION: 2.05 CM
TV REST PULMONARY ARTERY PRESSURE: 37 MMHG

## 2019-12-17 PROCEDURE — 99213 OFFICE O/P EST LOW 20 MIN: CPT | Mod: 25,HCNC,S$GLB, | Performed by: OTOLARYNGOLOGY

## 2019-12-17 PROCEDURE — 93306 TTE W/DOPPLER COMPLETE: CPT | Mod: HCNC,S$GLB,, | Performed by: INTERNAL MEDICINE

## 2019-12-17 PROCEDURE — 69210 PR REMOVAL IMPACTED CERUMEN REQUIRING INSTRUMENTATION, UNILATERAL: ICD-10-PCS | Mod: HCNC,S$GLB,, | Performed by: OTOLARYNGOLOGY

## 2019-12-17 PROCEDURE — 99213 PR OFFICE/OUTPT VISIT, EST, LEVL III, 20-29 MIN: ICD-10-PCS | Mod: 25,HCNC,S$GLB, | Performed by: OTOLARYNGOLOGY

## 2019-12-17 PROCEDURE — 1159F PR MEDICATION LIST DOCUMENTED IN MEDICAL RECORD: ICD-10-PCS | Mod: HCNC,S$GLB,, | Performed by: OTOLARYNGOLOGY

## 2019-12-17 PROCEDURE — 93306 ECHO (CUPID ONLY): ICD-10-PCS | Mod: HCNC,S$GLB,, | Performed by: INTERNAL MEDICINE

## 2019-12-17 PROCEDURE — 1159F MED LIST DOCD IN RCRD: CPT | Mod: HCNC,S$GLB,, | Performed by: OTOLARYNGOLOGY

## 2019-12-17 PROCEDURE — 1101F PT FALLS ASSESS-DOCD LE1/YR: CPT | Mod: HCNC,CPTII,S$GLB, | Performed by: OTOLARYNGOLOGY

## 2019-12-17 PROCEDURE — 99999 PR PBB SHADOW E&M-EST. PATIENT-LVL III: CPT | Mod: PBBFAC,HCNC,, | Performed by: OTOLARYNGOLOGY

## 2019-12-17 PROCEDURE — 1101F PR PT FALLS ASSESS DOC 0-1 FALLS W/OUT INJ PAST YR: ICD-10-PCS | Mod: HCNC,CPTII,S$GLB, | Performed by: OTOLARYNGOLOGY

## 2019-12-17 PROCEDURE — 99999 PR PBB SHADOW E&M-EST. PATIENT-LVL III: ICD-10-PCS | Mod: PBBFAC,HCNC,, | Performed by: OTOLARYNGOLOGY

## 2019-12-17 PROCEDURE — 69210 REMOVE IMPACTED EAR WAX UNI: CPT | Mod: HCNC,S$GLB,, | Performed by: OTOLARYNGOLOGY

## 2019-12-17 NOTE — PROGRESS NOTES
Subjective:       Patient ID: Laila Hanna is a 88 y.o. female.    Chief Complaint: Ear Fullness    Ear Fullness    There is pain in both ears. This is a recurrent problem. The current episode started 1 to 4 weeks ago. The problem occurs constantly. The problem has been unchanged. There has been no fever. The patient is experiencing no pain. Associated symptoms include hearing loss. Pertinent negatives include no coughing, diarrhea, ear discharge, headaches, neck pain, rash, rhinorrhea or vomiting. She has tried nothing for the symptoms. Her past medical history is significant for hearing loss.     Review of Systems   Constitutional: Negative for activity change, appetite change and fever.   HENT: Positive for hearing loss. Negative for congestion, ear discharge, ear pain, nosebleeds, postnasal drip, rhinorrhea and sneezing.    Eyes: Negative for redness and visual disturbance.   Respiratory: Negative for apnea, cough, shortness of breath and wheezing.    Cardiovascular: Negative for chest pain and palpitations.   Gastrointestinal: Negative for diarrhea and vomiting.   Genitourinary: Negative for difficulty urinating and frequency.   Musculoskeletal: Negative for arthralgias, back pain, gait problem and neck pain.   Skin: Negative for color change and rash.   Neurological: Negative for dizziness, speech difficulty, weakness and headaches.   Hematological: Negative for adenopathy. Does not bruise/bleed easily.   Psychiatric/Behavioral: Negative for agitation and behavioral problems.       Objective:        Constitutional:   Vital signs are normal. She appears well-developed and well-nourished. She is active. Normal speech.      Head:  Normocephalic and atraumatic. Salivary glands normal.  Facial strength is normal.      Nose:  Nose normal including turbinates, nasal mucosa, sinuses and nasal septum.     Mouth/Throat  Oropharynx clear and moist without lesions or asymmetry and normal uvula midline.     Neck:  Neck  normal without thyromegaly masses, asymmetry, normal tracheal structure, crepitus, and tenderness, thyroid normal, trachea normal, phonation normal and full range of motion with neck supple.     Psychiatric:   She has a normal mood and affect. Her speech is normal and behavior is normal.     Neurological:   She has neurological normal, alert and oriented.     Skin:   No abrasions, lacerations, lesions, or rashes.         PROCEDURE NOTE:  Ceruminosis is noted in both EACs.  Wax was removed by manual debridement and suctioning utilizing the assistance of binocular microscopy revealing EACs and TMs WNL. The patient tolerated this procedure without difficulty. The subjective decrease noted in hearing pre-cleaning was resolved post-cleaning.       Assessment:       1. Ear fullness, bilateral        Plan:       1. Hearing conservation strongly recommended.  2. Trial of amplification bilaterally also recommended (patient not interested).  3. Re-check of hearing in 18-24 months or sooner if subjective change noted.  4. F/U with PCP as per schedule.

## 2020-01-13 ENCOUNTER — PATIENT OUTREACH (OUTPATIENT)
Dept: ADMINISTRATIVE | Facility: OTHER | Age: 85
End: 2020-01-13

## 2020-01-15 ENCOUNTER — OFFICE VISIT (OUTPATIENT)
Dept: CARDIOLOGY | Facility: CLINIC | Age: 85
End: 2020-01-15
Payer: MEDICARE

## 2020-01-15 VITALS
WEIGHT: 211.88 LBS | SYSTOLIC BLOOD PRESSURE: 131 MMHG | DIASTOLIC BLOOD PRESSURE: 60 MMHG | HEIGHT: 67 IN | HEART RATE: 68 BPM | BODY MASS INDEX: 33.26 KG/M2

## 2020-01-15 DIAGNOSIS — I70.0 AORTIC ATHEROSCLEROSIS: ICD-10-CM

## 2020-01-15 DIAGNOSIS — I25.10 CORONARY ARTERY DISEASE INVOLVING NATIVE CORONARY ARTERY OF NATIVE HEART, ANGINA PRESENCE UNSPECIFIED: Primary | ICD-10-CM

## 2020-01-15 DIAGNOSIS — N18.2 CKD (CHRONIC KIDNEY DISEASE) STAGE 2, GFR 60-89 ML/MIN: ICD-10-CM

## 2020-01-15 DIAGNOSIS — E78.49 OTHER HYPERLIPIDEMIA: ICD-10-CM

## 2020-01-15 DIAGNOSIS — Z98.61 HISTORY OF PTCA: ICD-10-CM

## 2020-01-15 DIAGNOSIS — R60.9 DEPENDENT EDEMA: ICD-10-CM

## 2020-01-15 DIAGNOSIS — I51.89 LEFT VENTRICULAR DIASTOLIC DYSFUNCTION WITH PRESERVED SYSTOLIC FUNCTION: ICD-10-CM

## 2020-01-15 DIAGNOSIS — I10 ESSENTIAL HYPERTENSION: ICD-10-CM

## 2020-01-15 DIAGNOSIS — I35.0 NONRHEUMATIC AORTIC VALVE STENOSIS: ICD-10-CM

## 2020-01-15 PROCEDURE — 99499 RISK ADDL DX/OHS AUDIT: ICD-10-PCS | Mod: HCNC,S$GLB,, | Performed by: INTERNAL MEDICINE

## 2020-01-15 PROCEDURE — 1101F PT FALLS ASSESS-DOCD LE1/YR: CPT | Mod: HCNC,CPTII,S$GLB, | Performed by: INTERNAL MEDICINE

## 2020-01-15 PROCEDURE — 99499 UNLISTED E&M SERVICE: CPT | Mod: HCNC,S$GLB,, | Performed by: INTERNAL MEDICINE

## 2020-01-15 PROCEDURE — 1159F PR MEDICATION LIST DOCUMENTED IN MEDICAL RECORD: ICD-10-PCS | Mod: HCNC,S$GLB,, | Performed by: INTERNAL MEDICINE

## 2020-01-15 PROCEDURE — 99213 OFFICE O/P EST LOW 20 MIN: CPT | Mod: HCNC,S$GLB,, | Performed by: INTERNAL MEDICINE

## 2020-01-15 PROCEDURE — 1101F PR PT FALLS ASSESS DOC 0-1 FALLS W/OUT INJ PAST YR: ICD-10-PCS | Mod: HCNC,CPTII,S$GLB, | Performed by: INTERNAL MEDICINE

## 2020-01-15 PROCEDURE — 99999 PR PBB SHADOW E&M-EST. PATIENT-LVL III: ICD-10-PCS | Mod: PBBFAC,HCNC,, | Performed by: INTERNAL MEDICINE

## 2020-01-15 PROCEDURE — 99999 PR PBB SHADOW E&M-EST. PATIENT-LVL III: CPT | Mod: PBBFAC,HCNC,, | Performed by: INTERNAL MEDICINE

## 2020-01-15 PROCEDURE — 99213 PR OFFICE/OUTPT VISIT, EST, LEVL III, 20-29 MIN: ICD-10-PCS | Mod: HCNC,S$GLB,, | Performed by: INTERNAL MEDICINE

## 2020-01-15 PROCEDURE — 1159F MED LIST DOCD IN RCRD: CPT | Mod: HCNC,S$GLB,, | Performed by: INTERNAL MEDICINE

## 2020-01-15 NOTE — PROGRESS NOTES
Subjective:    Patient ID:  Laila Hanna is a 88 y.o. female who presents for follow-up of Dependent edema      HPI     88 year old female followed for management of hypertension and CAD,post PCI to mid LAD 2004. On her clinic visit of 12/2/19 she had been noting dependent edame for the past 2 months  and gained 14# since her last visit. She was placed on 10 mg torsemide. An echo ws unremarkable. She has lost 7# sinc the visit and np longer noted edema. She hs more adherent to a low salt diet.    Conclusion 12/7/19    · Normal left ventricular systolic function. The estimated ejection fraction is 60%  · Concentric left ventricular remodeling.  · Grade I (mild) left ventricular diastolic dysfunction consistent with impaired relaxation.  · Normal right ventricular systolic function.  · Severe left atrial enlargement.  · Mild mitral regurgitation.  · Mild tricuspid regurgitation.  · The estimated PA systolic pressure is 37 mm Hg  · Normal central venous pressure (3 mm Hg).  · The ascending aorta is moderately dilated and measures 4 cm.          Lab Results   Component Value Date     (L) 12/17/2019    K 4.2 12/17/2019    CL 95 12/17/2019    CO2 31 (H) 12/17/2019    BUN 16 12/17/2019    CREATININE 0.8 12/17/2019     12/17/2019    HGBA1C 5.8 01/29/2016    MG 1.8 07/03/2016    AST 17 09/24/2019    ALT 11 09/24/2019    ALBUMIN 3.5 09/24/2019    PROT 7.0 09/24/2019    BILITOT 0.5 09/24/2019    WBC 8.69 09/24/2019    HGB 13.0 09/24/2019    HCT 38.9 09/24/2019    MCV 92 09/24/2019     09/24/2019    INR 1.1 06/30/2016    INR 2.4 (H) 09/19/2008    TSH 3.381 09/24/2019         Lab Results   Component Value Date    CHOL 111 (L) 09/24/2019    HDL 46 09/24/2019    TRIG 71 09/24/2019       Lab Results   Component Value Date    LDLCALC 50.8 (L) 09/24/2019       Past Medical History:   Diagnosis Date    Basal cell carcinoma     nose    Benign essential hypertension     Cerebral microvascular disease 9/8/2014     Closed fracture of distal phalanx of left hand with routine healing 9/17/2015    Closed mallet fracture of distal phalanx of finger with routine healing 10/6/2015    Coronary artery disease     DDD (degenerative disc disease), lumbar 4/12/2016    Depression     Fall at home 5/30/2016    Hyperlipidemia     Hypothyroidism due to acquired atrophy of thyroid     Meningiomas, multiple     MI (myocardial infarction) 2004    80% blockage per patient    Migraine aura without headache 12/1/2014    Post PTCA 12/12/2012    Transient cerebral ischemia 5/4/2014       Current Outpatient Medications:     alendronate (FOSAMAX) 70 MG tablet, Take 1 tablet (70 mg total) by mouth every 7 days., Disp: 12 tablet, Rfl: 1    aspirin (ECOTRIN) 81 MG EC tablet, Take 1 tablet (81 mg total) by mouth once daily., Disp: , Rfl:     atorvastatin (LIPITOR) 40 MG tablet, TAKE 1 TABLET EVERY DAY, Disp: 90 tablet, Rfl: 0    calcium-vitamin D3 (CALCIUM 500 + D) 500 mg(1,250mg) -200 unit per tablet, Take 1 tablet by mouth once daily. , Disp: , Rfl:     carvedilol (COREG) 12.5 MG tablet, TAKE 1 TABLET TWICE DAILY, Disp: 180 tablet, Rfl: 0    cyanocobalamin (VITAMIN B-12) 100 MCG tablet, Take 100 mcg by mouth once daily., Disp: , Rfl:     dextran 70-hypromellose (ARTIFICIAL TEARS) ophthalmic solution, Place 1 drop into both eyes every 2 (two) hours as needed., Disp: , Rfl: 0    levothyroxine (SYNTHROID) 150 MCG tablet, TAKE 1 TABLET EVERY DAY, Disp: 90 tablet, Rfl: 0    polyethylene glycol 3350 (MIRALAX ORAL), Take by mouth once daily., Disp: , Rfl:     torsemide (DEMADEX) 10 MG Tab, Take 1 tablet (10 mg total) by mouth once daily., Disp: 30 tablet, Rfl: 11    cetirizine (ZYRTEC) 10 MG tablet, Take 1 tablet (10 mg total) by mouth every 12 (twelve) hours as needed for Allergies. (Patient not taking: Reported on 1/15/2020), Disp: 60 tablet, Rfl: 0    clotrimazole (LOTRIMIN) 1 % cream, Apply topically 2 (two) times daily as  needed. (Patient not taking: Reported on 1/15/2020), Disp: 15 g, Rfl: 1    mirtazapine (REMERON) 15 MG tablet, Take 1 tablet (15 mg total) by mouth every evening. (Patient not taking: Reported on 12/2/2019), Disp: 90 tablet, Rfl: 1    triamcinolone acetonide 0.1% (KENALOG) 0.1 % cream, AAA bid (Patient not taking: Reported on 1/15/2020), Disp: 60 g, Rfl: 3          Review of Systems   Constitution: Negative for decreased appetite, diaphoresis, fever, malaise/fatigue, weight gain and weight loss.   HENT: Negative for congestion, ear discharge, ear pain and nosebleeds.    Eyes: Negative for blurred vision, double vision and visual disturbance.   Cardiovascular: Negative for chest pain, claudication, cyanosis, dyspnea on exertion, irregular heartbeat, leg swelling, near-syncope, orthopnea, palpitations, paroxysmal nocturnal dyspnea and syncope.   Respiratory: Negative for cough, hemoptysis, shortness of breath, sleep disturbances due to breathing, snoring, sputum production and wheezing.    Endocrine: Negative for polydipsia, polyphagia and polyuria.   Hematologic/Lymphatic: Negative for adenopathy and bleeding problem. Does not bruise/bleed easily.   Skin: Negative for color change, nail changes, poor wound healing and rash.   Musculoskeletal: Negative for muscle cramps and muscle weakness.   Gastrointestinal: Negative for abdominal pain, anorexia, change in bowel habit, hematochezia, nausea and vomiting.   Genitourinary: Negative for dysuria, frequency and hematuria.   Neurological: Negative for brief paralysis, difficulty with concentration, excessive daytime sleepiness, dizziness, focal weakness, headaches, light-headedness, seizures, vertigo and weakness.   Psychiatric/Behavioral: Negative for altered mental status and depression.   Allergic/Immunologic: Negative for persistent infections.        Objective:/60 (BP Location: Left arm, Patient Position: Sitting, BP Method: X-Large (Automatic))   Pulse 68    "Ht 5' 7" (1.702 m)   Wt 96.1 kg (211 lb 13.8 oz)   LMP  (LMP Unknown) Comment: years ago  BMI 33.18 kg/m²             Physical Exam   Constitutional: She is oriented to person, place, and time. She appears well-developed and well-nourished.   HENT:   Head: Normocephalic.   Right Ear: External ear normal.   Left Ear: External ear normal.   Nose: Nose normal.   Inspection of lips, teeth and gums normal   Eyes: Pupils are equal, round, and reactive to light. Conjunctivae and EOM are normal. No scleral icterus.   Neck: Normal range of motion. No JVD present. No tracheal deviation present. No thyromegaly present.   Cardiovascular: Normal rate, regular rhythm, normal heart sounds and intact distal pulses. Exam reveals no gallop and no friction rub.   No murmur heard.  Pulmonary/Chest: Effort normal and breath sounds normal. No respiratory distress. She has no wheezes. She has no rales. She exhibits no tenderness.   Abdominal: Soft. Bowel sounds are normal. She exhibits no distension. There is no hepatosplenomegaly. There is no tenderness. There is no guarding.   Musculoskeletal: Normal range of motion. She exhibits edema (no edema). She exhibits no tenderness.   Lymphadenopathy:   Palpation of lymph nodes of neck and groin normal   Neurological: She is oriented to person, place, and time. No cranial nerve deficit. She exhibits normal muscle tone. Coordination normal.   Skin: Skin is warm and dry. No rash noted. No erythema. No pallor.   Palpation of skin normal   Psychiatric: She has a normal mood and affect. Her behavior is normal. Judgment and thought content normal.         Assessment:       1. Coronary artery disease involving native coronary artery of native heart, angina presence unspecified    2. Dependent edema    3. History of PTCA    4. Essential hypertension    5. Aortic atherosclerosis    6. Other hyperlipidemia    7. Left ventricular diastolic dysfunction with preserved systolic function    8. Nonrheumatic " aortic valve stenosis    9. CKD (chronic kidney disease) stage 2, GFR 60-89 ml/min         Plan:       Laila was seen today for dependent edema.    Diagnoses and all orders for this visit:    Coronary artery disease involving native coronary artery of native heart, angina presence unspecified    Dependent edema    History of PTCA    Essential hypertension  -     CBC auto differential; Future; Expected date: 07/16/2020    Aortic atherosclerosis    Other hyperlipidemia  -     Lipid panel; Future; Expected date: 07/16/2020    Left ventricular diastolic dysfunction with preserved systolic function    Nonrheumatic aortic valve stenosis    CKD (chronic kidney disease) stage 2, GFR 60-89 ml/min  -     Comprehensive metabolic panel; Future; Expected date: 07/16/2020

## 2020-01-15 NOTE — PROGRESS NOTES
Subjective:    Patient ID:  Laila Hanna is a 88 y.o. female who presents for follow-up of Dependent edema      HPI     88 year old female followed for management of hypertension and CAD,post PCI to mid LAD 2004. On her clinic visit of 12/2/19 she had been noting dependent edame for the past 2 months. She as gained 14# since her previous visit. She was placed on 10 mg torsemide. Echo was unremarkable.    Conclusion 12/17/19    · Normal left ventricular systolic function. The estimated ejection fraction is 60%  · Concentric left ventricular remodeling.  · Grade I (mild) left ventricular diastolic dysfunction consistent with impaired relaxation.  · Normal right ventricular systolic function.  · Severe left atrial enlargement.  · Mild mitral regurgitation.  · Mild tricuspid regurgitation.  · The estimated PA systolic pressure is 37 mm Hg  · Normal central venous pressure (3 mm Hg).  · The ascending aorta is moderately dilated and measures 4 cm.            Lab Results   Component Value Date     (L) 12/17/2019    K 4.2 12/17/2019    CL 95 12/17/2019    CO2 31 (H) 12/17/2019    BUN 16 12/17/2019    CREATININE 0.8 12/17/2019     12/17/2019    HGBA1C 5.8 01/29/2016    MG 1.8 07/03/2016    AST 17 09/24/2019    ALT 11 09/24/2019    ALBUMIN 3.5 09/24/2019    PROT 7.0 09/24/2019    BILITOT 0.5 09/24/2019    WBC 8.69 09/24/2019    HGB 13.0 09/24/2019    HCT 38.9 09/24/2019    MCV 92 09/24/2019     09/24/2019    INR 1.1 06/30/2016    INR 2.4 (H) 09/19/2008    TSH 3.381 09/24/2019         Lab Results   Component Value Date    CHOL 111 (L) 09/24/2019    HDL 46 09/24/2019    TRIG 71 09/24/2019       Lab Results   Component Value Date    LDLCALC 50.8 (L) 09/24/2019       Past Medical History:   Diagnosis Date    Basal cell carcinoma     nose    Benign essential hypertension     Cerebral microvascular disease 9/8/2014    Closed fracture of distal phalanx of left hand with routine healing 9/17/2015    Closed  mallet fracture of distal phalanx of finger with routine healing 10/6/2015    Coronary artery disease     DDD (degenerative disc disease), lumbar 4/12/2016    Depression     Fall at home 5/30/2016    Hyperlipidemia     Hypothyroidism due to acquired atrophy of thyroid     Meningiomas, multiple     MI (myocardial infarction) 2004    80% blockage per patient    Migraine aura without headache 12/1/2014    Post PTCA 12/12/2012    Transient cerebral ischemia 5/4/2014       Current Outpatient Medications:     alendronate (FOSAMAX) 70 MG tablet, Take 1 tablet (70 mg total) by mouth every 7 days., Disp: 12 tablet, Rfl: 1    aspirin (ECOTRIN) 81 MG EC tablet, Take 1 tablet (81 mg total) by mouth once daily., Disp: , Rfl:     atorvastatin (LIPITOR) 40 MG tablet, TAKE 1 TABLET EVERY DAY, Disp: 90 tablet, Rfl: 0    calcium-vitamin D3 (CALCIUM 500 + D) 500 mg(1,250mg) -200 unit per tablet, Take 1 tablet by mouth once daily. , Disp: , Rfl:     carvedilol (COREG) 12.5 MG tablet, TAKE 1 TABLET TWICE DAILY, Disp: 180 tablet, Rfl: 0    cyanocobalamin (VITAMIN B-12) 100 MCG tablet, Take 100 mcg by mouth once daily., Disp: , Rfl:     dextran 70-hypromellose (ARTIFICIAL TEARS) ophthalmic solution, Place 1 drop into both eyes every 2 (two) hours as needed., Disp: , Rfl: 0    levothyroxine (SYNTHROID) 150 MCG tablet, TAKE 1 TABLET EVERY DAY, Disp: 90 tablet, Rfl: 0    polyethylene glycol 3350 (MIRALAX ORAL), Take by mouth once daily., Disp: , Rfl:     torsemide (DEMADEX) 10 MG Tab, Take 1 tablet (10 mg total) by mouth once daily., Disp: 30 tablet, Rfl: 11    cetirizine (ZYRTEC) 10 MG tablet, Take 1 tablet (10 mg total) by mouth every 12 (twelve) hours as needed for Allergies. (Patient not taking: Reported on 1/15/2020), Disp: 60 tablet, Rfl: 0    clotrimazole (LOTRIMIN) 1 % cream, Apply topically 2 (two) times daily as needed. (Patient not taking: Reported on 1/15/2020), Disp: 15 g, Rfl: 1    mirtazapine (REMERON)  "15 MG tablet, Take 1 tablet (15 mg total) by mouth every evening. (Patient not taking: Reported on 12/2/2019), Disp: 90 tablet, Rfl: 1    triamcinolone acetonide 0.1% (KENALOG) 0.1 % cream, AAA bid (Patient not taking: Reported on 1/15/2020), Disp: 60 g, Rfl: 3          ROS     Objective:/60 (BP Location: Left arm, Patient Position: Sitting, BP Method: X-Large (Automatic))   Pulse 68   Ht 5' 7" (1.702 m)   Wt 96.1 kg (211 lb 13.8 oz)   LMP  (LMP Unknown) Comment: years ago  BMI 33.18 kg/m²             Physical Exam      Assessment:       No diagnosis found.     Plan:       There are no diagnoses linked to this encounter.            "

## 2020-02-04 DIAGNOSIS — E03.4 HYPOTHYROIDISM DUE TO ACQUIRED ATROPHY OF THYROID: Chronic | ICD-10-CM

## 2020-02-04 RX ORDER — LEVOTHYROXINE SODIUM 150 UG/1
TABLET ORAL
Qty: 90 TABLET | Refills: 0 | Status: SHIPPED | OUTPATIENT
Start: 2020-02-04 | End: 2020-04-07

## 2020-02-14 ENCOUNTER — OFFICE VISIT (OUTPATIENT)
Dept: INTERNAL MEDICINE | Facility: CLINIC | Age: 85
End: 2020-02-14
Payer: MEDICARE

## 2020-02-14 DIAGNOSIS — I10 ESSENTIAL HYPERTENSION: ICD-10-CM

## 2020-02-14 DIAGNOSIS — E78.5 HYPERLIPIDEMIA, UNSPECIFIED HYPERLIPIDEMIA TYPE: ICD-10-CM

## 2020-02-14 PROCEDURE — 99999 PR PBB SHADOW E&M-EST. PATIENT-LVL IV: CPT | Mod: PBBFAC,HCNC,, | Performed by: INTERNAL MEDICINE

## 2020-02-14 PROCEDURE — 99397 PER PM REEVAL EST PAT 65+ YR: CPT | Mod: HCNC,S$GLB,, | Performed by: INTERNAL MEDICINE

## 2020-02-14 PROCEDURE — 99999 PR PBB SHADOW E&M-EST. PATIENT-LVL IV: ICD-10-PCS | Mod: PBBFAC,HCNC,, | Performed by: INTERNAL MEDICINE

## 2020-02-14 PROCEDURE — 99397 PR PREVENTIVE VISIT,EST,65 & OVER: ICD-10-PCS | Mod: HCNC,S$GLB,, | Performed by: INTERNAL MEDICINE

## 2020-02-14 RX ORDER — CARVEDILOL 12.5 MG/1
12.5 TABLET ORAL 2 TIMES DAILY
Qty: 180 TABLET | Refills: 1 | Status: SHIPPED | OUTPATIENT
Start: 2020-02-14 | End: 2020-07-02

## 2020-02-14 RX ORDER — ATORVASTATIN CALCIUM 40 MG/1
40 TABLET, FILM COATED ORAL DAILY
Qty: 90 TABLET | Refills: 1 | Status: SHIPPED | OUTPATIENT
Start: 2020-02-14 | End: 2020-08-24

## 2020-02-14 RX ORDER — ALENDRONATE SODIUM 70 MG/1
70 TABLET ORAL
Qty: 12 TABLET | Refills: 1 | Status: SHIPPED | OUTPATIENT
Start: 2020-02-14 | End: 2020-07-02

## 2020-02-16 VITALS
OXYGEN SATURATION: 98 % | HEART RATE: 68 BPM | SYSTOLIC BLOOD PRESSURE: 138 MMHG | BODY MASS INDEX: 34.19 KG/M2 | DIASTOLIC BLOOD PRESSURE: 82 MMHG | HEIGHT: 67 IN | WEIGHT: 217.81 LBS

## 2020-02-17 NOTE — PROGRESS NOTES
Subjective:       Patient ID: Laila Hanna is a 88 y.o. female.    Chief Complaint: Annual Exam    HPI  She is here for annual exam.  Currently without complaint  Review of Systems   Constitutional: Negative for chills, fatigue, fever and unexpected weight change.   Respiratory: Negative for chest tightness and shortness of breath.    Cardiovascular: Negative for chest pain and palpitations.   Gastrointestinal: Negative for abdominal pain and blood in stool.   Neurological: Negative for dizziness, syncope, numbness and headaches.       Objective:      Physical Exam   HENT:   Right Ear: External ear normal.   Left Ear: External ear normal.   Nose: Nose normal.   Mouth/Throat: Oropharynx is clear and moist.   Eyes: Pupils are equal, round, and reactive to light.   Neck: Normal range of motion.   Cardiovascular: Normal rate and regular rhythm.   Murmur heard.  Pulmonary/Chest: Breath sounds normal.   Abdominal: She exhibits no distension. There is no hepatosplenomegaly. There is no tenderness.   Lymphadenopathy:     She has no cervical adenopathy.     She has no axillary adenopathy.   Neurological: She has normal strength and normal reflexes. No cranial nerve deficit or sensory deficit.       Assessment/Plan       Assessment and plan:  Annual exam

## 2020-03-05 ENCOUNTER — OFFICE VISIT (OUTPATIENT)
Dept: HOME HEALTH SERVICES | Facility: CLINIC | Age: 85
End: 2020-03-05
Payer: MEDICARE

## 2020-03-05 VITALS
WEIGHT: 215 LBS | DIASTOLIC BLOOD PRESSURE: 78 MMHG | SYSTOLIC BLOOD PRESSURE: 125 MMHG | HEART RATE: 60 BPM | HEIGHT: 67 IN | BODY MASS INDEX: 33.74 KG/M2

## 2020-03-05 DIAGNOSIS — Z86.73 HISTORY OF TRANSIENT ISCHEMIC ATTACK (TIA): ICD-10-CM

## 2020-03-05 DIAGNOSIS — I51.89 LEFT VENTRICULAR DIASTOLIC DYSFUNCTION WITH PRESERVED SYSTOLIC FUNCTION: ICD-10-CM

## 2020-03-05 DIAGNOSIS — Z74.09 OTHER REDUCED MOBILITY: ICD-10-CM

## 2020-03-05 DIAGNOSIS — Z98.61 HISTORY OF PTCA: ICD-10-CM

## 2020-03-05 DIAGNOSIS — I25.10 CORONARY ARTERY DISEASE INVOLVING NATIVE CORONARY ARTERY OF NATIVE HEART WITHOUT ANGINA PECTORIS: ICD-10-CM

## 2020-03-05 DIAGNOSIS — E78.49 OTHER HYPERLIPIDEMIA: ICD-10-CM

## 2020-03-05 DIAGNOSIS — I35.0 NONRHEUMATIC AORTIC VALVE STENOSIS: ICD-10-CM

## 2020-03-05 DIAGNOSIS — I70.0 AORTIC ATHEROSCLEROSIS: ICD-10-CM

## 2020-03-05 DIAGNOSIS — I10 ESSENTIAL HYPERTENSION: ICD-10-CM

## 2020-03-05 DIAGNOSIS — I77.9 BILATERAL CAROTID ARTERY DISEASE, UNSPECIFIED TYPE: ICD-10-CM

## 2020-03-05 DIAGNOSIS — E03.4 HYPOTHYROIDISM DUE TO ACQUIRED ATROPHY OF THYROID: Chronic | ICD-10-CM

## 2020-03-05 DIAGNOSIS — F09 COGNITIVE DYSFUNCTION: ICD-10-CM

## 2020-03-05 DIAGNOSIS — Z99.89 DEPENDENCE ON OTHER ENABLING MACHINES AND DEVICES: ICD-10-CM

## 2020-03-05 DIAGNOSIS — F33.42 RECURRENT MAJOR DEPRESSIVE DISORDER, IN FULL REMISSION: ICD-10-CM

## 2020-03-05 DIAGNOSIS — D32.9 MENINGIOMA: ICD-10-CM

## 2020-03-05 DIAGNOSIS — Z00.00 ENCOUNTER FOR PREVENTIVE HEALTH EXAMINATION: Primary | ICD-10-CM

## 2020-03-05 DIAGNOSIS — I27.9 PULMONARY HEART DISEASE: ICD-10-CM

## 2020-03-05 DIAGNOSIS — M81.0 SENILE OSTEOPOROSIS: ICD-10-CM

## 2020-03-05 DIAGNOSIS — F41.9 ANXIETY: ICD-10-CM

## 2020-03-05 DIAGNOSIS — E22.2 SIADH (SYNDROME OF INAPPROPRIATE ADH PRODUCTION): ICD-10-CM

## 2020-03-05 PROBLEM — N18.2 CKD (CHRONIC KIDNEY DISEASE) STAGE 2, GFR 60-89 ML/MIN: Status: RESOLVED | Noted: 2018-01-26 | Resolved: 2020-03-05

## 2020-03-05 PROCEDURE — 99499 RISK ADDL DX/OHS AUDIT: ICD-10-PCS | Mod: S$GLB,,, | Performed by: NURSE PRACTITIONER

## 2020-03-05 PROCEDURE — 99499 UNLISTED E&M SERVICE: CPT | Mod: S$GLB,,, | Performed by: NURSE PRACTITIONER

## 2020-03-05 PROCEDURE — G0439 PR MEDICARE ANNUAL WELLNESS SUBSEQUENT VISIT: ICD-10-PCS | Mod: S$GLB,,, | Performed by: NURSE PRACTITIONER

## 2020-03-05 PROCEDURE — G0439 PPPS, SUBSEQ VISIT: HCPCS | Mod: S$GLB,,, | Performed by: NURSE PRACTITIONER

## 2020-03-05 NOTE — PROGRESS NOTES
"Laila Hanna presented for a  Medicare AWV and comprehensive Health Risk Assessment today. The following components were reviewed and updated:    · Medical history  · Family History  · Social history  · Allergies and Current Medications  · Health Risk Assessment  · Health Maintenance  · Care Team     ** See Completed Assessments for Annual Wellness Visit within the encounter summary.**       The following assessments were completed:  · Living Situation  · CAGE  · Depression Screening  · Timed Get Up and Go  · Whisper Test  · Cognitive Function Screening  ·   ·   ·   · Nutrition Screening  · ADL Screening  · PAQ Screening    Vitals:    03/05/20 1123   BP: 125/78   Pulse: 60   Weight: 97.5 kg (215 lb)   Height: 5' 7" (1.702 m)     Body mass index is 33.67 kg/m².  Physical Exam   Constitutional: She is oriented to person, place, and time. She appears well-developed.   HENT:   Head: Normocephalic and atraumatic.   Eyes: EOM are normal.   Neck: Normal range of motion.   Cardiovascular: Normal rate, regular rhythm and normal heart sounds.   Pulmonary/Chest: Effort normal and breath sounds normal. No respiratory distress.   Abdominal: Soft. Bowel sounds are normal. She exhibits no distension.   Musculoskeletal: Normal range of motion. She exhibits edema (Bilateral ankles).   Unsteady gait  Walker present   Neurological: She is alert and oriented to person, place, and time.   Skin: Skin is warm and dry.   Psychiatric: She has a normal mood and affect. Her behavior is normal.         Diagnoses and health risks identified today and associated recommendations/orders:    1. Encounter for preventive health examination  Assessment completed. Preventive measures reviewed with patient and patients caregiver.    2. Dependence on other enabling machines and devices  Stable, followed by PCP.    3. Other reduced mobility  Stable, followed by PCP.    4. Cognitive dysfunction  Stable, followed by PCP and Neurology.    5. History of " transient ischemic attack (TIA)  Stable, followed by Cardiology.    6. Anxiety  Stable, followed by Psychiatry.    7. Recurrent major depressive disorder, in full remission  Stable, followed by Psychiatry and Neurology.    8. Pulmonary heart disease  Stable, followed by Cardiology.    9. Essential hypertension  Stable, followed by PCP.    10. Other hyperlipidemia  Stable, followed by PCP.    11. Coronary artery disease involving native coronary artery of native heart without angina pectoris  Stable, followed by Cardiology.    12. History of PTCA  Stable, followed by Cardiology.    13. Aortic atherosclerosis  Stable, followed by Cardiology.    14. Left ventricular diastolic dysfunction with preserved systolic function  Stable, followed by Cardiology.    15. Bilateral carotid artery disease, unspecified type  Stable, followed by Cardiology.    16. Nonrheumatic aortic valve stenosis  Stable, followed by Cardiology.    17. Hypothyroidism due to acquired atrophy of thyroid  Stable, followed by PCP.    18. Senile osteoporosis  Stable, followed by PCP.    19. Meningioma  Stable, followed by PCP.    20. SIADH (syndrome of inappropriate ADH production)  Stable, followed by PCP.    Provided Laila with a 5-10 year written screening schedule and personal prevention plan. Recommendations were developed using the USPSTF age appropriate recommendations. Education, counseling, and referrals were provided as needed. After Visit Summary printed and given to patient which includes a list of additional screenings\tests needed.    No follow-ups on file.    Janene Marquez NP  I offered to discuss end of life issues, including information on how to make advance directives that the patient could use to name someone who would make medical decisions on their behalf if they became too ill to make themselves.    ___Patient declined  _X_Patient is interested, I provided paper work and offered to discuss.

## 2020-03-05 NOTE — PATIENT INSTRUCTIONS
Counseling and Referral of Other Preventative  (Italic type indicates deductible and co-insurance are waived)    Patient Name: Laila Hanna  Today's Date: 3/5/2020    Health Maintenance       Date Due Completion Date    Shingles Vaccine (2 of 3) 03/27/2009 1/30/2009    TETANUS VACCINE 03/28/2018 3/28/2008    Lipid Panel 09/24/2020 9/24/2019    Aspirin/Antiplatelet Therapy 02/14/2021 2/14/2020    DEXA SCAN 08/21/2021 8/21/2019    Override on 8/23/2012: Not Clinically Appropriate        No orders of the defined types were placed in this encounter.    The following information is provided to all patients.  This information is to help you find resources for any of the problems found today that may be affecting your health:                Living healthy guide: www.[facility].[place].gov      Understanding Diabetes: www.diabetes.org      Eating healthy: www.cdc.gov/healthyweight      [facility] home safety checklist: www.cdc.gov/steadi/patient.html      Agency on Aging: www.goea.[place].[facility]      Alcoholics anonymous (AA): www.aa.org      Physical Activity: www.sage.nih.gov/ki2ruzn      Tobacco use: www.quitwithusla.org

## 2020-04-06 DIAGNOSIS — E03.4 HYPOTHYROIDISM DUE TO ACQUIRED ATROPHY OF THYROID: Chronic | ICD-10-CM

## 2020-04-07 RX ORDER — LEVOTHYROXINE SODIUM 150 UG/1
TABLET ORAL
Qty: 90 TABLET | Refills: 0 | Status: SHIPPED | OUTPATIENT
Start: 2020-04-07 | End: 2020-07-09

## 2020-07-14 ENCOUNTER — LAB VISIT (OUTPATIENT)
Dept: LAB | Facility: HOSPITAL | Age: 85
End: 2020-07-14
Attending: INTERNAL MEDICINE
Payer: MEDICARE

## 2020-07-14 DIAGNOSIS — N18.2 CKD (CHRONIC KIDNEY DISEASE) STAGE 2, GFR 60-89 ML/MIN: ICD-10-CM

## 2020-07-14 DIAGNOSIS — I10 ESSENTIAL HYPERTENSION: ICD-10-CM

## 2020-07-14 DIAGNOSIS — E78.49 OTHER HYPERLIPIDEMIA: ICD-10-CM

## 2020-07-14 LAB
ALBUMIN SERPL BCP-MCNC: 3.2 G/DL (ref 3.5–5.2)
ALP SERPL-CCNC: 97 U/L (ref 55–135)
ALT SERPL W/O P-5'-P-CCNC: 9 U/L (ref 10–44)
ANION GAP SERPL CALC-SCNC: 7 MMOL/L (ref 8–16)
AST SERPL-CCNC: 14 U/L (ref 10–40)
BASOPHILS # BLD AUTO: 0.03 K/UL (ref 0–0.2)
BASOPHILS NFR BLD: 0.3 % (ref 0–1.9)
BILIRUB SERPL-MCNC: 0.6 MG/DL (ref 0.1–1)
BUN SERPL-MCNC: 19 MG/DL (ref 8–23)
CALCIUM SERPL-MCNC: 9 MG/DL (ref 8.7–10.5)
CHLORIDE SERPL-SCNC: 99 MMOL/L (ref 95–110)
CHOLEST SERPL-MCNC: 108 MG/DL (ref 120–199)
CHOLEST/HDLC SERPL: 2.7 {RATIO} (ref 2–5)
CO2 SERPL-SCNC: 31 MMOL/L (ref 23–29)
CREAT SERPL-MCNC: 0.8 MG/DL (ref 0.5–1.4)
DIFFERENTIAL METHOD: ABNORMAL
EOSINOPHIL # BLD AUTO: 0.2 K/UL (ref 0–0.5)
EOSINOPHIL NFR BLD: 2.4 % (ref 0–8)
ERYTHROCYTE [DISTWIDTH] IN BLOOD BY AUTOMATED COUNT: 13 % (ref 11.5–14.5)
EST. GFR  (AFRICAN AMERICAN): >60 ML/MIN/1.73 M^2
EST. GFR  (NON AFRICAN AMERICAN): >60 ML/MIN/1.73 M^2
GLUCOSE SERPL-MCNC: 117 MG/DL (ref 70–110)
HCT VFR BLD AUTO: 39.9 % (ref 37–48.5)
HDLC SERPL-MCNC: 40 MG/DL (ref 40–75)
HDLC SERPL: 37 % (ref 20–50)
HGB BLD-MCNC: 12.6 G/DL (ref 12–16)
IMM GRANULOCYTES # BLD AUTO: 0.05 K/UL (ref 0–0.04)
IMM GRANULOCYTES NFR BLD AUTO: 0.5 % (ref 0–0.5)
LDLC SERPL CALC-MCNC: 49 MG/DL (ref 63–159)
LYMPHOCYTES # BLD AUTO: 1.6 K/UL (ref 1–4.8)
LYMPHOCYTES NFR BLD: 16.9 % (ref 18–48)
MCH RBC QN AUTO: 30.3 PG (ref 27–31)
MCHC RBC AUTO-ENTMCNC: 31.6 G/DL (ref 32–36)
MCV RBC AUTO: 96 FL (ref 82–98)
MONOCYTES # BLD AUTO: 0.9 K/UL (ref 0.3–1)
MONOCYTES NFR BLD: 9.3 % (ref 4–15)
NEUTROPHILS # BLD AUTO: 6.8 K/UL (ref 1.8–7.7)
NEUTROPHILS NFR BLD: 70.6 % (ref 38–73)
NONHDLC SERPL-MCNC: 68 MG/DL
NRBC BLD-RTO: 0 /100 WBC
PLATELET # BLD AUTO: 262 K/UL (ref 150–350)
PMV BLD AUTO: 10.7 FL (ref 9.2–12.9)
POTASSIUM SERPL-SCNC: 4.4 MMOL/L (ref 3.5–5.1)
PROT SERPL-MCNC: 7.3 G/DL (ref 6–8.4)
RBC # BLD AUTO: 4.16 M/UL (ref 4–5.4)
SODIUM SERPL-SCNC: 137 MMOL/L (ref 136–145)
TRIGL SERPL-MCNC: 95 MG/DL (ref 30–150)
WBC # BLD AUTO: 9.6 K/UL (ref 3.9–12.7)

## 2020-07-14 PROCEDURE — 85025 COMPLETE CBC W/AUTO DIFF WBC: CPT | Mod: HCNC

## 2020-07-14 PROCEDURE — 80061 LIPID PANEL: CPT | Mod: HCNC

## 2020-07-14 PROCEDURE — 36415 COLL VENOUS BLD VENIPUNCTURE: CPT | Mod: HCNC

## 2020-07-14 PROCEDURE — 80053 COMPREHEN METABOLIC PANEL: CPT | Mod: HCNC

## 2020-07-16 ENCOUNTER — PATIENT OUTREACH (OUTPATIENT)
Dept: ADMINISTRATIVE | Facility: OTHER | Age: 85
End: 2020-07-16

## 2020-07-16 NOTE — PROGRESS NOTES
Requested updates within Care Everywhere.  Patient's chart was reviewed for overdue IDALMIS topics.  Immunizations reconciled.    Orders placed:n/a  Tasked appts:n/a  Labs Linked:n/a

## 2020-07-17 ENCOUNTER — OFFICE VISIT (OUTPATIENT)
Dept: CARDIOLOGY | Facility: CLINIC | Age: 85
End: 2020-07-17
Payer: MEDICARE

## 2020-07-17 VITALS
WEIGHT: 216.06 LBS | SYSTOLIC BLOOD PRESSURE: 149 MMHG | HEART RATE: 64 BPM | OXYGEN SATURATION: 97 % | BODY MASS INDEX: 33.91 KG/M2 | DIASTOLIC BLOOD PRESSURE: 67 MMHG | HEIGHT: 67 IN

## 2020-07-17 DIAGNOSIS — I35.0 NONRHEUMATIC AORTIC VALVE STENOSIS: ICD-10-CM

## 2020-07-17 DIAGNOSIS — E78.49 OTHER HYPERLIPIDEMIA: ICD-10-CM

## 2020-07-17 DIAGNOSIS — I10 ESSENTIAL HYPERTENSION: Primary | ICD-10-CM

## 2020-07-17 DIAGNOSIS — N18.2 CKD (CHRONIC KIDNEY DISEASE) STAGE 2, GFR 60-89 ML/MIN: ICD-10-CM

## 2020-07-17 DIAGNOSIS — I25.110 ATHEROSCLEROSIS OF NATIVE CORONARY ARTERY OF NATIVE HEART WITH UNSTABLE ANGINA PECTORIS: ICD-10-CM

## 2020-07-17 DIAGNOSIS — I25.10 CORONARY ARTERY DISEASE INVOLVING NATIVE CORONARY ARTERY OF NATIVE HEART, ANGINA PRESENCE UNSPECIFIED: ICD-10-CM

## 2020-07-17 DIAGNOSIS — I51.89 GRADE I DIASTOLIC DYSFUNCTION: ICD-10-CM

## 2020-07-17 PROCEDURE — 1101F PR PT FALLS ASSESS DOC 0-1 FALLS W/OUT INJ PAST YR: ICD-10-PCS | Mod: HCNC,CPTII,S$GLB, | Performed by: INTERNAL MEDICINE

## 2020-07-17 PROCEDURE — 99999 PR PBB SHADOW E&M-EST. PATIENT-LVL IV: CPT | Mod: PBBFAC,HCNC,, | Performed by: INTERNAL MEDICINE

## 2020-07-17 PROCEDURE — 1159F PR MEDICATION LIST DOCUMENTED IN MEDICAL RECORD: ICD-10-PCS | Mod: HCNC,S$GLB,, | Performed by: INTERNAL MEDICINE

## 2020-07-17 PROCEDURE — 99213 OFFICE O/P EST LOW 20 MIN: CPT | Mod: HCNC,S$GLB,, | Performed by: INTERNAL MEDICINE

## 2020-07-17 PROCEDURE — 99999 PR PBB SHADOW E&M-EST. PATIENT-LVL IV: ICD-10-PCS | Mod: PBBFAC,HCNC,, | Performed by: INTERNAL MEDICINE

## 2020-07-17 PROCEDURE — 1159F MED LIST DOCD IN RCRD: CPT | Mod: HCNC,S$GLB,, | Performed by: INTERNAL MEDICINE

## 2020-07-17 PROCEDURE — 1101F PT FALLS ASSESS-DOCD LE1/YR: CPT | Mod: HCNC,CPTII,S$GLB, | Performed by: INTERNAL MEDICINE

## 2020-07-17 PROCEDURE — 99213 PR OFFICE/OUTPT VISIT, EST, LEVL III, 20-29 MIN: ICD-10-PCS | Mod: HCNC,S$GLB,, | Performed by: INTERNAL MEDICINE

## 2020-07-17 PROCEDURE — 1126F AMNT PAIN NOTED NONE PRSNT: CPT | Mod: HCNC,S$GLB,, | Performed by: INTERNAL MEDICINE

## 2020-07-17 PROCEDURE — 1126F PR PAIN SEVERITY QUANTIFIED, NO PAIN PRESENT: ICD-10-PCS | Mod: HCNC,S$GLB,, | Performed by: INTERNAL MEDICINE

## 2020-07-17 NOTE — PROGRESS NOTES
Subjective:    Patient ID:  Laila Hanna is a 89 y.o. female who presents for follow-up of Coronary artery disease involving native coronary artery of  (6 month f/u ) and Leg Swelling      HPI     88 year old female followed for management of hypertension and CAD,post PCI to mid LAD 2004. On her clinic visit of 12/2/19 she had been noting dependent edame for the past 2 months  and gained 14# since her last visit. She was placed on 10 mg torsemide. An echo revealed grade 1 diastolic dysfunction. She had lost 7# on her last visit and no longer noted edema. Since clnic visit 1/15/20 she ad gained 5# but no edema or SOB.  Lab Results   Component Value Date     07/14/2020    K 4.4 07/14/2020    CL 99 07/14/2020    CO2 31 (H) 07/14/2020    BUN 19 07/14/2020    CREATININE 0.8 07/14/2020     (H) 07/14/2020    HGBA1C 5.8 01/29/2016    MG 1.8 07/03/2016    AST 14 07/14/2020    ALT 9 (L) 07/14/2020    ALBUMIN 3.2 (L) 07/14/2020    PROT 7.3 07/14/2020    BILITOT 0.6 07/14/2020    WBC 9.60 07/14/2020    HGB 12.6 07/14/2020    HCT 39.9 07/14/2020    MCV 96 07/14/2020     07/14/2020    INR 1.1 06/30/2016    INR 2.4 (H) 09/19/2008    TSH 3.381 09/24/2019         Lab Results   Component Value Date    CHOL 108 (L) 07/14/2020    HDL 40 07/14/2020    TRIG 95 07/14/2020       Lab Results   Component Value Date    LDLCALC 49.0 (L) 07/14/2020       Past Medical History:   Diagnosis Date    Basal cell carcinoma     nose    Benign essential hypertension     Cerebral microvascular disease 9/8/2014    Closed fracture of distal phalanx of left hand with routine healing 9/17/2015    Closed mallet fracture of distal phalanx of finger with routine healing 10/6/2015    Coronary artery disease     DDD (degenerative disc disease), lumbar 4/12/2016    Depression     Fall at home 5/30/2016    Hyperlipidemia     Hypothyroidism due to acquired atrophy of thyroid     Meningiomas, multiple     MI (myocardial infarction)  2004    80% blockage per patient    Migraine aura without headache 12/1/2014    Post PTCA 12/12/2012    Transient cerebral ischemia 5/4/2014       Current Outpatient Medications:     aspirin (ECOTRIN) 81 MG EC tablet, Take 1 tablet (81 mg total) by mouth once daily., Disp: , Rfl:     calcium-vitamin D3 (CALCIUM 500 + D) 500 mg(1,250mg) -200 unit per tablet, Take 1 tablet by mouth once daily. , Disp: , Rfl:     clotrimazole (LOTRIMIN) 1 % cream, Apply topically 2 (two) times daily as needed., Disp: 15 g, Rfl: 1    cyanocobalamin (VITAMIN B-12) 100 MCG tablet, Take 100 mcg by mouth once daily., Disp: , Rfl:     dextran 70-hypromellose (ARTIFICIAL TEARS) ophthalmic solution, Place 1 drop into both eyes every 2 (two) hours as needed., Disp: , Rfl: 0    diphth,pertus,acell,,tetanus (BOOSTRIX) 2.5-8-5 Lf-mcg-Lf/0.5mL Syrg injection, Inject 0.5 ml into the muscle for one dose, Disp: 0.5 mL, Rfl: 0    polyethylene glycol 3350 (MIRALAX ORAL), Take by mouth once daily., Disp: , Rfl:     triamcinolone acetonide 0.1% (KENALOG) 0.1 % cream, AAA bid, Disp: 60 g, Rfl: 3    alendronate (FOSAMAX) 70 MG tablet, TAKE 1 TABLET EVERY 7 DAYS., Disp: 12 tablet, Rfl: 0    atorvastatin (LIPITOR) 40 MG tablet, TAKE 1 TABLET EVERY DAY, Disp: 90 tablet, Rfl: 0    carvediloL (COREG) 12.5 MG tablet, TAKE 1 TABLET TWICE DAILY, Disp: 180 tablet, Rfl: 1    doxycycline (VIBRAMYCIN) 100 MG Cap, Take 1 capsule (100 mg total) by mouth every 12 (twelve) hours., Disp: 20 capsule, Rfl: 0    levothyroxine (SYNTHROID) 150 MCG tablet, Take 1 tablet (150 mcg total) by mouth once daily., Disp: 90 tablet, Rfl: 0    mirtazapine (REMERON) 15 MG tablet, Take 1 tablet (15 mg total) by mouth every evening., Disp: 90 tablet, Rfl: 1    mupirocin (BACTROBAN) 2 % ointment, Apply topically 3 (three) times daily., Disp: 30 g, Rfl: 0    torsemide (DEMADEX) 10 MG Tab, Take 1 tablet (10 mg total) by mouth once daily., Disp: 90 tablet, Rfl:  "3          Review of Systems   Constitution: Negative for decreased appetite, diaphoresis, fever, malaise/fatigue, weight gain and weight loss.   HENT: Negative for congestion, ear discharge, ear pain and nosebleeds.    Eyes: Negative for blurred vision, double vision and visual disturbance.   Cardiovascular: Positive for dyspnea on exertion (no change). Negative for chest pain, claudication, cyanosis, irregular heartbeat, leg swelling, near-syncope, orthopnea, palpitations, paroxysmal nocturnal dyspnea and syncope.   Respiratory: Negative for cough, hemoptysis, shortness of breath, sleep disturbances due to breathing, snoring, sputum production and wheezing.    Endocrine: Negative for polydipsia, polyphagia and polyuria.   Hematologic/Lymphatic: Negative for adenopathy and bleeding problem. Does not bruise/bleed easily.   Skin: Negative for color change, nail changes, poor wound healing and rash.   Musculoskeletal: Negative for muscle cramps and muscle weakness.   Gastrointestinal: Negative for abdominal pain, anorexia, change in bowel habit, hematochezia, nausea and vomiting.   Genitourinary: Negative for dysuria, frequency and hematuria.   Neurological: Negative for brief paralysis, difficulty with concentration, excessive daytime sleepiness, dizziness, focal weakness, headaches, light-headedness, seizures, vertigo and weakness.   Psychiatric/Behavioral: Negative for altered mental status and depression.   Allergic/Immunologic: Negative for persistent infections.        Objective:BP (!) 149/67 (BP Location: Left arm, Patient Position: Sitting, BP Method: Large (Automatic))   Pulse 64   Ht 5' 7" (1.702 m)   Wt 98 kg (216 lb 0.8 oz)   LMP  (LMP Unknown) Comment: years ago  SpO2 97%   BMI 33.84 kg/m²             Physical Exam   Constitutional: She is oriented to person, place, and time. She appears well-developed and well-nourished.   HENT:   Head: Normocephalic.   Right Ear: External ear normal.   Left Ear: " External ear normal.   Nose: Nose normal.   Inspection of lips, teeth and gums normal   Eyes: Pupils are equal, round, and reactive to light. Conjunctivae and EOM are normal. No scleral icterus.   Neck: Normal range of motion. No JVD present. No tracheal deviation present. No thyromegaly present.   Cardiovascular: Normal rate, regular rhythm and intact distal pulses. Exam reveals no gallop and no friction rub.   Murmur heard.   Medium-pitched blowing midsystolic murmur is present with a grade of 2/6 radiating to the neck.  Pulmonary/Chest: Effort normal and breath sounds normal. No respiratory distress. She has no wheezes. She has no rales. She exhibits no tenderness.   Abdominal: Soft. Bowel sounds are normal. She exhibits no distension. There is no hepatosplenomegaly. There is no abdominal tenderness. There is no guarding.   Musculoskeletal: Normal range of motion.         General: No tenderness or edema.   Lymphadenopathy:   Palpation of lymph nodes of neck and groin normal   Neurological: She is oriented to person, place, and time. No cranial nerve deficit. She exhibits normal muscle tone. Coordination normal.   Skin: Skin is warm and dry. No rash noted. No erythema. No pallor.   Palpation of skin normal   Psychiatric: She has a normal mood and affect. Her behavior is normal. Judgment and thought content normal.         Assessment:       1. Essential hypertension    2. Atherosclerosis of native coronary artery of native heart with unstable angina pectoris    3. CKD (chronic kidney disease) stage 2, GFR 60-89 ml/min    4. Nonrheumatic aortic valve stenosis    5. Coronary artery disease involving native coronary artery of native heart, angina presence unspecified    6. Other hyperlipidemia    7. Grade I diastolic dysfunction         Plan:       Laila was seen today for coronary artery disease involving native coronary artery of  and leg swelling.    Diagnoses and all orders for this visit:    Essential  hypertension  -     Basic metabolic panel; Future; Expected date: 02/15/2021    Atherosclerosis of native coronary artery of native heart with unstable angina pectoris    CKD (chronic kidney disease) stage 2, GFR 60-89 ml/min    Nonrheumatic aortic valve stenosis    Coronary artery disease involving native coronary artery of native heart, angina presence unspecified    Other hyperlipidemia    Grade I diastolic dysfunction  -     Basic metabolic panel; Future; Expected date: 02/15/2021

## 2020-07-31 ENCOUNTER — PATIENT OUTREACH (OUTPATIENT)
Dept: ADMINISTRATIVE | Facility: OTHER | Age: 85
End: 2020-07-31

## 2020-08-04 ENCOUNTER — OFFICE VISIT (OUTPATIENT)
Dept: OTOLARYNGOLOGY | Facility: CLINIC | Age: 85
End: 2020-08-04
Payer: MEDICARE

## 2020-08-04 VITALS
DIASTOLIC BLOOD PRESSURE: 80 MMHG | BODY MASS INDEX: 33.83 KG/M2 | WEIGHT: 216 LBS | SYSTOLIC BLOOD PRESSURE: 133 MMHG | HEART RATE: 81 BPM

## 2020-08-04 DIAGNOSIS — H93.8X3 EAR FULLNESS, BILATERAL: Primary | ICD-10-CM

## 2020-08-04 PROCEDURE — 1101F PR PT FALLS ASSESS DOC 0-1 FALLS W/OUT INJ PAST YR: ICD-10-PCS | Mod: HCNC,CPTII,S$GLB, | Performed by: OTOLARYNGOLOGY

## 2020-08-04 PROCEDURE — 99999 PR PBB SHADOW E&M-EST. PATIENT-LVL III: CPT | Mod: PBBFAC,HCNC,, | Performed by: OTOLARYNGOLOGY

## 2020-08-04 PROCEDURE — 1101F PT FALLS ASSESS-DOCD LE1/YR: CPT | Mod: HCNC,CPTII,S$GLB, | Performed by: OTOLARYNGOLOGY

## 2020-08-04 PROCEDURE — 69210 REMOVE IMPACTED EAR WAX UNI: CPT | Mod: HCNC,S$GLB,, | Performed by: OTOLARYNGOLOGY

## 2020-08-04 PROCEDURE — 1159F MED LIST DOCD IN RCRD: CPT | Mod: HCNC,S$GLB,, | Performed by: OTOLARYNGOLOGY

## 2020-08-04 PROCEDURE — 69210 PR REMOVAL IMPACTED CERUMEN REQUIRING INSTRUMENTATION, UNILATERAL: ICD-10-PCS | Mod: HCNC,S$GLB,, | Performed by: OTOLARYNGOLOGY

## 2020-08-04 PROCEDURE — 99999 PR PBB SHADOW E&M-EST. PATIENT-LVL III: ICD-10-PCS | Mod: PBBFAC,HCNC,, | Performed by: OTOLARYNGOLOGY

## 2020-08-04 PROCEDURE — 99213 PR OFFICE/OUTPT VISIT, EST, LEVL III, 20-29 MIN: ICD-10-PCS | Mod: 25,HCNC,S$GLB, | Performed by: OTOLARYNGOLOGY

## 2020-08-04 PROCEDURE — 1159F PR MEDICATION LIST DOCUMENTED IN MEDICAL RECORD: ICD-10-PCS | Mod: HCNC,S$GLB,, | Performed by: OTOLARYNGOLOGY

## 2020-08-04 PROCEDURE — 99213 OFFICE O/P EST LOW 20 MIN: CPT | Mod: 25,HCNC,S$GLB, | Performed by: OTOLARYNGOLOGY

## 2020-08-04 NOTE — PROGRESS NOTES
Subjective:       Patient ID: Laila Hanna is a 88 y.o. female.    Chief Complaint: Ear Fullness    Ear Fullness   There is pain in both ears. This is a recurrent problem. The current episode started more than 1 month ago. The problem occurs constantly. The problem has been unchanged. There has been no fever. The patient is experiencing no pain. Associated symptoms include hearing loss. Pertinent negatives include no coughing, diarrhea, ear discharge, headaches, neck pain, rash, rhinorrhea or vomiting. She has tried nothing for the symptoms. Her past medical history is significant for hearing loss.     Review of Systems   Constitutional: Negative for activity change, appetite change and fever.   HENT: Positive for hearing loss. Negative for congestion, ear discharge, ear pain, nosebleeds, postnasal drip, rhinorrhea and sneezing.    Eyes: Negative for redness and visual disturbance.   Respiratory: Negative for apnea, cough, shortness of breath and wheezing.    Cardiovascular: Negative for chest pain and palpitations.   Gastrointestinal: Negative for diarrhea and vomiting.   Genitourinary: Negative for difficulty urinating and frequency.   Musculoskeletal: Negative for arthralgias, back pain, gait problem and neck pain.   Skin: Negative for color change and rash.   Neurological: Negative for dizziness, speech difficulty, weakness and headaches.   Hematological: Negative for adenopathy. Does not bruise/bleed easily.   Psychiatric/Behavioral: Negative for agitation and behavioral problems.       Objective:        Constitutional:   Vital signs are normal. She appears well-developed and well-nourished. She is active. Normal speech.      Head:  Normocephalic and atraumatic. Salivary glands normal.  Facial strength is normal.      Nose:  Nose normal including turbinates, nasal mucosa, sinuses and nasal septum.     Mouth/Throat  Oropharynx clear and moist without lesions or asymmetry and normal uvula midline.      Neck:  Neck normal without thyromegaly masses, asymmetry, normal tracheal structure, crepitus, and tenderness, thyroid normal, trachea normal, phonation normal and full range of motion with neck supple.     Psychiatric:   She has a normal mood and affect. Her speech is normal and behavior is normal.     Neurological:   She has neurological normal, alert and oriented.     Skin:   No abrasions, lacerations, lesions, or rashes.         PROCEDURE NOTE:  Ceruminosis is noted in both EACs.  Wax was removed by manual debridement and suctioning utilizing the assistance of binocular microscopy revealing EACs and TMs WNL. The patient tolerated this procedure without difficulty. The subjective decrease noted in hearing pre-cleaning was resolved post-cleaning.       Assessment:       1. Ear fullness, bilateral        Plan:       1. Hearing conservation strongly recommended.  2. Trial of amplification bilaterally also recommended (patient not interested).  3. Re-check of hearing in 18-24 months or sooner if subjective change noted.  4. F/U with PCP as per schedule.

## 2020-08-25 ENCOUNTER — OFFICE VISIT (OUTPATIENT)
Dept: INTERNAL MEDICINE | Facility: CLINIC | Age: 85
End: 2020-08-25
Payer: MEDICARE

## 2020-08-25 DIAGNOSIS — F41.9 ANXIETY: Primary | ICD-10-CM

## 2020-08-25 DIAGNOSIS — I10 HYPERTENSION, UNSPECIFIED TYPE: ICD-10-CM

## 2020-08-25 PROCEDURE — 99999 PR PBB SHADOW E&M-EST. PATIENT-LVL V: ICD-10-PCS | Mod: PBBFAC,HCNC,, | Performed by: INTERNAL MEDICINE

## 2020-08-25 PROCEDURE — 1159F MED LIST DOCD IN RCRD: CPT | Mod: HCNC,S$GLB,, | Performed by: INTERNAL MEDICINE

## 2020-08-25 PROCEDURE — 1159F PR MEDICATION LIST DOCUMENTED IN MEDICAL RECORD: ICD-10-PCS | Mod: HCNC,S$GLB,, | Performed by: INTERNAL MEDICINE

## 2020-08-25 PROCEDURE — 1101F PR PT FALLS ASSESS DOC 0-1 FALLS W/OUT INJ PAST YR: ICD-10-PCS | Mod: HCNC,CPTII,S$GLB, | Performed by: INTERNAL MEDICINE

## 2020-08-25 PROCEDURE — 99215 PR OFFICE/OUTPT VISIT, EST, LEVL V, 40-54 MIN: ICD-10-PCS | Mod: HCNC,S$GLB,, | Performed by: INTERNAL MEDICINE

## 2020-08-25 PROCEDURE — 99215 OFFICE O/P EST HI 40 MIN: CPT | Mod: HCNC,S$GLB,, | Performed by: INTERNAL MEDICINE

## 2020-08-25 PROCEDURE — 1101F PT FALLS ASSESS-DOCD LE1/YR: CPT | Mod: HCNC,CPTII,S$GLB, | Performed by: INTERNAL MEDICINE

## 2020-08-25 PROCEDURE — 99999 PR PBB SHADOW E&M-EST. PATIENT-LVL V: CPT | Mod: PBBFAC,HCNC,, | Performed by: INTERNAL MEDICINE

## 2020-08-29 VITALS
OXYGEN SATURATION: 96 % | TEMPERATURE: 99 F | HEIGHT: 67 IN | DIASTOLIC BLOOD PRESSURE: 80 MMHG | SYSTOLIC BLOOD PRESSURE: 136 MMHG | BODY MASS INDEX: 34.26 KG/M2 | WEIGHT: 218.25 LBS | HEART RATE: 63 BPM

## 2020-08-29 NOTE — PROGRESS NOTES
Subjective:       Patient ID: Laila Hanna is a 88 y.o. female.    Chief Complaint: Hypertension    HPI  She returns for management of hypertension.  She has had hypertension for over a year.  Current treatment has included medications outlined in medication list.  She denies chest pain or shortness of breath.  No palpitations.  Denies left arm or neck pain.    Past medical history: Hypertension, coronary artery disease, TIA, hyperlipidemia, aortic stenosis,osteoporosis, hypothyroidism, migraine headache,, status post cholecystectomy, status post thyroidectomy, colon adenoma. She had a colonoscopy April 2013     Medications: one aspirin daily, Lipitor 40 mg daily, Synthroid 0.15 mg daily, coreg 12.5 mg twice a day, Fosamax     ALLERGIES: Thiazides       Review of Systems   Constitutional: Negative for chills, fatigue, fever and unexpected weight change.   Respiratory: Negative for chest tightness and shortness of breath.    Cardiovascular: Negative for chest pain and palpitations.   Gastrointestinal: Negative for abdominal pain and blood in stool.   Neurological: Negative for dizziness, syncope, numbness and headaches.       Objective:      Physical Exam  HENT:      Right Ear: External ear normal.      Left Ear: External ear normal.      Nose: Nose normal.      Mouth/Throat:      Mouth: Mucous membranes are moist.      Pharynx: Oropharynx is clear.   Eyes:      Pupils: Pupils are equal, round, and reactive to light.   Neck:      Musculoskeletal: Normal range of motion.   Cardiovascular:      Rate and Rhythm: Normal rate and regular rhythm.      Heart sounds: Murmur present.   Pulmonary:      Breath sounds: Normal breath sounds.   Abdominal:      General: There is no distension.      Palpations: There is no hepatomegaly or splenomegaly.      Tenderness: There is no abdominal tenderness.   Lymphadenopathy:      Cervical: No cervical adenopathy.      Upper Body:      Right upper body: No axillary adenopathy.       Left upper body: No axillary adenopathy.   Neurological:      Cranial Nerves: No cranial nerve deficit.      Sensory: No sensory deficit.      Motor: Motor function is intact.      Deep Tendon Reflexes: Reflexes are normal and symmetric.         Assessment/Plan           assessment and plan:  Hypertension:  Controlled

## 2020-09-01 ENCOUNTER — OFFICE VISIT (OUTPATIENT)
Dept: INTERNAL MEDICINE | Facility: CLINIC | Age: 85
End: 2020-09-01
Payer: MEDICARE

## 2020-09-01 VITALS
HEIGHT: 67 IN | WEIGHT: 216 LBS | DIASTOLIC BLOOD PRESSURE: 67 MMHG | HEART RATE: 70 BPM | BODY MASS INDEX: 33.9 KG/M2 | SYSTOLIC BLOOD PRESSURE: 124 MMHG

## 2020-09-01 DIAGNOSIS — L03.031 PARONYCHIA, TOE, RIGHT: Primary | ICD-10-CM

## 2020-09-01 PROCEDURE — 99999 PR PBB SHADOW E&M-EST. PATIENT-LVL IV: ICD-10-PCS | Mod: PBBFAC,HCNC,, | Performed by: PHYSICIAN ASSISTANT

## 2020-09-01 PROCEDURE — 1159F PR MEDICATION LIST DOCUMENTED IN MEDICAL RECORD: ICD-10-PCS | Mod: HCNC,S$GLB,, | Performed by: PHYSICIAN ASSISTANT

## 2020-09-01 PROCEDURE — 1101F PT FALLS ASSESS-DOCD LE1/YR: CPT | Mod: HCNC,CPTII,S$GLB, | Performed by: PHYSICIAN ASSISTANT

## 2020-09-01 PROCEDURE — 99213 PR OFFICE/OUTPT VISIT, EST, LEVL III, 20-29 MIN: ICD-10-PCS | Mod: HCNC,S$GLB,, | Performed by: PHYSICIAN ASSISTANT

## 2020-09-01 PROCEDURE — 1125F PR PAIN SEVERITY QUANTIFIED, PAIN PRESENT: ICD-10-PCS | Mod: HCNC,S$GLB,, | Performed by: PHYSICIAN ASSISTANT

## 2020-09-01 PROCEDURE — 99213 OFFICE O/P EST LOW 20 MIN: CPT | Mod: HCNC,S$GLB,, | Performed by: PHYSICIAN ASSISTANT

## 2020-09-01 PROCEDURE — 1159F MED LIST DOCD IN RCRD: CPT | Mod: HCNC,S$GLB,, | Performed by: PHYSICIAN ASSISTANT

## 2020-09-01 PROCEDURE — 1125F AMNT PAIN NOTED PAIN PRSNT: CPT | Mod: HCNC,S$GLB,, | Performed by: PHYSICIAN ASSISTANT

## 2020-09-01 PROCEDURE — 99999 PR PBB SHADOW E&M-EST. PATIENT-LVL IV: CPT | Mod: PBBFAC,HCNC,, | Performed by: PHYSICIAN ASSISTANT

## 2020-09-01 PROCEDURE — 1101F PR PT FALLS ASSESS DOC 0-1 FALLS W/OUT INJ PAST YR: ICD-10-PCS | Mod: HCNC,CPTII,S$GLB, | Performed by: PHYSICIAN ASSISTANT

## 2020-09-01 RX ORDER — DOXYCYCLINE 100 MG/1
100 CAPSULE ORAL EVERY 12 HOURS
Qty: 20 CAPSULE | Refills: 0 | Status: SHIPPED | OUTPATIENT
Start: 2020-09-01 | End: 2021-04-01

## 2020-09-01 RX ORDER — MUPIROCIN 20 MG/G
OINTMENT TOPICAL 3 TIMES DAILY
Qty: 30 G | Refills: 0 | Status: SHIPPED | OUTPATIENT
Start: 2020-09-01 | End: 2021-04-01

## 2020-09-01 NOTE — PROGRESS NOTES
Subjective:       Patient ID: Laila Hanna is a 88 y.o. female.        Chief Complaint: Nail Problem    Laila Hanna is an established patient of Ama Graham MD here today for urgent care visit.    She got a pedicure 8/19 and developed some redness around the nail bed on her right fourth toe.  Yesterday she began applying neosporin and cleaned with hydrogen peroxide.  After that, pus drained out and it began to feel much better.  Redness and swelling are improved today.  No fever.           Review of Systems   Constitutional: Negative for chills, diaphoresis, fatigue and fever.   HENT: Negative for congestion and sore throat.    Eyes: Negative for visual disturbance.   Respiratory: Negative for cough, chest tightness and shortness of breath.    Cardiovascular: Negative for chest pain, palpitations and leg swelling.   Gastrointestinal: Negative for abdominal pain, blood in stool, constipation, diarrhea, nausea and vomiting.   Genitourinary: Negative for dysuria, frequency, hematuria and urgency.   Musculoskeletal: Negative for arthralgias and back pain.   Skin: Positive for color change. Negative for rash.   Neurological: Negative for dizziness, syncope, weakness and headaches.   Psychiatric/Behavioral: Negative for dysphoric mood and sleep disturbance. The patient is not nervous/anxious.        Objective:      Physical Exam  Constitutional:       General: She is not in acute distress.     Appearance: She is well-developed. She is not diaphoretic.   HENT:      Head: Normocephalic and atraumatic.      Left Ear: External ear normal.   Neck:      Musculoskeletal: Neck supple.   Pulmonary:      Effort: Pulmonary effort is normal.   Abdominal:      Palpations: Abdomen is soft.   Musculoskeletal:        Feet:    Skin:     General: Skin is warm and dry.   Neurological:      Mental Status: She is alert.         Assessment:       1. Paronychia, toe, right        Plan:       Laila was seen today for nail  "problem.    Diagnoses and all orders for this visit:    Paronychia, toe, right  -     mupirocin (BACTROBAN) 2 % ointment; Apply topically 3 (three) times daily.  -     doxycycline (VIBRAMYCIN) 100 MG Cap; Take 1 capsule (100 mg total) by mouth every 12 (twelve) hours.    Warm water soaks  Bactroban TID  If worsening or not completely resolving, doxy if needed    Pt has been given instructions populated from [employer] database and has verbalized understanding of the after visit summary and information contained wherein.    Follow up with a primary care provider. May go to ER for acute shortness of breath, lightheadedness, fever, or any other emergent complaints or changes in condition.    "This note will be shared with the patient"    Future Appointments   Date Time Provider Department Center   2/25/2021 10:40 AM Ama Graham MD [facility]                 "

## 2020-09-01 NOTE — PATIENT INSTRUCTIONS
Start bactroban cream to the toe three times daily    Wash with antibacterial soap like dial    Warm compresses    If worsening or not resolving, may start doxycycline    Paronychia of the Finger or Toe  Paronychia is an infection near a fingernail or toenail. It usually occurs when an opening in the cuticle or an ingrown toenail lets bacteria under the skin.  The infection will need to be drained if pus is present. If the infection has been caught early, you may need only antibiotic treatment. Healing will take about 1 to 2 weeks.  Home care  Follow these guidelines when caring for yourself at home:  · Clean and soak the toe or finger. Do this 2 times a day for the first 3 days. To do so:  ¨ Soak your foot or hand in a tub of warm water for 5 minutes. Or hold your toe or finger under a faucet of warm running water for 5 minutes.  ¨ Clean any crust away with soap and water using a cotton swab.  ¨ Put antibiotic ointment on the infected area.  · Change the dressing daily or any time it gets dirty.  · If you were given antibiotics, take them as directed until they are all gone.  · If your infection is on a toe, wear comfortable shoes with a lot of toe room. You can also wear open-toed sandals while your toe heals.  · You may use over-the-counter medicine (acetaminophen or ibuprofen to help with pain, unless another medicine was prescribed. If you have chronic liver or kidney disease, talk with your healthcare provider before using these medicines. Also talk with your provider if you've had a stomach ulcer or GI (gastrointestinal) bleeding.  Prevention  The following can prevent paronychia:  · Avoid cutting or playing with your cuticles at home.  · Don't bite your nails.  · Don't suck on your thumbs or fingers.  Follow-up care  Follow up with your healthcare provider, or as advised.  When to seek medical advice  Call your healthcare provider right away if any of these occur:  · Redness, pain, or swelling of the  finger or toe gets worse  · Red streaks in the skin leading away from the wound  · Pus or fluid draining from the nail area  · Fever of 100.4ºF (38ºC) or higher, or as directed by your provider  Date Last Reviewed: 8/1/2016  © 6762-9056 [employer]. [address] [place] [address]. All rights reserved. This information is not intended as a substitute for professional medical care. Always follow your healthcare professional's instructions.

## 2020-11-19 NOTE — TELEPHONE ENCOUNTER
----- Message from Rebeka Lynch sent at 11/19/2020  9:46 AM CST -----  Contact: Pt called  Pt need a new script for medication torsemide (DEMADEX) 10 MG Tab and send to [id number] - [place], [place] - [address] [place] [address] AT [facility] [address] [place] [phone] (Phone)[phone] (Fax. Lv 7/17/20 Dr. Severino. Thank you.

## 2020-11-20 RX ORDER — TORSEMIDE 10 MG/1
10 TABLET ORAL DAILY
Qty: 90 TABLET | Refills: 3 | Status: SHIPPED | OUTPATIENT
Start: 2020-11-20 | End: 2021-11-14

## 2020-12-18 ENCOUNTER — PES CALL (OUTPATIENT)
Dept: ADMINISTRATIVE | Facility: CLINIC | Age: 85
End: 2020-12-18

## 2021-01-07 DIAGNOSIS — E03.4 HYPOTHYROIDISM DUE TO ACQUIRED ATROPHY OF THYROID: Chronic | ICD-10-CM

## 2021-01-07 RX ORDER — LEVOTHYROXINE SODIUM 150 UG/1
150 TABLET ORAL DAILY
Qty: 90 TABLET | Refills: 0 | Status: SHIPPED | OUTPATIENT
Start: 2021-01-07 | End: 2021-03-05

## 2021-02-25 ENCOUNTER — LAB VISIT (OUTPATIENT)
Dept: LAB | Facility: HOSPITAL | Age: 86
End: 2021-02-25
Attending: INTERNAL MEDICINE
Payer: MEDICARE

## 2021-02-25 ENCOUNTER — OFFICE VISIT (OUTPATIENT)
Dept: INTERNAL MEDICINE | Facility: CLINIC | Age: 86
End: 2021-02-25
Payer: MEDICARE

## 2021-02-25 DIAGNOSIS — I10 HYPERTENSION, UNSPECIFIED TYPE: ICD-10-CM

## 2021-02-25 DIAGNOSIS — I25.10 CORONARY ARTERY DISEASE, ANGINA PRESENCE UNSPECIFIED, UNSPECIFIED VESSEL OR LESION TYPE, UNSPECIFIED WHETHER NATIVE OR TRANSPLANTED HEART: Primary | ICD-10-CM

## 2021-02-25 DIAGNOSIS — N39.0 URINARY TRACT INFECTION WITHOUT HEMATURIA, SITE UNSPECIFIED: ICD-10-CM

## 2021-02-25 DIAGNOSIS — E03.9 HYPOTHYROIDISM, UNSPECIFIED TYPE: ICD-10-CM

## 2021-02-25 DIAGNOSIS — Z12.31 SCREENING MAMMOGRAM, ENCOUNTER FOR: ICD-10-CM

## 2021-02-25 PROCEDURE — 84443 ASSAY THYROID STIM HORMONE: CPT

## 2021-02-25 PROCEDURE — 99215 PR OFFICE/OUTPT VISIT, EST, LEVL V, 40-54 MIN: ICD-10-PCS | Mod: S$GLB,,, | Performed by: INTERNAL MEDICINE

## 2021-02-25 PROCEDURE — 99215 OFFICE O/P EST HI 40 MIN: CPT | Mod: S$GLB,,, | Performed by: INTERNAL MEDICINE

## 2021-02-25 PROCEDURE — 1159F PR MEDICATION LIST DOCUMENTED IN MEDICAL RECORD: ICD-10-PCS | Mod: S$GLB,,, | Performed by: INTERNAL MEDICINE

## 2021-02-25 PROCEDURE — 1101F PT FALLS ASSESS-DOCD LE1/YR: CPT | Mod: CPTII,S$GLB,, | Performed by: INTERNAL MEDICINE

## 2021-02-25 PROCEDURE — 1159F MED LIST DOCD IN RCRD: CPT | Mod: S$GLB,,, | Performed by: INTERNAL MEDICINE

## 2021-02-25 PROCEDURE — 99999 PR PBB SHADOW E&M-EST. PATIENT-LVL V: CPT | Mod: PBBFAC,,, | Performed by: INTERNAL MEDICINE

## 2021-02-25 PROCEDURE — 36415 COLL VENOUS BLD VENIPUNCTURE: CPT

## 2021-02-25 PROCEDURE — 99999 PR PBB SHADOW E&M-EST. PATIENT-LVL V: ICD-10-PCS | Mod: PBBFAC,,, | Performed by: INTERNAL MEDICINE

## 2021-02-25 PROCEDURE — 99499 UNLISTED E&M SERVICE: CPT | Mod: S$GLB,,, | Performed by: INTERNAL MEDICINE

## 2021-02-25 PROCEDURE — 3288F FALL RISK ASSESSMENT DOCD: CPT | Mod: CPTII,S$GLB,, | Performed by: INTERNAL MEDICINE

## 2021-02-25 PROCEDURE — 99499 RISK ADDL DX/OHS AUDIT: ICD-10-PCS | Mod: S$GLB,,, | Performed by: INTERNAL MEDICINE

## 2021-02-25 PROCEDURE — 3288F PR FALLS RISK ASSESSMENT DOCUMENTED: ICD-10-PCS | Mod: CPTII,S$GLB,, | Performed by: INTERNAL MEDICINE

## 2021-02-25 PROCEDURE — 1101F PR PT FALLS ASSESS DOC 0-1 FALLS W/OUT INJ PAST YR: ICD-10-PCS | Mod: CPTII,S$GLB,, | Performed by: INTERNAL MEDICINE

## 2021-02-26 LAB — TSH SERPL DL<=0.005 MIU/L-ACNC: 3.05 UIU/ML (ref 0.4–4)

## 2021-02-28 VITALS
DIASTOLIC BLOOD PRESSURE: 74 MMHG | HEIGHT: 67 IN | SYSTOLIC BLOOD PRESSURE: 136 MMHG | WEIGHT: 221.81 LBS | BODY MASS INDEX: 34.81 KG/M2 | OXYGEN SATURATION: 98 % | HEART RATE: 62 BPM | TEMPERATURE: 99 F

## 2021-03-05 ENCOUNTER — IMMUNIZATION (OUTPATIENT)
Dept: PRIMARY CARE CLINIC | Facility: CLINIC | Age: 86
End: 2021-03-05

## 2021-03-05 DIAGNOSIS — Z23 NEED FOR VACCINATION: Primary | ICD-10-CM

## 2021-03-05 PROCEDURE — 0031A PR IMMUNIZ ADMIN, SARS-COV-2 COVID-19 VACC, 5X10VP/0.5ML: ICD-10-PCS | Mod: CV19,S$GLB,, | Performed by: INTERNAL MEDICINE

## 2021-03-05 PROCEDURE — 0031A PR IMMUNIZ ADMIN, SARS-COV-2 COVID-19 VACC, 5X10VP/0.5ML: CPT | Mod: CV19,S$GLB,, | Performed by: INTERNAL MEDICINE

## 2021-03-05 PROCEDURE — 91303 PR SARSCOV2 VAC AD26 .5ML IM: CPT | Mod: S$GLB,,, | Performed by: INTERNAL MEDICINE

## 2021-03-05 PROCEDURE — 91303 PR SARSCOV2 VAC AD26 .5ML IM: ICD-10-PCS | Mod: S$GLB,,, | Performed by: INTERNAL MEDICINE

## 2021-04-01 ENCOUNTER — OFFICE VISIT (OUTPATIENT)
Dept: HOME HEALTH SERVICES | Facility: CLINIC | Age: 86
End: 2021-04-01
Payer: MEDICARE

## 2021-04-01 VITALS
WEIGHT: 216 LBS | HEART RATE: 58 BPM | TEMPERATURE: 98 F | OXYGEN SATURATION: 97 % | HEIGHT: 67 IN | DIASTOLIC BLOOD PRESSURE: 90 MMHG | BODY MASS INDEX: 33.9 KG/M2 | SYSTOLIC BLOOD PRESSURE: 152 MMHG

## 2021-04-01 DIAGNOSIS — E22.2 SIADH (SYNDROME OF INAPPROPRIATE ADH PRODUCTION): ICD-10-CM

## 2021-04-01 DIAGNOSIS — Z99.89 DEPENDENCE ON OTHER ENABLING MACHINES AND DEVICES: ICD-10-CM

## 2021-04-01 DIAGNOSIS — F02.818 EARLY ONSET ALZHEIMER'S DISEASE WITH BEHAVIORAL DISTURBANCE: ICD-10-CM

## 2021-04-01 DIAGNOSIS — Z00.00 ENCOUNTER FOR PREVENTIVE HEALTH EXAMINATION: Primary | ICD-10-CM

## 2021-04-01 DIAGNOSIS — I70.0 AORTIC ATHEROSCLEROSIS: ICD-10-CM

## 2021-04-01 DIAGNOSIS — I25.10 CORONARY ARTERY DISEASE INVOLVING NATIVE CORONARY ARTERY OF NATIVE HEART WITHOUT ANGINA PECTORIS: ICD-10-CM

## 2021-04-01 DIAGNOSIS — R26.9 ABNORMALITY OF GAIT AND MOBILITY: ICD-10-CM

## 2021-04-01 DIAGNOSIS — E03.4 HYPOTHYROIDISM DUE TO ACQUIRED ATROPHY OF THYROID: Chronic | ICD-10-CM

## 2021-04-01 DIAGNOSIS — D32.9 MENINGIOMA: ICD-10-CM

## 2021-04-01 DIAGNOSIS — I10 ESSENTIAL HYPERTENSION: ICD-10-CM

## 2021-04-01 DIAGNOSIS — I27.9 PULMONARY HEART DISEASE: ICD-10-CM

## 2021-04-01 DIAGNOSIS — M35.00 SICCA SYNDROME: ICD-10-CM

## 2021-04-01 DIAGNOSIS — F33.42 RECURRENT MAJOR DEPRESSIVE DISORDER, IN FULL REMISSION: ICD-10-CM

## 2021-04-01 DIAGNOSIS — G30.0 EARLY ONSET ALZHEIMER'S DISEASE WITH BEHAVIORAL DISTURBANCE: ICD-10-CM

## 2021-04-01 DIAGNOSIS — E78.49 OTHER HYPERLIPIDEMIA: ICD-10-CM

## 2021-04-01 PROCEDURE — 1158F ADVNC CARE PLAN TLK DOCD: CPT | Mod: S$GLB,,, | Performed by: NURSE PRACTITIONER

## 2021-04-01 PROCEDURE — 1101F PT FALLS ASSESS-DOCD LE1/YR: CPT | Mod: CPTII,S$GLB,, | Performed by: NURSE PRACTITIONER

## 2021-04-01 PROCEDURE — G0439 PPPS, SUBSEQ VISIT: HCPCS | Mod: S$GLB,,, | Performed by: NURSE PRACTITIONER

## 2021-04-01 PROCEDURE — 1101F PR PT FALLS ASSESS DOC 0-1 FALLS W/OUT INJ PAST YR: ICD-10-PCS | Mod: CPTII,S$GLB,, | Performed by: NURSE PRACTITIONER

## 2021-04-01 PROCEDURE — 1158F PR ADVANCE CARE PLANNING DISCUSS DOCUMENTED IN MEDICAL RECORD: ICD-10-PCS | Mod: S$GLB,,, | Performed by: NURSE PRACTITIONER

## 2021-04-01 PROCEDURE — 99499 RISK ADDL DX/OHS AUDIT: ICD-10-PCS | Mod: S$GLB,,, | Performed by: NURSE PRACTITIONER

## 2021-04-01 PROCEDURE — G0439 PR MEDICARE ANNUAL WELLNESS SUBSEQUENT VISIT: ICD-10-PCS | Mod: S$GLB,,, | Performed by: NURSE PRACTITIONER

## 2021-04-01 PROCEDURE — 1126F PR PAIN SEVERITY QUANTIFIED, NO PAIN PRESENT: ICD-10-PCS | Mod: S$GLB,,, | Performed by: NURSE PRACTITIONER

## 2021-04-01 PROCEDURE — 3288F FALL RISK ASSESSMENT DOCD: CPT | Mod: CPTII,S$GLB,, | Performed by: NURSE PRACTITIONER

## 2021-04-01 PROCEDURE — 3288F PR FALLS RISK ASSESSMENT DOCUMENTED: ICD-10-PCS | Mod: CPTII,S$GLB,, | Performed by: NURSE PRACTITIONER

## 2021-04-01 PROCEDURE — 99499 UNLISTED E&M SERVICE: CPT | Mod: S$GLB,,, | Performed by: NURSE PRACTITIONER

## 2021-04-01 PROCEDURE — 1126F AMNT PAIN NOTED NONE PRSNT: CPT | Mod: S$GLB,,, | Performed by: NURSE PRACTITIONER

## 2021-04-01 RX ORDER — MELATONIN 10 MG
CAPSULE ORAL
COMMUNITY

## 2021-04-02 PROBLEM — M35.00 SICCA SYNDROME: Status: ACTIVE | Noted: 2021-04-02

## 2021-04-02 PROBLEM — G30.0 EARLY ONSET ALZHEIMER'S DISEASE WITH BEHAVIORAL DISTURBANCE: Status: ACTIVE | Noted: 2021-04-02

## 2021-04-02 PROBLEM — F02.818 EARLY ONSET ALZHEIMER'S DISEASE WITH BEHAVIORAL DISTURBANCE: Status: ACTIVE | Noted: 2021-04-02

## 2021-04-22 RX ORDER — ALENDRONATE SODIUM 70 MG/1
TABLET ORAL
Qty: 12 TABLET | Refills: 0 | Status: SHIPPED | OUTPATIENT
Start: 2021-04-22 | End: 2021-11-15 | Stop reason: SDUPTHER

## 2021-05-26 ENCOUNTER — PATIENT OUTREACH (OUTPATIENT)
Dept: ADMINISTRATIVE | Facility: OTHER | Age: 86
End: 2021-05-26

## 2021-05-28 ENCOUNTER — LAB VISIT (OUTPATIENT)
Dept: LAB | Facility: HOSPITAL | Age: 86
End: 2021-05-28
Attending: INTERNAL MEDICINE
Payer: MEDICARE

## 2021-05-28 DIAGNOSIS — I10 ESSENTIAL HYPERTENSION: ICD-10-CM

## 2021-05-28 DIAGNOSIS — I51.89 GRADE I DIASTOLIC DYSFUNCTION: ICD-10-CM

## 2021-05-28 LAB
ANION GAP SERPL CALC-SCNC: 8 MMOL/L (ref 8–16)
BUN SERPL-MCNC: 15 MG/DL (ref 8–23)
CALCIUM SERPL-MCNC: 9.2 MG/DL (ref 8.7–10.5)
CHLORIDE SERPL-SCNC: 100 MMOL/L (ref 95–110)
CO2 SERPL-SCNC: 29 MMOL/L (ref 23–29)
CREAT SERPL-MCNC: 0.8 MG/DL (ref 0.5–1.4)
EST. GFR  (AFRICAN AMERICAN): >60 ML/MIN/1.73 M^2
EST. GFR  (NON AFRICAN AMERICAN): >60 ML/MIN/1.73 M^2
GLUCOSE SERPL-MCNC: 96 MG/DL (ref 70–110)
POTASSIUM SERPL-SCNC: 4.2 MMOL/L (ref 3.5–5.1)
SODIUM SERPL-SCNC: 137 MMOL/L (ref 136–145)

## 2021-05-28 PROCEDURE — 36415 COLL VENOUS BLD VENIPUNCTURE: CPT | Performed by: INTERNAL MEDICINE

## 2021-05-28 PROCEDURE — 80048 BASIC METABOLIC PNL TOTAL CA: CPT | Performed by: INTERNAL MEDICINE

## 2021-05-31 ENCOUNTER — OFFICE VISIT (OUTPATIENT)
Dept: CARDIOLOGY | Facility: CLINIC | Age: 86
End: 2021-05-31
Payer: MEDICARE

## 2021-05-31 VITALS
HEART RATE: 60 BPM | OXYGEN SATURATION: 97 % | HEIGHT: 66 IN | BODY MASS INDEX: 35.54 KG/M2 | WEIGHT: 221.13 LBS | SYSTOLIC BLOOD PRESSURE: 164 MMHG | DIASTOLIC BLOOD PRESSURE: 77 MMHG

## 2021-05-31 DIAGNOSIS — I70.0 AORTIC ATHEROSCLEROSIS: ICD-10-CM

## 2021-05-31 DIAGNOSIS — I25.10 CORONARY ARTERY DISEASE INVOLVING NATIVE CORONARY ARTERY OF NATIVE HEART WITHOUT ANGINA PECTORIS: Primary | ICD-10-CM

## 2021-05-31 DIAGNOSIS — E78.49 OTHER HYPERLIPIDEMIA: ICD-10-CM

## 2021-05-31 DIAGNOSIS — Z98.61 HISTORY OF PTCA: ICD-10-CM

## 2021-05-31 DIAGNOSIS — I10 ESSENTIAL HYPERTENSION: ICD-10-CM

## 2021-05-31 PROCEDURE — 99499 UNLISTED E&M SERVICE: CPT | Mod: S$GLB,,, | Performed by: INTERNAL MEDICINE

## 2021-05-31 PROCEDURE — 99499 RISK ADDL DX/OHS AUDIT: ICD-10-PCS | Mod: S$GLB,,, | Performed by: INTERNAL MEDICINE

## 2021-05-31 PROCEDURE — 99213 PR OFFICE/OUTPT VISIT, EST, LEVL III, 20-29 MIN: ICD-10-PCS | Mod: S$GLB,,, | Performed by: INTERNAL MEDICINE

## 2021-05-31 PROCEDURE — 3288F PR FALLS RISK ASSESSMENT DOCUMENTED: ICD-10-PCS | Mod: CPTII,S$GLB,, | Performed by: INTERNAL MEDICINE

## 2021-05-31 PROCEDURE — 1101F PR PT FALLS ASSESS DOC 0-1 FALLS W/OUT INJ PAST YR: ICD-10-PCS | Mod: CPTII,S$GLB,, | Performed by: INTERNAL MEDICINE

## 2021-05-31 PROCEDURE — 3288F FALL RISK ASSESSMENT DOCD: CPT | Mod: CPTII,S$GLB,, | Performed by: INTERNAL MEDICINE

## 2021-05-31 PROCEDURE — 99999 PR PBB SHADOW E&M-EST. PATIENT-LVL IV: ICD-10-PCS | Mod: PBBFAC,,, | Performed by: INTERNAL MEDICINE

## 2021-05-31 PROCEDURE — 99213 OFFICE O/P EST LOW 20 MIN: CPT | Mod: S$GLB,,, | Performed by: INTERNAL MEDICINE

## 2021-05-31 PROCEDURE — 99999 PR PBB SHADOW E&M-EST. PATIENT-LVL IV: CPT | Mod: PBBFAC,,, | Performed by: INTERNAL MEDICINE

## 2021-05-31 PROCEDURE — 1101F PT FALLS ASSESS-DOCD LE1/YR: CPT | Mod: CPTII,S$GLB,, | Performed by: INTERNAL MEDICINE

## 2021-05-31 PROCEDURE — 1126F PR PAIN SEVERITY QUANTIFIED, NO PAIN PRESENT: ICD-10-PCS | Mod: S$GLB,,, | Performed by: INTERNAL MEDICINE

## 2021-05-31 PROCEDURE — 1126F AMNT PAIN NOTED NONE PRSNT: CPT | Mod: S$GLB,,, | Performed by: INTERNAL MEDICINE

## 2021-05-31 PROCEDURE — 1159F PR MEDICATION LIST DOCUMENTED IN MEDICAL RECORD: ICD-10-PCS | Mod: S$GLB,,, | Performed by: INTERNAL MEDICINE

## 2021-05-31 PROCEDURE — 1159F MED LIST DOCD IN RCRD: CPT | Mod: S$GLB,,, | Performed by: INTERNAL MEDICINE

## 2021-05-31 RX ORDER — LOSARTAN POTASSIUM 50 MG/1
50 TABLET ORAL DAILY
Qty: 90 TABLET | Refills: 3 | Status: SHIPPED | OUTPATIENT
Start: 2021-05-31 | End: 2022-01-18 | Stop reason: SDUPTHER

## 2021-06-24 ENCOUNTER — OFFICE VISIT (OUTPATIENT)
Dept: OTOLARYNGOLOGY | Facility: CLINIC | Age: 86
End: 2021-06-24
Payer: MEDICARE

## 2021-06-24 VITALS
DIASTOLIC BLOOD PRESSURE: 80 MMHG | HEART RATE: 67 BPM | SYSTOLIC BLOOD PRESSURE: 155 MMHG | BODY MASS INDEX: 36.22 KG/M2 | WEIGHT: 224.44 LBS

## 2021-06-24 DIAGNOSIS — H93.8X3 EAR FULLNESS, BILATERAL: Primary | ICD-10-CM

## 2021-06-24 PROCEDURE — 99999 PR PBB SHADOW E&M-EST. PATIENT-LVL III: ICD-10-PCS | Mod: PBBFAC,,, | Performed by: OTOLARYNGOLOGY

## 2021-06-24 PROCEDURE — 99213 PR OFFICE/OUTPT VISIT, EST, LEVL III, 20-29 MIN: ICD-10-PCS | Mod: 25,S$GLB,, | Performed by: OTOLARYNGOLOGY

## 2021-06-24 PROCEDURE — 99213 OFFICE O/P EST LOW 20 MIN: CPT | Mod: 25,S$GLB,, | Performed by: OTOLARYNGOLOGY

## 2021-06-24 PROCEDURE — 1159F MED LIST DOCD IN RCRD: CPT | Mod: S$GLB,,, | Performed by: OTOLARYNGOLOGY

## 2021-06-24 PROCEDURE — 99999 PR PBB SHADOW E&M-EST. PATIENT-LVL III: CPT | Mod: PBBFAC,,, | Performed by: OTOLARYNGOLOGY

## 2021-06-24 PROCEDURE — 69210 PR REMOVAL IMPACTED CERUMEN REQUIRING INSTRUMENTATION, UNILATERAL: ICD-10-PCS | Mod: S$GLB,,, | Performed by: OTOLARYNGOLOGY

## 2021-06-24 PROCEDURE — 69210 REMOVE IMPACTED EAR WAX UNI: CPT | Mod: S$GLB,,, | Performed by: OTOLARYNGOLOGY

## 2021-06-24 PROCEDURE — 1159F PR MEDICATION LIST DOCUMENTED IN MEDICAL RECORD: ICD-10-PCS | Mod: S$GLB,,, | Performed by: OTOLARYNGOLOGY

## 2021-06-24 PROCEDURE — 1126F PR PAIN SEVERITY QUANTIFIED, NO PAIN PRESENT: ICD-10-PCS | Mod: S$GLB,,, | Performed by: OTOLARYNGOLOGY

## 2021-06-24 PROCEDURE — 1126F AMNT PAIN NOTED NONE PRSNT: CPT | Mod: S$GLB,,, | Performed by: OTOLARYNGOLOGY

## 2021-08-25 ENCOUNTER — LAB VISIT (OUTPATIENT)
Dept: LAB | Facility: HOSPITAL | Age: 86
End: 2021-08-25
Attending: INTERNAL MEDICINE
Payer: MEDICARE

## 2021-08-25 ENCOUNTER — OFFICE VISIT (OUTPATIENT)
Dept: INTERNAL MEDICINE | Facility: CLINIC | Age: 86
End: 2021-08-25
Payer: MEDICARE

## 2021-08-25 DIAGNOSIS — I10 HYPERTENSION, UNSPECIFIED TYPE: Primary | ICD-10-CM

## 2021-08-25 DIAGNOSIS — I10 HYPERTENSION, UNSPECIFIED TYPE: ICD-10-CM

## 2021-08-25 LAB
ALBUMIN SERPL BCP-MCNC: 3.4 G/DL (ref 3.5–5.2)
ALP SERPL-CCNC: 86 U/L (ref 55–135)
ALT SERPL W/O P-5'-P-CCNC: 11 U/L (ref 10–44)
AST SERPL-CCNC: 15 U/L (ref 10–40)
BASOPHILS # BLD AUTO: 0.06 K/UL (ref 0–0.2)
BASOPHILS NFR BLD: 0.7 % (ref 0–1.9)
BILIRUB DIRECT SERPL-MCNC: 0.3 MG/DL (ref 0.1–0.3)
BILIRUB SERPL-MCNC: 0.8 MG/DL (ref 0.1–1)
DIFFERENTIAL METHOD: NORMAL
EOSINOPHIL # BLD AUTO: 0.3 K/UL (ref 0–0.5)
EOSINOPHIL NFR BLD: 3.2 % (ref 0–8)
ERYTHROCYTE [DISTWIDTH] IN BLOOD BY AUTOMATED COUNT: 13.2 % (ref 11.5–14.5)
HCT VFR BLD AUTO: 38.8 % (ref 37–48.5)
HGB BLD-MCNC: 12.4 G/DL (ref 12–16)
IMM GRANULOCYTES # BLD AUTO: 0.04 K/UL (ref 0–0.04)
IMM GRANULOCYTES NFR BLD AUTO: 0.5 % (ref 0–0.5)
LYMPHOCYTES # BLD AUTO: 1.8 K/UL (ref 1–4.8)
LYMPHOCYTES NFR BLD: 20.6 % (ref 18–48)
MCH RBC QN AUTO: 30 PG (ref 27–31)
MCHC RBC AUTO-ENTMCNC: 32 G/DL (ref 32–36)
MCV RBC AUTO: 94 FL (ref 82–98)
MONOCYTES # BLD AUTO: 0.9 K/UL (ref 0.3–1)
MONOCYTES NFR BLD: 9.8 % (ref 4–15)
NEUTROPHILS # BLD AUTO: 5.7 K/UL (ref 1.8–7.7)
NEUTROPHILS NFR BLD: 65.2 % (ref 38–73)
NRBC BLD-RTO: 0 /100 WBC
PLATELET # BLD AUTO: 270 K/UL (ref 150–450)
PMV BLD AUTO: 11 FL (ref 9.2–12.9)
PROT SERPL-MCNC: 7.2 G/DL (ref 6–8.4)
RBC # BLD AUTO: 4.13 M/UL (ref 4–5.4)
WBC # BLD AUTO: 8.77 K/UL (ref 3.9–12.7)

## 2021-08-25 PROCEDURE — 36415 COLL VENOUS BLD VENIPUNCTURE: CPT | Performed by: INTERNAL MEDICINE

## 2021-08-25 PROCEDURE — 99999 PR PBB SHADOW E&M-EST. PATIENT-LVL IV: CPT | Mod: PBBFAC,,, | Performed by: INTERNAL MEDICINE

## 2021-08-25 PROCEDURE — 3288F FALL RISK ASSESSMENT DOCD: CPT | Mod: CPTII,S$GLB,, | Performed by: INTERNAL MEDICINE

## 2021-08-25 PROCEDURE — 1159F PR MEDICATION LIST DOCUMENTED IN MEDICAL RECORD: ICD-10-PCS | Mod: CPTII,S$GLB,, | Performed by: INTERNAL MEDICINE

## 2021-08-25 PROCEDURE — 1126F AMNT PAIN NOTED NONE PRSNT: CPT | Mod: CPTII,S$GLB,, | Performed by: INTERNAL MEDICINE

## 2021-08-25 PROCEDURE — 1101F PR PT FALLS ASSESS DOC 0-1 FALLS W/OUT INJ PAST YR: ICD-10-PCS | Mod: CPTII,S$GLB,, | Performed by: INTERNAL MEDICINE

## 2021-08-25 PROCEDURE — 80076 HEPATIC FUNCTION PANEL: CPT | Performed by: INTERNAL MEDICINE

## 2021-08-25 PROCEDURE — 99999 PR PBB SHADOW E&M-EST. PATIENT-LVL IV: ICD-10-PCS | Mod: PBBFAC,,, | Performed by: INTERNAL MEDICINE

## 2021-08-25 PROCEDURE — 82550 ASSAY OF CK (CPK): CPT | Performed by: INTERNAL MEDICINE

## 2021-08-25 PROCEDURE — 99499 UNLISTED E&M SERVICE: CPT | Mod: S$GLB,,, | Performed by: INTERNAL MEDICINE

## 2021-08-25 PROCEDURE — 99215 OFFICE O/P EST HI 40 MIN: CPT | Mod: S$GLB,,, | Performed by: INTERNAL MEDICINE

## 2021-08-25 PROCEDURE — 1126F PR PAIN SEVERITY QUANTIFIED, NO PAIN PRESENT: ICD-10-PCS | Mod: CPTII,S$GLB,, | Performed by: INTERNAL MEDICINE

## 2021-08-25 PROCEDURE — 1101F PT FALLS ASSESS-DOCD LE1/YR: CPT | Mod: CPTII,S$GLB,, | Performed by: INTERNAL MEDICINE

## 2021-08-25 PROCEDURE — 99499 RISK ADDL DX/OHS AUDIT: ICD-10-PCS | Mod: S$GLB,,, | Performed by: INTERNAL MEDICINE

## 2021-08-25 PROCEDURE — 3288F PR FALLS RISK ASSESSMENT DOCUMENTED: ICD-10-PCS | Mod: CPTII,S$GLB,, | Performed by: INTERNAL MEDICINE

## 2021-08-25 PROCEDURE — 85025 COMPLETE CBC W/AUTO DIFF WBC: CPT | Performed by: INTERNAL MEDICINE

## 2021-08-25 PROCEDURE — 99215 PR OFFICE/OUTPT VISIT, EST, LEVL V, 40-54 MIN: ICD-10-PCS | Mod: S$GLB,,, | Performed by: INTERNAL MEDICINE

## 2021-08-25 PROCEDURE — 1159F MED LIST DOCD IN RCRD: CPT | Mod: CPTII,S$GLB,, | Performed by: INTERNAL MEDICINE

## 2021-08-25 PROCEDURE — 82552 ASSAY OF CPK IN BLOOD: CPT | Performed by: INTERNAL MEDICINE

## 2021-08-27 LAB
CK BB CFR SERPL ELPH: 0 %
CK MB CFR SERPL ELPH: 0 % (ref 0–3.3)
CK MM CFR SERPL ELPH: 100 % (ref 96.7–100)
CK SERPL-CCNC: 44 U/L (ref 30–223)

## 2021-09-04 VITALS
BODY MASS INDEX: 35.22 KG/M2 | WEIGHT: 224.44 LBS | HEART RATE: 62 BPM | DIASTOLIC BLOOD PRESSURE: 64 MMHG | OXYGEN SATURATION: 95 % | TEMPERATURE: 99 F | HEIGHT: 67 IN | SYSTOLIC BLOOD PRESSURE: 126 MMHG

## 2021-11-15 RX ORDER — ALENDRONATE SODIUM 70 MG/1
TABLET ORAL
Qty: 12 TABLET | Refills: 0 | Status: SHIPPED | OUTPATIENT
Start: 2021-11-15 | End: 2022-01-18

## 2022-01-18 DIAGNOSIS — I10 ESSENTIAL HYPERTENSION: ICD-10-CM

## 2022-01-18 RX ORDER — ALENDRONATE SODIUM 70 MG/1
TABLET ORAL
Qty: 12 TABLET | Refills: 1 | Status: SHIPPED | OUTPATIENT
Start: 2022-01-18 | End: 2022-07-05 | Stop reason: SDUPTHER

## 2022-01-18 RX ORDER — LOSARTAN POTASSIUM 50 MG/1
50 TABLET ORAL DAILY
Qty: 90 TABLET | Refills: 3 | Status: ON HOLD | OUTPATIENT
Start: 2022-01-18 | End: 2022-08-03 | Stop reason: SDUPTHER

## 2022-01-28 ENCOUNTER — TELEPHONE (OUTPATIENT)
Dept: INTERNAL MEDICINE | Facility: CLINIC | Age: 87
End: 2022-01-28
Payer: MEDICARE

## 2022-01-28 RX ORDER — CALCIUM CARBONATE 160(400)MG
TABLET,CHEWABLE ORAL
Qty: 1 EACH | Refills: 0 | Status: SHIPPED | OUTPATIENT
Start: 2022-01-28 | End: 2022-03-24 | Stop reason: SDUPTHER

## 2022-01-28 NOTE — TELEPHONE ENCOUNTER
----- Message from Rosie Rodriguez sent at 1/28/2022  9:43 AM CST -----  Contact: RAIMUNDO SPAIN [[id number]] @ [phone]  Patient is requesting you to order her a Rolator walker with the seat. Please call her to know if you will order this.

## 2022-02-07 ENCOUNTER — TELEPHONE (OUTPATIENT)
Dept: INTERNAL MEDICINE | Facility: CLINIC | Age: 87
End: 2022-02-07
Payer: MEDICARE

## 2022-02-07 NOTE — TELEPHONE ENCOUNTER
----- Message from Katherin Vo sent at 2/7/2022 10:05 AM CST -----  Contact: [phone]  Patient called, requested a courtesy call from Dr Graham about the wheelchair Rolator. Please call and advise. Thank you

## 2022-03-07 ENCOUNTER — TELEPHONE (OUTPATIENT)
Dept: INTERNAL MEDICINE | Facility: CLINIC | Age: 87
End: 2022-03-07
Payer: MEDICARE

## 2022-03-07 NOTE — TELEPHONE ENCOUNTER
----- Message from Glendy Bain sent at 3/7/2022  8:20 AM CST -----  Contact: Laila Ulloa would like a call back at [phone], Regards to getting an prescription for a rollator walker.    Thanks  Td

## 2022-03-08 ENCOUNTER — TELEPHONE (OUTPATIENT)
Dept: INTERNAL MEDICINE | Facility: CLINIC | Age: 87
End: 2022-03-08
Payer: MEDICARE

## 2022-03-08 NOTE — TELEPHONE ENCOUNTER
----- Message from Delilah Valladares sent at 3/8/2022  1:54 PM CST -----  Contact: [phone]  Pt called to advise that the RX for the wheel chair she received in January she sent to [facility], but they advised that she would have to send it the DME or to a facility that provides wheel chairs which is why she is requesting a new one. Please Advise

## 2022-03-08 NOTE — TELEPHONE ENCOUNTER
When I called the DME department they informed me that it has to be put in under other orders for it to be faxed to them.

## 2022-03-18 ENCOUNTER — TELEPHONE (OUTPATIENT)
Dept: INTERNAL MEDICINE | Facility: CLINIC | Age: 87
End: 2022-03-18
Payer: MEDICARE

## 2022-03-18 NOTE — TELEPHONE ENCOUNTER
----- Message from Rosalinda Richards sent at 3/18/2022 10:13 AM CDT -----  Regarding: Follow up  Contact: [phone]  Patient is following s following up on request for a  walker with seat and wheels.  Order was to go to [facility].  Please call and advise is order was sent in.

## 2022-03-23 ENCOUNTER — TELEPHONE (OUTPATIENT)
Dept: INTERNAL MEDICINE | Facility: CLINIC | Age: 87
End: 2022-03-23
Payer: MEDICARE

## 2022-03-23 DIAGNOSIS — G89.29 CHRONIC LOW BACK PAIN WITHOUT SCIATICA, UNSPECIFIED BACK PAIN LATERALITY: ICD-10-CM

## 2022-03-23 DIAGNOSIS — M81.0 SENILE OSTEOPOROSIS: Primary | ICD-10-CM

## 2022-03-23 DIAGNOSIS — M54.50 CHRONIC LOW BACK PAIN WITHOUT SCIATICA, UNSPECIFIED BACK PAIN LATERALITY: ICD-10-CM

## 2022-03-23 NOTE — TELEPHONE ENCOUNTER
LM for pt's caregiver Vicky to return a call to us on tomorrow to give an update on the Rollator.    ----- Message from Norma Velázquez sent at 3/23/2022  9:51 AM CDT -----  Contact: [phone]@ Patient  Good Morning  Patient would like a call due to orders for walker    Please call and advise

## 2022-03-24 RX ORDER — CALCIUM CARBONATE 160(400)MG
TABLET,CHEWABLE ORAL
Qty: 1 EACH | Refills: 0 | Status: SHIPPED | OUTPATIENT
Start: 2022-03-24 | End: 2022-06-08 | Stop reason: ALTCHOICE

## 2022-03-24 RX ORDER — CALCIUM CARBONATE 160(400)MG
TABLET,CHEWABLE ORAL
Qty: 1 EACH | Refills: 0 | Status: SHIPPED | OUTPATIENT
Start: 2022-03-24 | End: 2022-03-24 | Stop reason: SDUPTHER

## 2022-03-24 NOTE — TELEPHONE ENCOUNTER
----- Message from Renita Santillan sent at 3/24/2022  9:42 AM CDT -----  Regarding: DME Order  Good Morning,      We received an order for a rollator however order is missing Dx code. Please update order and fax it back to us .         Thanks   Anais Santillan

## 2022-03-24 NOTE — TELEPHONE ENCOUNTER
[facility]. How can I find the Fax number for Lexi

## 2022-03-25 ENCOUNTER — TELEPHONE (OUTPATIENT)
Dept: INTERNAL MEDICINE | Facility: CLINIC | Age: 87
End: 2022-03-25
Payer: MEDICARE

## 2022-03-25 NOTE — TELEPHONE ENCOUNTER
Spoke to Ronda esposito/ [facility] re:Rollator order. She explained it was rec'd but it doesn't have a Dx code.   I reviewed the information and the Dx Code G30.0 is on the form, refaxed the order w/Dx code and office notes.

## 2022-04-19 ENCOUNTER — PES CALL (OUTPATIENT)
Dept: ADMINISTRATIVE | Facility: CLINIC | Age: 87
End: 2022-04-19
Payer: MEDICARE

## 2022-05-11 DIAGNOSIS — Z78.0 ASYMPTOMATIC MENOPAUSAL STATE: ICD-10-CM

## 2022-05-25 ENCOUNTER — PATIENT OUTREACH (OUTPATIENT)
Dept: ADMINISTRATIVE | Facility: HOSPITAL | Age: 87
End: 2022-05-25
Payer: MEDICARE

## 2022-06-02 ENCOUNTER — OFFICE VISIT (OUTPATIENT)
Dept: INTERNAL MEDICINE | Facility: CLINIC | Age: 87
End: 2022-06-02
Payer: MEDICARE

## 2022-06-02 VITALS
BODY MASS INDEX: 31.96 KG/M2 | SYSTOLIC BLOOD PRESSURE: 174 MMHG | DIASTOLIC BLOOD PRESSURE: 76 MMHG | HEIGHT: 66 IN | HEART RATE: 64 BPM | WEIGHT: 198.88 LBS

## 2022-06-02 DIAGNOSIS — K59.09 CHRONIC CONSTIPATION: Primary | ICD-10-CM

## 2022-06-02 DIAGNOSIS — E66.9 OBESITY (BMI 30-39.9): ICD-10-CM

## 2022-06-02 PROCEDURE — 99999 PR PBB SHADOW E&M-EST. PATIENT-LVL IV: ICD-10-PCS | Mod: PBBFAC,,, | Performed by: NURSE PRACTITIONER

## 2022-06-02 PROCEDURE — 1101F PR PT FALLS ASSESS DOC 0-1 FALLS W/OUT INJ PAST YR: ICD-10-PCS | Mod: CPTII,S$GLB,, | Performed by: NURSE PRACTITIONER

## 2022-06-02 PROCEDURE — 99214 OFFICE O/P EST MOD 30 MIN: CPT | Mod: S$GLB,,, | Performed by: NURSE PRACTITIONER

## 2022-06-02 PROCEDURE — 1126F PR PAIN SEVERITY QUANTIFIED, NO PAIN PRESENT: ICD-10-PCS | Mod: CPTII,S$GLB,, | Performed by: NURSE PRACTITIONER

## 2022-06-02 PROCEDURE — 1159F PR MEDICATION LIST DOCUMENTED IN MEDICAL RECORD: ICD-10-PCS | Mod: CPTII,S$GLB,, | Performed by: NURSE PRACTITIONER

## 2022-06-02 PROCEDURE — 1160F RVW MEDS BY RX/DR IN RCRD: CPT | Mod: CPTII,S$GLB,, | Performed by: NURSE PRACTITIONER

## 2022-06-02 PROCEDURE — 1159F MED LIST DOCD IN RCRD: CPT | Mod: CPTII,S$GLB,, | Performed by: NURSE PRACTITIONER

## 2022-06-02 PROCEDURE — 3288F FALL RISK ASSESSMENT DOCD: CPT | Mod: CPTII,S$GLB,, | Performed by: NURSE PRACTITIONER

## 2022-06-02 PROCEDURE — 1101F PT FALLS ASSESS-DOCD LE1/YR: CPT | Mod: CPTII,S$GLB,, | Performed by: NURSE PRACTITIONER

## 2022-06-02 PROCEDURE — 99999 PR PBB SHADOW E&M-EST. PATIENT-LVL IV: CPT | Mod: PBBFAC,,, | Performed by: NURSE PRACTITIONER

## 2022-06-02 PROCEDURE — 3288F PR FALLS RISK ASSESSMENT DOCUMENTED: ICD-10-PCS | Mod: CPTII,S$GLB,, | Performed by: NURSE PRACTITIONER

## 2022-06-02 PROCEDURE — 1160F PR REVIEW ALL MEDS BY PRESCRIBER/CLIN PHARMACIST DOCUMENTED: ICD-10-PCS | Mod: CPTII,S$GLB,, | Performed by: NURSE PRACTITIONER

## 2022-06-02 PROCEDURE — 99214 PR OFFICE/OUTPT VISIT, EST, LEVL IV, 30-39 MIN: ICD-10-PCS | Mod: S$GLB,,, | Performed by: NURSE PRACTITIONER

## 2022-06-02 PROCEDURE — 1126F AMNT PAIN NOTED NONE PRSNT: CPT | Mod: CPTII,S$GLB,, | Performed by: NURSE PRACTITIONER

## 2022-06-02 NOTE — PATIENT INSTRUCTIONS
Start Linzess 145mg 1 tab daily for chronic constipation    Increase fiber in diet    Continue Metamucil    Return in 4 weeks with PCP Dr. Graham for f/u on constipation

## 2022-06-02 NOTE — PROGRESS NOTES
Subjective:       Patient ID: Laila Hanna is a 89 y.o. female.    Chief Complaint: Constipation    Pt of Dr. Graham, here for constipation. Present with her sitter Vicky. Vicky states pt has had this issue since back in October when she started sitting with pt. Tried metamucil but pt refuses to take it as she reads side effects on everything per sitter. Took 2 Ex Lax Monday and had her LBM on Tuesday.     Constipation  This is a chronic problem. The current episode started more than 1 month ago (started in October per sitter). The problem has been waxing and waning since onset. Her stool frequency is 2 to 3 times per week (with laxative). The stool is described as firm. The patient is on a high fiber diet. She does not exercise regularly. There has been adequate water intake. Associated symptoms include abdominal pain and flatus. Pertinent negatives include no anorexia, back pain, bloating, diarrhea, difficulty urinating, fecal incontinence, fever, hematochezia, hemorrhoids, melena, nausea, rectal pain, vomiting or weight loss. Risk factors include obesity and immobility. She has tried laxatives and fiber (took 2 X-lax Monday, Miralax, tried Metamucil once) for the symptoms. The treatment provided mild relief.     Review of Systems   Constitutional: Negative for activity change, appetite change, chills, diaphoresis, fatigue, fever, unexpected weight change and weight loss.   Respiratory: Negative for cough, chest tightness and shortness of breath.    Cardiovascular: Negative for chest pain, palpitations, leg swelling and claudication.   Gastrointestinal: Positive for abdominal pain, constipation and flatus. Negative for abdominal distention, anal bleeding, anorexia, bloating, blood in stool, change in bowel habit, diarrhea, hematochezia, hemorrhoids, melena, nausea, rectal pain, vomiting, reflux, fecal incontinence and change in bowel habit.        As documented in HPI     Endocrine: Negative for cold  intolerance, heat intolerance, polydipsia, polyphagia and polyuria.   Genitourinary: Negative for difficulty urinating and flank pain.   Musculoskeletal: Negative for back pain.   Allergic/Immunologic: Negative for environmental allergies, food allergies and immunocompromised state.   Neurological: Negative for dizziness, weakness, light-headedness, numbness and headaches.   Hematological: Negative for adenopathy. Does not bruise/bleed easily.   Psychiatric/Behavioral: Negative for suicidal ideas.     Review of patient's allergies indicates:   Allergen Reactions    Thiazides Other (See Comments)     Severe hyponatremia     Current Outpatient Medications:     alendronate (FOSAMAX) 70 MG tablet, TAKE 1 TABLET BY MOUTH EVERY 7 DAYS, Disp: 12 tablet, Rfl: 1    aspirin (ECOTRIN) 81 MG EC tablet, Take 1 tablet (81 mg total) by mouth once daily., Disp: , Rfl:     atorvastatin (LIPITOR) 40 MG tablet, TAKE 1 TABLET EVERY DAY, Disp: 90 tablet, Rfl: 1    calcium-vitamin D3 (OS-DANICA 500 + D3) 500 mg-5 mcg (200 unit) per tablet, Take 1 tablet by mouth once daily. , Disp: , Rfl:     carvediloL (COREG) 12.5 MG tablet, TAKE 1 TABLET TWICE DAILY, Disp: 180 tablet, Rfl: 0    cyanocobalamin (VITAMIN B-12) 100 MCG tablet, Take 100 mcg by mouth once daily., Disp: , Rfl:     dextran 70-hypromellose (ARTIFICIAL TEARS) ophthalmic solution, Place 1 drop into both eyes every 2 (two) hours as needed., Disp: , Rfl: 0    levothyroxine (SYNTHROID) 150 MCG tablet, TAKE 1 TABLET EVERY DAY, Disp: 90 tablet, Rfl: 0    losartan (COZAAR) 50 MG tablet, Take 1 tablet (50 mg total) by mouth once daily., Disp: 90 tablet, Rfl: 3    melatonin 10 mg Cap, Take by mouth., Disp: , Rfl:     polyethylene glycol 3350 (MIRALAX ORAL), Take by mouth once daily., Disp: , Rfl:     torsemide (DEMADEX) 10 MG Tab, TAKE 1 TABLET(10 MG) BY MOUTH EVERY DAY, Disp: 90 tablet, Rfl: 3    walker (ULTRA-LIGHT ROLLATOR) Misc, Use daily G30.0, Disp: 1 each, Rfl:  0    Patient Active Problem List   Diagnosis    Hypothyroidism due to acquired atrophy of thyroid    Hyponatremia    Meningioma    Low back pain without sciatica    SIADH (syndrome of inappropriate ADH production)    Essential hypertension    Anxiety    Hyperlipemia    Slow transit constipation    Cognitive dysfunction    Onychocryptosis    Onychomycosis due to dermatophyte    Coronary artery disease involving native coronary artery of native heart without angina pectoris    History of PTCA    Dysphagia    Hordeolum internum of right lower eyelid    Aortic atherosclerosis    Left ventricular diastolic dysfunction with preserved systolic function    Pulmonary heart disease    Bilateral carotid artery disease    Recurrent major depressive disorder, in full remission    History of transient ischemic attack (TIA)    Senile osteoporosis    Nonrheumatic aortic valve stenosis    Entropion of right eyelid    Metabolic bone disease    Coronary artery disease involving native coronary artery of native heart    Sicca syndrome    Early onset Alzheimer's disease with behavioral disturbance     Past Medical History:   Diagnosis Date    Basal cell carcinoma     nose    Benign essential hypertension     Cerebral microvascular disease 9/8/2014    Closed fracture of distal phalanx of left hand with routine healing 9/17/2015    Closed mallet fracture of distal phalanx of finger with routine healing 10/6/2015    Coronary artery disease     DDD (degenerative disc disease), lumbar 4/12/2016    Depression     Fall at home 5/30/2016    Hyperlipidemia     Hypothyroidism due to acquired atrophy of thyroid     Meningiomas, multiple     MI (myocardial infarction) 2004    80% blockage per patient    Migraine aura without headache 12/1/2014    Post PTCA 12/12/2012    Transient cerebral ischemia 5/4/2014     Past Surgical History:   Procedure Laterality Date    CARDIAC CATHETERIZATION  03/29/2004     "S/P LAD PTCA, coated stent    CATARACT EXTRACTION W/  INTRAOCULAR LENS IMPLANT Bilateral     Dr Youssef     CHOLECYSTECTOMY      CORONARY ANGIOPLASTY WITH STENT PLACEMENT      LAD    EYE SURGERY      TOTAL THYROIDECTOMY       Social History     Socioeconomic History    Marital status: Single   Occupational History     Employer: [employer]   Tobacco Use    Smoking status: Former Smoker     Packs/day: 0.30     Years: 15.00     Pack years: 4.50     Types: Cigarettes     Quit date: 1973     Years since quittin.3    Smokeless tobacco: Never Used   Substance and Sexual Activity    Alcohol use: No     Alcohol/week: 0.0 standard drinks    Drug use: No    Sexual activity: Never     Family History   Problem Relation Age of Onset    Stroke Mother     Diabetes Mother     Hypertension Father     Stroke Father     Cancer Brother         colon    Skin cancer Brother     Colon cancer Brother     Diabetes Paternal Grandmother     Cancer Sister         breast    Dementia Sister     Glaucoma Maternal Aunt     No Known Problems Maternal Uncle     No Known Problems Paternal Aunt     No Known Problems Paternal Uncle     No Known Problems Maternal Grandmother     No Known Problems Maternal Grandfather     No Known Problems Paternal Grandfather     Amblyopia Neg Hx     Blindness Neg Hx     Cataracts Neg Hx     Macular degeneration Neg Hx     Retinal detachment Neg Hx     Strabismus Neg Hx     Thyroid disease Neg Hx     Esophageal cancer Neg Hx            Objective:       Vitals:    22 1432   BP: (!) 174/76   Pulse: 64   Weight: 90.2 kg (198 lb 13.7 oz)   Height: 5' 6" (1.676 m)   PainSc: 0-No pain     Body mass index is 32.1 kg/m².    Physical Exam  Vitals and nursing note reviewed.   Constitutional:       Appearance: She is obese.   HENT:      Head: Normocephalic.      Nose: Nose normal.      Mouth/Throat:      Mouth: Mucous membranes are moist.      Pharynx: " Oropharynx is clear.   Eyes:      Conjunctiva/sclera: Conjunctivae normal.   Cardiovascular:      Rate and Rhythm: Normal rate and regular rhythm.      Pulses: Normal pulses.      Heart sounds: Normal heart sounds.   Pulmonary:      Effort: Pulmonary effort is normal.      Breath sounds: Normal breath sounds.   Abdominal:      General: Bowel sounds are normal.      Palpations: Abdomen is soft.      Tenderness: There is abdominal tenderness in the epigastric area.   Musculoskeletal:         General: Normal range of motion.      Cervical back: Normal range of motion.      Right lower le+ Edema present.      Left lower le+ Edema present.      Comments: Walks with rolling walker   Neurological:      General: No focal deficit present.      Mental Status: She is alert and oriented to person, place, and time.   Psychiatric:         Mood and Affect: Mood normal.         Behavior: Behavior normal.         Thought Content: Thought content normal.         Judgment: Judgment normal.         Assessment:       Problem List Items Addressed This Visit    None     Visit Diagnoses     Chronic constipation    -  Primary    Relevant Medications    linaCLOtide (LINZESS) 145 mcg Cap capsule    BMI 32.0-32.9,adult        Obesity (BMI 30-39.9)              Plan:       Laila was seen today for constipation.    Diagnoses and all orders for this visit:    Chronic constipation  -     linaCLOtide (LINZESS) 145 mcg Cap capsule; Take 1 capsule (145 mcg total) by mouth before breakfast.    BMI 32.0-32.9,adult  BMI reviewed    Obesity (BMI 30-39.9)  BMI reviewed.    Diet and exercise to lose weight.    Start Linzess 145mg 1 tab daily for chronic constipation    Increase fiber in diet    Continue Metamucil    Return in 4 weeks with PCP Dr. Garham for f/u on constipation    Self care instructions provided in AVS    Follow up in about 4 weeks (around 2022) for follow up on chronic constipation.

## 2022-06-07 ENCOUNTER — OFFICE VISIT (OUTPATIENT)
Dept: HOME HEALTH SERVICES | Facility: CLINIC | Age: 87
End: 2022-06-07
Payer: MEDICARE

## 2022-06-07 VITALS
SYSTOLIC BLOOD PRESSURE: 110 MMHG | HEART RATE: 57 BPM | BODY MASS INDEX: 31.82 KG/M2 | HEIGHT: 66 IN | WEIGHT: 198 LBS | OXYGEN SATURATION: 98 % | DIASTOLIC BLOOD PRESSURE: 74 MMHG

## 2022-06-07 DIAGNOSIS — E22.2 SIADH (SYNDROME OF INAPPROPRIATE ADH PRODUCTION): ICD-10-CM

## 2022-06-07 DIAGNOSIS — D32.9 MENINGIOMA: ICD-10-CM

## 2022-06-07 DIAGNOSIS — I70.0 AORTIC ATHEROSCLEROSIS: ICD-10-CM

## 2022-06-07 DIAGNOSIS — G30.0 EARLY ONSET ALZHEIMER'S DISEASE WITH BEHAVIORAL DISTURBANCE: ICD-10-CM

## 2022-06-07 DIAGNOSIS — I27.9 PULMONARY HEART DISEASE: ICD-10-CM

## 2022-06-07 DIAGNOSIS — F33.42 RECURRENT MAJOR DEPRESSIVE DISORDER, IN FULL REMISSION: ICD-10-CM

## 2022-06-07 DIAGNOSIS — Z00.00 ENCOUNTER FOR PREVENTIVE HEALTH EXAMINATION: Primary | ICD-10-CM

## 2022-06-07 DIAGNOSIS — F02.818 EARLY ONSET ALZHEIMER'S DISEASE WITH BEHAVIORAL DISTURBANCE: ICD-10-CM

## 2022-06-07 DIAGNOSIS — M35.00 SICCA SYNDROME: ICD-10-CM

## 2022-06-07 DIAGNOSIS — Z74.09 OTHER REDUCED MOBILITY: ICD-10-CM

## 2022-06-07 PROCEDURE — 1160F RVW MEDS BY RX/DR IN RCRD: CPT | Mod: CPTII,S$GLB,, | Performed by: NURSE PRACTITIONER

## 2022-06-07 PROCEDURE — 99499 UNLISTED E&M SERVICE: CPT | Mod: S$GLB,,, | Performed by: NURSE PRACTITIONER

## 2022-06-07 PROCEDURE — G0439 PPPS, SUBSEQ VISIT: HCPCS | Mod: S$GLB,,, | Performed by: NURSE PRACTITIONER

## 2022-06-07 PROCEDURE — 1101F PT FALLS ASSESS-DOCD LE1/YR: CPT | Mod: CPTII,S$GLB,, | Performed by: NURSE PRACTITIONER

## 2022-06-07 PROCEDURE — G0439 PR MEDICARE ANNUAL WELLNESS SUBSEQUENT VISIT: ICD-10-PCS | Mod: S$GLB,,, | Performed by: NURSE PRACTITIONER

## 2022-06-07 PROCEDURE — 3288F PR FALLS RISK ASSESSMENT DOCUMENTED: ICD-10-PCS | Mod: CPTII,S$GLB,, | Performed by: NURSE PRACTITIONER

## 2022-06-07 PROCEDURE — 3288F FALL RISK ASSESSMENT DOCD: CPT | Mod: CPTII,S$GLB,, | Performed by: NURSE PRACTITIONER

## 2022-06-07 PROCEDURE — 1159F PR MEDICATION LIST DOCUMENTED IN MEDICAL RECORD: ICD-10-PCS | Mod: CPTII,S$GLB,, | Performed by: NURSE PRACTITIONER

## 2022-06-07 PROCEDURE — 99499 RISK ADDL DX/OHS AUDIT: ICD-10-PCS | Mod: S$GLB,,, | Performed by: NURSE PRACTITIONER

## 2022-06-07 PROCEDURE — 1160F PR REVIEW ALL MEDS BY PRESCRIBER/CLIN PHARMACIST DOCUMENTED: ICD-10-PCS | Mod: CPTII,S$GLB,, | Performed by: NURSE PRACTITIONER

## 2022-06-07 PROCEDURE — 1101F PR PT FALLS ASSESS DOC 0-1 FALLS W/OUT INJ PAST YR: ICD-10-PCS | Mod: CPTII,S$GLB,, | Performed by: NURSE PRACTITIONER

## 2022-06-07 PROCEDURE — 1159F MED LIST DOCD IN RCRD: CPT | Mod: CPTII,S$GLB,, | Performed by: NURSE PRACTITIONER

## 2022-06-09 NOTE — PATIENT INSTRUCTIONS
Counseling and Referral of Other Preventative  (Italic type indicates deductible and co-insurance are waived)    Patient Name: Laila Hanna  Today's Date: 6/8/2022    Health Maintenance       Date Due Completion Date    Shingles Vaccine (2 of 3) 03/27/2009 1/30/2009    TETANUS VACCINE 03/28/2018 3/28/2008    COVID-19 Vaccine (2 - Booster for Walter series) 04/30/2021 3/5/2021    Lipid Panel 07/14/2021 7/14/2020    DEXA Scan 08/21/2021 8/21/2019    Override on 8/23/2012: Not Clinically Appropriate    Influenza Vaccine (Season Ended) 09/01/2022 11/13/2020    Aspirin/Antiplatelet Therapy 06/07/2023 6/7/2022        No orders of the defined types were placed in this encounter.    The following information is provided to all patients.  This information is to help you find resources for any of the problems found today that may be affecting your health:                Living healthy guide: www.[facility].[place].[facility]      Understanding Diabetes: www.diabetes.org      Eating healthy: www.cdc.gov/healthyweight      [facility] home safety checklist: www.cdc.gov/steadi/patient.html      Agency on Aging: www.goea.[place].gov      Alcoholics anonymous (AA): www.aa.org      Physical Activity: www.sage.nih.gov/zw7nsct      Tobacco use: www.quitwithusla.org

## 2022-06-09 NOTE — PROGRESS NOTES
"  Laila Hanna presented for a  Medicare AWV and comprehensive Health Risk Assessment today. The following components were reviewed and updated:    · Medical history  · Family History  · Social history  · Allergies and Current Medications  · Health Risk Assessment  · Health Maintenance  · Care Team         ** See Completed Assessments for Annual Wellness Visit within the encounter summary.**         The following assessments were completed:  · Living Situation  · CAGE  · Depression Screening  · Timed Get Up and Go  · Whisper Test  · Cognitive Function Screening  · Nutrition Screening  · ADL Screening  · PAQ Screening        Vitals:    06/07/22 1142   BP: 110/74   Pulse: (!) 57   SpO2: 98%   Weight: 89.8 kg (198 lb)   Height: 5' 6" (1.676 m)     Body mass index is 31.96 kg/m².  Physical Exam  Constitutional:       Appearance: Normal appearance.   HENT:      Head: Normocephalic and atraumatic.      Nose: Nose normal.   Eyes:      Extraocular Movements: Extraocular movements intact.      Pupils: Pupils are equal, round, and reactive to light.   Cardiovascular:      Rate and Rhythm: Normal rate and regular rhythm.      Pulses: Normal pulses.      Heart sounds: Normal heart sounds.   Pulmonary:      Effort: Pulmonary effort is normal.      Breath sounds: Normal breath sounds.   Musculoskeletal:      Cervical back: Normal range of motion and neck supple.   Neurological:      Mental Status: She is alert.               Diagnoses and health risks identified today and associated recommendations/orders:    1. Encounter for preventive health examination  awv completed      2. Meningioma  Chronic and stable. Continue current treatment. Follow with PCP.      3. SIADH (syndrome of inappropriate ADH production)  Chronic and stable. Continue current treatment. Follow with PCP.      4. Sicca syndrome  Chronic and stable. Continue current treatment. Follow with PCP.  Artificial tears    5. Recurrent major depressive disorder, in full " remission  Chronic and stable. Continue current treatment. Follow with PCP.  Has been on various meds     6. Pulmonary heart disease  Chronic and stable. Continue current treatment. Follow with PCP.      7. Aortic atherosclerosis  Chronic and stable. Continue current treatment. Follow with PCP.      8. Early onset Alzheimer's disease with behavioral disturbance  Chronic and stable. Continue current treatment. Follow with PCP.  Lives alone with caregivers    9. Other reduced mobility  Chronic and stable. Continue current treatment. Follow with PCP.      10. BMI 31.0-31.9,adult  Chronic and stable. Continue current treatment. Follow with PCP.      Provided Laila with a 5-10 year written screening schedule and personal prevention plan. Recommendations were developed using the USPSTF age appropriate recommendations. Education, counseling, and referrals were provided as needed. After Visit Summary printed and given to patient which includes a list of additional screenings\tests needed.    Fu in 1 yr for EMETERIO Matamoros NP  I offered to discuss advanced care planning, including how to pick a person who would make decisions for you if you were unable to make them for yourself, called a health care power of , and what kind of decisions you might make such as use of life sustaining treatments such as ventilators and tube feeding when faced with a life limiting illness recorded on a living will that they will need to know. (How you want to be cared for as you near the end of your natural life)     X  Patient is unable to engage in a discussion regarding advanced directives due to severe physical and/or cognitive impairment.

## 2022-06-10 ENCOUNTER — TELEPHONE (OUTPATIENT)
Dept: INTERNAL MEDICINE | Facility: CLINIC | Age: 87
End: 2022-06-10
Payer: MEDICARE

## 2022-06-10 ENCOUNTER — PATIENT OUTREACH (OUTPATIENT)
Dept: ADMINISTRATIVE | Facility: HOSPITAL | Age: 87
End: 2022-06-10
Payer: MEDICARE

## 2022-06-10 NOTE — PROGRESS NOTES
[facility] [employer].    Spoke with Mrs. Hanna regarding need for DEXA.  She does not wish to schedule at this time.  She will discuss at next visit.

## 2022-06-10 NOTE — TELEPHONE ENCOUNTER
----- Message from Feli Bowles sent at 6/9/2022  4:30 PM CDT -----  Needs advice from nurse:      Who Called:pt  Regarding:was told to f/u in a month, first available is 8/25  Would the patient rather a call back or VIA [facility]?  Best Call Back number:[phone]  Additional Info:

## 2022-06-20 ENCOUNTER — TELEPHONE (OUTPATIENT)
Dept: INTERNAL MEDICINE | Facility: CLINIC | Age: 87
End: 2022-06-20
Payer: MEDICARE

## 2022-06-20 DIAGNOSIS — K59.00 CONSTIPATION, UNSPECIFIED CONSTIPATION TYPE: Primary | ICD-10-CM

## 2022-06-20 NOTE — TELEPHONE ENCOUNTER
----- Message from Martha Bond sent at 6/20/2022  7:39 AM CDT -----  Contact: self/[phone]  Pt called in regards to the Rx for linzess is not working and she would like to talk to the dr. Call back    Please advice     18-Mar-2022

## 2022-06-22 ENCOUNTER — TELEPHONE (OUTPATIENT)
Dept: INTERNAL MEDICINE | Facility: CLINIC | Age: 87
End: 2022-06-22
Payer: MEDICARE

## 2022-06-22 NOTE — TELEPHONE ENCOUNTER
----- Message from Meghan Hernandez sent at 6/22/2022  9:22 AM CDT -----  Contact: [phone]  Pt states she took linzess and she states she can not go to the bathroom she states it is not helping her please give return call she is asking

## 2022-06-30 ENCOUNTER — HOSPITAL ENCOUNTER (EMERGENCY)
Facility: HOSPITAL | Age: 87
Discharge: HOME | End: 2022-06-30
Attending: EMERGENCY MEDICINE
Payer: MEDICARE

## 2022-06-30 VITALS
HEART RATE: 59 BPM | TEMPERATURE: 98 F | RESPIRATION RATE: 12 BRPM | OXYGEN SATURATION: 98 % | SYSTOLIC BLOOD PRESSURE: 150 MMHG | DIASTOLIC BLOOD PRESSURE: 67 MMHG

## 2022-06-30 DIAGNOSIS — K59.09 CHRONIC CONSTIPATION: Primary | ICD-10-CM

## 2022-06-30 DIAGNOSIS — R10.9 ABDOMINAL PAIN: ICD-10-CM

## 2022-06-30 LAB
ALBUMIN SERPL BCP-MCNC: 3.3 G/DL (ref 3.5–5.2)
ALP SERPL-CCNC: 90 U/L (ref 55–135)
ALT SERPL W/O P-5'-P-CCNC: 11 U/L (ref 10–44)
ANION GAP SERPL CALC-SCNC: 8 MMOL/L (ref 8–16)
AST SERPL-CCNC: 16 U/L (ref 10–40)
BACTERIA #/AREA URNS AUTO: NORMAL /HPF
BASOPHILS # BLD AUTO: 0.03 K/UL (ref 0–0.2)
BASOPHILS NFR BLD: 0.3 % (ref 0–1.9)
BILIRUB SERPL-MCNC: 1.1 MG/DL (ref 0.1–1)
BILIRUB UR QL STRIP: NEGATIVE
BUN SERPL-MCNC: 11 MG/DL (ref 8–23)
CALCIUM SERPL-MCNC: 9.2 MG/DL (ref 8.7–10.5)
CHLORIDE SERPL-SCNC: 97 MMOL/L (ref 95–110)
CLARITY UR REFRACT.AUTO: ABNORMAL
CO2 SERPL-SCNC: 28 MMOL/L (ref 23–29)
COLOR UR AUTO: YELLOW
CREAT SERPL-MCNC: 0.7 MG/DL (ref 0.5–1.4)
DIFFERENTIAL METHOD: ABNORMAL
EOSINOPHIL # BLD AUTO: 0.1 K/UL (ref 0–0.5)
EOSINOPHIL NFR BLD: 0.5 % (ref 0–8)
ERYTHROCYTE [DISTWIDTH] IN BLOOD BY AUTOMATED COUNT: 12.5 % (ref 11.5–14.5)
EST. GFR  (AFRICAN AMERICAN): >60 ML/MIN/1.73 M^2
EST. GFR  (NON AFRICAN AMERICAN): >60 ML/MIN/1.73 M^2
GLUCOSE SERPL-MCNC: 105 MG/DL (ref 70–110)
GLUCOSE UR QL STRIP: NEGATIVE
HCT VFR BLD AUTO: 37.1 % (ref 37–48.5)
HGB BLD-MCNC: 12.5 G/DL (ref 12–16)
HGB UR QL STRIP: ABNORMAL
IMM GRANULOCYTES # BLD AUTO: 0.07 K/UL (ref 0–0.04)
IMM GRANULOCYTES NFR BLD AUTO: 0.6 % (ref 0–0.5)
KETONES UR QL STRIP: NEGATIVE
LEUKOCYTE ESTERASE UR QL STRIP: NEGATIVE
LYMPHOCYTES # BLD AUTO: 1.2 K/UL (ref 1–4.8)
LYMPHOCYTES NFR BLD: 11.3 % (ref 18–48)
MCH RBC QN AUTO: 31.6 PG (ref 27–31)
MCHC RBC AUTO-ENTMCNC: 33.7 G/DL (ref 32–36)
MCV RBC AUTO: 94 FL (ref 82–98)
MICROSCOPIC COMMENT: NORMAL
MONOCYTES # BLD AUTO: 1 K/UL (ref 0.3–1)
MONOCYTES NFR BLD: 9.3 % (ref 4–15)
NEUTROPHILS # BLD AUTO: 8.6 K/UL (ref 1.8–7.7)
NEUTROPHILS NFR BLD: 78 % (ref 38–73)
NITRITE UR QL STRIP: NEGATIVE
NRBC BLD-RTO: 0 /100 WBC
PH UR STRIP: 5 [PH] (ref 5–8)
PLATELET # BLD AUTO: 259 K/UL (ref 150–450)
PMV BLD AUTO: 11.2 FL (ref 9.2–12.9)
POTASSIUM SERPL-SCNC: 3.8 MMOL/L (ref 3.5–5.1)
PROT SERPL-MCNC: 7.1 G/DL (ref 6–8.4)
PROT UR QL STRIP: NEGATIVE
RBC # BLD AUTO: 3.95 M/UL (ref 4–5.4)
RBC #/AREA URNS AUTO: 3 /HPF (ref 0–4)
SODIUM SERPL-SCNC: 133 MMOL/L (ref 136–145)
SP GR UR STRIP: 1 (ref 1–1.03)
SQUAMOUS #/AREA URNS AUTO: 0 /HPF
URN SPEC COLLECT METH UR: ABNORMAL
WBC # BLD AUTO: 10.97 K/UL (ref 3.9–12.7)
WBC #/AREA URNS AUTO: 1 /HPF (ref 0–5)

## 2022-06-30 PROCEDURE — 99284 EMERGENCY DEPT VISIT MOD MDM: CPT | Mod: ,,, | Performed by: EMERGENCY MEDICINE

## 2022-06-30 PROCEDURE — 99284 EMERGENCY DEPT VISIT MOD MDM: CPT

## 2022-06-30 PROCEDURE — 93005 ELECTROCARDIOGRAM TRACING: CPT

## 2022-06-30 PROCEDURE — 93010 ELECTROCARDIOGRAM REPORT: CPT | Mod: ,,, | Performed by: INTERNAL MEDICINE

## 2022-06-30 PROCEDURE — 93010 EKG 12-LEAD: ICD-10-PCS | Mod: ,,, | Performed by: INTERNAL MEDICINE

## 2022-06-30 PROCEDURE — 85025 COMPLETE CBC W/AUTO DIFF WBC: CPT | Performed by: EMERGENCY MEDICINE

## 2022-06-30 PROCEDURE — 81001 URINALYSIS AUTO W/SCOPE: CPT | Performed by: EMERGENCY MEDICINE

## 2022-06-30 PROCEDURE — 80053 COMPREHEN METABOLIC PANEL: CPT | Performed by: EMERGENCY MEDICINE

## 2022-06-30 PROCEDURE — 99284 PR EMERGENCY DEPT VISIT,LEVEL IV: ICD-10-PCS | Mod: ,,, | Performed by: EMERGENCY MEDICINE

## 2022-06-30 NOTE — ED PROVIDER NOTES
ED Resident HAND-OFF NOTE:  4:56 PM 6/30/2022  Laila Hanna is a 89 y.o. female who presented to the ED on 6/30/2022 and has been managed by Dr. Moe, who reports patient C/O constipation, last bowel movement yesterday. I assumed care of patient from off-going ED physician team at 4:56 PM pending Labs with Discharge if stable and within normal limits. Patient Labs within normal limits and stable. Urinalysis negative for signs of uti. Patient stable for discharge.     On my evaluation, Laila Hanna appears well, hemodynamically stable and in NAD. Thus far, Laila Hanna has received:  Medications - No data to display        Disposition: Discharged   I have discussed and counseled Laila Hanna regarding exam, results, diagnosis, treatment, and plan.  ______________________  Anaya Potter MD  Emergency Medicine Resident  4:56 PM 6/30/2022             Anaya Potter MD  Resident   06/30/22 [id number]    As the supervising MD I agree with the above plan and disposition.  Stacia Humphries MD  06/30/22 2022

## 2022-06-30 NOTE — ED TRIAGE NOTES
Laila Hanna, a 89 y.o. female presents to the ED w/ complaint of back pain. Pt reports that she hasn't had a BM x 2 days, family member at bedside reports last BM was yesterday.     Triage note:  Chief Complaint   Patient presents with    Abdominal Pain     No BM x 2 days from home.      Review of patient's allergies indicates:   Allergen Reactions    Thiazides Other (See Comments)     Severe hyponatremia     Past Medical History:   Diagnosis Date    Basal cell carcinoma     nose    Benign essential hypertension     Cerebral microvascular disease 9/8/2014    Closed fracture of distal phalanx of left hand with routine healing 9/17/2015    Closed mallet fracture of distal phalanx of finger with routine healing 10/6/2015    Coronary artery disease     DDD (degenerative disc disease), lumbar 4/12/2016    Depression     Fall at home 5/30/2016    Hyperlipidemia     Hypothyroidism due to acquired atrophy of thyroid     Meningiomas, multiple     MI (myocardial infarction) 2004    80% blockage per patient    Migraine aura without headache 12/1/2014    Post PTCA 12/12/2012    Transient cerebral ischemia 5/4/2014

## 2022-06-30 NOTE — ED PROVIDER NOTES
Encounter Date: 6/30/2022       History     Chief Complaint   Patient presents with    Abdominal Pain     No BM x 2 days from home.      Laila Hanna is a 89 y/o F with hx of chronic constipation, chronic back pain, HTN, CAD, HLD, TIA, hypothyroidism who presents with constipation and abdominal pain. The pt states her last BM was two days ago following an enema, however her caretaker at bedside states the pt had a BM yesterday. She was started on linzess three weeks ago by her PCP. She does not consistently take any laxatives/stool softeners. She also endorses nonspecific lower abdominal and back pain when she moves or stands for too long, states this is her chronic pain. She denies fever, chills, SOB, nausea/vomiting, dysuria. Pt's caretaker reports that the pt becomes anxious easily, and they were unable to wait until her PCP appointment on 7/5.        Review of patient's allergies indicates:   Allergen Reactions    Thiazides Other (See Comments)     Severe hyponatremia     Past Medical History:   Diagnosis Date    Basal cell carcinoma     nose    Benign essential hypertension     Cerebral microvascular disease 9/8/2014    Closed fracture of distal phalanx of left hand with routine healing 9/17/2015    Closed mallet fracture of distal phalanx of finger with routine healing 10/6/2015    Coronary artery disease     DDD (degenerative disc disease), lumbar 4/12/2016    Depression     Fall at home 5/30/2016    Hyperlipidemia     Hypothyroidism due to acquired atrophy of thyroid     Meningiomas, multiple     MI (myocardial infarction) 2004    80% blockage per patient    Migraine aura without headache 12/1/2014    Post PTCA 12/12/2012    Transient cerebral ischemia 5/4/2014     Past Surgical History:   Procedure Laterality Date    CARDIAC CATHETERIZATION  03/29/2004    S/P LAD PTCA, coated stent    CATARACT EXTRACTION W/  INTRAOCULAR LENS IMPLANT Bilateral 2000    Dr Youssef     CHOLECYSTECTOMY       CORONARY ANGIOPLASTY WITH STENT PLACEMENT      LAD    EYE SURGERY      TOTAL THYROIDECTOMY       Family History   Problem Relation Age of Onset    Stroke Mother     Diabetes Mother     Hypertension Father     Stroke Father     Cancer Brother         colon    Skin cancer Brother     Colon cancer Brother     Diabetes Paternal Grandmother     Cancer Sister         breast    Dementia Sister     Glaucoma Maternal Aunt     No Known Problems Maternal Uncle     No Known Problems Paternal Aunt     No Known Problems Paternal Uncle     No Known Problems Maternal Grandmother     No Known Problems Maternal Grandfather     No Known Problems Paternal Grandfather     Amblyopia Neg Hx     Blindness Neg Hx     Cataracts Neg Hx     Macular degeneration Neg Hx     Retinal detachment Neg Hx     Strabismus Neg Hx     Thyroid disease Neg Hx     Esophageal cancer Neg Hx      Social History     Tobacco Use    Smoking status: Former Smoker     Packs/day: 0.30     Years: 15.00     Pack years: 4.50     Types: Cigarettes     Quit date: 1973     Years since quittin.4    Smokeless tobacco: Never Used   Substance Use Topics    Alcohol use: No     Alcohol/week: 0.0 standard drinks    Drug use: No     Review of Systems   Constitutional: Negative for appetite change, chills and fever.   HENT: Negative for sore throat and trouble swallowing.    Eyes: Negative for visual disturbance.   Respiratory: Negative for shortness of breath and wheezing.    Cardiovascular: Negative for chest pain and palpitations.   Gastrointestinal: Positive for abdominal pain and constipation. Negative for diarrhea, nausea and vomiting.   Genitourinary: Negative for difficulty urinating and dysuria.   Musculoskeletal: Positive for back pain. Negative for neck pain.   Neurological: Negative for syncope and speech difficulty.   Psychiatric/Behavioral: Negative for agitation and confusion.       Physical Exam     Initial Vitals  [06/30/22 1256]   BP Pulse Resp Temp SpO2   (!) 153/78 67 18 98.1 °F (36.7 °C) 97 %      MAP       --         Physical Exam    Nursing note and vitals reviewed.  Constitutional: She appears well-developed and well-nourished. She is not diaphoretic. No distress.   HENT:   Head: Normocephalic and atraumatic.   Eyes: Conjunctivae and EOM are normal.   Neck:   Normal range of motion.  Cardiovascular: Normal rate and regular rhythm.   Pulmonary/Chest: Breath sounds normal. She has no wheezes. She has no rhonchi. She has no rales.   Abdominal: Abdomen is soft. Bowel sounds are normal. She exhibits no distension. There is no abdominal tenderness. There is no rebound and no guarding.   Musculoskeletal:         General: No tenderness or edema.      Cervical back: Normal range of motion.      Comments: No tenderness to palpation of spine or paraspinal areas.      Neurological: She is alert and oriented to person, place, and time.   Skin: Skin is warm and dry.         ED Course   Procedures  Labs Reviewed   CBC W/ AUTO DIFFERENTIAL - Abnormal; Notable for the following components:       Result Value    RBC 3.95 (*)     MCH 31.6 (*)     Immature Granulocytes 0.6 (*)     Gran # (ANC) 8.6 (*)     Immature Grans (Abs) 0.07 (*)     Gran % 78.0 (*)     Lymph % 11.3 (*)     All other components within normal limits   COMPREHENSIVE METABOLIC PANEL - Abnormal; Notable for the following components:    Sodium 133 (*)     Albumin 3.3 (*)     Total Bilirubin 1.1 (*)     All other components within normal limits   URINALYSIS, REFLEX TO URINE CULTURE - Abnormal; Notable for the following components:    Appearance, UA Cloudy (*)     Occult Blood UA 1+ (*)     All other components within normal limits    Narrative:     Specimen Source->Urine   URINALYSIS MICROSCOPIC    Narrative:     Specimen Source->Urine        ECG Results          EKG 12-lead (Final result)  Result time 07/01/22 14:12:54    Final result by Interface, Lab In [facility]  (07/01/22 14:12:54)                 Narrative:    Test Reason : R10.9,    Vent. Rate : 061 BPM     Atrial Rate : 061 BPM     P-R Int : 256 ms          QRS Dur : 118 ms      QT Int : 426 ms       P-R-T Axes : 061 -50 065 degrees     QTc Int : 428 ms    Sinus rhythm with 1st degree A-V block  Left anterior fascicular block  LVH with QRS widening  Cannot rule out Septal infarct (cited on or before 30-JUN-2022)  Possible Lateral infarct ,age undetermined  Abnormal ECG  When compared with ECG of 02-DEC-2019 15:28,  Borderline criteria for Lateral infarct are now Present  Confirmed by TOMAS PAIGE MD ([id number]) on 7/1/2022 2:12:47 PM    Referred By: MARBELLAERR   SELF           Confirmed By:TOMAS PAIGE MD                            Imaging Results          X-Ray Abdomen Flat And Erect (Final result)  Result time 06/30/22 15:18:33    Final result by Julian Land MD (06/30/22 15:18:33)                 Impression:      See above      Electronically signed by: Julian Land MD  Date:    06/30/2022  Time:    15:18             Narrative:    EXAMINATION:  XR ABDOMEN FLAT AND ERECT    CLINICAL HISTORY:  Abdominal Pain;    TECHNIQUE:  Flat and erect AP views of the abdomen were performed.    COMPARISON:  No 11/04/2014 ne    FINDINGS:  No free air in the abdomen.  Scattered bowel gas is identified in the colon and small bowel.  No localized small bowel dilatation noted.  Mild constipation in the right colon.                                 Medications - No data to display  Medical Decision Making:   History:   Old Medical Records: I decided to obtain old medical records.  Initial Assessment:   89 y/o with chronic constipation, presenting with concerns of abdominal pain and constipation. Last BM yesterday per caretaker.   Differential Diagnosis:   Mechanical vs functional bowel obstruction, constipation   Independently Interpreted Test(s):   I have ordered and independently interpreted X-rays - see summary below.       <> Summary of  X-Ray Reading(s): Abdominal x-ray:  Nonspecific bowel gas pattern  Clinical Tests:   Lab Tests: Ordered  Radiological Study: Ordered and Reviewed  ED Management:  CBC, CMP, UA, xray abdomen    Xray with mild constipation in right colon, no localized small bowel dilatation.     Signed out to Dr. Potter. Labs and UA pending.            Attending Attestation:   Physician Attestation Statement for Resident:  As the supervising MD   Physician Attestation Statement: I have personally seen and examined this patient.   I agree with the above history. -: Constipation, back pain   As the supervising MD I agree with the above PE.    As the supervising MD I agree with the above treatment, course, plan, and disposition.                         Clinical Impression:   Final diagnoses:  [R10.9] Abdominal pain  [K59.09] Chronic constipation (Primary)          ED Disposition Condition    Discharge Stable        ED Prescriptions     None        Follow-up Information     Follow up With Specialties Details Why Contact Info    Ama Graham MD Internal Medicine In 1 week  [address]  [place] [address]  [phone]      [facility] - Emergency Dept Emergency Medicine  If symptoms worsen [address] [facility] [id number] [phone]           Lali Teresa MD  Resident  06/30/22 1619       Patricio Moe III, MD  07/01/22 1835       Patricio Moe III, MD  07/01/22 1836

## 2022-07-05 ENCOUNTER — OFFICE VISIT (OUTPATIENT)
Dept: INTERNAL MEDICINE | Facility: CLINIC | Age: 87
End: 2022-07-05
Payer: MEDICARE

## 2022-07-05 ENCOUNTER — LAB VISIT (OUTPATIENT)
Dept: LAB | Facility: HOSPITAL | Age: 87
End: 2022-07-05
Attending: INTERNAL MEDICINE
Payer: MEDICARE

## 2022-07-05 DIAGNOSIS — E78.5 HYPERLIPIDEMIA, UNSPECIFIED HYPERLIPIDEMIA TYPE: ICD-10-CM

## 2022-07-05 DIAGNOSIS — Z00.00 PREVENTATIVE HEALTH CARE: ICD-10-CM

## 2022-07-05 DIAGNOSIS — I10 HYPERTENSION, UNSPECIFIED TYPE: ICD-10-CM

## 2022-07-05 DIAGNOSIS — E66.01 MORBID (SEVERE) OBESITY DUE TO EXCESS CALORIES: ICD-10-CM

## 2022-07-05 DIAGNOSIS — I25.10 CORONARY ARTERY DISEASE, UNSPECIFIED VESSEL OR LESION TYPE, UNSPECIFIED WHETHER ANGINA PRESENT, UNSPECIFIED WHETHER NATIVE OR TRANSPLANTED HEART: ICD-10-CM

## 2022-07-05 DIAGNOSIS — I10 ESSENTIAL HYPERTENSION: ICD-10-CM

## 2022-07-05 DIAGNOSIS — E03.4 HYPOTHYROIDISM DUE TO ACQUIRED ATROPHY OF THYROID: Chronic | ICD-10-CM

## 2022-07-05 DIAGNOSIS — M81.0 OSTEOPOROSIS, UNSPECIFIED OSTEOPOROSIS TYPE, UNSPECIFIED PATHOLOGICAL FRACTURE PRESENCE: ICD-10-CM

## 2022-07-05 DIAGNOSIS — K59.00 CONSTIPATION, UNSPECIFIED CONSTIPATION TYPE: Primary | ICD-10-CM

## 2022-07-05 DIAGNOSIS — S09.90XA HEAD TRAUMA, INITIAL ENCOUNTER: ICD-10-CM

## 2022-07-05 LAB
CHOLEST SERPL-MCNC: 100 MG/DL (ref 120–199)
CHOLEST/HDLC SERPL: 2.7 {RATIO} (ref 2–5)
HDLC SERPL-MCNC: 37 MG/DL (ref 40–75)
HDLC SERPL: 37 % (ref 20–50)
LDLC SERPL CALC-MCNC: 44.8 MG/DL (ref 63–159)
NONHDLC SERPL-MCNC: 63 MG/DL
TRIGL SERPL-MCNC: 91 MG/DL (ref 30–150)
TSH SERPL DL<=0.005 MIU/L-ACNC: 1.52 UIU/ML (ref 0.4–4)

## 2022-07-05 PROCEDURE — 1159F PR MEDICATION LIST DOCUMENTED IN MEDICAL RECORD: ICD-10-PCS | Mod: CPTII,S$GLB,, | Performed by: INTERNAL MEDICINE

## 2022-07-05 PROCEDURE — 99999 PR PBB SHADOW E&M-EST. PATIENT-LVL V: CPT | Mod: PBBFAC,,, | Performed by: INTERNAL MEDICINE

## 2022-07-05 PROCEDURE — 3288F FALL RISK ASSESSMENT DOCD: CPT | Mod: CPTII,S$GLB,, | Performed by: INTERNAL MEDICINE

## 2022-07-05 PROCEDURE — 84443 ASSAY THYROID STIM HORMONE: CPT | Performed by: INTERNAL MEDICINE

## 2022-07-05 PROCEDURE — 1101F PR PT FALLS ASSESS DOC 0-1 FALLS W/OUT INJ PAST YR: ICD-10-PCS | Mod: CPTII,S$GLB,, | Performed by: INTERNAL MEDICINE

## 2022-07-05 PROCEDURE — 1159F MED LIST DOCD IN RCRD: CPT | Mod: CPTII,S$GLB,, | Performed by: INTERNAL MEDICINE

## 2022-07-05 PROCEDURE — 99999 PR PBB SHADOW E&M-EST. PATIENT-LVL V: ICD-10-PCS | Mod: PBBFAC,,, | Performed by: INTERNAL MEDICINE

## 2022-07-05 PROCEDURE — 80061 LIPID PANEL: CPT | Performed by: INTERNAL MEDICINE

## 2022-07-05 PROCEDURE — 99397 PER PM REEVAL EST PAT 65+ YR: CPT | Mod: S$GLB,,, | Performed by: INTERNAL MEDICINE

## 2022-07-05 PROCEDURE — 1101F PT FALLS ASSESS-DOCD LE1/YR: CPT | Mod: CPTII,S$GLB,, | Performed by: INTERNAL MEDICINE

## 2022-07-05 PROCEDURE — 36415 COLL VENOUS BLD VENIPUNCTURE: CPT | Performed by: INTERNAL MEDICINE

## 2022-07-05 PROCEDURE — 3288F PR FALLS RISK ASSESSMENT DOCUMENTED: ICD-10-PCS | Mod: CPTII,S$GLB,, | Performed by: INTERNAL MEDICINE

## 2022-07-05 PROCEDURE — 99397 PR PREVENTIVE VISIT,EST,65 & OVER: ICD-10-PCS | Mod: S$GLB,,, | Performed by: INTERNAL MEDICINE

## 2022-07-05 RX ORDER — CARVEDILOL 12.5 MG/1
12.5 TABLET ORAL 2 TIMES DAILY
Qty: 180 TABLET | Refills: 1 | Status: SHIPPED | OUTPATIENT
Start: 2022-07-05

## 2022-07-05 RX ORDER — ATORVASTATIN CALCIUM 40 MG/1
40 TABLET, FILM COATED ORAL DAILY
Qty: 90 TABLET | Refills: 1 | Status: SHIPPED | OUTPATIENT
Start: 2022-07-05

## 2022-07-05 RX ORDER — LEVOTHYROXINE SODIUM 150 UG/1
150 TABLET ORAL DAILY
Qty: 90 TABLET | Refills: 1 | Status: SHIPPED | OUTPATIENT
Start: 2022-07-05

## 2022-07-05 RX ORDER — ALENDRONATE SODIUM 70 MG/1
TABLET ORAL
Qty: 12 TABLET | Refills: 1 | Status: SHIPPED | OUTPATIENT
Start: 2022-07-05

## 2022-07-05 RX ORDER — CLOTRIMAZOLE AND BETAMETHASONE DIPROPIONATE 10; .64 MG/G; MG/G
CREAM TOPICAL 2 TIMES DAILY PRN
Qty: 15 G | Refills: 1 | Status: SHIPPED | OUTPATIENT
Start: 2022-07-05

## 2022-07-08 VITALS
SYSTOLIC BLOOD PRESSURE: 124 MMHG | HEART RATE: 71 BPM | HEIGHT: 66 IN | WEIGHT: 199.75 LBS | BODY MASS INDEX: 32.1 KG/M2 | DIASTOLIC BLOOD PRESSURE: 70 MMHG | OXYGEN SATURATION: 96 % | TEMPERATURE: 99 F

## 2022-07-08 PROBLEM — E66.01 MORBID (SEVERE) OBESITY DUE TO EXCESS CALORIES: Status: ACTIVE | Noted: 2022-07-08

## 2022-07-08 NOTE — PROGRESS NOTES
Subjective:       Patient ID: Laila Hanna is a 89 y.o. female.    Chief Complaint: Annual Exam    HPI  She is here for annual exam.  She fell earlier today.  She did hit her head.  Denies dizziness.  No loss of consciousness  Review of Systems   Constitutional: Negative for chills, fatigue, fever and unexpected weight change.   Respiratory: Negative for chest tightness and shortness of breath.    Cardiovascular: Negative for chest pain and palpitations.   Gastrointestinal: Negative for abdominal pain and blood in stool.   Neurological: Negative for dizziness, syncope, numbness and headaches.       Objective:      Physical Exam  HENT:      Right Ear: External ear normal.      Left Ear: External ear normal.      Nose: Nose normal.      Mouth/Throat:      Mouth: Mucous membranes are moist.      Pharynx: Oropharynx is clear.   Eyes:      Pupils: Pupils are equal, round, and reactive to light.   Cardiovascular:      Rate and Rhythm: Normal rate and regular rhythm.      Heart sounds: Murmur heard.   Pulmonary:      Breath sounds: Normal breath sounds.   Chest:   Breasts:      Right: No axillary adenopathy.      Left: No axillary adenopathy.       Abdominal:      General: There is no distension.      Palpations: There is no hepatomegaly or splenomegaly.      Tenderness: There is no abdominal tenderness.   Musculoskeletal:      Cervical back: Normal range of motion.   Lymphadenopathy:      Cervical: No cervical adenopathy.      Upper Body:      Right upper body: No axillary adenopathy.      Left upper body: No axillary adenopathy.   Neurological:      Cranial Nerves: No cranial nerve deficit.      Sensory: No sensory deficit.      Motor: Motor function is intact.      Deep Tendon Reflexes: Reflexes are normal and symmetric.         Assessment/Plan       Assessment and plan:  1.  Annual exam.  Check lipid panel and TSH.  Schedule bone density.  Discussed Pap smear, pelvic exam.  She declined all  2. Fall:  Schedule CT  scan head

## 2022-07-12 ENCOUNTER — HOSPITAL ENCOUNTER (OUTPATIENT)
Dept: RADIOLOGY | Facility: HOSPITAL | Age: 87
Discharge: HOME | End: 2022-07-12
Attending: INTERNAL MEDICINE
Payer: MEDICARE

## 2022-07-12 ENCOUNTER — OFFICE VISIT (OUTPATIENT)
Dept: OTOLARYNGOLOGY | Facility: CLINIC | Age: 87
End: 2022-07-12
Payer: MEDICARE

## 2022-07-12 VITALS — HEART RATE: 60 BPM | SYSTOLIC BLOOD PRESSURE: 138 MMHG | DIASTOLIC BLOOD PRESSURE: 76 MMHG

## 2022-07-12 DIAGNOSIS — H93.8X3 EAR FULLNESS, BILATERAL: Primary | ICD-10-CM

## 2022-07-12 DIAGNOSIS — S09.90XA HEAD TRAUMA, INITIAL ENCOUNTER: ICD-10-CM

## 2022-07-12 PROCEDURE — 3288F FALL RISK ASSESSMENT DOCD: CPT | Mod: CPTII,S$GLB,, | Performed by: OTOLARYNGOLOGY

## 2022-07-12 PROCEDURE — 1159F MED LIST DOCD IN RCRD: CPT | Mod: CPTII,S$GLB,, | Performed by: OTOLARYNGOLOGY

## 2022-07-12 PROCEDURE — 69210 REMOVE IMPACTED EAR WAX UNI: CPT | Mod: S$GLB,,, | Performed by: OTOLARYNGOLOGY

## 2022-07-12 PROCEDURE — 1126F PR PAIN SEVERITY QUANTIFIED, NO PAIN PRESENT: ICD-10-PCS | Mod: CPTII,S$GLB,, | Performed by: OTOLARYNGOLOGY

## 2022-07-12 PROCEDURE — 70450 CT HEAD/BRAIN W/O DYE: CPT | Mod: 26,,, | Performed by: RADIOLOGY

## 2022-07-12 PROCEDURE — 99213 PR OFFICE/OUTPT VISIT, EST, LEVL III, 20-29 MIN: ICD-10-PCS | Mod: 25,S$GLB,, | Performed by: OTOLARYNGOLOGY

## 2022-07-12 PROCEDURE — 70450 CT HEAD WITHOUT CONTRAST: ICD-10-PCS | Mod: 26,,, | Performed by: RADIOLOGY

## 2022-07-12 PROCEDURE — 69210 PR REMOVAL IMPACTED CERUMEN REQUIRING INSTRUMENTATION, UNILATERAL: ICD-10-PCS | Mod: S$GLB,,, | Performed by: OTOLARYNGOLOGY

## 2022-07-12 PROCEDURE — 1126F AMNT PAIN NOTED NONE PRSNT: CPT | Mod: CPTII,S$GLB,, | Performed by: OTOLARYNGOLOGY

## 2022-07-12 PROCEDURE — 99999 PR PBB SHADOW E&M-EST. PATIENT-LVL III: ICD-10-PCS | Mod: PBBFAC,,, | Performed by: OTOLARYNGOLOGY

## 2022-07-12 PROCEDURE — 70450 CT HEAD/BRAIN W/O DYE: CPT | Mod: TC

## 2022-07-12 PROCEDURE — 1101F PT FALLS ASSESS-DOCD LE1/YR: CPT | Mod: CPTII,S$GLB,, | Performed by: OTOLARYNGOLOGY

## 2022-07-12 PROCEDURE — 1101F PR PT FALLS ASSESS DOC 0-1 FALLS W/OUT INJ PAST YR: ICD-10-PCS | Mod: CPTII,S$GLB,, | Performed by: OTOLARYNGOLOGY

## 2022-07-12 PROCEDURE — 3288F PR FALLS RISK ASSESSMENT DOCUMENTED: ICD-10-PCS | Mod: CPTII,S$GLB,, | Performed by: OTOLARYNGOLOGY

## 2022-07-12 PROCEDURE — 1159F PR MEDICATION LIST DOCUMENTED IN MEDICAL RECORD: ICD-10-PCS | Mod: CPTII,S$GLB,, | Performed by: OTOLARYNGOLOGY

## 2022-07-12 PROCEDURE — 99213 OFFICE O/P EST LOW 20 MIN: CPT | Mod: 25,S$GLB,, | Performed by: OTOLARYNGOLOGY

## 2022-07-12 PROCEDURE — 99999 PR PBB SHADOW E&M-EST. PATIENT-LVL III: CPT | Mod: PBBFAC,,, | Performed by: OTOLARYNGOLOGY

## 2022-07-12 NOTE — PROGRESS NOTES
Subjective:       Patient ID: Laila Hanna is a 89 y.o. female.    Chief Complaint: Ear Fullness    Ear Fullness   There is pain in both ears. This is a recurrent problem. The current episode started more than 1 month ago (Last seen in 2021 for same complaint.). The problem occurs constantly. The problem has been unchanged. There has been no fever. The patient is experiencing no pain. Associated symptoms include hearing loss. Pertinent negatives include no coughing, diarrhea, ear discharge, headaches, neck pain, rash, rhinorrhea or vomiting. She has tried nothing for the symptoms. Her past medical history is significant for hearing loss.     Review of Systems   Constitutional: Negative for activity change, appetite change and fever.   HENT: Positive for hearing loss. Negative for congestion, ear discharge, ear pain, nosebleeds, postnasal drip, rhinorrhea and sneezing.    Eyes: Negative for redness and visual disturbance.   Respiratory: Negative for apnea, cough, shortness of breath and wheezing.    Cardiovascular: Negative for chest pain and palpitations.   Gastrointestinal: Negative for diarrhea and vomiting.   Genitourinary: Negative for difficulty urinating and frequency.   Musculoskeletal: Negative for arthralgias, back pain, gait problem and neck pain.   Skin: Negative for color change and rash.   Neurological: Negative for dizziness, speech difficulty, weakness and headaches.   Hematological: Negative for adenopathy. Does not bruise/bleed easily.   Psychiatric/Behavioral: Negative for agitation and behavioral problems.       Objective:        Constitutional:   Vital signs are normal. She appears well-developed and well-nourished. She is active. Normal speech.      Head:  Normocephalic and atraumatic. Salivary glands normal.  Facial strength is normal.      Nose:  Nose normal including turbinates, nasal mucosa, sinuses and nasal septum.     Mouth/Throat  Oropharynx clear and moist without lesions or  asymmetry and normal uvula midline.     Neck:  Neck normal without thyromegaly masses, asymmetry, normal tracheal structure, crepitus, and tenderness, thyroid normal, trachea normal, phonation normal and full range of motion with neck supple.     Psychiatric:   She has a normal mood and affect. Her speech is normal and behavior is normal.     Neurological:   She has neurological normal, alert and oriented.     Skin:   No abrasions, lacerations, lesions, or rashes.         PROCEDURE NOTE:  Ceruminosis is noted in both EACs.  Wax was removed by manual debridement and suctioning utilizing the assistance of binocular microscopy revealing EACs and TMs WNL. The patient tolerated this procedure without difficulty. The subjective decrease noted in hearing pre-cleaning was resolved post-cleaning.       Assessment:       1. Ear fullness, bilateral        Plan:       1. Hearing conservation strongly recommended.  2. Trial of amplification bilaterally also recommended (patient not interested).  3. Re-check of hearing in 18-24 months or sooner if subjective change noted.  4. F/U with PCP as per schedule.

## 2022-07-21 ENCOUNTER — OFFICE VISIT (OUTPATIENT)
Dept: CARDIOLOGY | Facility: CLINIC | Age: 87
End: 2022-07-21
Payer: MEDICARE

## 2022-07-21 VITALS
DIASTOLIC BLOOD PRESSURE: 68 MMHG | HEIGHT: 66 IN | SYSTOLIC BLOOD PRESSURE: 111 MMHG | BODY MASS INDEX: 31.43 KG/M2 | WEIGHT: 195.56 LBS | OXYGEN SATURATION: 96 % | HEART RATE: 68 BPM

## 2022-07-21 DIAGNOSIS — I51.89 LEFT VENTRICULAR DIASTOLIC DYSFUNCTION WITH PRESERVED SYSTOLIC FUNCTION: ICD-10-CM

## 2022-07-21 DIAGNOSIS — I25.10 CORONARY ARTERY DISEASE, UNSPECIFIED VESSEL OR LESION TYPE, UNSPECIFIED WHETHER ANGINA PRESENT, UNSPECIFIED WHETHER NATIVE OR TRANSPLANTED HEART: ICD-10-CM

## 2022-07-21 DIAGNOSIS — R55 SYNCOPE AND COLLAPSE: Primary | ICD-10-CM

## 2022-07-21 DIAGNOSIS — I34.81 MITRAL ANNULAR CALCIFICATION: ICD-10-CM

## 2022-07-21 DIAGNOSIS — I35.9 AORTIC VALVE CALCIFICATION: ICD-10-CM

## 2022-07-21 DIAGNOSIS — I25.10 CORONARY ARTERY DISEASE INVOLVING NATIVE CORONARY ARTERY OF NATIVE HEART WITHOUT ANGINA PECTORIS: ICD-10-CM

## 2022-07-21 DIAGNOSIS — Z86.73 HISTORY OF TRANSIENT ISCHEMIC ATTACK (TIA): ICD-10-CM

## 2022-07-21 DIAGNOSIS — I77.9 BILATERAL CAROTID ARTERY DISEASE, UNSPECIFIED TYPE: ICD-10-CM

## 2022-07-21 PROCEDURE — 1160F PR REVIEW ALL MEDS BY PRESCRIBER/CLIN PHARMACIST DOCUMENTED: ICD-10-PCS | Mod: CPTII,GC,S$GLB, | Performed by: INTERNAL MEDICINE

## 2022-07-21 PROCEDURE — 3288F PR FALLS RISK ASSESSMENT DOCUMENTED: ICD-10-PCS | Mod: CPTII,GC,S$GLB, | Performed by: INTERNAL MEDICINE

## 2022-07-21 PROCEDURE — 99213 OFFICE O/P EST LOW 20 MIN: CPT | Mod: GC,S$GLB,, | Performed by: INTERNAL MEDICINE

## 2022-07-21 PROCEDURE — 1159F PR MEDICATION LIST DOCUMENTED IN MEDICAL RECORD: ICD-10-PCS | Mod: CPTII,GC,S$GLB, | Performed by: INTERNAL MEDICINE

## 2022-07-21 PROCEDURE — 99213 PR OFFICE/OUTPT VISIT, EST, LEVL III, 20-29 MIN: ICD-10-PCS | Mod: GC,S$GLB,, | Performed by: INTERNAL MEDICINE

## 2022-07-21 PROCEDURE — 1159F MED LIST DOCD IN RCRD: CPT | Mod: CPTII,GC,S$GLB, | Performed by: INTERNAL MEDICINE

## 2022-07-21 PROCEDURE — 99999 PR PBB SHADOW E&M-EST. PATIENT-LVL V: ICD-10-PCS | Mod: PBBFAC,GC,, | Performed by: INTERNAL MEDICINE

## 2022-07-21 PROCEDURE — 3288F FALL RISK ASSESSMENT DOCD: CPT | Mod: CPTII,GC,S$GLB, | Performed by: INTERNAL MEDICINE

## 2022-07-21 PROCEDURE — 99499 UNLISTED E&M SERVICE: CPT | Mod: S$GLB,,, | Performed by: INTERNAL MEDICINE

## 2022-07-21 PROCEDURE — 1126F AMNT PAIN NOTED NONE PRSNT: CPT | Mod: CPTII,GC,S$GLB, | Performed by: INTERNAL MEDICINE

## 2022-07-21 PROCEDURE — 1126F PR PAIN SEVERITY QUANTIFIED, NO PAIN PRESENT: ICD-10-PCS | Mod: CPTII,GC,S$GLB, | Performed by: INTERNAL MEDICINE

## 2022-07-21 PROCEDURE — 99499 RISK ADDL DX/OHS AUDIT: ICD-10-PCS | Mod: S$GLB,,, | Performed by: INTERNAL MEDICINE

## 2022-07-21 PROCEDURE — 1160F RVW MEDS BY RX/DR IN RCRD: CPT | Mod: CPTII,GC,S$GLB, | Performed by: INTERNAL MEDICINE

## 2022-07-21 PROCEDURE — 99999 PR PBB SHADOW E&M-EST. PATIENT-LVL V: CPT | Mod: PBBFAC,GC,, | Performed by: INTERNAL MEDICINE

## 2022-07-21 PROCEDURE — 1100F PTFALLS ASSESS-DOCD GE2>/YR: CPT | Mod: CPTII,GC,S$GLB, | Performed by: INTERNAL MEDICINE

## 2022-07-21 PROCEDURE — 1100F PR PT FALLS ASSESS DOC 2+ FALLS/FALL W/INJURY/YR: ICD-10-PCS | Mod: CPTII,GC,S$GLB, | Performed by: INTERNAL MEDICINE

## 2022-07-21 RX ORDER — TORSEMIDE 10 MG/1
10 TABLET ORAL DAILY
Qty: 90 TABLET | Refills: 3 | Status: SHIPPED | OUTPATIENT
Start: 2022-07-21

## 2022-07-21 NOTE — PROGRESS NOTES
"Laila Hanna is a 89 y.o. female here for annual follow-up. She is a patient of Dr Severino. She is pleasant and has a history of mild cognitive impairment, CAD s/p PCI to mid LAD 03/29/2004, HFpEF, hypertension. She presents today with chief concern of refill of her medications. She is accompanied by her aide and aide's daughter.   Patient states that she feels safe at home and states her aide is helpful. Patient states that she has been feeling well, except for having difficulty catching the end of her breath. She is able to inspire deeply but the end of respiration is mildly bothersome for her. When queried, she says "it just feels weird". Upon further query, patient also reports recent loss of consciousness while in the bathroom. Aide clarifies that the patient was washing her face (with lukewarm water as per patient) and fell back wards and hit her "behind" on the bath chair and had several bruises to her back. Aide also reports that patient has been experiencing some back pain as a result of the recent fall. She went to her PCP on 06/05/2022 and underwent testing with a CT scan of the head, which showed a stable meningioma but no concerns of bleeding/hematoma.     The patient is known to have coexisting coronary artery disease.     Cardiac Risk Factors  Age > 45-male, > 55-female:  YES  +1   Smoking:   NO   Sig. family hx of CHD*:  NO   Hypertension:   YES  +1   Diabetes:   NO   HDL < 35:   NO   HDL > 59:   NO   Total: 2   *- Significant family history of Coronary Heart Disease per National Cholesterol Education Program = Myocardial Infarction or sudden death at less than 55 years old in   father or other 1st-degree male relative, or less than 65 years old in mother or   other 1st-degree female relative.    Active Ambulatory Problems     Diagnosis Date Noted    Hypothyroidism due to acquired atrophy of thyroid     Hyponatremia 05/05/2014    Meningioma 12/01/2014    Low back pain without sciatica " 04/29/2016    SIADH (syndrome of inappropriate ADH production) 05/28/2016    Essential hypertension 05/29/2016    Anxiety 06/07/2016    Hyperlipemia 06/07/2016    Slow transit constipation 06/07/2016    Cognitive dysfunction 06/30/2016    Onychocryptosis 06/21/2017    Onychomycosis due to dermatophyte 06/21/2017    Coronary artery disease involving native coronary artery of native heart without angina pectoris 07/03/2017    History of PTCA 07/03/2017    Dysphagia 11/22/2017    Hordeolum internum of right lower eyelid 01/15/2018    Aortic atherosclerosis 01/26/2018    Left ventricular diastolic dysfunction with preserved systolic function 01/26/2018    Pulmonary heart disease 01/26/2018    Bilateral carotid artery disease 01/26/2018    Recurrent major depressive disorder, in full remission 01/26/2018    History of transient ischemic attack (TIA) 01/26/2018    Senile osteoporosis 01/26/2018    Nonrheumatic aortic valve stenosis 01/26/2018    Entropion of right eyelid 02/19/2018    Metabolic bone disease 07/09/2018    Coronary artery disease involving native coronary artery of native heart 01/15/2020    Sicca syndrome 04/02/2021    Early onset Alzheimer's disease with behavioral disturbance 04/02/2021    BMI 31.0-31.9,adult 06/08/2022    Morbid (severe) obesity due to excess calories 07/08/2022     Resolved Ambulatory Problems     Diagnosis Date Noted    Bilateral impacted cerumen 03/26/2013    Left ankle swelling 04/01/2014    Numbness 05/04/2014    Pain of finger of left hand 09/21/2015    Decreased range of motion of finger 09/21/2015    Weakness 05/27/2016    Fall at home 05/30/2016    Enterococcus faecalis infection 05/31/2016    Acute cystitis without hematuria 06/07/2016    Sicca syndrome 06/07/2016    CKD (chronic kidney disease) stage 2, GFR 60-89 ml/min 01/26/2018    Dependent edema 01/15/2020     Past Medical History:   Diagnosis Date    Basal cell carcinoma      Benign essential hypertension     Cerebral microvascular disease 9/8/2014    Closed fracture of distal phalanx of left hand with routine healing 9/17/2015    Closed mallet fracture of distal phalanx of finger with routine healing 10/6/2015    Coronary artery disease     DDD (degenerative disc disease), lumbar 4/12/2016    Depression     Hyperlipidemia     Meningiomas, multiple     MI (myocardial infarction) 2004    Migraine aura without headache 12/1/2014    Post PTCA 12/12/2012    Transient cerebral ischemia 5/4/2014          Review of patient's allergies indicates:   Allergen Reactions    Thiazides Other (See Comments)     Severe hyponatremia          Current Outpatient Medications   Medication Instructions    alendronate (FOSAMAX) 70 MG tablet TAKE 1 TABLET BY MOUTH EVERY 7 DAYS    aspirin (ECOTRIN) 81 mg, Oral, Daily    atorvastatin (LIPITOR) 40 mg, Oral, Daily    calcium-vitamin D3 (OS-DANICA 500 + D3) 500 mg-5 mcg (200 unit) per tablet 1 tablet, Daily    carvediloL (COREG) 12.5 mg, Oral, 2 times daily    clotrimazole-betamethasone 1-0.05% (LOTRISONE) cream Topical (Top), 2 times daily PRN    cyanocobalamin (VITAMIN B-12) 100 mcg, Oral, Daily    dextran 70-hypromellose (ARTIFICIAL TEARS) ophthalmic solution 1 drop, Both Eyes, Every 2 hours PRN    levothyroxine (SYNTHROID) 150 mcg, Oral, Daily    LINZESS 145 mcg Cap capsule TAKE 1 CAPSULE(145 MCG) BY MOUTH BEFORE BREAKFAST    losartan (COZAAR) 50 mg, Oral, Daily    melatonin 10 mg Cap Oral    torsemide (DEMADEX) 10 mg, Oral, Daily, As need for weight gain of 3 lbs in 1 day or 5 lbs in 3 days. Do not take if weight stable or decreasing. Daily weights at home.         Past Surgical History:   Procedure Laterality Date    CARDIAC CATHETERIZATION  03/29/2004    S/P LAD PTCA, coated stent    CATARACT EXTRACTION W/  INTRAOCULAR LENS IMPLANT Bilateral 2000    Dr Youssef     CHOLECYSTECTOMY      CORONARY ANGIOPLASTY WITH STENT PLACEMENT   2004    LAD    EYE SURGERY      TOTAL THYROIDECTOMY            Family History   Problem Relation Age of Onset    Stroke Mother     Diabetes Mother     Hypertension Father     Stroke Father     Cancer Brother         colon    Skin cancer Brother     Colon cancer Brother     Diabetes Paternal Grandmother     Cancer Sister         breast    Dementia Sister     Glaucoma Maternal Aunt     No Known Problems Maternal Uncle     No Known Problems Paternal Aunt     No Known Problems Paternal Uncle     No Known Problems Maternal Grandmother     No Known Problems Maternal Grandfather     No Known Problems Paternal Grandfather     Amblyopia Neg Hx     Blindness Neg Hx     Cataracts Neg Hx     Macular degeneration Neg Hx     Retinal detachment Neg Hx     Strabismus Neg Hx     Thyroid disease Neg Hx     Esophageal cancer Neg Hx           Social History     Socioeconomic History    Marital status: Single   Occupational History     Employer: [employer]"   Tobacco Use    Smoking status: Former Smoker     Packs/day: 0.30     Years: 15.00     Pack years: 4.50     Types: Cigarettes     Quit date: 1973     Years since quittin.5    Smokeless tobacco: Never Used   Substance and Sexual Activity    Alcohol use: No     Alcohol/week: 0.0 standard drinks    Drug use: No    Sexual activity: Never     The following portions of the patient's history were reviewed and updated as appropriate: allergies, current medications, past family history, past medical history, past social history, past surgical history and problem list.     Review of Systems  Review of Systems   Constitutional: Negative for chills, fever and malaise/fatigue.   HENT: Negative for congestion and sore throat.    Respiratory: Positive for shortness of breath (comfortable on room air, occurs at tail end of inspiration). Negative for cough.    Cardiovascular: Negative for chest pain, palpitations, orthopnea, leg swelling and PND.  "  Gastrointestinal: Negative for abdominal pain, constipation, diarrhea, nausea and vomiting.   Genitourinary: Negative for frequency.   Neurological: Negative for weakness and headaches.         Objective:     Vitals:    22 1009   BP: 111/68   Pulse: 68   SpO2: 96%   Weight: 88.7 kg (195 lb 8.8 oz)   Height: 5' 6" (1.676 m)   PainSc: 0-No pain        Physical Exam:  Physical Exam  Vitals reviewed.   Constitutional:       General: She is not in acute distress.     Appearance: Normal appearance. She is obese. She is not ill-appearing, toxic-appearing or diaphoretic.   HENT:      Head: Normocephalic and atraumatic.      Mouth/Throat:      Mouth: Mucous membranes are moist.   Cardiovascular:      Rate and Rhythm: Normal rate and regular rhythm.      Heart sounds: Murmur (Grade 3/6 harsh crescendo-decrescendo systolic murmur) heard.     No friction rub. No gallop.   Pulmonary:      Effort: Pulmonary effort is normal. No respiratory distress.      Breath sounds: Normal breath sounds. No wheezing or rales.   Abdominal:      Palpations: Abdomen is soft.   Musculoskeletal:      Right lower leg: No edema.      Left lower leg: No edema.   Skin:     General: Skin is warm.   Neurological:      Mental Status: She is alert. Mental status is at baseline.      Comments: At times has tangential thought process  Able to report name , , where she is currently         Lab Review   Lab Visit on 2022   Component Date Value    Cholesterol 2022 100 (A)    Triglycerides 2022 91     HDL 2022 37 (A)    LDL Cholesterol 2022 44.8 (A)    HDL/Cholesterol Ratio 2022 37.0     Total Cholesterol/HDL Ra* 2022 2.7     Non-HDL Cholesterol 2022 63     TSH 2022 1.520    Admission on 2022, Discharged on 2022   Component Date Value    WBC 2022 10.97     RBC 2022 3.95 (A)    Hemoglobin 2022 12.5     Hematocrit 2022 37.1     MCV 2022 94     " MCH 06/30/2022 31.6 (A)    MCHC 06/30/2022 33.7     RDW 06/30/2022 12.5     Platelets 06/30/2022 259     MPV 06/30/2022 11.2     Immature Granulocytes 06/30/2022 0.6 (A)    Gran # (ANC) 06/30/2022 8.6 (A)    Immature Grans (Abs) 06/30/2022 0.07 (A)    Lymph # 06/30/2022 1.2     Mono # 06/30/2022 1.0     Eos # 06/30/2022 0.1     Baso # 06/30/2022 0.03     nRBC 06/30/2022 0     Gran % 06/30/2022 78.0 (A)    Lymph % 06/30/2022 11.3 (A)    Mono % 06/30/2022 9.3     Eosinophil % 06/30/2022 0.5     Basophil % 06/30/2022 0.3     Differential Method 06/30/2022 Automated     Sodium 06/30/2022 133 (A)    Potassium 06/30/2022 3.8     Chloride 06/30/2022 97     CO2 06/30/2022 28     Glucose 06/30/2022 105     BUN 06/30/2022 11     Creatinine 06/30/2022 0.7     Calcium 06/30/2022 9.2     Total Protein 06/30/2022 7.1     Albumin 06/30/2022 3.3 (A)    Total Bilirubin 06/30/2022 1.1 (A)    Alkaline Phosphatase 06/30/2022 90     AST 06/30/2022 16     ALT 06/30/2022 11     Anion Gap 06/30/2022 8     eGFR if African American 06/30/2022 >60.0     eGFR if non African Amer* 06/30/2022 >60.0     Specimen UA 06/30/2022 Urine, Clean Catch     Color, UA 06/30/2022 Yellow     Appearance, UA 06/30/2022 Cloudy (A)    pH, UA 06/30/2022 5.0     Specific [place], UA 06/30/2022 1.005     Protein, UA 06/30/2022 Negative     Glucose, UA 06/30/2022 Negative     Ketones, UA 06/30/2022 Negative     Bilirubin (UA) 06/30/2022 Negative     Occult Blood UA 06/30/2022 1+ (A)    Nitrite, UA 06/30/2022 Negative     Leukocytes, UA 06/30/2022 Negative     RBC, UA 06/30/2022 3     WBC, UA 06/30/2022 1     Bacteria 06/30/2022 Rare     Squam Epithel, UA 06/30/2022 0     Microscopic Comment 06/30/2022 SEE COMMENT           Assessment:      Syncope and fall   HFpEF   CAD s/p PCI 03/29/2004   Hypertension     Plan:      Syncope and fall   - Patient has a harsh murmur at the RUSB 2nd intercostal space consistent with  aortic valve pathology   - Patient has known severe aortic valve annular calcifications from TTE 12/2019   - Repeat TTE to evaluate aortic valve (of note, patient also has mitral annular calcifications   - adjust torsemide to avoid syncopal episodes    - has known bilateral carotid artery disease but no harsh sounding bruits on exam, soft bruits     HFpEF   - Change torsemide 10 mg PO Daily to PRN for weight gain of 3 lbs in 1 day or 5 lbs within 3 days   - Daily weights     CAD s/p PCI 03/29/2004  - Continue aspirin 81 mg PO Daily   - Continue carvedilol 12.5 mg PO BID  - Continue atorvastatin 40 mg PO HS  - Continue losartan 50 mg PO Daily      Hypertension  - Dietary sodium restriction 2 g / 24 hrs.  - Regular aerobic exercise as tolerated  - Check blood pressures daily and record.    Mitral Annular Calcification  - TTE as above    Follow up: 3 months and as needed.     Thank you for choosing [facility]. Please call with questions or concerns.     Plan of care was discussed with staff, Dr Hernandez.     Miguel Ángel Bernard MD  Cardiology PGY5  [facility]

## 2022-07-24 ENCOUNTER — HOSPITAL ENCOUNTER (INPATIENT)
Facility: HOSPITAL | Age: 87
LOS: 10 days | Discharge: NURSING, REHAB OR LONG-TERM CARE | DRG: 643 | End: 2022-08-04
Attending: EMERGENCY MEDICINE | Admitting: INTERNAL MEDICINE
Payer: MEDICARE

## 2022-07-24 DIAGNOSIS — E87.1 HYPONATREMIA: ICD-10-CM

## 2022-07-24 DIAGNOSIS — I10 ESSENTIAL HYPERTENSION: ICD-10-CM

## 2022-07-24 DIAGNOSIS — S22.41XA CLOSED FRACTURE OF MULTIPLE RIBS OF RIGHT SIDE, INITIAL ENCOUNTER: Primary | ICD-10-CM

## 2022-07-24 DIAGNOSIS — I50.30 HEART FAILURE WITH PRESERVED EJECTION FRACTION: ICD-10-CM

## 2022-07-24 DIAGNOSIS — S32.009A: ICD-10-CM

## 2022-07-24 DIAGNOSIS — S32.020A COMPRESSION FRACTURE OF L2 VERTEBRA, INITIAL ENCOUNTER: ICD-10-CM
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
## 2022-07-24 PROBLEM — S32.000A LUMBAR COMPRESSION FRACTURE: Status: ACTIVE | Noted: 2022-07-24

## 2022-07-24 PROBLEM — S22.49XA MULTIPLE RIB FRACTURES: Status: ACTIVE | Noted: 2022-07-24

## 2022-07-24 PROBLEM — S32.001A CLOSED BURST FRACTURE OF LUMBAR VERTEBRA: Status: ACTIVE | Noted: 2022-07-24

## 2022-07-24 LAB
ABO + RH BLD: NORMAL
ALBUMIN SERPL BCP-MCNC: 3.3 G/DL (ref 3.5–5.2)
ALP SERPL-CCNC: 107 U/L (ref 55–135)
ALT SERPL W/O P-5'-P-CCNC: 13 U/L (ref 10–44)
ANION GAP SERPL CALC-SCNC: 8 MMOL/L (ref 8–16)
AST SERPL-CCNC: 19 U/L (ref 10–40)
BASOPHILS # BLD AUTO: 0.04 K/UL (ref 0–0.2)
BASOPHILS NFR BLD: 0.3 % (ref 0–1.9)
BILIRUB SERPL-MCNC: 0.7 MG/DL (ref 0.1–1)
BLD GP AB SCN CELLS X3 SERPL QL: NORMAL
BUN SERPL-MCNC: 12 MG/DL (ref 8–23)
CALCIUM SERPL-MCNC: 9 MG/DL (ref 8.7–10.5)
CHLORIDE SERPL-SCNC: 94 MMOL/L (ref 95–110)
CO2 SERPL-SCNC: 26 MMOL/L (ref 23–29)
CREAT SERPL-MCNC: 0.7 MG/DL (ref 0.5–1.4)
DIFFERENTIAL METHOD: ABNORMAL
EOSINOPHIL # BLD AUTO: 0.2 K/UL (ref 0–0.5)
EOSINOPHIL NFR BLD: 1.2 % (ref 0–8)
ERYTHROCYTE [DISTWIDTH] IN BLOOD BY AUTOMATED COUNT: 13.2 % (ref 11.5–14.5)
EST. GFR  (AFRICAN AMERICAN): >60 ML/MIN/1.73 M^2
EST. GFR  (NON AFRICAN AMERICAN): >60 ML/MIN/1.73 M^2
GLUCOSE SERPL-MCNC: 98 MG/DL (ref 70–110)
HCT VFR BLD AUTO: 38.1 % (ref 37–48.5)
HGB BLD-MCNC: 12.4 G/DL (ref 12–16)
IMM GRANULOCYTES # BLD AUTO: 0.14 K/UL (ref 0–0.04)
IMM GRANULOCYTES NFR BLD AUTO: 1 % (ref 0–0.5)
INR PPP: 1.1 (ref 0.8–1.2)
LYMPHOCYTES # BLD AUTO: 1.1 K/UL (ref 1–4.8)
LYMPHOCYTES NFR BLD: 7.7 % (ref 18–48)
MCH RBC QN AUTO: 31.4 PG (ref 27–31)
MCHC RBC AUTO-ENTMCNC: 32.5 G/DL (ref 32–36)
MCV RBC AUTO: 97 FL (ref 82–98)
MONOCYTES # BLD AUTO: 0.8 K/UL (ref 0.3–1)
MONOCYTES NFR BLD: 5.8 % (ref 4–15)
NEUTROPHILS # BLD AUTO: 11.6 K/UL (ref 1.8–7.7)
NEUTROPHILS NFR BLD: 84 % (ref 38–73)
NRBC BLD-RTO: 0 /100 WBC
PLATELET # BLD AUTO: 282 K/UL (ref 150–450)
PMV BLD AUTO: 11 FL (ref 9.2–12.9)
POTASSIUM SERPL-SCNC: 4.4 MMOL/L (ref 3.5–5.1)
PROT SERPL-MCNC: 7.2 G/DL (ref 6–8.4)
PROTHROMBIN TIME: 11.1 SEC (ref 9–12.5)
RBC # BLD AUTO: 3.95 M/UL (ref 4–5.4)
SODIUM SERPL-SCNC: 128 MMOL/L (ref 136–145)
WBC # BLD AUTO: 13.81 K/UL (ref 3.9–12.7)

## 2022-07-24 PROCEDURE — 63600175 PHARM REV CODE 636 W HCPCS: Performed by: STUDENT IN AN ORGANIZED HEALTH CARE EDUCATION/TRAINING PROGRAM

## 2022-07-24 PROCEDURE — 99285 EMERGENCY DEPT VISIT HI MDM: CPT | Mod: 25

## 2022-07-24 PROCEDURE — 99285 PR EMERGENCY DEPT VISIT,LEVEL V: ICD-10-PCS | Mod: CS,,, | Performed by: EMERGENCY MEDICINE

## 2022-07-24 PROCEDURE — 96374 THER/PROPH/DIAG INJ IV PUSH: CPT

## 2022-07-24 PROCEDURE — 99285 EMERGENCY DEPT VISIT HI MDM: CPT | Mod: CS,,, | Performed by: EMERGENCY MEDICINE

## 2022-07-24 PROCEDURE — 86850 RBC ANTIBODY SCREEN: CPT | Performed by: STUDENT IN AN ORGANIZED HEALTH CARE EDUCATION/TRAINING PROGRAM

## 2022-07-24 PROCEDURE — 99223 1ST HOSP IP/OBS HIGH 75: CPT | Mod: AI,GC,, | Performed by: NEUROLOGICAL SURGERY

## 2022-07-24 PROCEDURE — 25000003 PHARM REV CODE 250: Performed by: EMERGENCY MEDICINE

## 2022-07-24 PROCEDURE — 63600175 PHARM REV CODE 636 W HCPCS: Performed by: EMERGENCY MEDICINE

## 2022-07-24 PROCEDURE — 85025 COMPLETE CBC W/AUTO DIFF WBC: CPT | Performed by: STUDENT IN AN ORGANIZED HEALTH CARE EDUCATION/TRAINING PROGRAM

## 2022-07-24 PROCEDURE — 85610 PROTHROMBIN TIME: CPT | Performed by: STUDENT IN AN ORGANIZED HEALTH CARE EDUCATION/TRAINING PROGRAM

## 2022-07-24 PROCEDURE — 96376 TX/PRO/DX INJ SAME DRUG ADON: CPT

## 2022-07-24 PROCEDURE — 99223 PR INITIAL HOSPITAL CARE,LEVL III: ICD-10-PCS | Mod: AI,GC,, | Performed by: NEUROLOGICAL SURGERY

## 2022-07-24 PROCEDURE — 80053 COMPREHEN METABOLIC PANEL: CPT | Performed by: STUDENT IN AN ORGANIZED HEALTH CARE EDUCATION/TRAINING PROGRAM

## 2022-07-24 RX ORDER — MORPHINE SULFATE 2 MG/ML
2 INJECTION, SOLUTION INTRAMUSCULAR; INTRAVENOUS
Status: COMPLETED | OUTPATIENT
Start: 2022-07-24 | End: 2022-07-24

## 2022-07-24 RX ORDER — LIDOCAINE HYDROCHLORIDE 10 MG/ML
1 INJECTION, SOLUTION EPIDURAL; INFILTRATION; INTRACAUDAL; PERINEURAL ONCE
Status: CANCELLED | OUTPATIENT
Start: 2022-07-24 | End: 2022-07-24

## 2022-07-24 RX ORDER — MORPHINE SULFATE 4 MG/ML
4 INJECTION, SOLUTION INTRAMUSCULAR; INTRAVENOUS
Status: COMPLETED | OUTPATIENT
Start: 2022-07-24 | End: 2022-07-24

## 2022-07-24 RX ORDER — LIDOCAINE 50 MG/G
1 PATCH TOPICAL
Status: COMPLETED | OUTPATIENT
Start: 2022-07-24 | End: 2022-07-25

## 2022-07-24 RX ADMIN — MORPHINE SULFATE 2 MG: 2 INJECTION, SOLUTION INTRAMUSCULAR; INTRAVENOUS at 05:07

## 2022-07-24 RX ADMIN — LIDOCAINE 1 PATCH: 50 PATCH CUTANEOUS at 06:07

## 2022-07-24 RX ADMIN — MORPHINE SULFATE 4 MG: 4 INJECTION INTRAVENOUS at 09:07

## 2022-07-24 NOTE — ED NOTES
Bib ems from home s/p slip and fall at home.  Swelling and ecchymosis observed to rt leg.  Pt also c/o of pain to rt side of ribs

## 2022-07-24 NOTE — ED PROVIDER NOTES
Encounter Date: 7/24/2022       History     Chief Complaint   Patient presents with    Fall     Pt had trip and fall at home, no loss of consciousness, pain to right knee and hand, pt hit her head and has a small hematoma. Pt takes aspirin daily.      89 y/o F with hx of chronic constipation, chronic back pain, HTN, CAD, HLD, TIA, hypothyroidism presenting to the ED after mechanical fall.  She was in the kitchen and turned, was trying for a pitcher of water into a glass.  She was not using her cane at time and fell onto her right side.  Positive head trauma, no LOC or vomiting.  Has been able ambulate since.  Called EMS for transfer to the emergency room.  Patient endorses pain in her right-sided back, worsening shortness of breath due to her pain.  Also endorses in her lower back and right knee.  She is not on anticoagulation        Review of patient's allergies indicates:   Allergen Reactions    Thiazides Other (See Comments)     Severe hyponatremia     Past Medical History:   Diagnosis Date    Basal cell carcinoma     nose    Benign essential hypertension     Cerebral microvascular disease 9/8/2014    Closed fracture of distal phalanx of left hand with routine healing 9/17/2015    Closed mallet fracture of distal phalanx of finger with routine healing 10/6/2015    Coronary artery disease     DDD (degenerative disc disease), lumbar 4/12/2016    Depression     Fall at home 5/30/2016    Hyperlipidemia     Hypothyroidism due to acquired atrophy of thyroid     Meningiomas, multiple     MI (myocardial infarction) 2004    80% blockage per patient    Migraine aura without headache 12/1/2014    Post PTCA 12/12/2012    Transient cerebral ischemia 5/4/2014     Past Surgical History:   Procedure Laterality Date    CARDIAC CATHETERIZATION  03/29/2004    S/P LAD PTCA, coated stent    CATARACT EXTRACTION W/  INTRAOCULAR LENS IMPLANT Bilateral 2000    Dr Youssef     CHOLECYSTECTOMY      CORONARY  ANGIOPLASTY WITH STENT PLACEMENT      LAD    EYE SURGERY      TOTAL THYROIDECTOMY       Family History   Problem Relation Age of Onset    Stroke Mother     Diabetes Mother     Hypertension Father     Stroke Father     Cancer Brother         colon    Skin cancer Brother     Colon cancer Brother     Diabetes Paternal Grandmother     Cancer Sister         breast    Dementia Sister     Glaucoma Maternal Aunt     No Known Problems Maternal Uncle     No Known Problems Paternal Aunt     No Known Problems Paternal Uncle     No Known Problems Maternal Grandmother     No Known Problems Maternal Grandfather     No Known Problems Paternal Grandfather     Amblyopia Neg Hx     Blindness Neg Hx     Cataracts Neg Hx     Macular degeneration Neg Hx     Retinal detachment Neg Hx     Strabismus Neg Hx     Thyroid disease Neg Hx     Esophageal cancer Neg Hx      Social History     Tobacco Use    Smoking status: Former Smoker     Packs/day: 0.30     Years: 15.00     Pack years: 4.50     Types: Cigarettes     Quit date: 1973     Years since quittin.5    Smokeless tobacco: Never Used   Substance Use Topics    Alcohol use: No     Alcohol/week: 0.0 standard drinks    Drug use: No     Review of Systems   Constitutional: Negative for fever.   HENT: Negative for sore throat.    Respiratory: Negative for shortness of breath.    Cardiovascular: Negative for chest pain.   Gastrointestinal: Negative for abdominal pain, constipation, diarrhea, nausea and vomiting.   Genitourinary: Negative for dysuria.   Musculoskeletal: Positive for back pain and gait problem (Pain with ambulating). Negative for neck pain.   Skin: Negative for rash.   Neurological: Negative for dizziness, weakness, light-headedness and headaches.   Hematological: Does not bruise/bleed easily.       Physical Exam     Initial Vitals   BP Pulse Resp Temp SpO2   22 1613 22 1613 22 1613 22 1658 22 1613   (!)  150/90 70 18 97.8 °F (36.6 °C) 96 %      MAP       --                Physical Exam    Nursing note and vitals reviewed.  Constitutional: She appears well-developed and well-nourished. She is not diaphoretic. No distress.   HENT:   Head: Normocephalic.   Bruising noted to right side forehead, no periorbital ecchymosis, negative cheung sign   Eyes: Conjunctivae and EOM are normal. Pupils are equal, round, and reactive to light. Right eye exhibits no discharge. Left eye exhibits no discharge. No scleral icterus.   Neck:   No C-spine midline tenderness to palpation   Cardiovascular: Normal rate and regular rhythm. Exam reveals no gallop and no friction rub.    No murmur heard.  Pulmonary/Chest: No respiratory distress. She has no wheezes. She has no rhonchi. She has no rales. She exhibits no tenderness.   Abdominal: Abdomen is soft. She exhibits no distension and no mass. There is no abdominal tenderness. There is no rebound and no guarding.   Musculoskeletal:      Comments: Tenderness palpation of thoracic and lumbar spine as well as tenderness palpation of right-sided back     Neurological: She is alert and oriented to person, place, and time. She has normal strength. No cranial nerve deficit or sensory deficit.   Skin: Skin is warm and dry. No erythema. No pallor.   Bruising noted to right hand around fifth digit as well as right knee         ED Course   Procedures  Labs Reviewed   CBC W/ AUTO DIFFERENTIAL - Abnormal; Notable for the following components:       Result Value    WBC 13.81 (*)     RBC 3.95 (*)     MCH 31.4 (*)     Immature Granulocytes 1.0 (*)     Gran # (ANC) 11.6 (*)     Immature Grans (Abs) 0.14 (*)     Gran % 84.0 (*)     Lymph % 7.7 (*)     All other components within normal limits   COMPREHENSIVE METABOLIC PANEL - Abnormal; Notable for the following components:    Sodium 128 (*)     Chloride 94 (*)     Albumin 3.3 (*)     All other components within normal limits   PROTIME-INR   TYPE & SCREEN           Imaging Results           CT Lumbar Spine Without Contrast (Final result)  Result time 07/24/22 20:36:04    Final result by Derik Nunez MD (07/24/22 20:36:04)                 Impression:      1. Acute displaced burst type fracture of the L2 vertebral body with small fracture component extending into the spinal canal.  Contrast MRI the lumbar spine may be attempted for further evaluation.  2. Acute nondisplaced fracture involving the inferior endplate of the L1 vertebral body.  3. Multilevel degenerative changes of the cervical, thoracic, and lumbar spine, as detailed above.  4. Fusiform aneurysmal dilation of the ascending aorta.  5. Partially visualized right-sided rib fractures.  6. Additional findings as above.  This report was flagged in Epic as abnormal.    Electronically signed by resident: Saida Maynard  Date:    07/24/2022  Time:    19:28    Electronically signed by: Derik Nunez MD  Date:    07/24/2022  Time:    20:36             Narrative:    EXAMINATION:  CT CERVICAL SPINE WITHOUT CONTRAST; CT THORACIC SPINE WITHOUT CONTRAST; CT LUMBAR SPINE WITHOUT CONTRAST    CLINICAL HISTORY:  Neck trauma (Age >= 65y);; Back trauma, no prior imaging (Age >= 16y);; Low back pain, trauma;    TECHNIQUE:  Low dose axial images, sagittal and coronal reformations were performed though the cervical, thoracic, and lumbar spine.  Contrast was not administered.    COMPARISON:  MRI cervical spine 05/08/2006 and x-ray lumbar spine 01/20/2016.    FINDINGS:  Cervical spine:    Alignment: Sagittal reformatted images demonstrate adequate alignment of the cervical spine.    C1-C2: The dens, lateral masses, and occipital condyles are intact.    Vertebrae: There is no evidence of fracture or dislocation.  There is fusion across the bilateral C3-C4 and C4-C5 facet joints.    Discs: Multilevel disc height loss.    C2-C3:Mild bilateral facet arthropathy.  No spinal canal stenosis or neural foraminal narrowing.    C3-C4:Severe bilateral  facet arthropathy and uncovertebral spurring resulting in moderate right neural foraminal narrowing.  No spinal canal stenosis.    C4-C5:Severe bilateral facet arthropathy and uncovertebral spurring resulting in moderate right neural foraminal narrowing.  No spinal canal stenosis.    C5-C6:Moderate right and mild left facet arthropathy resulting in severe right neural foraminal narrowing.  No spinal canal stenosis.    C6-C7:Mild bilateral neural foraminal narrowing.  No spinal canal stenosis or neural foraminal narrowing.    C7-T1:Mild bilateral facet arthropathy.  No spinal canal stenosis or neural foraminal narrowing.    Bilateral apical scarring.  Calcific atherosclerosis of the aorta.    The airway is patent and the lung apices are unremarkable.  The visualized portions of the brain demonstrate no significant abnormality.    Thoracic Spine:    Alignment: Exaggerated kyphotic posture of the thoracic spine.    Vertebrae: No infiltrative marrow replacing process.  Stable vertebral body height loss at T11, unchanged compared to lumbar x-ray from 01/20/2016.  Flowing anterior osteophytes consistent with DISH.    Discs: Severe multilevel disc height loss.    Degenerative findings: Multilevel facet arthropathy.  No significant spinal canal stenosis or neural foraminal narrowing.    Soft tissue: Fusiform aneurysmal dilation of the ascending aorta measuring 4.5 cm (coronal series 601, image 16).  Severe coronary artery atherosclerotic calcifications.  Cardiomegaly.  Hypodense right adrenal gland nodule measuring 2.4 cm in diameter (axial series 2, image 352).  Moderate hiatal hernia.  Hypodensity in the left hepatic lobe, favored represent hepatic cyst.  Bilateral bandlike opacifications in the lung bases favored to represent scarring or atelectasis.  Small right pleural effusion.  Ground-glass opacifications throughout the bilateral lung fields.    There are partially visualized right-sided rib fractures.  Please see  the dedicated CT chest examination.    Lumbar spine:    Alignment: There is straightening of the normal lumbar lordosis.    Vertebrae: No infiltrative marrow replacing process.  There is an acute burst fracture of L2 with retropulsed superior posterior fragment extending approximately 0.4 cm into the canal.  There is approximately 20% loss of the vertebral body height.  There is an acute nondisplaced fracture involving the inferior endplate of the L1 vertebral body extending from the large osteophyte.    Discs: Multilevel disc height loss most pronounced at L2-L3 with vacuum disc phenomenon at L1-L2 and L2-L3.    Degenerative findings:    T12-L1: Mild bilateral facet arthropathy.  No spinal canal stenosis or neural foraminal narrowing.    L1-L2: Burst fracture of the superior endplate of L2 with 4 mm retropulsion resulting at least mild spinal canal stenosis.  No spinal foraminal narrowing    L2-L3: Circumferential disc bulge and mild bilateral facet arthropathy resulting in mild spinal canal stenosis and mild bilateral neural foraminal narrowing.    L3-L4: Circumferential disc bulge and mild bilateral facet arthropathy resulting in mild spinal canal stenosis and mild bilateral neural foraminal narrowing.    L4-L5: Circumferential disc bulge and mild bilateral facet arthropathy resulting in mild spinal canal stenosis and mild bilateral neural foraminal narrowing.    L5-S1: Small circumferential disc bulge and severe right and mild left facet arthropathy.  No spinal canal stenosis or neural foraminal narrowing.    Upper SI joints/sacrum: Unremarkable.    Soft tissue: Calcific atherosclerosis of the aorta and its branch vessels.                                CT Thoracic Spine Without Contrast (Final result)  Result time 07/24/22 20:36:04    Final result by Derik Nunez MD (07/24/22 20:36:04)                 Impression:      1. Acute displaced burst type fracture of the L2 vertebral body with small fracture component  extending into the spinal canal.  Contrast MRI the lumbar spine may be attempted for further evaluation.  2. Acute nondisplaced fracture involving the inferior endplate of the L1 vertebral body.  3. Multilevel degenerative changes of the cervical, thoracic, and lumbar spine, as detailed above.  4. Fusiform aneurysmal dilation of the ascending aorta.  5. Partially visualized right-sided rib fractures.  6. Additional findings as above.  This report was flagged in Epic as abnormal.    Electronically signed by resident: Saida Maynard  Date:    07/24/2022  Time:    19:28    Electronically signed by: Derik Nunez MD  Date:    07/24/2022  Time:    20:36             Narrative:    EXAMINATION:  CT CERVICAL SPINE WITHOUT CONTRAST; CT THORACIC SPINE WITHOUT CONTRAST; CT LUMBAR SPINE WITHOUT CONTRAST    CLINICAL HISTORY:  Neck trauma (Age >= 65y);; Back trauma, no prior imaging (Age >= 16y);; Low back pain, trauma;    TECHNIQUE:  Low dose axial images, sagittal and coronal reformations were performed though the cervical, thoracic, and lumbar spine.  Contrast was not administered.    COMPARISON:  MRI cervical spine 05/08/2006 and x-ray lumbar spine 01/20/2016.    FINDINGS:  Cervical spine:    Alignment: Sagittal reformatted images demonstrate adequate alignment of the cervical spine.    C1-C2: The dens, lateral masses, and occipital condyles are intact.    Vertebrae: There is no evidence of fracture or dislocation.  There is fusion across the bilateral C3-C4 and C4-C5 facet joints.    Discs: Multilevel disc height loss.    C2-C3:Mild bilateral facet arthropathy.  No spinal canal stenosis or neural foraminal narrowing.    C3-C4:Severe bilateral facet arthropathy and uncovertebral spurring resulting in moderate right neural foraminal narrowing.  No spinal canal stenosis.    C4-C5:Severe bilateral facet arthropathy and uncovertebral spurring resulting in moderate right neural foraminal narrowing.  No spinal canal  stenosis.    C5-C6:Moderate right and mild left facet arthropathy resulting in severe right neural foraminal narrowing.  No spinal canal stenosis.    C6-C7:Mild bilateral neural foraminal narrowing.  No spinal canal stenosis or neural foraminal narrowing.    C7-T1:Mild bilateral facet arthropathy.  No spinal canal stenosis or neural foraminal narrowing.    Bilateral apical scarring.  Calcific atherosclerosis of the aorta.    The airway is patent and the lung apices are unremarkable.  The visualized portions of the brain demonstrate no significant abnormality.    Thoracic Spine:    Alignment: Exaggerated kyphotic posture of the thoracic spine.    Vertebrae: No infiltrative marrow replacing process.  Stable vertebral body height loss at T11, unchanged compared to lumbar x-ray from 01/20/2016.  Flowing anterior osteophytes consistent with DISH.    Discs: Severe multilevel disc height loss.    Degenerative findings: Multilevel facet arthropathy.  No significant spinal canal stenosis or neural foraminal narrowing.    Soft tissue: Fusiform aneurysmal dilation of the ascending aorta measuring 4.5 cm (coronal series 601, image 16).  Severe coronary artery atherosclerotic calcifications.  Cardiomegaly.  Hypodense right adrenal gland nodule measuring 2.4 cm in diameter (axial series 2, image 352).  Moderate hiatal hernia.  Hypodensity in the left hepatic lobe, favored represent hepatic cyst.  Bilateral bandlike opacifications in the lung bases favored to represent scarring or atelectasis.  Small right pleural effusion.  Ground-glass opacifications throughout the bilateral lung fields.    There are partially visualized right-sided rib fractures.  Please see the dedicated CT chest examination.    Lumbar spine:    Alignment: There is straightening of the normal lumbar lordosis.    Vertebrae: No infiltrative marrow replacing process.  There is an acute burst fracture of L2 with retropulsed superior posterior fragment extending  approximately 0.4 cm into the canal.  There is approximately 20% loss of the vertebral body height.  There is an acute nondisplaced fracture involving the inferior endplate of the L1 vertebral body extending from the large osteophyte.    Discs: Multilevel disc height loss most pronounced at L2-L3 with vacuum disc phenomenon at L1-L2 and L2-L3.    Degenerative findings:    T12-L1: Mild bilateral facet arthropathy.  No spinal canal stenosis or neural foraminal narrowing.    L1-L2: Burst fracture of the superior endplate of L2 with 4 mm retropulsion resulting at least mild spinal canal stenosis.  No spinal foraminal narrowing    L2-L3: Circumferential disc bulge and mild bilateral facet arthropathy resulting in mild spinal canal stenosis and mild bilateral neural foraminal narrowing.    L3-L4: Circumferential disc bulge and mild bilateral facet arthropathy resulting in mild spinal canal stenosis and mild bilateral neural foraminal narrowing.    L4-L5: Circumferential disc bulge and mild bilateral facet arthropathy resulting in mild spinal canal stenosis and mild bilateral neural foraminal narrowing.    L5-S1: Small circumferential disc bulge and severe right and mild left facet arthropathy.  No spinal canal stenosis or neural foraminal narrowing.    Upper SI joints/sacrum: Unremarkable.    Soft tissue: Calcific atherosclerosis of the aorta and its branch vessels.                                CT Chest Without Contrast (Final result)  Result time 07/24/22 19:44:51    Final result by Jose L Easton MD (07/24/22 19:44:51)                 Impression:      This report was flagged in Epic as abnormal.    1. Several acute appearing right rib fractures as described.  2. Pulmonary findings suggesting emphysematous change, fibrotic change, and possible superimposed interstitial edema.  3. Large hiatal hernia.  4. Large amount of stool within the colon could reflect developing constipation.  5. Please see above for several  additional findings.      Electronically signed by: Jose L Easton MD  Date:    07/24/2022  Time:    19:44             Narrative:    EXAMINATION:  CT CHEST WITHOUT CONTRAST    CLINICAL HISTORY:  Chest trauma, blunt;concern for rib fractures;    TECHNIQUE:  Low dose axial images, sagittal and coronal reformations were obtained from the thoracic inlet to the lung bases. Contrast was not administered.    COMPARISON:  None.    FINDINGS:  The structures at the base of the neck are unremarkable noting surgical change of thyroidectomy.  No significant mediastinal lymphadenopathy.  The heart is not enlarged.  There is atherosclerotic calcification of the aorta and in the distribution of the coronary arteries.  There is mild prominence of the aorta at the mid arch measuring 3.6 cm, the aorta otherwise tapers normally distally.  There is a large hiatal hernia.  Allowing for extensive motion artifact, the visualized portions of the kidneys, left adrenal gland and spleen are grossly unremarkable.  There is a low attenuating lesion arising from the anterior aspect of the left hepatic lobe measuring 1.6 cm with attenuation suggesting cyst.  There is a large amount of stool within the visualized colon.  There are scattered colonic diverticula.  There is atrophic change of the pancreas.  There is a low attenuating lesion arising from the right adrenal gland measuring 2.5 cm with attenuation suggesting adenoma.    Please note, motion artifact limits evaluation of the airways and pulmonary parenchyma.  Allowing for this, the airways are patent.  There is diffuse bilateral pulmonary emphysematous change noting scattered regions of bandlike atelectasis or scarring.  There is scattered interlobular septal thickening.  There are a few patchy regions of ground-glass attenuation, may reflect superimposed edema.  There is peripherally based fibrotic change bilaterally.  There is bilateral basilar dependent atelectasis.  No large focal  consolidation.  No pneumothorax.  No pleural effusion.    There is osteopenia.  Motion artifact limits evaluation of the ribs bilaterally.  There are several bilateral remote appearing rib fractures.  More acute appearing rib fractures are noted involving right posterolateral ribs 4, 5, 6, 7, 8 and 9.  Correlation with focal tenderness is recommended.  The sternum is intact.  No significant axillary lymphadenopathy.  There is multilevel degenerative change of the thoracic spine noting multilevel flowing syndesmophytes.  The thoracic spine is evaluated in separate report, noting stable compression deformity of T11.                                CT Cervical Spine Without Contrast (Final result)  Result time 07/24/22 20:36:04    Final result by Derik Nunez MD (07/24/22 20:36:04)                 Impression:      1. Acute displaced burst type fracture of the L2 vertebral body with small fracture component extending into the spinal canal.  Contrast MRI the lumbar spine may be attempted for further evaluation.  2. Acute nondisplaced fracture involving the inferior endplate of the L1 vertebral body.  3. Multilevel degenerative changes of the cervical, thoracic, and lumbar spine, as detailed above.  4. Fusiform aneurysmal dilation of the ascending aorta.  5. Partially visualized right-sided rib fractures.  6. Additional findings as above.  This report was flagged in Epic as abnormal.    Electronically signed by resident: Saida Maynard  Date:    07/24/2022  Time:    19:28    Electronically signed by: Derik Nunez MD  Date:    07/24/2022  Time:    20:36             Narrative:    EXAMINATION:  CT CERVICAL SPINE WITHOUT CONTRAST; CT THORACIC SPINE WITHOUT CONTRAST; CT LUMBAR SPINE WITHOUT CONTRAST    CLINICAL HISTORY:  Neck trauma (Age >= 65y);; Back trauma, no prior imaging (Age >= 16y);; Low back pain, trauma;    TECHNIQUE:  Low dose axial images, sagittal and coronal reformations were performed though the cervical, thoracic,  and lumbar spine.  Contrast was not administered.    COMPARISON:  MRI cervical spine 05/08/2006 and x-ray lumbar spine 01/20/2016.    FINDINGS:  Cervical spine:    Alignment: Sagittal reformatted images demonstrate adequate alignment of the cervical spine.    C1-C2: The dens, lateral masses, and occipital condyles are intact.    Vertebrae: There is no evidence of fracture or dislocation.  There is fusion across the bilateral C3-C4 and C4-C5 facet joints.    Discs: Multilevel disc height loss.    C2-C3:Mild bilateral facet arthropathy.  No spinal canal stenosis or neural foraminal narrowing.    C3-C4:Severe bilateral facet arthropathy and uncovertebral spurring resulting in moderate right neural foraminal narrowing.  No spinal canal stenosis.    C4-C5:Severe bilateral facet arthropathy and uncovertebral spurring resulting in moderate right neural foraminal narrowing.  No spinal canal stenosis.    C5-C6:Moderate right and mild left facet arthropathy resulting in severe right neural foraminal narrowing.  No spinal canal stenosis.    C6-C7:Mild bilateral neural foraminal narrowing.  No spinal canal stenosis or neural foraminal narrowing.    C7-T1:Mild bilateral facet arthropathy.  No spinal canal stenosis or neural foraminal narrowing.    Bilateral apical scarring.  Calcific atherosclerosis of the aorta.    The airway is patent and the lung apices are unremarkable.  The visualized portions of the brain demonstrate no significant abnormality.    Thoracic Spine:    Alignment: Exaggerated kyphotic posture of the thoracic spine.    Vertebrae: No infiltrative marrow replacing process.  Stable vertebral body height loss at T11, unchanged compared to lumbar x-ray from 01/20/2016.  Flowing anterior osteophytes consistent with DISH.    Discs: Severe multilevel disc height loss.    Degenerative findings: Multilevel facet arthropathy.  No significant spinal canal stenosis or neural foraminal narrowing.    Soft tissue: Fusiform  aneurysmal dilation of the ascending aorta measuring 4.5 cm (coronal series 601, image 16).  Severe coronary artery atherosclerotic calcifications.  Cardiomegaly.  Hypodense right adrenal gland nodule measuring 2.4 cm in diameter (axial series 2, image 352).  Moderate hiatal hernia.  Hypodensity in the left hepatic lobe, favored represent hepatic cyst.  Bilateral bandlike opacifications in the lung bases favored to represent scarring or atelectasis.  Small right pleural effusion.  Ground-glass opacifications throughout the bilateral lung fields.    There are partially visualized right-sided rib fractures.  Please see the dedicated CT chest examination.    Lumbar spine:    Alignment: There is straightening of the normal lumbar lordosis.    Vertebrae: No infiltrative marrow replacing process.  There is an acute burst fracture of L2 with retropulsed superior posterior fragment extending approximately 0.4 cm into the canal.  There is approximately 20% loss of the vertebral body height.  There is an acute nondisplaced fracture involving the inferior endplate of the L1 vertebral body extending from the large osteophyte.    Discs: Multilevel disc height loss most pronounced at L2-L3 with vacuum disc phenomenon at L1-L2 and L2-L3.    Degenerative findings:    T12-L1: Mild bilateral facet arthropathy.  No spinal canal stenosis or neural foraminal narrowing.    L1-L2: Burst fracture of the superior endplate of L2 with 4 mm retropulsion resulting at least mild spinal canal stenosis.  No spinal foraminal narrowing    L2-L3: Circumferential disc bulge and mild bilateral facet arthropathy resulting in mild spinal canal stenosis and mild bilateral neural foraminal narrowing.    L3-L4: Circumferential disc bulge and mild bilateral facet arthropathy resulting in mild spinal canal stenosis and mild bilateral neural foraminal narrowing.    L4-L5: Circumferential disc bulge and mild bilateral facet arthropathy resulting in mild spinal  canal stenosis and mild bilateral neural foraminal narrowing.    L5-S1: Small circumferential disc bulge and severe right and mild left facet arthropathy.  No spinal canal stenosis or neural foraminal narrowing.    Upper SI joints/sacrum: Unremarkable.    Soft tissue: Calcific atherosclerosis of the aorta and its branch vessels.                               CT Head Without Contrast (Final result)  Result time 07/24/22 20:07:50    Final result by Derik Nunez MD (07/24/22 20:07:50)                 Impression:      No acute intracranial pathology.    Stable small extra-axial lesion at the right paraclinoid region, likely representing partially calcified meningioma.    Electronically signed by resident: Errol Mai  Date:    07/24/2022  Time:    19:28    Electronically signed by: Derik Nunez MD  Date:    07/24/2022  Time:    20:07             Narrative:    EXAMINATION:  CT HEAD WITHOUT CONTRAST    CLINICAL HISTORY:  Head trauma, minor (Age >= 65y);    TECHNIQUE:  Low dose axial CT images obtained throughout the head without intravenous contrast. Sagittal and coronal reconstructions were performed.    COMPARISON:  CT head: 07/12/2022.    MRI brain: 06/30/2016.    FINDINGS:  Generalized cerebral volume loss with compensatory enlargement of the ventricular system.  No hydrocephalus.    Patchy periventricular hypodensity throughout the supratentorial white matter, likely representing chronic microvascular ischemic change. No intracerebral mass, acute hemorrhage, edema or major vascular distribution infarct.    Stable hyperdense extra-axial lesion at right paraclinoid region likely corresponds to lesion seen on prior MRI concerning for partially calcified meningioma.    Partially empty sella turcica, similar to the prior.      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  Mastoid air cells and other paranasal sinuses are essentially clear.  There are postoperative changes in the globes.                                X-Ray Chest 1 View (Final result)  Result time 07/24/22 18:10:53    Final result by Jose L Easton MD (07/24/22 18:10:53)                 Impression:      1. Pulmonary findings are nonspecific, differential would include edema or other nonspecific pneumonitis.  There may be developing consolidation projected over the right lower lung zone.  Correlation and follow-up is advised.      Electronically signed by: Jose L Easton MD  Date:    07/24/2022  Time:    18:10             Narrative:    EXAMINATION:  XR CHEST 1 VIEW    TECHNIQUE:  Single frontal view of the chest was performed.    COMPARISON:  07/05/2016    FINDINGS:  The cardiomediastinal silhouette is prominent noting magnification by technique.  There is calcification of the aorta..  There is no pleural effusion.  The trachea is midline.  The lungs are symmetrically expanded bilaterally with coarse interstitial attenuation bilaterally.  There is right basilar subsegmental atelectasis..  There is no pneumothorax.  The osseous structures are remarkable for degenerative changes and osteopenia..                               X-Ray Femur 2 AP/LAT Right (Final result)  Result time 07/24/22 18:07:56    Final result by Chandler Lyon DO (07/24/22 18:07:56)                 Impression:      No acute fracture or dislocation of the right femur, knee, or tib-fib.    Right prepatellar and infrapatellar soft tissue edema.      Electronically signed by: Chandler Lyon  Date:    07/24/2022  Time:    18:07             Narrative:    EXAMINATION:  XR TIBIA FIBULA BILATERAL; XR FEMUR 2 VIEW RIGHT; XR KNEE 3 VIEW RIGHT    CLINICAL HISTORY:  pain;  Unspecified fall, initial encounter    TECHNIQUE:  AP and lateral radiographs of the proximal and distal right femur.    AP, lateral, and patellofemoral radiographs of the right knee.    AP and lateral radiographs of the proximal and distal right tibia and fibula.    COMPARISON:  None    FINDINGS:  There is diffuse  osteopenia.    Right femur: No acute fracture or dislocation.  Alignment is normal.  The femoral head is well seated within the acetabulum.  Mild joint space narrowing of the femoroacetabular joint noted.    Right knee: No acute fracture or dislocation of the right knee.  Alignment is normal.  Mild tricompartmental joint space narrowing and mild osteophytes noted.  There is prepatellar and infrapatellar soft tissue edema.  There is no joint effusion.    Right tibia/fibula: No acute fracture or dislocation.  Alignment is normal.  There are vascular calcifications.                               X-Ray Knee 3 View Right (Final result)  Result time 07/24/22 18:07:56    Final result by Chandler Lyon DO (07/24/22 18:07:56)                 Impression:      No acute fracture or dislocation of the right femur, knee, or tib-fib.    Right prepatellar and infrapatellar soft tissue edema.      Electronically signed by: Chandler Lyon  Date:    07/24/2022  Time:    18:07             Narrative:    EXAMINATION:  XR TIBIA FIBULA BILATERAL; XR FEMUR 2 VIEW RIGHT; XR KNEE 3 VIEW RIGHT    CLINICAL HISTORY:  pain;  Unspecified fall, initial encounter    TECHNIQUE:  AP and lateral radiographs of the proximal and distal right femur.    AP, lateral, and patellofemoral radiographs of the right knee.    AP and lateral radiographs of the proximal and distal right tibia and fibula.    COMPARISON:  None    FINDINGS:  There is diffuse osteopenia.    Right femur: No acute fracture or dislocation.  Alignment is normal.  The femoral head is well seated within the acetabulum.  Mild joint space narrowing of the femoroacetabular joint noted.    Right knee: No acute fracture or dislocation of the right knee.  Alignment is normal.  Mild tricompartmental joint space narrowing and mild osteophytes noted.  There is prepatellar and infrapatellar soft tissue edema.  There is no joint effusion.    Right tibia/fibula: No acute fracture or dislocation.   Alignment is normal.  There are vascular calcifications.                               X-Ray Tibia Fibula Bilateral (Final result)  Result time 07/24/22 18:07:56   Procedure changed from X-Ray Tibia Fibula 2 View Right     Final result by Chandler Lyon DO (07/24/22 18:07:56)                 Impression:      No acute fracture or dislocation of the right femur, knee, or tib-fib.    Right prepatellar and infrapatellar soft tissue edema.      Electronically signed by: Chandler Lyon  Date:    07/24/2022  Time:    18:07             Narrative:    EXAMINATION:  XR TIBIA FIBULA BILATERAL; XR FEMUR 2 VIEW RIGHT; XR KNEE 3 VIEW RIGHT    CLINICAL HISTORY:  pain;  Unspecified fall, initial encounter    TECHNIQUE:  AP and lateral radiographs of the proximal and distal right femur.    AP, lateral, and patellofemoral radiographs of the right knee.    AP and lateral radiographs of the proximal and distal right tibia and fibula.    COMPARISON:  None    FINDINGS:  There is diffuse osteopenia.    Right femur: No acute fracture or dislocation.  Alignment is normal.  The femoral head is well seated within the acetabulum.  Mild joint space narrowing of the femoroacetabular joint noted.    Right knee: No acute fracture or dislocation of the right knee.  Alignment is normal.  Mild tricompartmental joint space narrowing and mild osteophytes noted.  There is prepatellar and infrapatellar soft tissue edema.  There is no joint effusion.    Right tibia/fibula: No acute fracture or dislocation.  Alignment is normal.  There are vascular calcifications.                               X-Ray Hand 3 view Right (Final result)  Result time 07/24/22 18:09:01    Final result by Derik Nunez MD (07/24/22 18:09:01)                 Impression:      1.  No acute process.    2.  Osteoarthrosis of the right hand.      Electronically signed by: Derik Nunez MD  Date:    07/24/2022  Time:    18:09             Narrative:    EXAMINATION:  XR HAND COMPLETE 3 VIEW  RIGHT    CLINICAL HISTORY:  fall;    TECHNIQUE:  PA, lateral, and oblique views of the right hand were performed.    COMPARISON:  03/06/2018.    FINDINGS:  There is demineralization of the osseous structures.  There is no cortical step-off.  There is no evidence of periostitis.    There is stable appearance of joint space narrowing throughout the right hand.  There is no evidence of periostitis.  The soft tissues are unremarkable.  No radiopaque foreign body is identified.    There is no evidence of a fracture or dislocation of the right hand.                              X-Rays:   Independently Interpreted Readings:   Other Readings:  No fractures noted on the upper or lower extremity x-rays, chest x-ray    Medications   Tdap (BOOSTRIX) vaccine injection 0.5 mL (has no administration in time range)   LIDOcaine 5 % patch 1 patch (1 patch Transdermal Patch Applied 7/24/22 1858)   morphine injection 2 mg (2 mg Intravenous Given 7/24/22 1710)   morphine injection 4 mg (4 mg Intravenous Given 7/24/22 2135)     Medical Decision Making:   History:   Old Medical Records: I decided to obtain old medical records.  Initial Assessment:   89-year-old female not on anticoagulation, is on aspirin presenting to the ED after mechanical fall onto her right side.  No LOC or vomiting.  Has nonfocal neurologic exam, significant tenderness palpation of thoracic and lumbar spine as well as right-sided back.  Clear lung sounds.    Differential includes is not limited to intracranial hemorrhage, C-spine fracture, thoracic spine fracture lumbar spine fracture, rib fractures, hemothorax pneumothorax, fractures of lower extremities.    X-rays of right hand, right knee, right tib-fib in right femur negative for fractures.  X-ray of left tib-fib negative for fracture.  She CT head showed no intracranial bleeding.  CT C-spine showed no concern for fractures.  CT chest showed multiple rib fractures between ribs 4-9.  CT thoracic and lumbar spine  showed concern for acute burst fracture of L2 as well as nondisplaced fracture of L3.  Discussed with General surgery for admission, discussed with neurosurgery for burst fracture and nondisplaced fracture.  Neurosurgery recommended TLSO brace.  This is ordered.  Pending General surgery recommendation, evaluated, patient handed off to oncoming team at shift change.  Independently Interpreted Test(s):   I have ordered and independently interpreted X-rays - see prior notes.  I have ordered and independently interpreted EKG Reading(s) - see prior notes  Clinical Tests:   Lab Tests: Ordered and Reviewed  Radiological Study: Ordered and Reviewed  Other:   I have discussed this case with another health care provider.       <> Summary of the Discussion: General surgery, neurosurgery            Attending Attestation:   Physician Attestation Statement for Resident:  As the supervising MD   Physician Attestation Statement: I have personally seen and examined this patient.   I agree with the above history. -:   As the supervising MD I agree with the above PE.    As the supervising MD I agree with the above treatment, course, plan, and disposition.                ED Course as of 07/25/22 0012   Sun Jul 24, 2022 1959 Discussed with general surgery for admission due to the multiple rib fractures [AU]      ED Course User Index  [AU] Gregory Arrieta MD             Clinical Impression:   Final diagnoses:  [W19.XXXA] Fall  [S22.41XA] Closed fracture of multiple ribs of right side, initial encounter (Primary)  [S32.009A] Closed fracture of lumbar spine without spinal cord lesion, initial encounter          ED Disposition Condition    Admit               Gregory Arrieta MD  Resident  07/25/22 0012       Saida Pascual MD  07/25/22 1921

## 2022-07-25 PROBLEM — S32.009A: Status: ACTIVE | Noted: 2022-07-24

## 2022-07-25 LAB
ALBUMIN SERPL BCP-MCNC: 3 G/DL (ref 3.5–5.2)
ALP SERPL-CCNC: 98 U/L (ref 55–135)
ALT SERPL W/O P-5'-P-CCNC: 12 U/L (ref 10–44)
ANION GAP SERPL CALC-SCNC: 7 MMOL/L (ref 8–16)
AST SERPL-CCNC: 19 U/L (ref 10–40)
BASOPHILS # BLD AUTO: 0.03 K/UL (ref 0–0.2)
BASOPHILS NFR BLD: 0.3 % (ref 0–1.9)
BILIRUB SERPL-MCNC: 1.1 MG/DL (ref 0.1–1)
BUN SERPL-MCNC: 13 MG/DL (ref 8–23)
CALCIUM SERPL-MCNC: 8.6 MG/DL (ref 8.7–10.5)
CHLORIDE SERPL-SCNC: 95 MMOL/L (ref 95–110)
CO2 SERPL-SCNC: 23 MMOL/L (ref 23–29)
CREAT SERPL-MCNC: 0.6 MG/DL (ref 0.5–1.4)
DIFFERENTIAL METHOD: ABNORMAL
EOSINOPHIL # BLD AUTO: 0.1 K/UL (ref 0–0.5)
EOSINOPHIL NFR BLD: 1 % (ref 0–8)
ERYTHROCYTE [DISTWIDTH] IN BLOOD BY AUTOMATED COUNT: 13 % (ref 11.5–14.5)
EST. GFR  (AFRICAN AMERICAN): >60 ML/MIN/1.73 M^2
EST. GFR  (NON AFRICAN AMERICAN): >60 ML/MIN/1.73 M^2
GLUCOSE SERPL-MCNC: 122 MG/DL (ref 70–110)
HCT VFR BLD AUTO: 34.4 % (ref 37–48.5)
HGB BLD-MCNC: 11.5 G/DL (ref 12–16)
IMM GRANULOCYTES # BLD AUTO: 0.04 K/UL (ref 0–0.04)
IMM GRANULOCYTES NFR BLD AUTO: 0.4 % (ref 0–0.5)
LYMPHOCYTES # BLD AUTO: 0.9 K/UL (ref 1–4.8)
LYMPHOCYTES NFR BLD: 8.2 % (ref 18–48)
MAGNESIUM SERPL-MCNC: 2 MG/DL (ref 1.6–2.6)
MCH RBC QN AUTO: 31.7 PG (ref 27–31)
MCHC RBC AUTO-ENTMCNC: 33.4 G/DL (ref 32–36)
MCV RBC AUTO: 95 FL (ref 82–98)
MONOCYTES # BLD AUTO: 0.9 K/UL (ref 0.3–1)
MONOCYTES NFR BLD: 8.4 % (ref 4–15)
NEUTROPHILS # BLD AUTO: 8.6 K/UL (ref 1.8–7.7)
NEUTROPHILS NFR BLD: 81.7 % (ref 38–73)
NRBC BLD-RTO: 0 /100 WBC
PHOSPHATE SERPL-MCNC: 3.1 MG/DL (ref 2.7–4.5)
PLATELET # BLD AUTO: 246 K/UL (ref 150–450)
PMV BLD AUTO: 10.5 FL (ref 9.2–12.9)
POTASSIUM SERPL-SCNC: 4.6 MMOL/L (ref 3.5–5.1)
PROT SERPL-MCNC: 6.5 G/DL (ref 6–8.4)
RBC # BLD AUTO: 3.63 M/UL (ref 4–5.4)
SARS-COV-2 RDRP RESP QL NAA+PROBE: NEGATIVE
SODIUM SERPL-SCNC: 125 MMOL/L (ref 136–145)
WBC # BLD AUTO: 10.55 K/UL (ref 3.9–12.7)

## 2022-07-25 PROCEDURE — 20600001 HC STEP DOWN PRIVATE ROOM

## 2022-07-25 PROCEDURE — 63600175 PHARM REV CODE 636 W HCPCS: Performed by: STUDENT IN AN ORGANIZED HEALTH CARE EDUCATION/TRAINING PROGRAM

## 2022-07-25 PROCEDURE — 99900035 HC TECH TIME PER 15 MIN (STAT)

## 2022-07-25 PROCEDURE — 25000003 PHARM REV CODE 250: Performed by: STUDENT IN AN ORGANIZED HEALTH CARE EDUCATION/TRAINING PROGRAM

## 2022-07-25 PROCEDURE — 99233 PR SUBSEQUENT HOSPITAL CARE,LEVL III: ICD-10-PCS | Mod: ,,, | Performed by: PHYSICIAN ASSISTANT

## 2022-07-25 PROCEDURE — 94761 N-INVAS EAR/PLS OXIMETRY MLT: CPT

## 2022-07-25 PROCEDURE — 51701 INSERT BLADDER CATHETER: CPT

## 2022-07-25 PROCEDURE — 27000221 HC OXYGEN, UP TO 24 HOURS

## 2022-07-25 PROCEDURE — 99233 SBSQ HOSP IP/OBS HIGH 50: CPT | Mod: ,,, | Performed by: PHYSICIAN ASSISTANT

## 2022-07-25 PROCEDURE — 51798 US URINE CAPACITY MEASURE: CPT

## 2022-07-25 PROCEDURE — 85025 COMPLETE CBC W/AUTO DIFF WBC: CPT | Performed by: STUDENT IN AN ORGANIZED HEALTH CARE EDUCATION/TRAINING PROGRAM

## 2022-07-25 PROCEDURE — 97165 OT EVAL LOW COMPLEX 30 MIN: CPT

## 2022-07-25 PROCEDURE — U0002 COVID-19 LAB TEST NON-CDC: HCPCS | Performed by: EMERGENCY MEDICINE

## 2022-07-25 PROCEDURE — 25500020 PHARM REV CODE 255: Performed by: STUDENT IN AN ORGANIZED HEALTH CARE EDUCATION/TRAINING PROGRAM

## 2022-07-25 PROCEDURE — 84100 ASSAY OF PHOSPHORUS: CPT | Performed by: STUDENT IN AN ORGANIZED HEALTH CARE EDUCATION/TRAINING PROGRAM

## 2022-07-25 PROCEDURE — A9585 GADOBUTROL INJECTION: HCPCS | Performed by: STUDENT IN AN ORGANIZED HEALTH CARE EDUCATION/TRAINING PROGRAM

## 2022-07-25 PROCEDURE — 94799 UNLISTED PULMONARY SVC/PX: CPT

## 2022-07-25 PROCEDURE — 97535 SELF CARE MNGMENT TRAINING: CPT

## 2022-07-25 PROCEDURE — 80053 COMPREHEN METABOLIC PANEL: CPT | Performed by: STUDENT IN AN ORGANIZED HEALTH CARE EDUCATION/TRAINING PROGRAM

## 2022-07-25 PROCEDURE — 83735 ASSAY OF MAGNESIUM: CPT | Performed by: STUDENT IN AN ORGANIZED HEALTH CARE EDUCATION/TRAINING PROGRAM

## 2022-07-25 PROCEDURE — 97162 PT EVAL MOD COMPLEX 30 MIN: CPT

## 2022-07-25 PROCEDURE — 97530 THERAPEUTIC ACTIVITIES: CPT

## 2022-07-25 PROCEDURE — 36415 COLL VENOUS BLD VENIPUNCTURE: CPT | Performed by: STUDENT IN AN ORGANIZED HEALTH CARE EDUCATION/TRAINING PROGRAM

## 2022-07-25 RX ORDER — ACETAMINOPHEN 325 MG/1
650 TABLET ORAL EVERY 8 HOURS PRN
Status: DISCONTINUED | OUTPATIENT
Start: 2022-07-24 | End: 2022-07-25

## 2022-07-25 RX ORDER — ONDANSETRON 8 MG/1
8 TABLET, ORALLY DISINTEGRATING ORAL EVERY 8 HOURS PRN
Status: DISCONTINUED | OUTPATIENT
Start: 2022-07-24 | End: 2022-08-04 | Stop reason: HOSPADM

## 2022-07-25 RX ORDER — ACETAMINOPHEN 500 MG
1000 TABLET ORAL EVERY 8 HOURS
Status: DISPENSED | OUTPATIENT
Start: 2022-07-25 | End: 2022-07-27

## 2022-07-25 RX ORDER — TALC
6 POWDER (GRAM) TOPICAL NIGHTLY PRN
Status: DISCONTINUED | OUTPATIENT
Start: 2022-07-24 | End: 2022-08-04 | Stop reason: HOSPADM

## 2022-07-25 RX ORDER — ENOXAPARIN SODIUM 100 MG/ML
40 INJECTION SUBCUTANEOUS EVERY 24 HOURS
Status: DISCONTINUED | OUTPATIENT
Start: 2022-07-25 | End: 2022-08-04 | Stop reason: HOSPADM

## 2022-07-25 RX ORDER — LIDOCAINE 50 MG/G
1 PATCH TOPICAL
Status: DISCONTINUED | OUTPATIENT
Start: 2022-07-25 | End: 2022-08-04 | Stop reason: HOSPADM

## 2022-07-25 RX ORDER — IBUPROFEN 400 MG/1
800 TABLET ORAL EVERY 8 HOURS
Status: DISCONTINUED | OUTPATIENT
Start: 2022-07-25 | End: 2022-07-27

## 2022-07-25 RX ORDER — CARVEDILOL 12.5 MG/1
12.5 TABLET ORAL 2 TIMES DAILY
Status: DISCONTINUED | OUTPATIENT
Start: 2022-07-25 | End: 2022-07-27

## 2022-07-25 RX ORDER — GABAPENTIN 300 MG/1
300 CAPSULE ORAL 3 TIMES DAILY
Status: DISCONTINUED | OUTPATIENT
Start: 2022-07-25 | End: 2022-07-27

## 2022-07-25 RX ORDER — SODIUM CHLORIDE 0.9 % (FLUSH) 0.9 %
10 SYRINGE (ML) INJECTION
Status: DISCONTINUED | OUTPATIENT
Start: 2022-07-25 | End: 2022-08-04 | Stop reason: HOSPADM

## 2022-07-25 RX ORDER — LEVOTHYROXINE SODIUM 75 UG/1
150 TABLET ORAL DAILY
Status: DISCONTINUED | OUTPATIENT
Start: 2022-07-25 | End: 2022-08-04 | Stop reason: HOSPADM

## 2022-07-25 RX ORDER — ASPIRIN 81 MG/1
81 TABLET ORAL DAILY
Status: DISCONTINUED | OUTPATIENT
Start: 2022-07-25 | End: 2022-08-04 | Stop reason: HOSPADM

## 2022-07-25 RX ORDER — ATORVASTATIN CALCIUM 20 MG/1
40 TABLET, FILM COATED ORAL DAILY
Status: DISCONTINUED | OUTPATIENT
Start: 2022-07-25 | End: 2022-08-04 | Stop reason: HOSPADM

## 2022-07-25 RX ORDER — OXYCODONE AND ACETAMINOPHEN 10; 325 MG/1; MG/1
1 TABLET ORAL EVERY 4 HOURS PRN
Status: DISCONTINUED | OUTPATIENT
Start: 2022-07-25 | End: 2022-07-25

## 2022-07-25 RX ORDER — SODIUM CHLORIDE 0.9 % (FLUSH) 0.9 %
10 SYRINGE (ML) INJECTION
Status: DISCONTINUED | OUTPATIENT
Start: 2022-07-24 | End: 2022-07-25

## 2022-07-25 RX ORDER — OXYCODONE HYDROCHLORIDE 10 MG/1
10 TABLET ORAL EVERY 4 HOURS PRN
Status: DISCONTINUED | OUTPATIENT
Start: 2022-07-25 | End: 2022-08-04 | Stop reason: HOSPADM

## 2022-07-25 RX ORDER — GADOBUTROL 604.72 MG/ML
10 INJECTION INTRAVENOUS
Status: COMPLETED | OUTPATIENT
Start: 2022-07-25 | End: 2022-07-25

## 2022-07-25 RX ORDER — CYCLOBENZAPRINE HCL 5 MG
5 TABLET ORAL 3 TIMES DAILY
Status: DISCONTINUED | OUTPATIENT
Start: 2022-07-25 | End: 2022-07-27

## 2022-07-25 RX ORDER — OXYCODONE AND ACETAMINOPHEN 5; 325 MG/1; MG/1
1 TABLET ORAL EVERY 4 HOURS PRN
Status: DISCONTINUED | OUTPATIENT
Start: 2022-07-25 | End: 2022-07-25

## 2022-07-25 RX ORDER — OXYCODONE HYDROCHLORIDE 5 MG/1
5 TABLET ORAL EVERY 4 HOURS PRN
Status: DISCONTINUED | OUTPATIENT
Start: 2022-07-25 | End: 2022-08-04 | Stop reason: HOSPADM

## 2022-07-25 RX ADMIN — CARVEDILOL 12.5 MG: 12.5 TABLET, FILM COATED ORAL at 12:07

## 2022-07-25 RX ADMIN — IBUPROFEN 800 MG: 400 TABLET, FILM COATED ORAL at 09:07

## 2022-07-25 RX ADMIN — Medication 6 MG: at 09:07

## 2022-07-25 RX ADMIN — ACETAMINOPHEN 1000 MG: 500 TABLET ORAL at 01:07

## 2022-07-25 RX ADMIN — LEVOTHYROXINE SODIUM 150 MCG: 150 TABLET ORAL at 05:07

## 2022-07-25 RX ADMIN — GADOBUTROL 10 ML: 604.72 INJECTION INTRAVENOUS at 07:07

## 2022-07-25 RX ADMIN — ACETAMINOPHEN 1000 MG: 500 TABLET ORAL at 05:07

## 2022-07-25 RX ADMIN — CARVEDILOL 12.5 MG: 12.5 TABLET, FILM COATED ORAL at 08:07

## 2022-07-25 RX ADMIN — ENOXAPARIN SODIUM 40 MG: 40 INJECTION SUBCUTANEOUS at 05:07

## 2022-07-25 RX ADMIN — LIDOCAINE 1 PATCH: 50 PATCH CUTANEOUS at 12:07

## 2022-07-25 RX ADMIN — ONDANSETRON 8 MG: 8 TABLET, ORALLY DISINTEGRATING ORAL at 03:07

## 2022-07-25 RX ADMIN — CYCLOBENZAPRINE HYDROCHLORIDE 5 MG: 5 TABLET, FILM COATED ORAL at 09:07

## 2022-07-25 RX ADMIN — GABAPENTIN 300 MG: 300 CAPSULE ORAL at 08:07

## 2022-07-25 RX ADMIN — IBUPROFEN 800 MG: 400 TABLET, FILM COATED ORAL at 01:07

## 2022-07-25 RX ADMIN — OXYCODONE HYDROCHLORIDE 10 MG: 10 TABLET ORAL at 12:07

## 2022-07-25 RX ADMIN — ACETAMINOPHEN 1000 MG: 500 TABLET ORAL at 12:07

## 2022-07-25 RX ADMIN — ASPIRIN 81 MG: 81 TABLET, COATED ORAL at 09:07

## 2022-07-25 RX ADMIN — ACETAMINOPHEN 1000 MG: 500 TABLET ORAL at 09:07

## 2022-07-25 RX ADMIN — ATORVASTATIN CALCIUM 40 MG: 20 TABLET, FILM COATED ORAL at 09:07

## 2022-07-25 RX ADMIN — CYCLOBENZAPRINE HYDROCHLORIDE 5 MG: 5 TABLET, FILM COATED ORAL at 08:07

## 2022-07-25 RX ADMIN — IBUPROFEN 800 MG: 400 TABLET, FILM COATED ORAL at 05:07

## 2022-07-25 RX ADMIN — CARVEDILOL 12.5 MG: 12.5 TABLET, FILM COATED ORAL at 09:07

## 2022-07-25 RX ADMIN — GABAPENTIN 300 MG: 300 CAPSULE ORAL at 09:07

## 2022-07-25 NOTE — ED NOTES
Patient is currently refusing mri and xray due to severe back pain. Pain medication given but patient says she will not be able to move or stand for xray or mri and want it to do later time. Neurosurgery and surgical step down team made aware that and confirmed that its ok for patient to go to floor without scans to be done at this time.

## 2022-07-25 NOTE — PT/OT/SLP EVAL
Occupational Therapy   Co-Evaluation and Co-Treat  Co-treatment with PT for maximal pt participation, safety, and activity tolerance       Name: Laila Hanna  MRN: [id number]  Admitting Diagnosis:  Multiple rib fractures  Recent Surgery: * No surgery found *      Recommendations:     Discharge Recommendations: nursing facility, skilled  Discharge Equipment Recommendations:  wheelchair, bedside commode  Barriers to discharge:       Assessment:     Laila Hanna is a 89 y.o. female with a medical diagnosis of Multiple rib fractures.  She presents with impaired ADL and mobility performance deficits. Pt found upright in bed and agreeable for therapy with pt's caregiver present and supportive. Pt limited today 2/2 rib pain and back pain with TLSO worn throughout. Pt required mod-max A for bed mobility and sat EOB with CGA-min A 2/2 spontaneously repositioning self out of pain at side of bed. Pt stood with min A using RW and tolerated 6 steps laterally towards HOB with min A. Pt motivated to improve as pt was primarily (I) for ADLs and using a SPC for mobility. Pt does have a sitter who visits daily however the pt will be alone most nights. Pt with 2 recent falls 2/2 bedtime ADLs and currently remains a high fall risk . Pt would benefit from continued OT skilled services 3x/wk to improve daily living skills to optimize QOL.  Pt is recommended to discharge to SNF at this time.   Performance deficits affecting function: weakness, impaired functional mobility, impaired endurance, gait instability, impaired balance, impaired self care skills, decreased lower extremity function, decreased upper extremity function, pain, impaired cardiopulmonary response to activity.      Rehab Prognosis: Good; patient would benefit from acute skilled OT services to address these deficits and reach maximum level of function.       Plan:     Patient to be seen 4 x/week to address the above listed problems via self-care/home management,  therapeutic activities, therapeutic exercises, wheelchair management/training  · Plan of Care Expires: 08/25/22  · Plan of Care Reviewed with: patient    Subjective     Chief Complaint: back pain and rib pain   Patient/Family Comments/goals: return home     Occupational Profile:  Living Environment: Pt lives alone in a H with 4 BRITTANY, 1 HR on R side. Pt has a shower/tub combo with shower chair used for bathing.  Previous level of function: I with ADLs (does receive baths from sitter 3x/wk). Mod I for mobility with SPC however sitter explains ~3 wk of unsteady walking in home. Pt has sitter 9am-5   Roles and Routines: Pt does not drive; home dwelling.  Equipment Used at Home:  cane, quad, walker, rolling, rollator  Assistance upon Discharge: Sitter    Pain/Comfort:  · Pain Rating 1: other (see comments) (pain in back and ribs not ranked)  · Location - Orientation 1: generalized  · Location 1: back  · Pain Addressed 1: Reposition, Distraction, Cessation of Activity  · Location 2: rib(s)  · Pain Addressed 2: Reposition, Distraction    Patients cultural, spiritual, Mormon conflicts given the current situation: no    Objective:     Communicated with: RN prior to session.  Patient found HOB elevated with TLSO, peripheral IV, telemetry upon OT entry to room.    General Precautions: Standard, fall   Orthopedic Precautions:N/A   Braces: N/A  Respiratory Status: Room air    Occupational Performance:    Bed Mobility:    · Patient completed Rolling/Turning to Right with moderate assistance  · Patient completed Scooting/Bridging with moderate assistance  · Patient completed Supine to Sit with maximal assistance  · Patient completed Sit to Supine with maximal assistance    Functional Mobility/Transfers:  · Patient completed Sit <> Stand Transfer with minimum assistance  with  rolling walker   · Functional Mobility: Pt stood at bedside and took 6 steps laterally towards HOB with RW.    Activities of Daily Living:  · Upper Body  Dressing: minimum assistance donning gown at EOB   · Lower Body Dressing: total assistance donning socks   · Toileting: total assistance using purewick    Cognitive/Visual Perceptual:  Cognitive/Psychosocial Skills:     -       Oriented to: Person, Place, Time and Situation   -       Follows Commands/attention:Follows multistep  commands  -       Communication: clear/fluent  -       Memory: No Deficits noted  -       Safety awareness/insight to disability: intact   -       Mood/Affect/Coping skills/emotional control: Pleasant    Physical Exam:  Balance:    -       demo fair sitting and poor standing balance with RW   Upper Extremity Range of Motion:     -       Right Upper Extremity: WFL  -       Left Upper Extremity: WFL  Upper Extremity Strength:    -       Right Upper Extremity: WFL  -       Left Upper Extremity: WFL   Strength:    -       Right Upper Extremity: WFL  -       Left Upper Extremity: WFL    AMPAC 6 Click ADL:  AMPAC Total Score: 7    Treatment & Education:  Pt educated on role of occupational therapy, POC, and safety during ADLs and functional mobility. Pt and OT discussed importance of safe, continued mobility to optimize daily living skills. Pt verbalized understanding. White board updated during session. Pt given instruction to call for medical staff/nurse for assistance.     Education:    Patient left HOB elevated with all lines intact, call button in reach and RN notified    GOALS:   Multidisciplinary Problems     Occupational Therapy Goals        Problem: Occupational Therapy    Goal Priority Disciplines Outcome Interventions   Occupational Therapy Goal     OT, PT/OT Ongoing, Progressing    Description: Goals to be met by: 8/25/2022 (1 month)     Patient will increase functional independence with ADLs by performing:    UE Dressing with Moderate Assistance.  LE Dressing with Maximum Assistance.  Grooming while standing at sink with Minimal Assistance.  Toileting from toilet with Minimal  Assistance for hygiene and clothing management.   Rolling to Bilateral with Standby Assistance.   Supine to sit with Minimal Assistance.  Step transfer with Stand-by Assistance  Toilet transfer to toilet with Stand-by Assistance.                     History:     Past Medical History:   Diagnosis Date    Basal cell carcinoma     nose    Benign essential hypertension     Cerebral microvascular disease 9/8/2014    Closed fracture of distal phalanx of left hand with routine healing 9/17/2015    Closed mallet fracture of distal phalanx of finger with routine healing 10/6/2015    Coronary artery disease     DDD (degenerative disc disease), lumbar 4/12/2016    Depression     Fall at home 5/30/2016    Hyperlipidemia     Hypothyroidism due to acquired atrophy of thyroid     Meningiomas, multiple     MI (myocardial infarction) 2004    80% blockage per patient    Migraine aura without headache 12/1/2014    Post PTCA 12/12/2012    Transient cerebral ischemia 5/4/2014       Past Surgical History:   Procedure Laterality Date    CARDIAC CATHETERIZATION  03/29/2004    S/P LAD PTCA, coated stent    CATARACT EXTRACTION W/  INTRAOCULAR LENS IMPLANT Bilateral 2000    Dr Youssef     CHOLECYSTECTOMY      CORONARY ANGIOPLASTY WITH STENT PLACEMENT  2004    LAD    EYE SURGERY      TOTAL THYROIDECTOMY         Time Tracking:     OT Date of Treatment: 07/25/22  OT Start Time: 1326  OT Stop Time: 1357  OT Total Time (min): 31 min    Billable Minutes:Evaluation 15 min  Self Care/Home Management 16 min    7/25/2022

## 2022-07-25 NOTE — PROGRESS NOTES
[facility] - [facility]  Neurosurgery  Progress Note    Subjective:     History of Present Illness: Laila Hanna is a 89 y.o. female h/o chronic constipation, chronic back pain, HTN, CAD, HLD, TIA, hypothyroidism presented via EMS for mechanical GLF at home. Patient was in her usual state of health prior to that time. She cannot recall whether she suffered LOC. Reports mild LBP, but denies neck pain. Denies fever/chills, HA, vision/hearing changes, dysphagia, dysarthria, nausea/vomiting, new-onset weakness or sensory change, bowel/bladder changes. She takes ASA 81mg. CT Lsp significant for L2 burst fx, L1 compression fx. She was also found to have several rib fractures for which she is being admitted to general surgery.       Post-Op Info:  * No surgery found *         Interval History: Patient with back pain and rib pain. Neuro intact. LSO brace delivered to bedside. MRI lumbar spine pending.     Medications:  Continuous Infusions:  Scheduled Meds:   acetaminophen  1,000 mg Oral Q8H    aspirin  81 mg Oral Daily    atorvastatin  40 mg Oral Daily    carvediloL  12.5 mg Oral BID    cyclobenzaprine  5 mg Oral TID    enoxaparin  40 mg Subcutaneous Daily    gabapentin  300 mg Oral TID    ibuprofen  800 mg Oral Q8H    levothyroxine  150 mcg Oral Daily    LIDOcaine  1 patch Transdermal Q24H     PRN Meds:melatonin, ondansetron, oxyCODONE, oxyCODONE, sodium chloride 0.9%, DIPH,PERTUSS(ACELL),TET VACCINE (ADULT)(BOOSTRIX,ADACEL)     Review of Systems  Objective:     Weight: 93.9 kg (207 lb 0.2 oz)  Body mass index is 33.41 kg/m².  Vital Signs (Most Recent):  Temp: 97.6 °F (36.4 °C) (07/25/22 1147)  Pulse: (!) 55 (07/25/22 1148)  Resp: 20 (07/25/22 1147)  BP: 136/73 (07/25/22 1147)  SpO2: 100 % (07/25/22 1147)   Vital Signs (24h Range):  Temp:  [97.5 °F (36.4 °C)-98.4 °F (36.9 °C)] 97.6 °F (36.4 °C)  Pulse:  [55-84] 55  Resp:  [16-20] 20  SpO2:  [87 %-100 %] 100 %  BP: (136-158)/(68-90) 136/73     Date 07/25/22 0700 -  07/26/22 0659   Shift 0383-7008 5423-2647 2602-7340 24 Hour Total   INTAKE   P.O. 720   720   Shift Total(mL/kg) 720(7.7)   720(7.7)   OUTPUT   Shift Total(mL/kg)       Weight (kg) 93.9 93.9 93.9 93.9       Neurosurgery Physical Exam  General: well developed, well nourished, no distress.   Head: normocephalic, atraumatic  Neurologic: Alert and oriented. Thought content appropriate.  Cranial nerves: face symmetric, tongue midline, CN II-XII grossly intact.   Eyes: pupils equal, round, reactive to light with accommodation, EOMI.   Pulmonary: normal respirations, no signs of respiratory distress  Skin: Skin is warm, dry and intact.  Sensory: intact to light touch throughout  Motor Strength: right knee swelling--limiting exam 2/2 pain    Strength  Deltoids Triceps Biceps Wrist Extension Wrist Flexion Hand    Upper: R 5/5 5/5 5/5 5/5 5/5 5/5    L 5/5 5/5 5/5 5/5 5/5 5/5     Iliopsoas Quadriceps Knee  Flexion Tibialis  anterior Gastro- cnemius EHL   Lower: R 5/5 3/5 3/5 5/5 5/5 5/5    L 5/5 5/5 5/5 5/5 5/5 5/5         Significant Labs:  Recent Labs   Lab 07/24/22 2122 07/25/22  0458   GLU 98 122*   * 125*   K 4.4 4.6   CL 94* 95   CO2 26 23   BUN 12 13   CREATININE 0.7 0.6   CALCIUM 9.0 8.6*   MG  --  2.0     Recent Labs   Lab 07/24/22 2122 07/25/22  0458   WBC 13.81* 10.55   HGB 12.4 11.5*   HCT 38.1 34.4*    246     Recent Labs   Lab 07/24/22 2122   INR 1.1     Microbiology Results (last 7 days)       ** No results found for the last 168 hours. **          All pertinent labs from the last 24 hours have been reviewed.    Significant Diagnostics:  I have reviewed all pertinent imaging results/findings within the past 24 hours.    Assessment/Plan:     Lumbar compression fracture  Laila Hanna is a 89 y.o. female h/o chronic constipation, chronic back pain, HTN, CAD, HLD, TIA, hypothyroidism presented via EMS for mechanical GLF at home. CT Lsp significant for L2 burst fx with 4mm retropulsion, and L1  compression fx. TLICS 2. She has multiple rib fractures.     --Patient admitted to general surgery on telemetry      - q4h neurochecks on floor  --Recommend MRI Lsp and XR Lsp upright supine to assess for dynamic instability  --Maintain LSO at all times  --Diet per primary  --Continue to monitor clinically, notify NSGY immediately with any changes in neuro status    Dispo: per primary        Lita Vallejo PA-C  Neurosurgery  [facility]

## 2022-07-25 NOTE — CONSULTS
[facility] - Emergency Dept  General Surgery  Consult Note    Inpatient consult to General surgery  Consult performed by: Malaika Tamez MD  Consult ordered by: Malaika Tamez MD  Reason for consult: fall from standing w/rib fractures and L2 burst fracture  Assessment/Recommendations: 89yo F w/PMH HTN, CAD, degenerative disc disease, HLD, MI, TIA who presents to the ED after falling from standing, CT demonstrated no head injury, has rib fractures in ribs 4-9 as well as an acute burst fracture of L2 with fragments entering canal    - admit to general surgery, Dr. Salazar  - neurosurgery consult for burst fracture, they recommend lumbar MRI and x-rays, keep patient in TLSO, q4 hour neuro checks  - reg diet  - IS  - multi-modal pain control  - PT/OT  - daily labs  - daily CXR  - f/u R knee and hand x-rays given hematomas        Subjective:     Chief Complaint/Reason for Admission: fall from standing with rib fractures and L2 burst fracture    History of Present Illness: Ms Hanna is a 89yo F with extensive PMH who presents to the ED after falling from standing. She states she was standing in her kitchen trying to get a glass of water when she lost her balance and fell. She landed on her R side and was taken to the ED for evaluation. Did not have loss of consciousness. CT head was negative for acute pathology. CT chest/spine showed acute displaced burst fracture of L2 vertebral body with fracture component extending into spinal canal. Also nondisplaced fracture of L1 vertebral body. Also has acute R posterolateral rib fractures of ribs 4-9. She is not on anticoagulation.     PSH: laparoscopic cholecystectomy, thyroidectomy    Allergies: thiazides    No current facility-administered medications on file prior to encounter.     Current Outpatient Medications on File Prior to Encounter   Medication Sig    alendronate (FOSAMAX) 70 MG tablet TAKE 1 TABLET BY MOUTH EVERY 7 DAYS    aspirin (ECOTRIN) 81 MG EC tablet Take 1  tablet (81 mg total) by mouth once daily.    atorvastatin (LIPITOR) 40 MG tablet Take 1 tablet (40 mg total) by mouth once daily.    calcium-vitamin D3 (OS-DANICA 500 + D3) 500 mg-5 mcg (200 unit) per tablet Take 1 tablet by mouth once daily.     carvediloL (COREG) 12.5 MG tablet Take 1 tablet (12.5 mg total) by mouth 2 (two) times daily.    clotrimazole-betamethasone 1-0.05% (LOTRISONE) cream Apply topically 2 (two) times daily as needed.    cyanocobalamin (VITAMIN B-12) 100 MCG tablet Take 100 mcg by mouth once daily.    dextran 70-hypromellose (ARTIFICIAL TEARS) ophthalmic solution Place 1 drop into both eyes every 2 (two) hours as needed.    levothyroxine (SYNTHROID) 150 MCG tablet Take 1 tablet (150 mcg total) by mouth once daily.    LINZESS 145 mcg Cap capsule TAKE 1 CAPSULE(145 MCG) BY MOUTH BEFORE BREAKFAST    losartan (COZAAR) 50 MG tablet Take 1 tablet (50 mg total) by mouth once daily.    melatonin 10 mg Cap Take by mouth.    torsemide (DEMADEX) 10 MG Tab Take 1 tablet (10 mg total) by mouth once daily. As need for weight gain of 3 lbs in 1 day or 5 lbs in 3 days. Do not take if weight stable or decreasing. Daily weights at home.       Review of patient's allergies indicates:   Allergen Reactions    Thiazides Other (See Comments)     Severe hyponatremia       Past Medical History:   Diagnosis Date    Basal cell carcinoma     nose    Benign essential hypertension     Cerebral microvascular disease 9/8/2014    Closed fracture of distal phalanx of left hand with routine healing 9/17/2015    Closed mallet fracture of distal phalanx of finger with routine healing 10/6/2015    Coronary artery disease     DDD (degenerative disc disease), lumbar 4/12/2016    Depression     Fall at home 5/30/2016    Hyperlipidemia     Hypothyroidism due to acquired atrophy of thyroid     Meningiomas, multiple     MI (myocardial infarction) 2004    80% blockage per patient    Migraine aura without headache  2014    Post PTCA 2012    Transient cerebral ischemia 2014     Past Surgical History:   Procedure Laterality Date    CARDIAC CATHETERIZATION  2004    S/P LAD PTCA, coated stent    CATARACT EXTRACTION W/  INTRAOCULAR LENS IMPLANT Bilateral     Dr Youssef     CHOLECYSTECTOMY      CORONARY ANGIOPLASTY WITH STENT PLACEMENT      LAD    EYE SURGERY      TOTAL THYROIDECTOMY       Family History     Problem Relation (Age of Onset)    Cancer Brother, Sister    Colon cancer Brother    Dementia Sister    Diabetes Mother, Paternal Grandmother    Glaucoma Maternal Aunt    Hypertension Father    No Known Problems Maternal Uncle, Paternal Aunt, Paternal Uncle, Maternal Grandmother, Maternal Grandfather, Paternal Grandfather    Skin cancer Brother    Stroke Mother, Father        Tobacco Use    Smoking status: Former Smoker     Packs/day: 0.30     Years: 15.00     Pack years: 4.50     Types: Cigarettes     Quit date: 1973     Years since quittin.5    Smokeless tobacco: Never Used   Substance and Sexual Activity    Alcohol use: No     Alcohol/week: 0.0 standard drinks    Drug use: No    Sexual activity: Never     Review of Systems   Constitutional: Negative for chills and fever.   Respiratory: Negative for chest tightness and shortness of breath.    Cardiovascular: Positive for chest pain (R sided rib pain).   Gastrointestinal: Negative for abdominal pain, constipation, diarrhea, nausea and vomiting.   Genitourinary: Negative for decreased urine volume and dysuria.   Musculoskeletal: Positive for arthralgias and back pain.   Skin: Negative for color change.   Neurological: Negative for dizziness and headaches.   Hematological: Negative for adenopathy.   Psychiatric/Behavioral: Negative for agitation and confusion.     Objective:     Vital Signs (Most Recent):  Temp: 98 °F (36.7 °C) (22)  Pulse: 60 (22 0027)  Resp: 18 (22)  BP: (!) 146/76 (22  2129)  SpO2: (!) 93 % (07/25/22 0027) Vital Signs (24h Range):  Temp:  [97.8 °F (36.6 °C)-98.3 °F (36.8 °C)] 98 °F (36.7 °C)  Pulse:  [60-84] 60  Resp:  [16-18] 18  SpO2:  [93 %-99 %] 93 %  BP: (138-150)/(74-90) 146/76     Weight: 88.7 kg (195 lb 8.8 oz)  Body mass index is 31.56 kg/m².    No intake or output data in the 24 hours ending 07/25/22 0042    Physical Exam  Constitutional:       Appearance: Normal appearance.   HENT:      Head: Normocephalic and atraumatic.   Cardiovascular:      Rate and Rhythm: Normal rate.   Pulmonary:      Effort: Pulmonary effort is normal. No respiratory distress.      Comments: Tenderness to palpation over R lower rib cage  Abdominal:      General: There is no distension.      Palpations: Abdomen is soft.      Tenderness: There is no abdominal tenderness.   Musculoskeletal:      Comments: Hematoma around R knee and R hand   Skin:     General: Skin is warm and dry.      Capillary Refill: Capillary refill takes less than 2 seconds.   Neurological:      General: No focal deficit present.      Mental Status: She is alert.         Significant Labs:  CBC:   Recent Labs   Lab 07/24/22 2122   WBC 13.81*   RBC 3.95*   HGB 12.4   HCT 38.1      MCV 97   MCH 31.4*   MCHC 32.5     CMP:   Recent Labs   Lab 07/24/22 2122   GLU 98   CALCIUM 9.0   ALBUMIN 3.3*   PROT 7.2   *   K 4.4   CO2 26   CL 94*   BUN 12   CREATININE 0.7   ALKPHOS 107   ALT 13   AST 19   BILITOT 0.7       Significant Diagnostics:  CT: I have reviewed all pertinent results/findings within the past 24 hours. acute rib and L2 fractures    Assessment/Plan:     Active Diagnoses:    Diagnosis Date Noted POA    PRINCIPAL PROBLEM:  Multiple rib fractures [S22.49XA] 07/24/2022 Unknown    Lumbar compression fracture [S32.000A] 07/24/2022 Yes    Closed burst fracture of lumbar vertebra [S32.001A] 07/24/2022 Unknown      Problems Resolved During this Admission:       Thank you for your consult. I will follow-up with  patient. Please contact us if you have any additional questions.    Malaika Tamez MD  General Surgery  [facility] - Emergency Dept

## 2022-07-25 NOTE — NURSING
Pt arrived from ED. Upon assessment, pupils are pinpoint, fixed, and nonreactive. Pt also states she feels she is having trouble speaking. Pt has expected weakness on R side due to R knee bruising and R hand hematoma. No facial droop.   Notified Dr. Perea. No new orders, continue to monitor. Charge RN assessed patient as well.   Notified rapid response RN of pt for extra monitoring.

## 2022-07-25 NOTE — HPI
Laila Hanna is a 89 y.o. female h/o chronic constipation, chronic back pain, HTN, CAD, HLD, TIA, hypothyroidism presented via EMS for mechanical GLF at home. Patient was in her usual state of health prior to that time. She cannot recall whether she suffered LOC. Reports mild LBP, but denies neck pain. Denies fever/chills, HA, vision/hearing changes, dysphagia, dysarthria, nausea/vomiting, new-onset weakness or sensory change, bowel/bladder changes. She takes ASA 81mg. CT Lsp significant for L2 burst fx, L1 compression fx. She was also found to have several rib fractures for which she is being admitted to general surgery.

## 2022-07-25 NOTE — PLAN OF CARE
AAOx4, VSS but bradycardic on tele. Neurovascular checks q 4, see previous note. Drowsy but responds with minimal stimulation. Placed on 2L NC, shallow breaths due to pain. C/o R side and lower back pain. Pt has bruise on R side of forehead, R knee, hematoma on R hand, bruising on R side of back. Small old scratch to R 3rd toe. TLSO brace on, bedrest. Purewick in place, does not remember when last time she voided. Bladder scan showed 160 ml. Pt reports history of dysphagia.    XR taken with brace on, pt did attempt to refuse. Still refusing MRI due to pain. Will notify oncoming nurse to call MRI when pt is agreeable.

## 2022-07-25 NOTE — PLAN OF CARE
No acute events since admission.   Pt complaining of back pain and pain upon inspiration.     Given IS and taught how to use.   Labs and prior imaging reviewed.      - neurosurgery consult for L2 burst fracture, they recommend lumbar MRI and x-rays, keep patient in TLSO, q4 hour neuro checks  - Follow up imaging today  - Multimodal pain control  - Aggressive pulm tosuzanna Mclaughlin MD  General Surgery, PGY-1

## 2022-07-25 NOTE — PLAN OF CARE
Problem: Occupational Therapy  Goal: Occupational Therapy Goal  Description: Goals to be met by: 8/25/2022 (1 month)     Patient will increase functional independence with ADLs by performing:    UE Dressing with Moderate Assistance.  LE Dressing with Maximum Assistance.  Grooming while standing at sink with Minimal Assistance.  Toileting from toilet with Minimal Assistance for hygiene and clothing management.   Rolling to Bilateral with Standby Assistance.   Supine to sit with Minimal Assistance.  Step transfer with Stand-by Assistance  Toilet transfer to toilet with Stand-by Assistance.    Evaluated pt and established OT POC. Continue OT as tolerated.  Mirlande Kumar OT  7/25/2022    Outcome: Ongoing, Progressing

## 2022-07-25 NOTE — ASSESSMENT & PLAN NOTE
Laila Hanna is a 89 y.o. female h/o chronic constipation, chronic back pain, HTN, CAD, HLD, TIA, hypothyroidism presented via EMS for mechanical GLF at home. CT Lsp significant for L2 burst fx with 4mm retropulsion, and L1 compression fx. TLICS 2. She has multiple rib fractures.     --Patient admitted to general surgery on telemetry      - q4h neurochecks on floor  --Recommend MRI Lsp and XR Lsp upright supine to assess for dynamic instability  --Maintain LSO at all times  --Diet per primary  --Continue to monitor clinically, notify NSGY immediately with any changes in neuro status    Dispo: per primary

## 2022-07-25 NOTE — PLAN OF CARE
[facility]  Initial Discharge Assessment       Primary Care Provider: Ama Graham MD    Admission Diagnosis: Fall [W19.XXXA]  Closed fracture of multiple ribs of right side, initial encounter [S22.41XA]  Closed fracture of lumbar spine without spinal cord lesion, initial encounter [S32.009A]    Admission Date: 7/24/2022  Expected Discharge Date: 7/27/2022    Discharge Barriers Identified: None    Payor: HU[employer] MANAGED MEDICARE / Plan: HU[employer] MEDICARE HMO / Product Type: Capitation /     Extended Emergency Contact Information  Primary Emergency Contact: Tayla Baughecca  Address: [address] [place] [address] [place]  Mobile Phone: [phone]  Relation: Relative  Secondary Emergency Contact: patsy gonzalez  Mobile Phone: [phone]  Relation: None    Discharge Plan A: Home Health  Discharge Plan B: New Nursing Home placement - long-term care facility      [facility] (Now [facility]) - [place], [place] - [address]  [address] [address]  Phone: [phone] Fax: [phone]    [id number] - [place] - [address]  [facility] [place] [address]  Phone: [phone] Fax: [phone]      Initial Assessment (most recent)     Adult Discharge Assessment - 07/25/22 1052        Discharge Assessment    Assessment Type Discharge Planning Brief Assessment     Confirmed/corrected address, phone number and insurance Yes     Confirmed Demographics Correct on Facesheet     Source of Information family   Spoke with neice via phone.    If unable to respond/provide information was family/caregiver contacted? Yes     Contact Name/Number [phone]     When was your last doctors appointment? 07/18/22     Does patient/caregiver understand observation status Yes     Communicated RACHAEL with patient/caregiver Yes     Reason For  "Admission Fall     Lives With alone     Facility Arrived From: Home     Do you expect to return to your current living situation? Yes     Do you have help at home or someone to help you manage your care at home? Yes     Who are your caregiver(s) and their phone number(s)? Per maria m pt has a sitter who come 5 days a week. Not reciveing any other services at this time     Prior to hospitilization cognitive status: Unable to Assess     Current cognitive status: Unable to Assess     Walking or Climbing Stairs Difficulty ambulation difficulty, requires equipment;stair climbing difficulty, requires equipment;transferring difficulty, requires equipment     Mobility Management Pt uses cane and walker at home     Dressing/Bathing Difficulty none     Do you have any problems with: Errands/Grocery     Home Layout Able to live on 1st floor     Equipment Currently Used at Home cane, quad;walker, rolling     Readmission within 30 days? No     Patient currently being followed by outpatient case management? No     Do you currently have service(s) that help you manage your care at home? Yes     How Many hours does patient receive services --   unknown    Name and Contact number of agency Per maria m pt has a sitter who come 5 days a week. Not reciveing any other services at this time. Per maria m " the lady who comes is a family freind, it's not through a company."     Is the pt/caregiver preference to resume services with current agency Yes     Do you take prescription medications? Yes     Do you have prescription coverage? Yes     Coverage Medicare     Do you have any problems affording any of your prescribed medications? No     Is the patient taking medications as prescribed? yes     Who is going to help you get home at discharge? [phone]     How do you get to doctors appointments? family or friend will provide     Are you on dialysis? No     Do you take coumadin? No     Discharge Plan A Home Health     Discharge " "Plan B New Nursing Home placement - skilled nursing care facility     DME Needed Upon Discharge  other (see comments)   Unknown at this time    Discharge Plan discussed with: Caregiver     Name(s) and Number(s) [phone]     Discharge Barriers Identified None                     Spoke to [phone]. Pt lives at home alone. Per niece pt has a family friend " come and sit with her" five days a week. Post hospital stay neladarius will be pt support person and pt. has transportation at d/c with joshua. There have been no hospitalizations within the last 30 days per pt chart. Verified pt PCP and preferred pharmacy. Pt joshua stated not on Coumadin and is not receiving dialysis. All questions answered regarding case management/ discharge planning , pt joshua verbalized understanding. SW contact information given to pt maria m.       Sandie Pleitez LCSW  Case Management/[facility]  [phone]          "

## 2022-07-25 NOTE — PLAN OF CARE
GENERAL SURGERY    Per nursing, pt had been refusing MRI, discussed with patient, who is now amenable, nurse stated they will work with MRI to get that done expeditiously.    Chicho Miller MD  General Surgery, PGY-5  [phone]

## 2022-07-25 NOTE — NURSING
Spoke to Dr. Chicho Miller and informed nurse that he spoke to the patient and patients sitter [caregiver] and that patient has agreed to have the MRI and xray's completed.  Spoke to MRI : [Max} and informed that patient is in agreement and the sitter is able to answer questions for the patient.

## 2022-07-25 NOTE — SUBJECTIVE & OBJECTIVE
Interval History: Patient with back pain and rib pain. Neuro intact. LSO brace delivered to bedside. MRI lumbar spine pending.     Medications:  Continuous Infusions:  Scheduled Meds:   acetaminophen  1,000 mg Oral Q8H    aspirin  81 mg Oral Daily    atorvastatin  40 mg Oral Daily    carvediloL  12.5 mg Oral BID    cyclobenzaprine  5 mg Oral TID    enoxaparin  40 mg Subcutaneous Daily    gabapentin  300 mg Oral TID    ibuprofen  800 mg Oral Q8H    levothyroxine  150 mcg Oral Daily    LIDOcaine  1 patch Transdermal Q24H     PRN Meds:melatonin, ondansetron, oxyCODONE, oxyCODONE, sodium chloride 0.9%, DIPH,PERTUSS(ACELL),TET VACCINE (ADULT)(BOOSTRIX,ADACEL)     Review of Systems  Objective:     Weight: 93.9 kg (207 lb 0.2 oz)  Body mass index is 33.41 kg/m².  Vital Signs (Most Recent):  Temp: 97.6 °F (36.4 °C) (07/25/22 1147)  Pulse: (!) 55 (07/25/22 1148)  Resp: 20 (07/25/22 1147)  BP: 136/73 (07/25/22 1147)  SpO2: 100 % (07/25/22 1147)   Vital Signs (24h Range):  Temp:  [97.5 °F (36.4 °C)-98.4 °F (36.9 °C)] 97.6 °F (36.4 °C)  Pulse:  [55-84] 55  Resp:  [16-20] 20  SpO2:  [87 %-100 %] 100 %  BP: (136-158)/(68-90) 136/73     Date 07/25/22 0700 - 07/26/22 0659   Shift 8988-8543 3117-3729 7718-4798 24 Hour Total   INTAKE   P.O. 720   720   Shift Total(mL/kg) 720(7.7)   720(7.7)   OUTPUT   Shift Total(mL/kg)       Weight (kg) 93.9 93.9 93.9 93.9       Neurosurgery Physical Exam  General: well developed, well nourished, no distress.   Head: normocephalic, atraumatic  Neurologic: Alert and oriented. Thought content appropriate.  Cranial nerves: face symmetric, tongue midline, CN II-XII grossly intact.   Eyes: pupils equal, round, reactive to light with accommodation, EOMI.   Pulmonary: normal respirations, no signs of respiratory distress  Skin: Skin is warm, dry and intact.  Sensory: intact to light touch throughout  Motor Strength: right knee swelling--limiting exam 2/2 pain    Strength  Deltoids Triceps Biceps Wrist  Extension Wrist Flexion Hand    Upper: R 5/5 5/5 5/5 5/5 5/5 5/5    L 5/5 5/5 5/5 5/5 5/5 5/5     Iliopsoas Quadriceps Knee  Flexion Tibialis  anterior Gastro- cnemius EHL   Lower: R 5/5 3/5 3/5 5/5 5/5 5/5    L 5/5 5/5 5/5 5/5 5/5 5/5         Significant Labs:  Recent Labs   Lab 07/24/22 2122 07/25/22  0458   GLU 98 122*   * 125*   K 4.4 4.6   CL 94* 95   CO2 26 23   BUN 12 13   CREATININE 0.7 0.6   CALCIUM 9.0 8.6*   MG  --  2.0     Recent Labs   Lab 07/24/22 2122 07/25/22 0458   WBC 13.81* 10.55   HGB 12.4 11.5*   HCT 38.1 34.4*    246     Recent Labs   Lab 07/24/22 2122   INR 1.1     Microbiology Results (last 7 days)       ** No results found for the last 168 hours. **          All pertinent labs from the last 24 hours have been reviewed.    Significant Diagnostics:  I have reviewed all pertinent imaging results/findings within the past 24 hours.

## 2022-07-25 NOTE — PLAN OF CARE
"  Problem: Adult Inpatient Plan of Care  Goal: Plan of Care Review  Outcome: Ongoing, Progressing     Problem: Adult Inpatient Plan of Care  Goal: Patient-Specific Goal (Individualized)  Outcome: Ongoing, Progressing     Problem: Fall Injury Risk  Goal: Absence of Fall and Fall-Related Injury  Outcome: Ongoing, Progressing     Problem: Skin Injury Risk Increased  Goal: Skin Health and Integrity  Outcome: Ongoing, Progressing     Problem: Pain Acute  Goal: Acceptable Pain Control and Functional Ability  Outcome: Ongoing, Progressing     Patient alert and oriented x 3 , patient reports discomfort but does not want anything too strong."  Patient is on scheduled tylenol, ibuprofen and lidocaine patch to back.  TS LO brace is on and to be on at all times.  Patient has bruises to R knee, R upper back, R side has bruising after s/p fall at home.  Dysphagia soft diet and neuro consult for L 2 burst fracture.  MRI with X-rays ordered and patient can not stand for the MRI.  Neuro checks every 2 hours.   Personal sitter at bedside.   PT evaluated to day at the bedside.   POC reviewed with niece and sitter. Discharge is TBD.   "

## 2022-07-25 NOTE — CONSULTS
[facility] - Emergency Dept  Neurosurgery  Consult Note    Inpatient consult to Neurosurgery  Consult performed by: Ricardo Perea MD  Consult ordered by: Gregory Arrieta MD        Subjective:     Chief Complaint/Reason for Admission: Fall    History of Present Illness: Laila Hanna is a 89 y.o. female h/o chronic constipation, chronic back pain, HTN, CAD, HLD, TIA, hypothyroidism presented via EMS for mechanical GLF at home. Patient was in her usual state of health prior to that time. She cannot recall whether she suffered LOC. Reports mild LBP, but denies neck pain. Denies fever/chills, HA, vision/hearing changes, dysphagia, dysarthria, nausea/vomiting, new-onset weakness or sensory change, bowel/bladder changes. She takes ASA 81mg. CT Lsp significant for L2 burst fx, L1 compression fx. She was also found to have several rib fractures for which she is being admitted to general surgery.       (Not in a hospital admission)      Review of patient's allergies indicates:   Allergen Reactions    Thiazides Other (See Comments)     Severe hyponatremia       Past Medical History:   Diagnosis Date    Basal cell carcinoma     nose    Benign essential hypertension     Cerebral microvascular disease 9/8/2014    Closed fracture of distal phalanx of left hand with routine healing 9/17/2015    Closed mallet fracture of distal phalanx of finger with routine healing 10/6/2015    Coronary artery disease     DDD (degenerative disc disease), lumbar 4/12/2016    Depression     Fall at home 5/30/2016    Hyperlipidemia     Hypothyroidism due to acquired atrophy of thyroid     Meningiomas, multiple     MI (myocardial infarction) 2004    80% blockage per patient    Migraine aura without headache 12/1/2014    Post PTCA 12/12/2012    Transient cerebral ischemia 5/4/2014     Past Surgical History:   Procedure Laterality Date    CARDIAC CATHETERIZATION  03/29/2004    S/P LAD PTCA, coated stent    CATARACT  EXTRACTION W/  INTRAOCULAR LENS IMPLANT Bilateral     Dr Youssef     CHOLECYSTECTOMY      CORONARY ANGIOPLASTY WITH STENT PLACEMENT      LAD    EYE SURGERY      TOTAL THYROIDECTOMY       Family History       Problem Relation (Age of Onset)    Cancer Brother, Sister    Colon cancer Brother    Dementia Sister    Diabetes Mother, Paternal Grandmother    Glaucoma Maternal Aunt    Hypertension Father    No Known Problems Maternal Uncle, Paternal Aunt, Paternal Uncle, Maternal Grandmother, Maternal Grandfather, Paternal Grandfather    Skin cancer Brother    Stroke Mother, Father          Tobacco Use    Smoking status: Former Smoker     Packs/day: 0.30     Years: 15.00     Pack years: 4.50     Types: Cigarettes     Quit date: 1973     Years since quittin.5    Smokeless tobacco: Never Used   Substance and Sexual Activity    Alcohol use: No     Alcohol/week: 0.0 standard drinks    Drug use: No    Sexual activity: Never     Review of Systems: see HPI for pertinent positives and negatives.   Objective:        There is no height or weight on file to calculate BMI.  Vital Signs (Most Recent):  Temp: 98 °F (36.7 °C) (22)  Pulse: 84 (22)  Resp: 18 (22)  BP: (!) 146/76 (22)  SpO2: 99 % (22)   Vital Signs (24h Range):  Temp:  [97.8 °F (36.6 °C)-98.3 °F (36.8 °C)] 98 °F (36.7 °C)  Pulse:  [70-84] 84  Resp:  [16-18] 18  SpO2:  [96 %-99 %] 99 %  BP: (138-150)/(74-90) 146/76                          Physical Exam    Neurosurgery Physical Exam    GENERAL: resting comfortably  HEENT: NCAT, PERRL, mucous membranes moist  NECK: supple, trachea midline  CV: normal capillary refill  PULM: aerating well, symmetric expansion, no distress  ABD: soft, NT, ND  EXT: no c/c/e    NEURO:    AAO x 3  Fc x 4 antigravity  SILT    No lazar's or clonus      Significant Labs:  No results for input(s): GLU, NA, K, CL, CO2, BUN, CREATININE, CALCIUM, MG in the last 48  hours.  Recent Labs   Lab 07/24/22 2122   WBC 13.81*   HGB 12.4   HCT 38.1        Recent Labs   Lab 07/24/22 2122   INR 1.1     Microbiology Results (last 7 days)       ** No results found for the last 168 hours. **          All pertinent labs from the last 24 hours have been reviewed.    Significant Diagnostics:  I have reviewed all pertinent imaging results/findings within the past 24 hours.  X-Ray Chest 1 View    Result Date: 7/24/2022  1. Pulmonary findings are nonspecific, differential would include edema or other nonspecific pneumonitis.  There may be developing consolidation projected over the right lower lung zone.  Correlation and follow-up is advised. Electronically signed by: Jose L Easton MD Date:    07/24/2022 Time:    18:10    X-Ray Hand 3 view Right    Result Date: 7/24/2022  1.  No acute process. 2.  Osteoarthrosis of the right hand. Electronically signed by: Derik Nunez MD Date:    07/24/2022 Time:    18:09    X-Ray Femur 2 AP/LAT Right    Result Date: 7/24/2022  No acute fracture or dislocation of the right femur, knee, or tib-fib. Right prepatellar and infrapatellar soft tissue edema. Electronically signed by: Chandler Lyon Date:    07/24/2022 Time:    18:07    X-Ray Knee 3 View Right    Result Date: 7/24/2022  No acute fracture or dislocation of the right femur, knee, or tib-fib. Right prepatellar and infrapatellar soft tissue edema. Electronically signed by: Chandler Lyon Date:    07/24/2022 Time:    18:07    CT Head Without Contrast    Result Date: 7/24/2022  No acute intracranial pathology. Stable small extra-axial lesion at the right paraclinoid region, likely representing partially calcified meningioma. Electronically signed by resident: Errol Mai Date:    07/24/2022 Time:    19:28 Electronically signed by: Derik Nunez MD Date:    07/24/2022 Time:    20:07    CT Chest Without Contrast    Result Date: 7/24/2022  This report was flagged in Epic as abnormal. 1. Several  acute appearing right rib fractures as described. 2. Pulmonary findings suggesting emphysematous change, fibrotic change, and possible superimposed interstitial edema. 3. Large hiatal hernia. 4. Large amount of stool within the colon could reflect developing constipation. 5. Please see above for several additional findings. Electronically signed by: Jose L Easton MD Date:    07/24/2022 Time:    19:44    CT Cervical Spine Without Contrast    Result Date: 7/24/2022  1. Acute displaced burst type fracture of the L2 vertebral body with small fracture component extending into the spinal canal.  Contrast MRI the lumbar spine may be attempted for further evaluation. 2. Acute nondisplaced fracture involving the inferior endplate of the L1 vertebral body. 3. Multilevel degenerative changes of the cervical, thoracic, and lumbar spine, as detailed above. 4. Fusiform aneurysmal dilation of the ascending aorta. 5. Partially visualized right-sided rib fractures. 6. Additional findings as above. This report was flagged in Epic as abnormal. Electronically signed by resident: Saida Maynard Date:    07/24/2022 Time:    19:28 Electronically signed by: Derik Nunez MD Date:    07/24/2022 Time:    20:36    CT Thoracic Spine Without Contrast    Result Date: 7/24/2022  1. Acute displaced burst type fracture of the L2 vertebral body with small fracture component extending into the spinal canal.  Contrast MRI the lumbar spine may be attempted for further evaluation. 2. Acute nondisplaced fracture involving the inferior endplate of the L1 vertebral body. 3. Multilevel degenerative changes of the cervical, thoracic, and lumbar spine, as detailed above. 4. Fusiform aneurysmal dilation of the ascending aorta. 5. Partially visualized right-sided rib fractures. 6. Additional findings as above. This report was flagged in Epic as abnormal. Electronically signed by resident: Saida Maynard Date:    07/24/2022 Time:    19:28 Electronically signed  by: Derik Nunez MD Date:    07/24/2022 Time:    20:36    CT Lumbar Spine Without Contrast    Result Date: 7/24/2022  1. Acute displaced burst type fracture of the L2 vertebral body with small fracture component extending into the spinal canal.  Contrast MRI the lumbar spine may be attempted for further evaluation. 2. Acute nondisplaced fracture involving the inferior endplate of the L1 vertebral body. 3. Multilevel degenerative changes of the cervical, thoracic, and lumbar spine, as detailed above. 4. Fusiform aneurysmal dilation of the ascending aorta. 5. Partially visualized right-sided rib fractures. 6. Additional findings as above. This report was flagged in Epic as abnormal. Electronically signed by resident: Saida Maynard Date:    07/24/2022 Time:    19:28 Electronically signed by: Derik Nunez MD Date:    07/24/2022 Time:    20:36    X-Ray Tibia Fibula Bilateral    Result Date: 7/24/2022  No acute fracture or dislocation of the right femur, knee, or tib-fib. Right prepatellar and infrapatellar soft tissue edema. Electronically signed by: Chandler Lyon Date:    07/24/2022 Time:    18:07       Assessment/Plan:     Lumbar compression fracture  Laila Hanna is a 89 y.o. female h/o chronic constipation, chronic back pain, HTN, CAD, HLD, TIA, hypothyroidism presented via EMS for mechanical GLF at home. CT Lsp significant for L2 burst fx with 4mm retropulsion, and L1 compression fx. TLICS 2. She has multiple rib fractures.     --Patient admitted to general surgery on telemetry      -q1h neurochecks in ICU, q2h neurochecks in stepdown, q4h neurochecks on floor  --Recommend MRI Lsp and XR Lsp upright supine to assess for dynamic instability  --Maintain TLSO at all times  --Monitor for urinary retention  --Follow-up full pre-op labs (CBC/CMP/PT-INR/PTT/T&S)  --Diet per primary  --Continue to monitor clinically, notify NSGY immediately with any changes in neuro status    Dispo: per primary      Thank you for your  consult. I will follow-up with patient. Please contact us if you have any additional questions.    Ricardo Perea MD  Neurosurgery  [facility] - Emergency Dept

## 2022-07-25 NOTE — H&P
Please see consult note dated 7/25/22 for full H&P.    Malaika Tamez MD  Pager: [phone]  General Surgery PGY-4  [facility]

## 2022-07-25 NOTE — ASSESSMENT & PLAN NOTE
Laila Hanna is a 89 y.o. female h/o chronic constipation, chronic back pain, HTN, CAD, HLD, TIA, hypothyroidism presented via EMS for mechanical GLF at home. CT Lsp significant for L1 burst fx with 4mm retropulsion, and L2 compression fx. TLICS 2. She has multiple rib fractures.     --Patient admitted to general surgery on telemetry      -q1h neurochecks in ICU, q2h neurochecks in stepdown, q4h neurochecks on floor  --Recommend MRI Lsp and XR Lsp upright supine to assess for dynamic instability  --Maintain TLSO at all times  --Monitor for urinary retention  --Follow-up full pre-op labs (CBC/CMP/PT-INR/PTT/T&S)  --Diet per primary  --Continue to monitor clinically, notify NSGY immediately with any changes in neuro status    Dispo: per primary

## 2022-07-25 NOTE — SUBJECTIVE & OBJECTIVE
(Not in a hospital admission)      Review of patient's allergies indicates:   Allergen Reactions    Thiazides Other (See Comments)     Severe hyponatremia       Past Medical History:   Diagnosis Date    Basal cell carcinoma     nose    Benign essential hypertension     Cerebral microvascular disease 2014    Closed fracture of distal phalanx of left hand with routine healing 2015    Closed mallet fracture of distal phalanx of finger with routine healing 10/6/2015    Coronary artery disease     DDD (degenerative disc disease), lumbar 2016    Depression     Fall at home 2016    Hyperlipidemia     Hypothyroidism due to acquired atrophy of thyroid     Meningiomas, multiple     MI (myocardial infarction)     80% blockage per patient    Migraine aura without headache 2014    Post PTCA 2012    Transient cerebral ischemia 2014     Past Surgical History:   Procedure Laterality Date    CARDIAC CATHETERIZATION  2004    S/P LAD PTCA, coated stent    CATARACT EXTRACTION W/  INTRAOCULAR LENS IMPLANT Bilateral     Dr Youssef     CHOLECYSTECTOMY      CORONARY ANGIOPLASTY WITH STENT PLACEMENT      LAD    EYE SURGERY      TOTAL THYROIDECTOMY       Family History       Problem Relation (Age of Onset)    Cancer Brother, Sister    Colon cancer Brother    Dementia Sister    Diabetes Mother, Paternal Grandmother    Glaucoma Maternal Aunt    Hypertension Father    No Known Problems Maternal Uncle, Paternal Aunt, Paternal Uncle, Maternal Grandmother, Maternal Grandfather, Paternal Grandfather    Skin cancer Brother    Stroke Mother, Father          Tobacco Use    Smoking status: Former Smoker     Packs/day: 0.30     Years: 15.00     Pack years: 4.50     Types: Cigarettes     Quit date: 1973     Years since quittin.5    Smokeless tobacco: Never Used   Substance and Sexual Activity    Alcohol use: No     Alcohol/week: 0.0 standard drinks    Drug use: No    Sexual activity: Never      Review of Systems: see HPI for pertinent positives and negatives.   Objective:        There is no height or weight on file to calculate BMI.  Vital Signs (Most Recent):  Temp: 98 °F (36.7 °C) (07/24/22 2129)  Pulse: 84 (07/24/22 2129)  Resp: 18 (07/24/22 2135)  BP: (!) 146/76 (07/24/22 2129)  SpO2: 99 % (07/24/22 2129)   Vital Signs (24h Range):  Temp:  [97.8 °F (36.6 °C)-98.3 °F (36.8 °C)] 98 °F (36.7 °C)  Pulse:  [70-84] 84  Resp:  [16-18] 18  SpO2:  [96 %-99 %] 99 %  BP: (138-150)/(74-90) 146/76                          Physical Exam    Neurosurgery Physical Exam    GENERAL: resting comfortably  HEENT: NCAT, PERRL, mucous membranes moist  NECK: supple, trachea midline  CV: normal capillary refill  PULM: aerating well, symmetric expansion, no distress  ABD: soft, NT, ND  EXT: no c/c/e    NEURO:    AAO x 3  Fc x 4 antigravity  SILT    No lazar's or clonus      Significant Labs:  No results for input(s): GLU, NA, K, CL, CO2, BUN, CREATININE, CALCIUM, MG in the last 48 hours.  Recent Labs   Lab 07/24/22 2122   WBC 13.81*   HGB 12.4   HCT 38.1        Recent Labs   Lab 07/24/22 2122   INR 1.1     Microbiology Results (last 7 days)       ** No results found for the last 168 hours. **          All pertinent labs from the last 24 hours have been reviewed.    Significant Diagnostics:  I have reviewed all pertinent imaging results/findings within the past 24 hours.  X-Ray Chest 1 View    Result Date: 7/24/2022  1. Pulmonary findings are nonspecific, differential would include edema or other nonspecific pneumonitis.  There may be developing consolidation projected over the right lower lung zone.  Correlation and follow-up is advised. Electronically signed by: Jose L Easton MD Date:    07/24/2022 Time:    18:10    X-Ray Hand 3 view Right    Result Date: 7/24/2022  1.  No acute process. 2.  Osteoarthrosis of the right hand. Electronically signed by: Derik Nunez MD Date:    07/24/2022 Time:    18:09    X-Ray  Femur 2 AP/LAT Right    Result Date: 7/24/2022  No acute fracture or dislocation of the right femur, knee, or tib-fib. Right prepatellar and infrapatellar soft tissue edema. Electronically signed by: Chandler Lyon Date:    07/24/2022 Time:    18:07    X-Ray Knee 3 View Right    Result Date: 7/24/2022  No acute fracture or dislocation of the right femur, knee, or tib-fib. Right prepatellar and infrapatellar soft tissue edema. Electronically signed by: Chandler Lyon Date:    07/24/2022 Time:    18:07    CT Head Without Contrast    Result Date: 7/24/2022  No acute intracranial pathology. Stable small extra-axial lesion at the right paraclinoid region, likely representing partially calcified meningioma. Electronically signed by resident: Errol Mai Date:    07/24/2022 Time:    19:28 Electronically signed by: Derik Nunez MD Date:    07/24/2022 Time:    20:07    CT Chest Without Contrast    Result Date: 7/24/2022  This report was flagged in Epic as abnormal. 1. Several acute appearing right rib fractures as described. 2. Pulmonary findings suggesting emphysematous change, fibrotic change, and possible superimposed interstitial edema. 3. Large hiatal hernia. 4. Large amount of stool within the colon could reflect developing constipation. 5. Please see above for several additional findings. Electronically signed by: Jose L Easton MD Date:    07/24/2022 Time:    19:44    CT Cervical Spine Without Contrast    Result Date: 7/24/2022  1. Acute displaced burst type fracture of the L2 vertebral body with small fracture component extending into the spinal canal.  Contrast MRI the lumbar spine may be attempted for further evaluation. 2. Acute nondisplaced fracture involving the inferior endplate of the L1 vertebral body. 3. Multilevel degenerative changes of the cervical, thoracic, and lumbar spine, as detailed above. 4. Fusiform aneurysmal dilation of the ascending aorta. 5. Partially visualized right-sided rib  fractures. 6. Additional findings as above. This report was flagged in Epic as abnormal. Electronically signed by resident: Saida Maynard Date:    07/24/2022 Time:    19:28 Electronically signed by: Derik Nunez MD Date:    07/24/2022 Time:    20:36    CT Thoracic Spine Without Contrast    Result Date: 7/24/2022  1. Acute displaced burst type fracture of the L2 vertebral body with small fracture component extending into the spinal canal.  Contrast MRI the lumbar spine may be attempted for further evaluation. 2. Acute nondisplaced fracture involving the inferior endplate of the L1 vertebral body. 3. Multilevel degenerative changes of the cervical, thoracic, and lumbar spine, as detailed above. 4. Fusiform aneurysmal dilation of the ascending aorta. 5. Partially visualized right-sided rib fractures. 6. Additional findings as above. This report was flagged in Epic as abnormal. Electronically signed by resident: Saida Maynard Date:    07/24/2022 Time:    19:28 Electronically signed by: Derik Nunez MD Date:    07/24/2022 Time:    20:36    CT Lumbar Spine Without Contrast    Result Date: 7/24/2022  1. Acute displaced burst type fracture of the L2 vertebral body with small fracture component extending into the spinal canal.  Contrast MRI the lumbar spine may be attempted for further evaluation. 2. Acute nondisplaced fracture involving the inferior endplate of the L1 vertebral body. 3. Multilevel degenerative changes of the cervical, thoracic, and lumbar spine, as detailed above. 4. Fusiform aneurysmal dilation of the ascending aorta. 5. Partially visualized right-sided rib fractures. 6. Additional findings as above. This report was flagged in Epic as abnormal. Electronically signed by resident: Saida Maynard Date:    07/24/2022 Time:    19:28 Electronically signed by: Derik Nunez MD Date:    07/24/2022 Time:    20:36    X-Ray Tibia Fibula Bilateral    Result Date: 7/24/2022  No acute fracture or dislocation of the right femur,  knee, or tib-fib. Right prepatellar and infrapatellar soft tissue edema. Electronically signed by: Chandler Lyon Date:    07/24/2022 Time:    18:07

## 2022-07-25 NOTE — ED NOTES
Telemetry Verification   Patient placed on Telemetry Box  Verified with War Room  Box # [id number]   Monitor Tech    Rate 57   Rhythm Sinus Lamberto

## 2022-07-25 NOTE — PLAN OF CARE
Problem: Physical Therapy  Goal: Physical Therapy Goal  Description: Goals to be met by: 22     Patient will increase functional independence with mobility by performin. Supine to sit with Minimal Assistance  2. Sit to supine with MInimal Assistance  3. Rolling to Left and Right with Stand-by Assistance  4. Sit to stand transfer with Stand-by Assistance using LRAD  5. Gait  x 75 feet with Stand-by Assistance using LRAD    Outcome: Ongoing, Progressing     Discharge Recommendation: SNF.    Evaluation completed today. PT goals appropriate.    Patient is safe to perform transfers with Max A with nursing staff.    Please continue Progressive Mobility Protocol as appropriate.    Debby Rosas, SPT  2022

## 2022-07-25 NOTE — PT/OT/SLP EVAL
Physical Therapy Co-Evaluation and Co-Treatment    Patient Name:  Laila Hanna   MRN:  [id number]  Admit Date: 7/24/2022  Admitting Diagnosis:  Multiple rib fractures   Length of Stay: 0 days  Recent Surgery: * No surgery found *      Recommendations:     Discharge Recommendations:  nursing facility, skilled   Discharge Equipment Recommendations: wheelchair, bedside commode   Barriers to discharge: Decreased caregiver support; Pt would require 24/7 assistance and is currently only receiving care 5x/week from 9am-5pm    Plan:     During this hospitalization, patient to be seen 4 x/week to address the identified rehab impairments via gait training, therapeutic activities, therapeutic exercises and progress towards the established goals.  · Plan of Care Expires:  08/24/22  · Plan of Care Reviewed with: patient, caregiver    Assessment:     Laila Hanna is a 89 y.o. female admitted with a medical diagnosis of Multiple rib fractures. Pt found alert with HOB elevated. TLSO donned upon entering the room and remained on throughout therapy session. Pt agreeable to therapy and tolerated session well. Pt presents slightly confused today with delayed responsiveness. Session limited today by patient report of increased pain in back and ribs with movement. Pt reported dizziness upon moving from supine to sitting EOB that gradually decreased. Pt sat EOB for ~7 minutes demonstrating good activity tolerance. Pt required Min A while sitting EOB due to patient's jerky movements and intermittent posterior and lateral leaning due to increased pain. Pt able to perform STS transfer with Min A x2 and RW demonstrating good LE strength. Pt able to ambulate 5 side steps to the R with Min A x2 and RW demonstrating fair endurance. Pt demonstrated heavy WB through B UE, decreased step length, decreased foot clearance, decreased juliana, forward flexed posture, and instability during ambulation. Pt required min A for management of RW and verbal  cuing for foot placement during ambulation. Pt will continue to benefit from PT to increase endurance, improve functional mobility, and return to PLOF. Upon discharge, patient would benefit from skilled nursing rehabilitation to maximize safety and return to PLOF. Pt has a sitter that visits 5x/week from 9am-5pm but does not have any assistance at night or over the weekend. Pt has had two recent falls and is at high risk of unplanned readmission due to fall risk.       Problem List: weakness, impaired endurance, impaired self care skills, impaired functional mobility, gait instability, impaired balance, pain, impaired cardiopulmonary response to activity.  Rehab Prognosis: Good; patient would benefit from acute skilled PT services to address these deficits and reach maximum level of function.      Goals:   Multidisciplinary Problems     Physical Therapy Goals        Problem: Physical Therapy    Goal Priority Disciplines Outcome Goal Variances Interventions   Physical Therapy Goal     PT, PT/OT Ongoing, Progressing     Description: Goals to be met by: 22     Patient will increase functional independence with mobility by performin. Supine to sit with Minimal Assistance  2. Sit to supine with MInimal Assistance  3. Rolling to Left and Right with Stand-by Assistance  4. Sit to stand transfer with Stand-by Assistance using LRAD  5. Gait  x 75 feet with Stand-by Assistance using LRAD                     Subjective     RN notified prior to session. Caregiver present upon PT entrance into room. Patient agreeable to PT evaluation.    Chief Complaint: Fall (Pt had trip and fall at home, no loss of consciousness, pain to right knee and hand, pt hit her head and has a small hematoma. Pt takes aspirin daily. )    Patient/Family Comments/goals: To return to PLOF  Pain/Comfort:  · Pain Rating 1: other (see comments) (Pt reported pain in back and ribs; Did not rate)  · Pain Addressed 1: Reposition, Distraction,  "Pre-medicate for activity  · Pain Rating Post-Intervention 1: other (see comments) (did not rate)    Social History:  Residence: lives alone 1-story house with 4 BRITTANY and R HR. Pt's bathroom has a tub-shower combination with a shower chair.  Support available: Pt has a sitter that visits 5x/week from 9am-5pm; Pt currently does not recieve care at night or on the weekends\  Equipment Used: cane, quad, walker, rolling, rollator  Equipment Owned (not using): None  Prior level of function: Pt was independent with ADLs; Pt required assistance with bathing (recieves baths from caregiver 3x/wk); Pt was mod I for mobility using a SPC for ambulation; Pt's caregiver reports instability during ambulation using SPC and that patient just received a rollator to use; Caregiver is with patient 5x/week from 9am-5pm  Work: Retired.   Drive: no.     Patient reports they will have assistance from caregiver upon discharge.    Objective:     Additional staff present: OT and Supervising PT; OT for co-evaluation due to patient's medical complexities requiring two skilled therapists in order to appropriately assess patient's functional deficits as well as ensure patient safety, accommodate for limited activity tolerance, and provide appropriate, skilled assistance to maximize functional potential during evaluation.    Patient found HOB elevated with: TLSO, peripheral IV, telemetry, PureWick     General Precautions: Standard, Cardiac fall   Orthopedic Precautions:N/A   Braces: TLSO   Body mass index is 33.41 kg/m².  Oxygen Device: Nasal Cannula 2L  Vitals: /73 (BP Location: Right arm, Patient Position: Lying)   Pulse (!) 55   Temp 97.6 °F (36.4 °C) (Oral)   Resp 20   Ht 5' 6" (1.676 m)   Wt 93.9 kg (207 lb 0.2 oz)   LMP  (LMP Unknown) Comment: years ago  SpO2 100%   BMI 33.41 kg/m²     Exams:  · Cognition:   · Alert and Cooperative  · AxOx3  · Command following: Follows two-step verbal commands  · Fluency: " clear/fluent  · Hearing: Intact  · Vision:  Intact  · Skin Integrity: Bruising of R forehead, R hand, R knee  · Postural Assessment: slouched posture, rounded shoulders and forward head  · Physical Exam:    Left UE Left LE Right UE Right LE   Edema absent absent Present around R knee absent   ROM AROM WFL AROM WFL AROM WFL AROM WFL   Strength within normal limits within normal limits within normal limits within normal limits     Outcome Measures:  AM-PAC 6 CLICK MOBILITY  Turning over in bed (including adjusting bedclothes, sheets and blankets)?: 2  Sitting down on and standing up from a chair with arms (e.g., wheelchair, bedside commode, etc.): 3  Moving from lying on back to sitting on the side of the bed?: 2  Moving to and from a bed to a chair (including a wheelchair)?: 3  Need to walk in hospital room?: 3  Climbing 3-5 steps with a railing?: 1  Basic Mobility Total Score: 14     Functional Mobility:    Bed Mobility:   · Rolling/Turning to Right: moderate assistance and 2 persons  · Scooting to HOB via supine bridge: total assistance and 2 persons  · Supine to Sit: maximal assistance and 2 persons; from R side of bed  · Scooting anteriorly to EOB to have both feet planted on floor: contact guard assistance  · Sit to Supine: maximal assistance and 2 persons; to R side of bed    Sitting Balance at Edge of Bed:   Static Sitting Balance: Poor+ : able to maintain balance with minimal assistance from individual or chair   Dynamic Sitting Balance: Poor+ : able to sit unsupported with minimal assistance and reach to ipsilateral side but unable to weight shift   Assistance Level Required: Minimal Assistance   Time: ~7 minutes   Postural deviations noted: slouched posture, rounded shoulders and forward head   Encouraged: Pt encouraged to keep her eyes open   Comments: Pt reported dizziness upon moving from supine to sitting EOB that gradually resolved; Pt required Min A while sitting EOB due to patient's jerky  movements and intermittent posterior and lateral leaning due to increased pain; no LOB and VSS    Transfers:   · Sit <> Stand Transfer: minimum assistance and of 2 persons with rolling walker   · Stand <> Sit Transfer: minimum assistance and of 2 persons with rolling walker   · [id number] from EOB    Standing Balance:   Static Standing Balance: Fair- : requires minimal assistance or UE support in order to stand without LOB   Dynamic Standing Balance: Poor+ : able to stand with minimal assistance and reach ipsilaterally. Unable to weight shift.   Assistance Level Required: Minimal Assistance   Patient used: rolling walker    Postural deviations noted: slouched posture, rounded shoulders and forward head   Encouraged: Pt encouraged to keep eyes open   Comments: no LOB and VSS      Gait:   · Patient ambulated: 5 side steps to the R   · Patient required: minimal assist  · Patient used:  rolling walker   · Gait Pattern observed: step to  · Gait Deviation(s): occasional unsteady gait, decreased step length, decreased weight shift, shuffle gait and decreased juliana  · Impairments due to: impaired balance, pain, decreased strength and decreased endurance  · all lines remained intact throughout ambulation trial  · Comments: Pt required min A for management of RW during side steps and verbal cuing for foot placement; no LOB and VSS    Education:   Time provided for education, counseling and discussion of health disposition in regards to patient's current status   All questions answered within PT scope of practice and to patient's satisfaction   PT role in POC to address current functional deficits   Pt educated on proper body mechanics, safety techniques, and energy conservation with PT facilitation and cueing throughout session   Call nursing/pct to transfer to chair/use bathroom. Pt stated understanding.    Patient left HOB elevated with all lines intact, call button in reach, RN notified and caregiver  present.      History:     Past Medical History:   Diagnosis Date    Basal cell carcinoma     nose    Benign essential hypertension     Cerebral microvascular disease 9/8/2014    Closed fracture of distal phalanx of left hand with routine healing 9/17/2015    Closed mallet fracture of distal phalanx of finger with routine healing 10/6/2015    Coronary artery disease     DDD (degenerative disc disease), lumbar 4/12/2016    Depression     Fall at home 5/30/2016    Hyperlipidemia     Hypothyroidism due to acquired atrophy of thyroid     Meningiomas, multiple     MI (myocardial infarction) 2004    80% blockage per patient    Migraine aura without headache 12/1/2014    Post PTCA 12/12/2012    Transient cerebral ischemia 5/4/2014       Past Surgical History:   Procedure Laterality Date    CARDIAC CATHETERIZATION  03/29/2004    S/P LAD PTCA, coated stent    CATARACT EXTRACTION W/  INTRAOCULAR LENS IMPLANT Bilateral 2000    Dr Youssef     CHOLECYSTECTOMY      CORONARY ANGIOPLASTY WITH STENT PLACEMENT  2004    LAD    EYE SURGERY      TOTAL THYROIDECTOMY         Family History   Problem Relation Age of Onset    Stroke Mother     Diabetes Mother     Hypertension Father     Stroke Father     Cancer Brother         colon    Skin cancer Brother     Colon cancer Brother     Diabetes Paternal Grandmother     Cancer Sister         breast    Dementia Sister     Glaucoma Maternal Aunt     No Known Problems Maternal Uncle     No Known Problems Paternal Aunt     No Known Problems Paternal Uncle     No Known Problems Maternal Grandmother     No Known Problems Maternal Grandfather     No Known Problems Paternal Grandfather     Amblyopia Neg Hx     Blindness Neg Hx     Cataracts Neg Hx     Macular degeneration Neg Hx     Retinal detachment Neg Hx     Strabismus Neg Hx     Thyroid disease Neg Hx     Esophageal cancer Neg Hx        Social History     Socioeconomic History    Marital status:  Single   Occupational History     Employer: [employer]   Tobacco Use    Smoking status: Former Smoker     Packs/day: 0.30     Years: 15.00     Pack years: 4.50     Types: Cigarettes     Quit date: 1973     Years since quittin.5    Smokeless tobacco: Never Used   Substance and Sexual Activity    Alcohol use: No     Alcohol/week: 0.0 standard drinks    Drug use: No    Sexual activity: Never     Social Determinants of Health     Financial Resource Strain: Low Risk     Difficulty of Paying Living Expenses: Not hard at all   Food Insecurity: No Food Insecurity    Worried About Running Out of Food in the Last Year: Never true    Ran Out of Food in the Last Year: Never true   Transportation Needs: No Transportation Needs    Lack of Transportation (Medical): No    Lack of Transportation (Non-Medical): No   Physical Activity: Insufficiently Active    Days of Exercise per Week: 3 days    Minutes of Exercise per Session: 20 min   Stress: Stress Concern Present    Feeling of Stress : To some extent   Social Connections: Socially Isolated    Frequency of Communication with Friends and Family: Never    Frequency of Social Gatherings with Friends and Family: Once a week    Attends Judaism Services: Never    Active Member of Clubs or Organizations: No    Attends Club or Organization Meetings: Never    Marital Status: Never    Housing Stability: Unknown    Unable to Pay for Housing in the Last Year: No    Unstable Housing in the Last Year: No       Time Tracking:     PT Received On: 22  PT Start Time: 1327     PT Stop Time: 1357  PT Total Time (min): 30 min     Billable Minutes: Evaluation 10 minutes and Therapeutic Activity 20 minutes    Debby Rosas, SPT  2022

## 2022-07-26 LAB
ALBUMIN SERPL BCP-MCNC: 2.7 G/DL (ref 3.5–5.2)
ALP SERPL-CCNC: 92 U/L (ref 55–135)
ALT SERPL W/O P-5'-P-CCNC: 10 U/L (ref 10–44)
ANION GAP SERPL CALC-SCNC: 7 MMOL/L (ref 8–16)
AST SERPL-CCNC: 18 U/L (ref 10–40)
BASOPHILS # BLD AUTO: 0.03 K/UL (ref 0–0.2)
BASOPHILS NFR BLD: 0.4 % (ref 0–1.9)
BILIRUB SERPL-MCNC: 0.9 MG/DL (ref 0.1–1)
BUN SERPL-MCNC: 12 MG/DL (ref 8–23)
CALCIUM SERPL-MCNC: 8.5 MG/DL (ref 8.7–10.5)
CHLORIDE SERPL-SCNC: 95 MMOL/L (ref 95–110)
CO2 SERPL-SCNC: 25 MMOL/L (ref 23–29)
CREAT SERPL-MCNC: 0.7 MG/DL (ref 0.5–1.4)
DIFFERENTIAL METHOD: ABNORMAL
EOSINOPHIL # BLD AUTO: 0.3 K/UL (ref 0–0.5)
EOSINOPHIL NFR BLD: 4.1 % (ref 0–8)
ERYTHROCYTE [DISTWIDTH] IN BLOOD BY AUTOMATED COUNT: 13.1 % (ref 11.5–14.5)
EST. GFR  (AFRICAN AMERICAN): >60 ML/MIN/1.73 M^2
EST. GFR  (NON AFRICAN AMERICAN): >60 ML/MIN/1.73 M^2
GLUCOSE SERPL-MCNC: 91 MG/DL (ref 70–110)
HCT VFR BLD AUTO: 29.9 % (ref 37–48.5)
HGB BLD-MCNC: 10 G/DL (ref 12–16)
IMM GRANULOCYTES # BLD AUTO: 0.03 K/UL (ref 0–0.04)
IMM GRANULOCYTES NFR BLD AUTO: 0.4 % (ref 0–0.5)
LYMPHOCYTES # BLD AUTO: 1.6 K/UL (ref 1–4.8)
LYMPHOCYTES NFR BLD: 20.4 % (ref 18–48)
MAGNESIUM SERPL-MCNC: 2.1 MG/DL (ref 1.6–2.6)
MCH RBC QN AUTO: 31.3 PG (ref 27–31)
MCHC RBC AUTO-ENTMCNC: 33.4 G/DL (ref 32–36)
MCV RBC AUTO: 94 FL (ref 82–98)
MONOCYTES # BLD AUTO: 0.8 K/UL (ref 0.3–1)
MONOCYTES NFR BLD: 9.7 % (ref 4–15)
NEUTROPHILS # BLD AUTO: 5.2 K/UL (ref 1.8–7.7)
NEUTROPHILS NFR BLD: 65 % (ref 38–73)
NRBC BLD-RTO: 0 /100 WBC
PHOSPHATE SERPL-MCNC: 3.1 MG/DL (ref 2.7–4.5)
PLATELET # BLD AUTO: 219 K/UL (ref 150–450)
PMV BLD AUTO: 10.7 FL (ref 9.2–12.9)
POTASSIUM SERPL-SCNC: 4.3 MMOL/L (ref 3.5–5.1)
PROT SERPL-MCNC: 6 G/DL (ref 6–8.4)
RBC # BLD AUTO: 3.19 M/UL (ref 4–5.4)
SODIUM SERPL-SCNC: 127 MMOL/L (ref 136–145)
WBC # BLD AUTO: 8.03 K/UL (ref 3.9–12.7)

## 2022-07-26 PROCEDURE — 20600001 HC STEP DOWN PRIVATE ROOM

## 2022-07-26 PROCEDURE — 80053 COMPREHEN METABOLIC PANEL: CPT | Performed by: STUDENT IN AN ORGANIZED HEALTH CARE EDUCATION/TRAINING PROGRAM

## 2022-07-26 PROCEDURE — 83735 ASSAY OF MAGNESIUM: CPT | Performed by: STUDENT IN AN ORGANIZED HEALTH CARE EDUCATION/TRAINING PROGRAM

## 2022-07-26 PROCEDURE — 36415 COLL VENOUS BLD VENIPUNCTURE: CPT | Performed by: STUDENT IN AN ORGANIZED HEALTH CARE EDUCATION/TRAINING PROGRAM

## 2022-07-26 PROCEDURE — 85025 COMPLETE CBC W/AUTO DIFF WBC: CPT | Performed by: STUDENT IN AN ORGANIZED HEALTH CARE EDUCATION/TRAINING PROGRAM

## 2022-07-26 PROCEDURE — 25000003 PHARM REV CODE 250: Performed by: STUDENT IN AN ORGANIZED HEALTH CARE EDUCATION/TRAINING PROGRAM

## 2022-07-26 PROCEDURE — 84100 ASSAY OF PHOSPHORUS: CPT | Performed by: STUDENT IN AN ORGANIZED HEALTH CARE EDUCATION/TRAINING PROGRAM

## 2022-07-26 PROCEDURE — 25000003 PHARM REV CODE 250

## 2022-07-26 PROCEDURE — 63600175 PHARM REV CODE 636 W HCPCS: Performed by: STUDENT IN AN ORGANIZED HEALTH CARE EDUCATION/TRAINING PROGRAM

## 2022-07-26 RX ADMIN — GABAPENTIN 300 MG: 300 CAPSULE ORAL at 03:07

## 2022-07-26 RX ADMIN — ASPIRIN 81 MG: 81 TABLET, COATED ORAL at 09:07

## 2022-07-26 RX ADMIN — ENOXAPARIN SODIUM 40 MG: 40 INJECTION SUBCUTANEOUS at 05:07

## 2022-07-26 RX ADMIN — IBUPROFEN 800 MG: 400 TABLET, FILM COATED ORAL at 09:07

## 2022-07-26 RX ADMIN — CYCLOBENZAPRINE HYDROCHLORIDE 5 MG: 5 TABLET, FILM COATED ORAL at 09:07

## 2022-07-26 RX ADMIN — DOCUSATE SODIUM 50 MG: 50 CAPSULE, LIQUID FILLED ORAL at 09:07

## 2022-07-26 RX ADMIN — CYCLOBENZAPRINE HYDROCHLORIDE 5 MG: 5 TABLET, FILM COATED ORAL at 03:07

## 2022-07-26 RX ADMIN — OXYCODONE 5 MG: 5 TABLET ORAL at 05:07

## 2022-07-26 RX ADMIN — IBUPROFEN 800 MG: 400 TABLET, FILM COATED ORAL at 03:07

## 2022-07-26 RX ADMIN — LIDOCAINE 1 PATCH: 50 PATCH CUTANEOUS at 12:07

## 2022-07-26 RX ADMIN — IBUPROFEN 800 MG: 400 TABLET, FILM COATED ORAL at 05:07

## 2022-07-26 RX ADMIN — OXYCODONE 5 MG: 5 TABLET ORAL at 04:07

## 2022-07-26 RX ADMIN — OXYCODONE 5 MG: 5 TABLET ORAL at 09:07

## 2022-07-26 RX ADMIN — ACETAMINOPHEN 1000 MG: 500 TABLET ORAL at 09:07

## 2022-07-26 RX ADMIN — GABAPENTIN 300 MG: 300 CAPSULE ORAL at 09:07

## 2022-07-26 RX ADMIN — LEVOTHYROXINE SODIUM 150 MCG: 150 TABLET ORAL at 07:07

## 2022-07-26 RX ADMIN — CARVEDILOL 12.5 MG: 12.5 TABLET, FILM COATED ORAL at 09:07

## 2022-07-26 RX ADMIN — ACETAMINOPHEN 1000 MG: 500 TABLET ORAL at 02:07

## 2022-07-26 RX ADMIN — LEVOTHYROXINE SODIUM 150 MCG: 150 TABLET ORAL at 05:07

## 2022-07-26 RX ADMIN — ACETAMINOPHEN 1000 MG: 500 TABLET ORAL at 05:07

## 2022-07-26 RX ADMIN — ATORVASTATIN CALCIUM 40 MG: 20 TABLET, FILM COATED ORAL at 09:07

## 2022-07-26 NOTE — PROGRESS NOTES
[facility]  General Surgery  Progress Note    Subjective:     History of Present Illness:  Ms Hanna is a 89yo F with extensive PMH who presents to the ED after falling from standing. She states she was standing in her kitchen trying to get a glass of water when she lost her balance and fell. She landed on her R side and was taken to the ED for evaluation. Did not have loss of consciousness. CT head was negative for acute pathology. CT chest/spine showed acute displaced burst fracture of L2 vertebral body with fracture component extending into spinal canal. Also nondisplaced fracture of L1 vertebral body. Also has acute R posterolateral rib fractures of ribs 4-9. She is not on anticoagulation.     Post-Op Info:  * No surgery found *         Interval History: NAEO. Patient continues to have back pain and pain on inspiration.   MRI done yesterday.   Hbg 10.0 (11.5)    Medications:  Continuous Infusions:  Scheduled Meds:   acetaminophen  1,000 mg Oral Q8H    aspirin  81 mg Oral Daily    atorvastatin  40 mg Oral Daily    carvediloL  12.5 mg Oral BID    cyclobenzaprine  5 mg Oral TID    enoxaparin  40 mg Subcutaneous Daily    gabapentin  300 mg Oral TID    ibuprofen  800 mg Oral Q8H    levothyroxine  150 mcg Oral Daily    LIDOcaine  1 patch Transdermal Q24H     PRN Meds:melatonin, ondansetron, oxyCODONE, oxyCODONE, sodium chloride 0.9%, DIPH,PERTUSS(ACELL),TET VACCINE (ADULT)(BOOSTRIX,ADACEL)     Review of patient's allergies indicates:   Allergen Reactions    Thiazides Other (See Comments)     Severe hyponatremia     Objective:     Vital Signs (Most Recent):  Temp: 98.3 °F (36.8 °C) (07/26/22 0811)  Pulse: (!) 56 (07/26/22 0811)  Resp: 18 (07/26/22 0811)  BP: 132/61 (07/26/22 0811)  SpO2: 100 % (07/26/22 0811)   Vital Signs (24h Range):  Temp:  [97.6 °F (36.4 °C)-98.4 °F (36.9 °C)] 98.3 °F (36.8 °C)  Pulse:  [51-59] 56  Resp:  [16-20] 18  SpO2:  [96 %-100 %] 100 %  BP: (120-162)/(59-76) 132/61      Weight: 93.9 kg (207 lb 0.2 oz)  Body mass index is 33.41 kg/m².    Intake/Output - Last 3 Shifts         07/24 0700 07/25 0659 07/25 0700 07/26 0659 07/26 0700 07/27 0659    P.O.  1080     Total Intake(mL/kg)  1080 (11.5)     Urine (mL/kg/hr) 0 1462 (0.6)     Stool 0      Total Output 0 1462     Net 0 -382            Stool Occurrence 0 x              Physical Exam  Constitutional:       General: She is not in acute distress.     Appearance: She is not ill-appearing.   HENT:      Head: Normocephalic and atraumatic.   Eyes:      Extraocular Movements: Extraocular movements intact.   Cardiovascular:      Rate and Rhythm: Normal rate.   Pulmonary:      Effort: Pulmonary effort is normal.   Abdominal:      General: There is no distension.      Palpations: Abdomen is soft.      Tenderness: There is no abdominal tenderness.   Musculoskeletal:         General: Normal range of motion.   Neurological:      General: No focal deficit present.      Mental Status: She is alert and oriented to person, place, and time.      Cranial Nerves: No cranial nerve deficit.      Sensory: No sensory deficit.      Motor: No weakness.   Psychiatric:         Mood and Affect: Mood normal.       Significant Labs:  I have reviewed all pertinent lab results within the past 24 hours.  CBC:   Recent Labs   Lab 07/26/22  0356   WBC 8.03   RBC 3.19*   HGB 10.0*   HCT 29.9*      MCV 94   MCH 31.3*   MCHC 33.4     BMP:   Recent Labs   Lab 07/26/22  0355   GLU 91   *   K 4.3   CL 95   CO2 25   BUN 12   CREATININE 0.7   CALCIUM 8.5*   MG 2.1       Significant Diagnostics:  I have reviewed all pertinent imaging results/findings within the past 24 hours.  I have reviewed and interpreted all pertinent imaging results/findings within the past 24 hours.    Assessment/Plan:   89yo F w/PMH HTN, CAD, degenerative disc disease, HLD, MI, TIA who presents to the ED after falling from standing, CT demonstrated no head injury, has rib fractures in  ribs 4-9 as well as an acute burst fracture of L2 with fragments entering canal     - MRI spine obtained yesterday. Follow neurosurgery recs for burst fracture, keep patient in TLSO, q4 hour neuro checks.   - daily CXR, at least 3 days  - reg diet  - IS  - multi-modal pain control  - PT/OT  - daily labs      Sonali Mclaughlin MD  General Surgery, PGY-1  [facility]

## 2022-07-26 NOTE — SUBJECTIVE & OBJECTIVE
Interval History: NAEO. Patient continues to have back pain and pain on inspiration.   MRI done yesterday.     Medications:  Continuous Infusions:  Scheduled Meds:   acetaminophen  1,000 mg Oral Q8H    aspirin  81 mg Oral Daily    atorvastatin  40 mg Oral Daily    carvediloL  12.5 mg Oral BID    cyclobenzaprine  5 mg Oral TID    enoxaparin  40 mg Subcutaneous Daily    gabapentin  300 mg Oral TID    ibuprofen  800 mg Oral Q8H    levothyroxine  150 mcg Oral Daily    LIDOcaine  1 patch Transdermal Q24H     PRN Meds:melatonin, ondansetron, oxyCODONE, oxyCODONE, sodium chloride 0.9%, DIPH,PERTUSS(ACELL),TET VACCINE (ADULT)(BOOSTRIX,ADACEL)     Review of patient's allergies indicates:   Allergen Reactions    Thiazides Other (See Comments)     Severe hyponatremia     Objective:     Vital Signs (Most Recent):  Temp: 98.3 °F (36.8 °C) (07/26/22 0811)  Pulse: (!) 56 (07/26/22 0811)  Resp: 18 (07/26/22 0811)  BP: 132/61 (07/26/22 0811)  SpO2: 100 % (07/26/22 0811)   Vital Signs (24h Range):  Temp:  [97.6 °F (36.4 °C)-98.4 °F (36.9 °C)] 98.3 °F (36.8 °C)  Pulse:  [51-59] 56  Resp:  [16-20] 18  SpO2:  [96 %-100 %] 100 %  BP: (120-162)/(59-76) 132/61     Weight: 93.9 kg (207 lb 0.2 oz)  Body mass index is 33.41 kg/m².    Intake/Output - Last 3 Shifts         07/24 0700 07/25 0659 07/25 0700 07/26 0659 07/26 0700 07/27 0659    P.O.  1080     Total Intake(mL/kg)  1080 (11.5)     Urine (mL/kg/hr) 0 1462 (0.6)     Stool 0      Total Output 0 1462     Net 0 -382            Stool Occurrence 0 x              Physical Exam  Constitutional:       General: She is not in acute distress.     Appearance: She is not ill-appearing.   HENT:      Head: Normocephalic and atraumatic.   Eyes:      Extraocular Movements: Extraocular movements intact.   Cardiovascular:      Rate and Rhythm: Normal rate.   Pulmonary:      Effort: Pulmonary effort is normal.   Abdominal:      General: There is no distension.      Palpations: Abdomen is soft.       Tenderness: There is no abdominal tenderness.   Musculoskeletal:         General: Normal range of motion.   Neurological:      General: No focal deficit present.      Mental Status: She is alert and oriented to person, place, and time.      Cranial Nerves: No cranial nerve deficit.      Sensory: No sensory deficit.      Motor: No weakness.   Psychiatric:         Mood and Affect: Mood normal.       Significant Labs:  I have reviewed all pertinent lab results within the past 24 hours.  CBC:   Recent Labs   Lab 07/26/22  0356   WBC 8.03   RBC 3.19*   HGB 10.0*   HCT 29.9*      MCV 94   MCH 31.3*   MCHC 33.4     BMP:   Recent Labs   Lab 07/26/22  0355   GLU 91   *   K 4.3   CL 95   CO2 25   BUN 12   CREATININE 0.7   CALCIUM 8.5*   MG 2.1       Significant Diagnostics:  I have reviewed all pertinent imaging results/findings within the past 24 hours.  I have reviewed and interpreted all pertinent imaging results/findings within the past 24 hours.

## 2022-07-26 NOTE — PLAN OF CARE
Problem: Adult Inpatient Plan of Care  Goal: Plan of Care Review  Outcome: Ongoing, Progressing     Problem: Adult Inpatient Plan of Care  Goal: Patient-Specific Goal (Individualized)  Outcome: Ongoing, Progressing     Problem: Adult Inpatient Plan of Care  Goal: Optimal Comfort and Wellbeing  Outcome: Ongoing, Progressing     Problem: Adult Inpatient Plan of Care  Goal: Readiness for Transition of Care  Outcome: Ongoing, Progressing     Problem: Fall Injury Risk  Goal: Absence of Fall and Fall-Related Injury  Outcome: Ongoing, Progressing     Problem: Skin Injury Risk Increased  Goal: Skin Health and Integrity  Outcome: Ongoing, Progressing     Problem: Pain Acute  Goal: Acceptable Pain Control and Functional Ability  Outcome: Ongoing, Progressing     Patient is alert and oriented with periods of confusion to situation. Follows simple commands well.   Patient medicated with scheduled medications for Rib fracture and lumbar compression.  Patient is logged rolled and TSLO brace in place.   Patient and sitter are aware that TSLO brace should be worn at all times.  PCXr ordered and revealed chronic elevation L hemidiaphragm . elevation.  Neurosurgery consulted and evaluated with recommendations.  Patient reported having esophageal strictures in the past and requiring surgical intervention of dilation of esophagus, will leave a note for the team to review.  Will ask to consider a  ST consult and possible  MBS or GI consult.   Patient requires assistance with feeding and meal set up.  Voiding per purwyck.  POC reviewed with patient, niece [Anamika} , and sitter [Vicky].  Discharge plans is to a rehab.

## 2022-07-26 NOTE — PLAN OF CARE
07/26/22 0958   Post-Acute Status   Post-Acute Authorization Placement   Post-Acute Placement Status Referrals Sent       SW sent referrals to multiple SNF in the area. SW will continue to monitor.     Sandie Pleitez LCSW  Case Management/[facility]  [phone]     1:25 PM  [facility] willing to accept pt at d/c. Family in agreement.    Sandie Pleitez LCSW  Case Management/[facility]  [phone]

## 2022-07-26 NOTE — NURSING
Spoke to Sherrell in MRI and patient to transport via bed.  Escort arrived and notified her department that she will need assistance in transporting via bed.  [facility] notified that patient is transfer to MRI.

## 2022-07-26 NOTE — PROGRESS NOTES
Imaging reviewed. MRI shows an acute L2 compression fracture without significant retropulsion or central canal narrowing. There is an L1 fracture without extension into the pedicles.     Plan:  Upright/supine XR in brace  Continue LSO brace at all times  Will arrange follow up in 6 weeks with repeat XR in the brace     Discussed with Dr. Llanes    Neurosurgery will sign off at this time. Please contact with questions or concerns.     Cesia Hermosillo PA-C   [phone]  Neurosurgery  [facility]

## 2022-07-26 NOTE — PT/OT/SLP PROGRESS
Occupational Therapy      Patient Name:  Laila Hanna   MRN:  [id number]    Patient not seen today for OT services. Will follow up per POC to address therapy deficits.     Mirlande Kumar OT    7/26/2022

## 2022-07-27 PROBLEM — R40.0 SOMNOLENCE: Status: ACTIVE | Noted: 2022-07-27

## 2022-07-27 PROBLEM — N17.9 AKI (ACUTE KIDNEY INJURY): Status: ACTIVE | Noted: 2022-07-27

## 2022-07-27 PROBLEM — G93.40 ACUTE ENCEPHALOPATHY: Status: ACTIVE | Noted: 2022-07-27

## 2022-07-27 LAB
ALBUMIN SERPL BCP-MCNC: 2.8 G/DL (ref 3.5–5.2)
ALP SERPL-CCNC: 97 U/L (ref 55–135)
ALT SERPL W/O P-5'-P-CCNC: 10 U/L (ref 10–44)
ANION GAP SERPL CALC-SCNC: 7 MMOL/L (ref 8–16)
ANION GAP SERPL CALC-SCNC: 7 MMOL/L (ref 8–16)
ANION GAP SERPL CALC-SCNC: 8 MMOL/L (ref 8–16)
ANION GAP SERPL CALC-SCNC: 9 MMOL/L (ref 8–16)
ANION GAP SERPL CALC-SCNC: 9 MMOL/L (ref 8–16)
AST SERPL-CCNC: 18 U/L (ref 10–40)
BASOPHILS # BLD AUTO: 0.06 K/UL (ref 0–0.2)
BASOPHILS NFR BLD: 0.6 % (ref 0–1.9)
BILIRUB SERPL-MCNC: 0.7 MG/DL (ref 0.1–1)
BUN SERPL-MCNC: 14 MG/DL (ref 8–23)
BUN SERPL-MCNC: 16 MG/DL (ref 8–23)
BUN SERPL-MCNC: 17 MG/DL (ref 8–23)
BUN SERPL-MCNC: 17 MG/DL (ref 8–23)
BUN SERPL-MCNC: 19 MG/DL (ref 8–23)
CALCIUM SERPL-MCNC: 7.7 MG/DL (ref 8.7–10.5)
CALCIUM SERPL-MCNC: 8.1 MG/DL (ref 8.7–10.5)
CALCIUM SERPL-MCNC: 8.3 MG/DL (ref 8.7–10.5)
CHLORIDE SERPL-SCNC: 88 MMOL/L (ref 95–110)
CHLORIDE SERPL-SCNC: 89 MMOL/L (ref 95–110)
CHLORIDE SERPL-SCNC: 90 MMOL/L (ref 95–110)
CHLORIDE SERPL-SCNC: 90 MMOL/L (ref 95–110)
CHLORIDE SERPL-SCNC: 94 MMOL/L (ref 95–110)
CO2 SERPL-SCNC: 19 MMOL/L (ref 23–29)
CO2 SERPL-SCNC: 22 MMOL/L (ref 23–29)
CO2 SERPL-SCNC: 23 MMOL/L (ref 23–29)
CO2 SERPL-SCNC: 24 MMOL/L (ref 23–29)
CO2 SERPL-SCNC: 24 MMOL/L (ref 23–29)
CORTIS SERPL-MCNC: 4.2 UG/DL
CREAT SERPL-MCNC: 0.8 MG/DL (ref 0.5–1.4)
CREAT SERPL-MCNC: 0.8 MG/DL (ref 0.5–1.4)
CREAT SERPL-MCNC: 0.9 MG/DL (ref 0.5–1.4)
CREAT SERPL-MCNC: 1 MG/DL (ref 0.5–1.4)
CREAT SERPL-MCNC: 1.1 MG/DL (ref 0.5–1.4)
DIFFERENTIAL METHOD: ABNORMAL
EOSINOPHIL # BLD AUTO: 0.4 K/UL (ref 0–0.5)
EOSINOPHIL NFR BLD: 4.2 % (ref 0–8)
ERYTHROCYTE [DISTWIDTH] IN BLOOD BY AUTOMATED COUNT: 13.1 % (ref 11.5–14.5)
EST. GFR  (AFRICAN AMERICAN): 51.1 ML/MIN/1.73 M^2
EST. GFR  (AFRICAN AMERICAN): 57.3 ML/MIN/1.73 M^2
EST. GFR  (AFRICAN AMERICAN): >60 ML/MIN/1.73 M^2
EST. GFR  (NON AFRICAN AMERICAN): 44.3 ML/MIN/1.73 M^2
EST. GFR  (NON AFRICAN AMERICAN): 49.7 ML/MIN/1.73 M^2
EST. GFR  (NON AFRICAN AMERICAN): 56.5 ML/MIN/1.73 M^2
EST. GFR  (NON AFRICAN AMERICAN): >60 ML/MIN/1.73 M^2
EST. GFR  (NON AFRICAN AMERICAN): >60 ML/MIN/1.73 M^2
GLUCOSE SERPL-MCNC: 100 MG/DL (ref 70–110)
GLUCOSE SERPL-MCNC: 107 MG/DL (ref 70–110)
GLUCOSE SERPL-MCNC: 115 MG/DL (ref 70–110)
GLUCOSE SERPL-MCNC: 118 MG/DL (ref 70–110)
GLUCOSE SERPL-MCNC: 85 MG/DL (ref 70–110)
HCT VFR BLD AUTO: 31.6 % (ref 37–48.5)
HGB BLD-MCNC: 10.5 G/DL (ref 12–16)
IMM GRANULOCYTES # BLD AUTO: 0.04 K/UL (ref 0–0.04)
IMM GRANULOCYTES NFR BLD AUTO: 0.4 % (ref 0–0.5)
LYMPHOCYTES # BLD AUTO: 1.7 K/UL (ref 1–4.8)
LYMPHOCYTES NFR BLD: 17.9 % (ref 18–48)
MAGNESIUM SERPL-MCNC: 1.9 MG/DL (ref 1.6–2.6)
MCH RBC QN AUTO: 31.4 PG (ref 27–31)
MCHC RBC AUTO-ENTMCNC: 33.2 G/DL (ref 32–36)
MCV RBC AUTO: 95 FL (ref 82–98)
MONOCYTES # BLD AUTO: 0.7 K/UL (ref 0.3–1)
MONOCYTES NFR BLD: 7.4 % (ref 4–15)
NEUTROPHILS # BLD AUTO: 6.7 K/UL (ref 1.8–7.7)
NEUTROPHILS NFR BLD: 69.5 % (ref 38–73)
NRBC BLD-RTO: 0 /100 WBC
OSMOLALITY SERPL: 263 MOSM/KG (ref 275–295)
OSMOLALITY UR: 284 MOSM/KG (ref 50–1200)
PHOSPHATE SERPL-MCNC: 3.2 MG/DL (ref 2.7–4.5)
PLATELET # BLD AUTO: 215 K/UL (ref 150–450)
PMV BLD AUTO: 10.7 FL (ref 9.2–12.9)
POTASSIUM SERPL-SCNC: 4.3 MMOL/L (ref 3.5–5.1)
POTASSIUM SERPL-SCNC: 4.4 MMOL/L (ref 3.5–5.1)
POTASSIUM SERPL-SCNC: 4.5 MMOL/L (ref 3.5–5.1)
POTASSIUM SERPL-SCNC: 4.5 MMOL/L (ref 3.5–5.1)
POTASSIUM SERPL-SCNC: 4.8 MMOL/L (ref 3.5–5.1)
POTASSIUM UR-SCNC: 25 MMOL/L (ref 15–95)
PROT SERPL-MCNC: 6.1 G/DL (ref 6–8.4)
RBC # BLD AUTO: 3.34 M/UL (ref 4–5.4)
SODIUM SERPL-SCNC: 118 MMOL/L (ref 136–145)
SODIUM SERPL-SCNC: 119 MMOL/L (ref 136–145)
SODIUM SERPL-SCNC: 120 MMOL/L (ref 136–145)
SODIUM SERPL-SCNC: 121 MMOL/L (ref 136–145)
SODIUM SERPL-SCNC: 121 MMOL/L (ref 136–145)
SODIUM SERPL-SCNC: 123 MMOL/L (ref 136–145)
SODIUM UR-SCNC: <10 MMOL/L (ref 20–250)
T4 FREE SERPL-MCNC: 1.2 NG/DL (ref 0.71–1.51)
TSH SERPL DL<=0.005 MIU/L-ACNC: 7.02 UIU/ML (ref 0.4–4)
WBC # BLD AUTO: 9.6 K/UL (ref 3.9–12.7)

## 2022-07-27 PROCEDURE — 99223 1ST HOSP IP/OBS HIGH 75: CPT | Mod: GC,,, | Performed by: STUDENT IN AN ORGANIZED HEALTH CARE EDUCATION/TRAINING PROGRAM

## 2022-07-27 PROCEDURE — 99223 1ST HOSP IP/OBS HIGH 75: CPT | Mod: AI,,, | Performed by: INTERNAL MEDICINE

## 2022-07-27 PROCEDURE — 97530 THERAPEUTIC ACTIVITIES: CPT

## 2022-07-27 PROCEDURE — 84133 ASSAY OF URINE POTASSIUM: CPT | Performed by: STUDENT IN AN ORGANIZED HEALTH CARE EDUCATION/TRAINING PROGRAM

## 2022-07-27 PROCEDURE — 84443 ASSAY THYROID STIM HORMONE: CPT | Performed by: STUDENT IN AN ORGANIZED HEALTH CARE EDUCATION/TRAINING PROGRAM

## 2022-07-27 PROCEDURE — 99223 PR INITIAL HOSPITAL CARE,LEVL III: ICD-10-PCS | Mod: GC,,, | Performed by: HOSPITALIST

## 2022-07-27 PROCEDURE — 36415 COLL VENOUS BLD VENIPUNCTURE: CPT | Performed by: STUDENT IN AN ORGANIZED HEALTH CARE EDUCATION/TRAINING PROGRAM

## 2022-07-27 PROCEDURE — 25000003 PHARM REV CODE 250: Performed by: STUDENT IN AN ORGANIZED HEALTH CARE EDUCATION/TRAINING PROGRAM

## 2022-07-27 PROCEDURE — 99223 PR INITIAL HOSPITAL CARE,LEVL III: ICD-10-PCS | Mod: GC,,, | Performed by: STUDENT IN AN ORGANIZED HEALTH CARE EDUCATION/TRAINING PROGRAM

## 2022-07-27 PROCEDURE — 83935 ASSAY OF URINE OSMOLALITY: CPT | Performed by: STUDENT IN AN ORGANIZED HEALTH CARE EDUCATION/TRAINING PROGRAM

## 2022-07-27 PROCEDURE — 99223 PR INITIAL HOSPITAL CARE,LEVL III: ICD-10-PCS | Mod: AI,,, | Performed by: INTERNAL MEDICINE

## 2022-07-27 PROCEDURE — 84300 ASSAY OF URINE SODIUM: CPT | Performed by: STUDENT IN AN ORGANIZED HEALTH CARE EDUCATION/TRAINING PROGRAM

## 2022-07-27 PROCEDURE — 99223 1ST HOSP IP/OBS HIGH 75: CPT | Mod: GC,,, | Performed by: HOSPITALIST

## 2022-07-27 PROCEDURE — 94761 N-INVAS EAR/PLS OXIMETRY MLT: CPT

## 2022-07-27 PROCEDURE — 80053 COMPREHEN METABOLIC PANEL: CPT | Performed by: STUDENT IN AN ORGANIZED HEALTH CARE EDUCATION/TRAINING PROGRAM

## 2022-07-27 PROCEDURE — 80048 BASIC METABOLIC PNL TOTAL CA: CPT | Mod: XB | Performed by: STUDENT IN AN ORGANIZED HEALTH CARE EDUCATION/TRAINING PROGRAM

## 2022-07-27 PROCEDURE — 80048 BASIC METABOLIC PNL TOTAL CA: CPT | Mod: 91,XB | Performed by: STUDENT IN AN ORGANIZED HEALTH CARE EDUCATION/TRAINING PROGRAM

## 2022-07-27 PROCEDURE — 85025 COMPLETE CBC W/AUTO DIFF WBC: CPT | Performed by: STUDENT IN AN ORGANIZED HEALTH CARE EDUCATION/TRAINING PROGRAM

## 2022-07-27 PROCEDURE — 97535 SELF CARE MNGMENT TRAINING: CPT

## 2022-07-27 PROCEDURE — 36415 COLL VENOUS BLD VENIPUNCTURE: CPT | Performed by: HOSPITALIST

## 2022-07-27 PROCEDURE — 82533 TOTAL CORTISOL: CPT | Performed by: STUDENT IN AN ORGANIZED HEALTH CARE EDUCATION/TRAINING PROGRAM

## 2022-07-27 PROCEDURE — 51702 INSERT TEMP BLADDER CATH: CPT

## 2022-07-27 PROCEDURE — 27000221 HC OXYGEN, UP TO 24 HOURS

## 2022-07-27 PROCEDURE — 83930 ASSAY OF BLOOD OSMOLALITY: CPT | Performed by: STUDENT IN AN ORGANIZED HEALTH CARE EDUCATION/TRAINING PROGRAM

## 2022-07-27 PROCEDURE — 63600175 PHARM REV CODE 636 W HCPCS: Performed by: STUDENT IN AN ORGANIZED HEALTH CARE EDUCATION/TRAINING PROGRAM

## 2022-07-27 PROCEDURE — 83735 ASSAY OF MAGNESIUM: CPT | Performed by: STUDENT IN AN ORGANIZED HEALTH CARE EDUCATION/TRAINING PROGRAM

## 2022-07-27 PROCEDURE — 20000000 HC ICU ROOM

## 2022-07-27 PROCEDURE — 84439 ASSAY OF FREE THYROXINE: CPT | Performed by: STUDENT IN AN ORGANIZED HEALTH CARE EDUCATION/TRAINING PROGRAM

## 2022-07-27 PROCEDURE — 80048 BASIC METABOLIC PNL TOTAL CA: CPT | Mod: 91,XB | Performed by: HOSPITALIST

## 2022-07-27 PROCEDURE — 84100 ASSAY OF PHOSPHORUS: CPT | Performed by: STUDENT IN AN ORGANIZED HEALTH CARE EDUCATION/TRAINING PROGRAM

## 2022-07-27 PROCEDURE — 94799 UNLISTED PULMONARY SVC/PX: CPT

## 2022-07-27 PROCEDURE — 84295 ASSAY OF SERUM SODIUM: CPT | Performed by: STUDENT IN AN ORGANIZED HEALTH CARE EDUCATION/TRAINING PROGRAM

## 2022-07-27 RX ORDER — SODIUM CHLORIDE 9 MG/ML
INJECTION, SOLUTION INTRAVENOUS CONTINUOUS
Status: DISCONTINUED | OUTPATIENT
Start: 2022-07-27 | End: 2022-07-27

## 2022-07-27 RX ADMIN — ATORVASTATIN CALCIUM 40 MG: 20 TABLET, FILM COATED ORAL at 08:07

## 2022-07-27 RX ADMIN — CARVEDILOL 12.5 MG: 12.5 TABLET, FILM COATED ORAL at 08:07

## 2022-07-27 RX ADMIN — IBUPROFEN 800 MG: 400 TABLET, FILM COATED ORAL at 05:07

## 2022-07-27 RX ADMIN — CYCLOBENZAPRINE HYDROCHLORIDE 5 MG: 5 TABLET, FILM COATED ORAL at 08:07

## 2022-07-27 RX ADMIN — ASPIRIN 81 MG: 81 TABLET, COATED ORAL at 08:07

## 2022-07-27 RX ADMIN — SODIUM CHLORIDE: 0.9 INJECTION, SOLUTION INTRAVENOUS at 08:07

## 2022-07-27 RX ADMIN — ACETAMINOPHEN 1000 MG: 500 TABLET ORAL at 05:07

## 2022-07-27 RX ADMIN — OXYCODONE HYDROCHLORIDE 10 MG: 10 TABLET ORAL at 11:07

## 2022-07-27 RX ADMIN — LEVOTHYROXINE SODIUM 150 MCG: 150 TABLET ORAL at 05:07

## 2022-07-27 RX ADMIN — OXYCODONE HYDROCHLORIDE 10 MG: 10 TABLET ORAL at 12:07

## 2022-07-27 RX ADMIN — GABAPENTIN 300 MG: 300 CAPSULE ORAL at 08:07

## 2022-07-27 RX ADMIN — OXYCODONE HYDROCHLORIDE 10 MG: 10 TABLET ORAL at 08:07

## 2022-07-27 RX ADMIN — ENOXAPARIN SODIUM 40 MG: 40 INJECTION SUBCUTANEOUS at 05:07

## 2022-07-27 NOTE — CONSULTS
Derrick Main - [facility]  Vascular Neurology  Comprehensive Stroke Center  Consult Note    Inpatient consult to Vascular (Stroke) Neurology  Consult performed by: Ricardo Sierra DO  Consult ordered by: Ricardo Sierra DO        Assessment/Plan:     Patient is a 89 y.o. year old female with:    Somnolence  Patient somnolent during exam, likely metabolic in nature vs acute stroke symptoms. No focal deficits on exam, able to move upper and lower extremities without issues, with diplopia and somnolence being only complaint.    - Recommend Stat CTA to assess for stroke, acute neurologic process  - Recommend normotension  - Recommend normo-natremia        STROKE DOCUMENTATION     Acute Stroke Times   Last Known Normal Date: 07/27/22  Last Known Normal Time: 1000  Symptom Onset Date: 07/27/22  Symptom Onset Time: 1030  Stroke Team Called Date: 07/27/22  Stroke Team Called Time: 1045  Stroke Team Arrival Date: 07/27/22  Stroke Team Arrival Time: 1047  Thrombectomy Recommended: No    NIH Scale:  Interval: baseline  1a. Level of Consciousness: 0-->Alert, keenly responsive  1b. LOC Questions: 0-->Answers both questions correctly  1c. LOC Commands: 0-->Performs both tasks correctly  2. Best Gaze: 0-->Normal  3. Visual: 0-->No visual loss  4. Facial Palsy: 0-->Normal symmetrical movements  5a. Motor Arm, Left: 0-->No drift, limb holds 90 (or 45) degrees for full 10 secs  5b. Motor Arm, Right: 0-->No drift, limb holds 90 (or 45) degrees for full 10 secs  6a. Motor Leg, Left: 0-->No drift, leg holds 30 degree position for full 5 secs  6b. Motor Leg, Right: 0-->No drift, leg holds 30 degree position for full 5 secs  7. Limb Ataxia: 0-->Absent  8. Sensory: 0-->Normal, no sensory loss  9. Best Language: 0-->No aphasia, normal  10. Dysarthria: 0-->Normal  11. Extinction and Inattention (formerly Neglect): 0-->No abnormality  Total (NIH Stroke Scale): 0    Modified Re[place]    [place] Coma Scale:    ABCD2 Score:    [id number] Score:   HAS -BLED  Score:   ICH Score:   Hunt & Serrato Classification:       Thrombolysis Candidate? No, Non-disabling symptoms - Low NIHSS     Delays to Thrombolysis?  No    Interventional Revascularization Candidate?   Is the patient eligible for mechanical endovascular reperfusion (BRITTNI)?  No; No large vessel occlusion identified on imaging     Delays to Thrombectomy? Not Applicable    Hemorrhagic change of an Ischemic Stroke: Does this patient have an ischemic stroke with hemorrhagic changes? No     Subjective:     History of Present Illness:  07/27/2022 Stroke alerted 10:45am due to altered mental status and slurred speech. Vascular Neurology arrived 10:47. Examined patient at bedside. Patient somnolent, but able to follow commands, oriented to time, date and place, with no focal deficits or weakness. No history of stroke reported in the past. Per caregiver at bedside, patient had been slurring speech. Morning labs significant for Na 119. Patient PMHx significant for chronic constipation, chronic back pain, HTN, CAD, HLD, TIA, hypothyroidism, admitted to [facility] for management of GLF at home. POCT glucose not available.           Past Medical History:   Diagnosis Date    Basal cell carcinoma     nose    Benign essential hypertension     Cerebral microvascular disease 9/8/2014    Closed fracture of distal phalanx of left hand with routine healing 9/17/2015    Closed mallet fracture of distal phalanx of finger with routine healing 10/6/2015    Coronary artery disease     DDD (degenerative disc disease), lumbar 4/12/2016    Depression     Fall at home 5/30/2016    Hyperlipidemia     Hypothyroidism due to acquired atrophy of thyroid     Meningiomas, multiple     MI (myocardial infarction) 2004    80% blockage per patient    Migraine aura without headache 12/1/2014    Post PTCA 12/12/2012    Transient cerebral ischemia 5/4/2014     Past Surgical History:   Procedure Laterality Date    CARDIAC CATHETERIZATION  03/29/2004     S/P LAD PTCA, coated stent    CATARACT EXTRACTION W/  INTRAOCULAR LENS IMPLANT Bilateral     Dr Youssef     CHOLECYSTECTOMY      CORONARY ANGIOPLASTY WITH STENT PLACEMENT      LAD    EYE SURGERY      TOTAL THYROIDECTOMY       Family History   Problem Relation Age of Onset    Stroke Mother     Diabetes Mother     Hypertension Father     Stroke Father     Cancer Brother         colon    Skin cancer Brother     Colon cancer Brother     Diabetes Paternal Grandmother     Cancer Sister         breast    Dementia Sister     Glaucoma Maternal Aunt     No Known Problems Maternal Uncle     No Known Problems Paternal Aunt     No Known Problems Paternal Uncle     No Known Problems Maternal Grandmother     No Known Problems Maternal Grandfather     No Known Problems Paternal Grandfather     Amblyopia Neg Hx     Blindness Neg Hx     Cataracts Neg Hx     Macular degeneration Neg Hx     Retinal detachment Neg Hx     Strabismus Neg Hx     Thyroid disease Neg Hx     Esophageal cancer Neg Hx      Social History     Tobacco Use    Smoking status: Former Smoker     Packs/day: 0.30     Years: 15.00     Pack years: 4.50     Types: Cigarettes     Quit date: 1973     Years since quittin.5    Smokeless tobacco: Never Used   Substance Use Topics    Alcohol use: No     Alcohol/week: 0.0 standard drinks    Drug use: No     Review of patient's allergies indicates:   Allergen Reactions    Thiazides Other (See Comments)     Severe hyponatremia       Medications: I have reviewed the current medication administration record.    Medications Prior to Admission   Medication Sig Dispense Refill Last Dose    alendronate (FOSAMAX) 70 MG tablet TAKE 1 TABLET BY MOUTH EVERY 7 DAYS 12 tablet 1 2022 at Unknown time    aspirin (ECOTRIN) 81 MG EC tablet Take 1 tablet (81 mg total) by mouth once daily.   2022 at Unknown time    atorvastatin (LIPITOR) 40 MG tablet Take 1 tablet (40 mg total) by  mouth once daily. 90 tablet 1 7/24/2022 at Unknown time    calcium-vitamin D3 (OS-DANICA 500 + D3) 500 mg-5 mcg (200 unit) per tablet Take 1 tablet by mouth once daily.    7/24/2022 at Unknown time    carvediloL (COREG) 12.5 MG tablet Take 1 tablet (12.5 mg total) by mouth 2 (two) times daily. 180 tablet 1 7/24/2022 at Unknown time    levothyroxine (SYNTHROID) 150 MCG tablet Take 1 tablet (150 mcg total) by mouth once daily. 90 tablet 1 7/24/2022 at Unknown time    clotrimazole-betamethasone 1-0.05% (LOTRISONE) cream Apply topically 2 (two) times daily as needed. 15 g 1     cyanocobalamin (VITAMIN B-12) 100 MCG tablet Take 100 mcg by mouth once daily.       dextran 70-hypromellose (ARTIFICIAL TEARS) ophthalmic solution Place 1 drop into both eyes every 2 (two) hours as needed.  0     LINZESS 145 mcg Cap capsule TAKE 1 CAPSULE(145 MCG) BY MOUTH BEFORE BREAKFAST 30 capsule 0     losartan (COZAAR) 50 MG tablet Take 1 tablet (50 mg total) by mouth once daily. 90 tablet 3     melatonin 10 mg Cap Take by mouth.       torsemide (DEMADEX) 10 MG Tab Take 1 tablet (10 mg total) by mouth once daily. As need for weight gain of 3 lbs in 1 day or 5 lbs in 3 days. Do not take if weight stable or decreasing. Daily weights at home. 90 tablet 3        Review of Systems   Constitutional:  Positive for activity change.   HENT:          Diplopia   All other systems reviewed and are negative.  Objective:     Vital Signs (Most Recent):  Temp: 97.6 °F (36.4 °C) (07/27/22 1250)  Pulse: (!) 54 (07/27/22 1250)  Resp: 20 (07/27/22 1250)  BP: (!) 102/52 (07/27/22 1250)  SpO2: 97 % (07/27/22 1250)    Vital Signs Range (Last 24H):  Temp:  [96.1 °F (35.6 °C)-98 °F (36.7 °C)]   Pulse:  [54-61]   Resp:  [16-20]   BP: (102-132)/(52-63)   SpO2:  [88 %-100 %]     Physical Exam  Constitutional:       Appearance: Normal appearance.      Comments: Somnolent   HENT:      Head: Normocephalic and atraumatic.      Nose: Nose normal.      Mouth/Throat:       Mouth: Mucous membranes are dry.      Pharynx: Oropharynx is clear.   Eyes:      Extraocular Movements: Extraocular movements intact.      Pupils: Pupils are equal, round, and reactive to light.   Cardiovascular:      Rate and Rhythm: Normal rate and regular rhythm.   Musculoskeletal:      Cervical back: Normal range of motion and neck supple.       Neurological Exam:   LOC: drowsy  Attention Span: poor  Language: No aphasia  Articulation: No dysarthria  Orientation: Person, Place, Time   Visual Fields: Full  EOM (CN III, IV, VI): Full/intact  Pupils (CN II, III): PERRL  Facial Sensation (CN V): Normal  Facial Movement (CN VII): Symmetric facial expression    Reflexes: 2+ throughout  Motor: Arm left  Normal 5/5  Leg left  Normal 5/5  Arm right  Normal 5/5  Leg right Normal 5/5  Sensation: Intact to light touch, temperature and vibration  Tone: Normal tone throughout      Laboratory:  CMP:   Recent Labs   Lab 07/27/22 0454 07/27/22  0737 07/27/22  0953   CALCIUM 8.3*  --  8.3*   ALBUMIN 2.8*  --   --    PROT 6.1  --   --    *   < > 121*   K 4.3  --  4.5   CO2 23  --  24   CL 88*  --  90*   BUN 14  --  16   CREATININE 0.8  --  1.0   ALKPHOS 97  --   --    ALT 10  --   --    AST 18  --   --    BILITOT 0.7  --   --     < > = values in this interval not displayed.     CBC:   Recent Labs   Lab 07/27/22 0454   WBC 9.60   RBC 3.34*   HGB 10.5*   HCT 31.6*      MCV 95   MCH 31.4*   MCHC 33.2     Coagulation:   Recent Labs   Lab 07/24/22  2122   INR 1.1     Recent Labs   Lab 07/27/22  0737   TSH 7.019*     Diagnostic Results:    Brain imaging:  STAT CTA Ordered- Pending      Ricardonadeem Sierra DO  [facility] Stroke [place]  Department of Vascular Neurology   Derrick ANSARI

## 2022-07-27 NOTE — ASSESSMENT & PLAN NOTE
Patient somnolent during exam, likely metabolic in nature vs acute stroke symptoms. No focal deficits on exam, able to move upper and lower extremities without issues, with diplopia and somnolence being only complaint.    - Recommend Stat CTA to assess for stroke, acute neurologic process  - Recommend normotension  - Recommend normo-natremia

## 2022-07-27 NOTE — HPI
Ms. Hanna is a 89 year old female with CAD, HLD, hypothyroidism, HTN, HFpEF presenting after mechanical fall at home. Patient lost her balance and fell while in her kitchen, landing on her right side without loss of consciousness nor prodrome. Per caregiver patient completes all ADLs without assistance and only requires minimal supervision. She reports experiencing HA x1wk prior to her fall. She denies taking NSAIDs at home or drinking large volumes of water. She was seen at her cardiologist x1wk ago and was told to only take her Torsemide if she notices a sudden weight gain and had an echo scheduled for a new murmur. She reports a similar episode of hyponatremia in 2016 requiring hospitalization and treatment with Tolvaptan for multiple weeks. She was also placed on strict fluid restriction but has been drinking 2-3 glasses of water/soda recently.     On admission, with negative head CT. CT C-spine was notable for acute displaced burst fracture of L2 vertebral body with extension into the spinal canal as well as nondisplaced fracture of L1 vertebral body. CT chest with posterolateral ribs fracture of ribs 4-9. Since admission, patient was hyponatremic (128) with a baseline of 133. She had an acute drop of sodium on 7/26 to 127 and to 120 on 7/27 with some worsening mentation. Hospital medicine was consulted for management of hyponatremia, but hyponatremia continued drop to 118. Patient transferred to Critical Care Medicine for management of hyponatremia in setting of encephalopathy.

## 2022-07-27 NOTE — ASSESSMENT & PLAN NOTE
Creatinine worsening from 0.8 to 1.1 from AM to PM of 7/27. Urine sodium <10 and patient concurrently on scheduled NSAIDS. Suspect developing prerenal acute kidney injury.    - continue IV fluids  - BMP daily (q4 for now given hyponatremia)  - avoid nephrotoxins and renally dose medications  - discontinue ibuprofen

## 2022-07-27 NOTE — PT/OT/SLP PROGRESS
Physical Therapy Co-Treatment  Co-treatment with OT for maximal pt participation, safety, and activity tolerance      Patient Name:  Laila Hanna   MRN:  [id number]  Admitting Diagnosis:  Multiple rib fractures   Recent Surgery: * No surgery found *    Admit Date: 7/24/2022  Length of Stay: 2 days    Recommendations:     Discharge Recommendations:  nursing facility, skilled   Discharge Equipment Recommendations: wheelchair, bedside commode   Barriers to discharge: Decreased Caregiver support and Evolving Clinical Presentation    Assessment:     Laila Hanna is a 89 y.o. female admitted with a medical diagnosis of Multiple rib fractures.  She presents with the following impairments/functional limitations:  weakness, gait instability, decreased upper extremity function, impaired endurance, impaired balance, pain, impaired cognition, impaired self care skills, decreased lower extremity function, decreased safety awareness, orthopedic precautions, impaired functional mobility.     Pt presents with caregiver at bedside, caregiver reporting she is much more altered today than she has been lately, not responding as appropriately as normal. Pt has low sodium levels today so there is concern this is the reason for the altered mental state. Pt is wearing TLSO brace but the brace is upside down and she is reporting discomfort with it. Pt assisted to sit EOB with max A x2, CGA to min A to maintain upright sitting at EOB. Time taken to remove and re-adjust brace for more comfortable fit. Pt belching and reporting increased pain and discomfort at EOB. Due to altered mental status and low sodium levels MD in room states that it may not be a good time to continue with therapy, pt assisted back into supine with maximal assistance x2. Progress functional mobility as patient medical status allows.     Rehab Prognosis: Fair; patient would benefit from acute skilled PT services to address these deficits and reach maximum level of  "function.    Recent Surgery: * No surgery found *      Highest level of mobility achieved this visit: sitting EOB ~ 12 minutes with CGA    Activity with RN/PCT: bed mobility only    Plan:     During this hospitalization, patient to be seen 4 x/week to address the identified rehab impairments via gait training, therapeutic activities, therapeutic exercises, neuromuscular re-education and progress toward the following goals:    · Plan of Care Expires:  08/24/22    Subjective     RN Alexis notified prior to session. Pt's caregiver present upon PT entrance into room.    Chief Complaint: back pain  Patient/Family Comments/goals: "This brace hurts"  Pain/Comfort:  · Pain Rating 1:  (unrated back pain)  · Location - Orientation 1: generalized  · Location 1: back  · Pain Addressed 1: Reposition, Distraction, Cessation of Activity      Objective:     Additional staff present: OT Mirlande    Patient found supine with: TLSO, telemetry, PureWick   Cognition:   · Lethargic and Cooperative  · Command following: Follows one-step verbal commands  · Fluency: mumbling with mildly slurred speech  General Precautions: Standard, Cardiac fall   Orthopedic Precautions:spinal precautions   Braces: TLSO   Body mass index is 33.41 kg/m².  Oxygen Device: Nasal Cannula 2L      Vitals: BP (!) 102/52 (BP Location: Right arm, Patient Position: Lying)   Pulse (!) 54   Temp 97.6 °F (36.4 °C) (Axillary)   Resp 20   Ht 5' 6" (1.676 m)   Wt 93.9 kg (207 lb 0.2 oz)   LMP  (LMP Unknown) Comment: years ago  SpO2 97%   BMI 33.41 kg/m²     Outcome Measures:  AM-PAC 6 CLICK MOBILITY  Turning over in bed (including adjusting bedclothes, sheets and blankets)?: 2  Sitting down on and standing up from a chair with arms (e.g., wheelchair, bedside commode, etc.): 1  Moving from lying on back to sitting on the side of the bed?: 2  Moving to and from a bed to a chair (including a wheelchair)?: 1  Need to walk in hospital room?: 1  Climbing 3-5 steps with a " railing?: 1  Basic Mobility Total Score: 8     Functional Mobility:    Bed Mobility:   · Scooting to HOB via supine bridge: total assistance of 3 persons  · Supine to Sit: maximal assistance of 2 persons; from R side of bed  · Scooting anteriorly to EOB to have both feet planted on floor: maximal assistance of 2 persons  · Sit to Supine: maximal assistance of 2 persons; to L side of bed    Sitting Balance at Edge of Bed:   Static Sitting Balance: Poor : unable to maintain balance and requires moderate to maximal assistance from individual or chair   Dynamic Sitting Balance: Poor: able to sit unsupported with moderate assistance and reach ipsilaterally or anteriorly. Unable to cross midline.   Assistance Level Required: Minimal Assistance and Moderate Assistance   Time: 15 min   Postural deviations noted: slouched posture   Encouraged: upright sitting    Transfers: deferred due to poor mentation and postural control today    Education:   Time provided for education, counseling and discussion of health disposition in regards to patient's current status   PT role in POC to address current functional deficits    Patient left supine with all lines intact, call button in reach and caretaker present.    GOALS:   Multidisciplinary Problems     Physical Therapy Goals        Problem: Physical Therapy    Goal Priority Disciplines Outcome Goal Variances Interventions   Physical Therapy Goal     PT, PT/OT Ongoing, Progressing     Description: Goals to be met by: 22     Patient will increase functional independence with mobility by performin. Supine to sit with Minimal Assistance  2. Sit to supine with MInimal Assistance  3. Rolling to Left and Right with Stand-by Assistance  4. Sit to stand transfer with Stand-by Assistance using LRAD  5. Gait  x 75 feet with Stand-by Assistance using LRAD                   Time Tracking:     PT Received On: 22  PT Start Time: 1015     PT Stop Time: 1040  PT Total Time  (min): 25 min     Billable Minutes:   · Therapeutic Activity 24 min    Treatment Type: Treatment  PT/PTA: PT       Eladio Kumar PT, DPT  7/27/2022

## 2022-07-27 NOTE — ASSESSMENT & PLAN NOTE
Increased confusion and worsening mental status 7/27. Suspect likely related to worsening hyponatremia. Improved with improvement in sodium.   - hold gabapentin and flexeril

## 2022-07-27 NOTE — PLAN OF CARE
Plan of care reviewed with pt, who verbalized understanding. Pt intermittently confused, can answer all orientation questions aside from situation, makes random statements, remains very pleasant. Tolerating small amounts of mech. Soft diet. Frequent turns for pressure offloading, back immobilizer in place. Pain managed w/ PRN medications. Bruising noted to R hand/knee. Call bell in reach, bed locked in lowest position. Frequent rounds made for pt safety. VSS.     Pt to MRI, will transfer to CMICU following scan d/t hyponatremia and mentation. Plan of care to be assumed by DEISI Parra in MICU room 6083.

## 2022-07-27 NOTE — ASSESSMENT & PLAN NOTE
Patient with acute on chronic hyponatremia. Baseline sodium approximately 133. On admission 128, trending down. 7/26 sodium was 127 and 7/27 sodium was 120. Repeat sodiums 119>121>118. Some worsening of mental status including increased confusion from baseline, slurring of speech, and diplopia. 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 Previously on thiazide diuretic but no longer taking it. Critical care consulted to assess ICU needs in the setting of symptomatic hyponatremia. Suspect hypovolemic hyponatremia given urine sodium <10.     - repeat BMP q4  - continue IV fluids 100 cc/hr for 10 hours  - if worsening mentation or seizing, will need to consider ICU admission for closer monitor of sodium and HTS  - discussed with critical care fellow

## 2022-07-27 NOTE — ASSESSMENT & PLAN NOTE
- hold carvedilol given bradycardia and soft blood pressures  - home losartan not started on admission but would continue to hold given worsening renal function and soft blood pressures

## 2022-07-27 NOTE — ASSESSMENT & PLAN NOTE
Patient with acute on chronic hyponatremia. Baseline sodium approximately 133. On admission 128, trending down. 7/26 sodium was 127 and 7/27 sodium was 120. Repeat sodiums 119>121>118. Some worsening of mental status including increased confusion from baseline, slurring of speech, and diplopia. 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 Previously on thiazide diuretic but no longer taking it. Critical care consulted and patient admitted to ICU. Fluid restricted with improvement in Na and mentation. Stepped down to hospital medicine 7/30.

## 2022-07-27 NOTE — HPI
07/27/2022 Stroke alerted 10:45am due to altered mental status and slurred speech. Vascular Neurology arrived 10:47. Examined patient at bedside. Patient somnolent, but able to follow commands, oriented to time, date and place, with no focal deficits or weakness. No history of stroke reported in the past. Per caregiver at bedside, patient had been slurring speech. Morning labs significant for Na 119. Patient PMHx significant for chronic constipation, chronic back pain, HTN, CAD, HLD, TIA, hypothyroidism, admitted to [facility] for management of GLF at home. POCT glucose not available.

## 2022-07-27 NOTE — CONSULTS
[facility]  Consult Note    Patient Name: Laila Hanna  MRN: [id number]  Admission Date: 7/24/2022  Hospital Length of Stay: 2 days  Attending Physician: Ashish Salazar MD   Primary Care Provider: Ama Graham MD           Patient information was obtained from patient, relative(s) and ER records.     Inpatient consult to Hospital Medicine-General  Consult performed by: Vik Phillips MD  Consult ordered by: Earnest Wynn PA-C        Subjective:     Principal Problem: Multiple rib fractures    Chief Complaint:   Chief Complaint   Patient presents with    Fall     Pt had trip and fall at home, no loss of consciousness, pain to right knee and hand, pt hit her head and has a small hematoma. Pt takes aspirin daily.         HPI: Laila Hanna is a 89 year old female with CAD, HLD, hypothyroidism, HTN, HFpEF who presents after mechanical fall. Patient reportedly lost her balance and fell while in her kitchen, landing on her right side without loss of consciousness. Patient denies prodrome. Per caregiver at bedside, patient is typically able to complete all ADLs without assistance and only requires minimal supervision. On admission, patient was noted to have negative head CT. Imaging was notable for acute displaced burst fracture of L2 vertebral body with extension into the spinal canal as well as nondisplaced fracture of L1 vertebral body. Additionally, patient was noted to have posterolateral ribs fracture of ribs 4-9. Since admission, patient was noted to be hyponatremic (128). Baseline 133. She was noted to have acute drop of sodium (7/26 127) to 120 (7/27) with some worsening mentation. Hospital medicine was consulted for management of hyponatremia.      Past Medical History:   Diagnosis Date    Basal cell carcinoma     nose    Benign essential hypertension     Cerebral microvascular disease 9/8/2014    Closed fracture of distal phalanx of left hand with routine healing  9/17/2015    Closed mallet fracture of distal phalanx of finger with routine healing 10/6/2015    Coronary artery disease     DDD (degenerative disc disease), lumbar 4/12/2016    Depression     Fall at home 5/30/2016    Hyperlipidemia     Hypothyroidism due to acquired atrophy of thyroid     Meningiomas, multiple     MI (myocardial infarction) 2004    80% blockage per patient    Migraine aura without headache 12/1/2014    Post PTCA 12/12/2012    Transient cerebral ischemia 5/4/2014       Past Surgical History:   Procedure Laterality Date    CARDIAC CATHETERIZATION  03/29/2004    S/P LAD PTCA, coated stent    CATARACT EXTRACTION W/  INTRAOCULAR LENS IMPLANT Bilateral 2000    Dr Youssef     CHOLECYSTECTOMY      CORONARY ANGIOPLASTY WITH STENT PLACEMENT  2004    LAD    EYE SURGERY      TOTAL THYROIDECTOMY         Review of patient's allergies indicates:   Allergen Reactions    Thiazides Other (See Comments)     Severe hyponatremia       No current facility-administered medications on file prior to encounter.     Current Outpatient Medications on File Prior to Encounter   Medication Sig    alendronate (FOSAMAX) 70 MG tablet TAKE 1 TABLET BY MOUTH EVERY 7 DAYS    aspirin (ECOTRIN) 81 MG EC tablet Take 1 tablet (81 mg total) by mouth once daily.    atorvastatin (LIPITOR) 40 MG tablet Take 1 tablet (40 mg total) by mouth once daily.    calcium-vitamin D3 (OS-DANICA 500 + D3) 500 mg-5 mcg (200 unit) per tablet Take 1 tablet by mouth once daily.     carvediloL (COREG) 12.5 MG tablet Take 1 tablet (12.5 mg total) by mouth 2 (two) times daily.    levothyroxine (SYNTHROID) 150 MCG tablet Take 1 tablet (150 mcg total) by mouth once daily.    clotrimazole-betamethasone 1-0.05% (LOTRISONE) cream Apply topically 2 (two) times daily as needed.    cyanocobalamin (VITAMIN B-12) 100 MCG tablet Take 100 mcg by mouth once daily.    dextran 70-hypromellose (ARTIFICIAL TEARS) ophthalmic solution Place 1 drop  into both eyes every 2 (two) hours as needed.    LINZESS 145 mcg Cap capsule TAKE 1 CAPSULE(145 MCG) BY MOUTH BEFORE BREAKFAST    losartan (COZAAR) 50 MG tablet Take 1 tablet (50 mg total) by mouth once daily.    melatonin 10 mg Cap Take by mouth.    torsemide (DEMADEX) 10 MG Tab Take 1 tablet (10 mg total) by mouth once daily. As need for weight gain of 3 lbs in 1 day or 5 lbs in 3 days. Do not take if weight stable or decreasing. Daily weights at home.     Family History       Problem Relation (Age of Onset)    Cancer Brother, Sister    Colon cancer Brother    Dementia Sister    Diabetes Mother, Paternal Grandmother    Glaucoma Maternal Aunt    Hypertension Father    No Known Problems Maternal Uncle, Paternal Aunt, Paternal Uncle, Maternal Grandmother, Maternal Grandfather, Paternal Grandfather    Skin cancer Brother    Stroke Mother, Father          Tobacco Use    Smoking status: Former Smoker     Packs/day: 0.30     Years: 15.00     Pack years: 4.50     Types: Cigarettes     Quit date: 1973     Years since quittin.5    Smokeless tobacco: Never Used   Substance and Sexual Activity    Alcohol use: No     Alcohol/week: 0.0 standard drinks    Drug use: No    Sexual activity: Never     Review of Systems   Unable to perform ROS: Mental status change   Objective:     Vital Signs (Most Recent):  Temp: 97.6 °F (36.4 °C) (22 1250)  Pulse: (!) 54 (22 1250)  Resp: 20 (22 1250)  BP: (!) 102/52 (22 1250)  SpO2: 97 % (22 1250)   Vital Signs (24h Range):  Temp:  [96.1 °F (35.6 °C)-98 °F (36.7 °C)] 97.6 °F (36.4 °C)  Pulse:  [54-61] 54  Resp:  [16-20] 20  SpO2:  [88 %-100 %] 97 %  BP: (102-132)/(52-63) 102/52     Weight: 93.9 kg (207 lb 0.2 oz)  Body mass index is 33.41 kg/m².    Physical Exam  Constitutional:       General: She is not in acute distress.     Appearance: Normal appearance. She is ill-appearing. She is not toxic-appearing or diaphoretic.   HENT:      Nose: No  congestion or rhinorrhea.      Mouth/Throat:      Mouth: Mucous membranes are moist.      Pharynx: No oropharyngeal exudate or posterior oropharyngeal erythema.   Eyes:      General: No scleral icterus.        Right eye: No discharge.         Left eye: No discharge.   Cardiovascular:      Rate and Rhythm: Regular rhythm. Bradycardia present.      Pulses: Normal pulses.      Heart sounds: Normal heart sounds. No murmur heard.    No friction rub. No gallop.   Pulmonary:      Effort: Pulmonary effort is normal. No respiratory distress.      Breath sounds: Normal breath sounds. No stridor. No wheezing, rhonchi or rales.   Chest:      Chest wall: No tenderness.   Abdominal:      General: Abdomen is flat. Bowel sounds are normal. There is no distension.      Palpations: Abdomen is soft. There is no mass.      Tenderness: There is no abdominal tenderness. There is no guarding.   Musculoskeletal:      Right lower leg: No edema.      Left lower leg: No edema.   Skin:     General: Skin is warm and dry.      Coloration: Skin is not jaundiced.      Findings: Bruising present.   Neurological:      General: No focal deficit present.      Comments: Word searching, slurring of speech, diplopia           Significant Labs: All pertinent labs within the past 24 hours have been reviewed.    Significant Imaging: I have reviewed all pertinent imaging results/findings within the past 24 hours.    Assessment/Plan:     * Multiple rib fractures  Multiple posterolateral rib fractures of ribs 4-9 after mechanical fall      Hyponatremia  Patient with acute on chronic hyponatremia. Baseline sodium approximately 133. On admission 128, trending down. 7/26 sodium was 127 and 7/27 sodium was 120. Repeat sodiums 119>121>118. Some worsening of mental status including increased confusion from baseline, slurring of speech, and diplopia. 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 Previously on thiazide  diuretic but no longer taking it. Critical care consulted to assess ICU needs in the setting of symptomatic hyponatremia. Suspected hypovolemic hyponatremia given urine sodium <10 and initially started IV fluids but patient with worsening sodium.    - repeat BMP q4  - if worsening mentation/seizing/down trending sodium, will need to consider ICU admission for closer monitor of sodium and HTS  - discussed with critical care fellow      JAENINE (acute kidney injury)  Creatinine worsening from 0.8 to 1.1 from AM to PM of 7/27. Urine sodium <10 and patient concurrently on scheduled NSAIDS. Suspect developing prerenal acute kidney injury.    - BMP daily (q4 for now given hyponatremia)  - avoid nephrotoxins and renally dose medications  - discontinue ibuprofen      Acute encephalopathy  Increased confusion and worsening mental status 7/27. Suspect likely related to worsening hyponatremia but given slurred speech, vision changes will consider stroke. Additionally, consider delirium or other metabolic causes of encephalopathy.    - see hyponatremia  - code stroke called, vascular surgery consulted  - MRA head and neck pending (defer CTA given worsening renal function)  - hold gabapentin and flexeril      Lumbar compression fracture  Patient with acute L2 compression fracture without significant retropulsion or central canal narrowing and L1 compression fracture without extension into pedicles. Neurosurgery evaluated patient with plan for repeat imaging in brace at 6 weeks. No acute intervention.    - Continue LSO brace at all times  - Follow up in 6 weeks with repeat XR in the brace     Coronary artery disease involving native coronary artery of native heart  - continue aspirin  - hold carvedilol, given soft blood pressures and bradycardia      Essential hypertension  - hold carvedilol given bradycardia and soft blood pressures  - home losartan not started on admission but would continue to hold given worsening renal function  and soft blood pressures      Hypothyroidism due to acquired atrophy of thyroid  TSH elevated 7.019 with free T4 1.2, ordered in the workup of hyponatremia    - continue levothyroxine 150 mcg daily  - will need to follow up once out of acute illness      VTE Risk Mitigation (From admission, onward)         Ordered     enoxaparin injection 40 mg  Daily         07/25/22 0040     IP VTE HIGH RISK PATIENT  Once         07/25/22 0040                    Thank you for your consult. I will follow-up with patient. Please contact us if you have any additional questions.    Vik Phillips MD  Department of Hospital Medicine   [facility]

## 2022-07-27 NOTE — HPI
Laila Hanna is a 89 year old white woman with obesity, hypertension, coronary artery disease status post stent placement to left anterior descending artery on 3/29/2004, left atrial enlargement, left ventricular diastolic dysfunction, aortic and itral valve annular calcifications, carotid artery disease, aortic atherosclerosis, history of transient ischemic attack, syndrome of inappropriate antidiuretic hormone production, Alzheimer's disease, osteoporosis, history of multinodular goiter status post total thyroidectomy on 10/16/2006 with hypothyroidism, chronic constipation, history of cholecystectomy. She lives in [place], [place]. Her primary care physician is Dr. Ama Graham. Her cardiologist is Dr. Bubba Severino.               Sodium was 133 mmol/L on 6/30/2022.              She was seen at [facility] clinic on 7/21/2022. Due to recent syncope, her torsemide was changed to take as needed.               She presented to [facility] Emergency Department on 7/24/2022 after losing her balance and falling in her kitchen, falling onto her right side without losing consciousness. She denied a prodrome. Her caregiver reported that she is typically able to complete activities of daily living without assistance and only requires minimal supervision. Imaging showed an acute displaced burst fracture of the L2 vertebral body with extension into the spinal canal and nondisplaced fracture of the L1 vertebral body, posterolateral fractures of ribs 4 through 9. Sodium was 128 mmol/L. She was admitted to General Surgery.

## 2022-07-27 NOTE — PROGRESS NOTES
[facility]  General Surgery  Progress Note    Subjective:     History of Present Illness:  No notes on file    Post-Op Info:  * No surgery found *         Interval History: NAEON. Afebrile. HDS. Reports feeling okay this morning. Satting well on 2L O2 via NC. Pain is controlled with current regimen, worse with movement. Adequate UOP. Tolerating diet. No new symptoms or concerns this morning. All questions answered.   PT/OT recommending SNF  Na 127 > 120 this AM  Neurosurgery recommend Upright/supine XR in brace, Continue LSO brace at all times, follow up in 6 weeks with repeat XR in the brace       Medications:  Continuous Infusions:  Scheduled Meds:   acetaminophen  1,000 mg Oral Q8H    aspirin  81 mg Oral Daily    atorvastatin  40 mg Oral Daily    carvediloL  12.5 mg Oral BID    cyclobenzaprine  5 mg Oral TID    enoxaparin  40 mg Subcutaneous Daily    gabapentin  300 mg Oral TID    ibuprofen  800 mg Oral Q8H    levothyroxine  150 mcg Oral Daily    LIDOcaine  1 patch Transdermal Q24H     PRN Meds:docusate sodium, melatonin, ondansetron, oxyCODONE, oxyCODONE, sodium chloride 0.9%, DIPH,PERTUSS(ACELL),TET VACCINE (ADULT)(BOOSTRIX,ADACEL)     Review of patient's allergies indicates:   Allergen Reactions    Thiazides Other (See Comments)     Severe hyponatremia     Objective:     Vital Signs (Most Recent):  Temp: 98 °F (36.7 °C) (07/27/22 0442)  Pulse: 60 (07/27/22 0442)  Resp: 16 (07/27/22 0442)  BP: 131/63 (07/27/22 0442)  SpO2: 99 % (07/27/22 0442)   Vital Signs (24h Range):  Temp:  [96.1 °F (35.6 °C)-98.3 °F (36.8 °C)] 98 °F (36.7 °C)  Pulse:  [51-60] 60  Resp:  [16-20] 16  SpO2:  [99 %-100 %] 99 %  BP: (117-132)/(58-63) 131/63     Weight: 93.9 kg (207 lb 0.2 oz)  Body mass index is 33.41 kg/m².    Intake/Output - Last 3 Shifts         07/25 0700  07/26 0659 07/26 0700 07/27 0659 07/27 0700 07/28 0659    P.O. 1080 1320     Total Intake(mL/kg) 1080 (11.5) 1320 (14.1)     Urine (mL/kg/hr) 1462  (0.6) 1105 (0.5)     Stool       Total Output 1462 1105     Net -382 +215                    Physical Exam  Vitals and nursing note reviewed.   Constitutional:       General: She is not in acute distress.     Appearance: She is not ill-appearing.      Comments: 2L O2 via NC   HENT:      Head: Normocephalic and atraumatic.   Eyes:      Extraocular Movements: Extraocular movements intact.   Cardiovascular:      Rate and Rhythm: Normal rate.   Pulmonary:      Effort: Pulmonary effort is normal. No respiratory distress.   Abdominal:      General: There is no distension.      Palpations: Abdomen is soft.      Tenderness: There is no abdominal tenderness.   Musculoskeletal:         General: No deformity.      Comments: Back brace in place  TTP to R chest wall.   Hematoma to R knee and R hand   Neurological:      General: No focal deficit present.      Mental Status: She is alert and oriented to person, place, and time.      Cranial Nerves: No cranial nerve deficit.      Sensory: No sensory deficit.      Motor: No weakness.   Psychiatric:         Mood and Affect: Mood normal.       Significant Labs:  I have reviewed all pertinent lab results within the past 24 hours.  CBC:   Recent Labs   Lab 07/26/22  0356   WBC 8.03   RBC 3.19*   HGB 10.0*   HCT 29.9*      MCV 94   MCH 31.3*   MCHC 33.4       BMP:   Recent Labs   Lab 07/27/22  0454   GLU 85   *   K 4.3   CL 88*   CO2 23   BUN 14   CREATININE 0.8   CALCIUM 8.3*   MG 1.9         Significant Diagnostics:  I have reviewed all pertinent imaging results/findings within the past 24 hours.  I have reviewed and interpreted all pertinent imaging results/findings within the past 24 hours.    Assessment/Plan:     * Multiple rib fractures  Patient is a 89 year old female with h/o HTN, CAD, degenerative disc disease, HLD, MI, TIA presenting after a fall from standing with R rib 4-9 fractures and L2 burst fracture. Clinically stable but with worsening hyponatremia     -  Mechanical soft diet. Aspiration precautions  - STAT repeat Na   - Scheduled multimodal pain regimen  - PRN pain and nausea medications  - Daily labs  - Replete electrolytes PRN  - Wound care   - DVT prophylaxis (SCDs and lovenox)  - OOB, ambulate  - PT/OT. Recommend SNF at dc  - IS  - Inhalation treatments  - Daily CXR  - Wean O2 as tolerated    Dispo: not yet medically stable for dc. Evaluated by PT/OT and determined to require SNF at discharge. SW/CM involved, appreciate assistance. Needs neurosurgery f/u in 6 weeks        Lumbar compression fracture  - Neurosurgery consulted, appreciate assistance  - MRI shows an acute L2 compression fracture without significant retropulsion or central canal narrowing. There is an L1 fracture without extension into the pedicles.   - Upright/supine XR in brace  - Continue LSO brace at all times  - Follow up in 6 weeks with repeat XR in the brace        Coronary artery disease involving native coronary artery of native heart  - Continue home ASA, coreg, and statin     Essential hypertension  - Controlled  - Continue home coreg  - Continue to monitor with q4 vitals and adjust regimen PRN    Hyponatremia  - Chronic but with acute drop to 120 this AM  - NaCl at 50cc/hr for now. Will adjust with new labs   - Hospital medicine consulted, appreciate assistance  - STAT Na repeat ordered     Hypothyroidism due to acquired atrophy of thyroid  - Continue home levothyroxine         ADITYA WhyteC  General Surgery  [facility]

## 2022-07-27 NOTE — ASSESSMENT & PLAN NOTE
- Neurosurgery consulted, appreciate assistance  - MRI shows an acute L2 compression fracture without significant retropulsion or central canal narrowing. There is an L1 fracture without extension into the pedicles.   - Upright/supine XR in brace  - Continue LSO brace at all times  - Follow up in 6 weeks with repeat XR in the brace

## 2022-07-27 NOTE — ASSESSMENT & PLAN NOTE
Patient with acute on chronic hyponatremia. Baseline sodium approximately 133. On admission 128, trending down. 7/26 sodium was 127 and 7/27 sodium was 120. Repeat sodiums 119>121>118. Some worsening of mental status including increased confusion from baseline, slurring of speech, and diplopia. She has been required an extended hospitalization in the past for hyponatremia, requiring vaptans and strict fluid restriction. She also recently had her diuretic adjusted to PRN for weight gain and a new murmur by her outpt cardiologist. Symptoms are similar to previous symptomatic hyponatremia. She was given NS on the floor with sudden worsening of her sodium from 122-118.     - q4 ISTAT Na  - Fluid restriction relaxed to <1500cc  - If mental status acutely worsens give 3% NS and check q2 BMP   - Repeat urine electrolytes now off confounding medications

## 2022-07-27 NOTE — SUBJECTIVE & OBJECTIVE
Interval History: NAEON. Afebrile. HDS. Reports feeling okay this morning. Satting well on 2L O2 via NC. Pain is controlled with current regimen, worse with movement. Adequate UOP. Tolerating diet. No new symptoms or concerns this morning. All questions answered.   PT/OT recommending SNF  Na 127 > 120 this AM  Neurosurgery recommend Upright/supine XR in brace, Continue LSO brace at all times, follow up in 6 weeks with repeat XR in the brace       Medications:  Continuous Infusions:  Scheduled Meds:   acetaminophen  1,000 mg Oral Q8H    aspirin  81 mg Oral Daily    atorvastatin  40 mg Oral Daily    carvediloL  12.5 mg Oral BID    cyclobenzaprine  5 mg Oral TID    enoxaparin  40 mg Subcutaneous Daily    gabapentin  300 mg Oral TID    ibuprofen  800 mg Oral Q8H    levothyroxine  150 mcg Oral Daily    LIDOcaine  1 patch Transdermal Q24H     PRN Meds:docusate sodium, melatonin, ondansetron, oxyCODONE, oxyCODONE, sodium chloride 0.9%, DIPH,PERTUSS(ACELL),TET VACCINE (ADULT)(BOOSTRIX,ADACEL)     Review of patient's allergies indicates:   Allergen Reactions    Thiazides Other (See Comments)     Severe hyponatremia     Objective:     Vital Signs (Most Recent):  Temp: 98 °F (36.7 °C) (07/27/22 0442)  Pulse: 60 (07/27/22 0442)  Resp: 16 (07/27/22 0442)  BP: 131/63 (07/27/22 0442)  SpO2: 99 % (07/27/22 0442)   Vital Signs (24h Range):  Temp:  [96.1 °F (35.6 °C)-98.3 °F (36.8 °C)] 98 °F (36.7 °C)  Pulse:  [51-60] 60  Resp:  [16-20] 16  SpO2:  [99 %-100 %] 99 %  BP: (117-132)/(58-63) 131/63     Weight: 93.9 kg (207 lb 0.2 oz)  Body mass index is 33.41 kg/m².    Intake/Output - Last 3 Shifts         07/25 0700 07/26 0659 07/26 0700 07/27 0659 07/27 0700 07/28 0659    P.O. 1080 1320     Total Intake(mL/kg) 1080 (11.5) 1320 (14.1)     Urine (mL/kg/hr) 1462 (0.6) 1105 (0.5)     Stool       Total Output 1462 1105     Net -382 +215                    Physical Exam  Vitals and nursing note reviewed.   Constitutional:       General:  She is not in acute distress.     Appearance: She is not ill-appearing.      Comments: 2L O2 via NC   HENT:      Head: Normocephalic and atraumatic.   Eyes:      Extraocular Movements: Extraocular movements intact.   Cardiovascular:      Rate and Rhythm: Normal rate.   Pulmonary:      Effort: Pulmonary effort is normal. No respiratory distress.   Abdominal:      General: There is no distension.      Palpations: Abdomen is soft.      Tenderness: There is no abdominal tenderness.   Musculoskeletal:         General: No deformity.      Comments: Back brace in place  TTP to R chest wall.   Hematoma to R knee and R hand   Neurological:      General: No focal deficit present.      Mental Status: She is alert and oriented to person, place, and time.      Cranial Nerves: No cranial nerve deficit.      Sensory: No sensory deficit.      Motor: No weakness.   Psychiatric:         Mood and Affect: Mood normal.       Significant Labs:  I have reviewed all pertinent lab results within the past 24 hours.  CBC:   Recent Labs   Lab 07/26/22  0356   WBC 8.03   RBC 3.19*   HGB 10.0*   HCT 29.9*      MCV 94   MCH 31.3*   MCHC 33.4       BMP:   Recent Labs   Lab 07/27/22  0454   GLU 85   *   K 4.3   CL 88*   CO2 23   BUN 14   CREATININE 0.8   CALCIUM 8.3*   MG 1.9         Significant Diagnostics:  I have reviewed all pertinent imaging results/findings within the past 24 hours.  I have reviewed and interpreted all pertinent imaging results/findings within the past 24 hours.

## 2022-07-27 NOTE — ASSESSMENT & PLAN NOTE
Patient is a 89 year old female with h/o HTN, CAD, degenerative disc disease, HLD, MI, TIA presenting after a fall from standing with R rib 4-9 fractures and L2 burst fracture. Clinically stable but with worsening hyponatremia     - Mechanical soft diet. Aspiration precautions  - STAT repeat Na   - Scheduled multimodal pain regimen  - PRN pain and nausea medications  - Daily labs  - Replete electrolytes PRN  - Wound care   - DVT prophylaxis (SCDs and lovenox)  - OOB, ambulate  - PT/OT. Recommend SNF at dc  - IS  - Inhalation treatments  - Daily CXR  - Wean O2 as tolerated    Dispo: not yet medically stable for dc. Evaluated by PT/OT and determined to require SNF at discharge. SW/CM involved, appreciate assistance. Needs neurosurgery f/u in 6 weeks

## 2022-07-27 NOTE — SUBJECTIVE & OBJECTIVE
Past Medical History:   Diagnosis Date    Basal cell carcinoma     nose    Benign essential hypertension     Cerebral microvascular disease 9/8/2014    Closed fracture of distal phalanx of left hand with routine healing 9/17/2015    Closed mallet fracture of distal phalanx of finger with routine healing 10/6/2015    Coronary artery disease     DDD (degenerative disc disease), lumbar 4/12/2016    Depression     Fall at home 5/30/2016    Hyperlipidemia     Hypothyroidism due to acquired atrophy of thyroid     Meningiomas, multiple     MI (myocardial infarction) 2004    80% blockage per patient    Migraine aura without headache 12/1/2014    Post PTCA 12/12/2012    Transient cerebral ischemia 5/4/2014     Past Surgical History:   Procedure Laterality Date    CARDIAC CATHETERIZATION  03/29/2004    S/P LAD PTCA, coated stent    CATARACT EXTRACTION W/  INTRAOCULAR LENS IMPLANT Bilateral 2000    Dr Youssef     CHOLECYSTECTOMY      CORONARY ANGIOPLASTY WITH STENT PLACEMENT  2004    LAD    EYE SURGERY      TOTAL THYROIDECTOMY       Family History   Problem Relation Age of Onset    Stroke Mother     Diabetes Mother     Hypertension Father     Stroke Father     Cancer Brother         colon    Skin cancer Brother     Colon cancer Brother     Diabetes Paternal Grandmother     Cancer Sister         breast    Dementia Sister     Glaucoma Maternal Aunt     No Known Problems Maternal Uncle     No Known Problems Paternal Aunt     No Known Problems Paternal Uncle     No Known Problems Maternal Grandmother     No Known Problems Maternal Grandfather     No Known Problems Paternal Grandfather     Amblyopia Neg Hx     Blindness Neg Hx     Cataracts Neg Hx     Macular degeneration Neg Hx     Retinal detachment Neg Hx     Strabismus Neg Hx     Thyroid disease Neg Hx     Esophageal cancer Neg Hx      Social History     Tobacco Use    Smoking status: Former Smoker     Packs/day: 0.30     Years: 15.00     Pack years: 4.50     Types:  Cigarettes     Quit date: 1973     Years since quittin.5    Smokeless tobacco: Never Used   Substance Use Topics    Alcohol use: No     Alcohol/week: 0.0 standard drinks    Drug use: No     Review of patient's allergies indicates:   Allergen Reactions    Thiazides Other (See Comments)     Severe hyponatremia       Medications: I have reviewed the current medication administration record.    Medications Prior to Admission   Medication Sig Dispense Refill Last Dose    alendronate (FOSAMAX) 70 MG tablet TAKE 1 TABLET BY MOUTH EVERY 7 DAYS 12 tablet 1 2022 at Unknown time    aspirin (ECOTRIN) 81 MG EC tablet Take 1 tablet (81 mg total) by mouth once daily.   2022 at Unknown time    atorvastatin (LIPITOR) 40 MG tablet Take 1 tablet (40 mg total) by mouth once daily. 90 tablet 1 2022 at Unknown time    calcium-vitamin D3 (OS-DANICA 500 + D3) 500 mg-5 mcg (200 unit) per tablet Take 1 tablet by mouth once daily.    2022 at Unknown time    carvediloL (COREG) 12.5 MG tablet Take 1 tablet (12.5 mg total) by mouth 2 (two) times daily. 180 tablet 1 2022 at Unknown time    levothyroxine (SYNTHROID) 150 MCG tablet Take 1 tablet (150 mcg total) by mouth once daily. 90 tablet 1 2022 at Unknown time    clotrimazole-betamethasone 1-0.05% (LOTRISONE) cream Apply topically 2 (two) times daily as needed. 15 g 1     cyanocobalamin (VITAMIN B-12) 100 MCG tablet Take 100 mcg by mouth once daily.       dextran 70-hypromellose (ARTIFICIAL TEARS) ophthalmic solution Place 1 drop into both eyes every 2 (two) hours as needed.  0     LINZESS 145 mcg Cap capsule TAKE 1 CAPSULE(145 MCG) BY MOUTH BEFORE BREAKFAST 30 capsule 0     losartan (COZAAR) 50 MG tablet Take 1 tablet (50 mg total) by mouth once daily. 90 tablet 3     melatonin 10 mg Cap Take by mouth.       torsemide (DEMADEX) 10 MG Tab Take 1 tablet (10 mg total) by mouth once daily. As need for weight gain of 3 lbs in 1 day or 5 lbs in 3 days. Do not  take if weight stable or decreasing. Daily weights at home. 90 tablet 3        Review of Systems   Constitutional:  Positive for activity change.   HENT:          Diplopia   All other systems reviewed and are negative.  Objective:     Vital Signs (Most Recent):  Temp: 97.6 °F (36.4 °C) (07/27/22 1250)  Pulse: (!) 54 (07/27/22 1250)  Resp: 20 (07/27/22 1250)  BP: (!) 102/52 (07/27/22 1250)  SpO2: 97 % (07/27/22 1250)    Vital Signs Range (Last 24H):  Temp:  [96.1 °F (35.6 °C)-98 °F (36.7 °C)]   Pulse:  [54-61]   Resp:  [16-20]   BP: (102-132)/(52-63)   SpO2:  [88 %-100 %]     Physical Exam  Constitutional:       Appearance: Normal appearance.      Comments: Somnolent   HENT:      Head: Normocephalic and atraumatic.      Nose: Nose normal.      Mouth/Throat:      Mouth: Mucous membranes are dry.      Pharynx: Oropharynx is clear.   Eyes:      Extraocular Movements: Extraocular movements intact.      Pupils: Pupils are equal, round, and reactive to light.   Cardiovascular:      Rate and Rhythm: Normal rate and regular rhythm.   Musculoskeletal:      Cervical back: Normal range of motion and neck supple.       Neurological Exam:   LOC: drowsy  Attention Span: poor  Language: No aphasia  Articulation: No dysarthria  Orientation: Person, Place, Time   Visual Fields: Full  EOM (CN III, IV, VI): Full/intact  Pupils (CN II, III): PERRL  Facial Sensation (CN V): Normal  Facial Movement (CN VII): Symmetric facial expression    Reflexes: 2+ throughout  Motor: Arm left  Normal 5/5  Leg left  Normal 5/5  Arm right  Normal 5/5  Leg right Normal 5/5  Sensation: Intact to light touch, temperature and vibration  Tone: Normal tone throughout      Laboratory:  CMP:   Recent Labs   Lab 07/27/22  0454 07/27/22  0737 07/27/22  0953   CALCIUM 8.3*  --  8.3*   ALBUMIN 2.8*  --   --    PROT 6.1  --   --    *   < > 121*   K 4.3  --  4.5   CO2 23  --  24   CL 88*  --  90*   BUN 14  --  16   CREATININE 0.8  --  1.0   ALKPHOS 97  --   --     ALT 10  --   --    AST 18  --   --    BILITOT 0.7  --   --     < > = values in this interval not displayed.     CBC:   Recent Labs   Lab 07/27/22  0454   WBC 9.60   RBC 3.34*   HGB 10.5*   HCT 31.6*      MCV 95   MCH 31.4*   MCHC 33.2     Coagulation:   Recent Labs   Lab 07/24/22  2122   INR 1.1     Recent Labs   Lab 07/27/22  0737   TSH 7.019*     Diagnostic Results:    Brain imaging:  STAT CTA Ordered- Pending

## 2022-07-27 NOTE — CONSULTS
[facility] - Cardiac Medical ICU  Critical Care Medicine  Consult Note    Patient Name: Laila Hanna  MRN: [id number]  Admission Date: 7/24/2022  Hospital Length of Stay: 2 days  Code Status: Full Code  Attending Physician: Ricardo Enamorado MD   Primary Care Provider: Ama Graham MD   Principal Problem: Hyponatremia    Consults  Subjective:     HPI:  Ms. Hanna is a 89 year old female with CAD, HLD, hypothyroidism, HTN, HFpEF presenting after mechanical fall at home. Patient lost her balance and fell while in her kitchen, landing on her right side without loss of consciousness nor prodrome. Per caregiver patient completes all ADLs without assistance and only requires minimal supervision. She reports experiencing HA x1wk prior to her fall. She denies taking NSAIDs at home or drinking large volumes of water. She was seen at her cardiologist x1wk ago and was told to only take her Torsemide if she notices a sudden weight gain and had an echo scheduled for a new murmur. She reports a similar episode of hyponatremia in 2016 requiring hospitalization and treatment with Tolvaptan for multiple weeks. She was also placed on strict fluid restriction but has been drinking 2-3 glasses of water/soda recently.     On admission, with negative head CT. CT C-spine was notable for acute displaced burst fracture of L2 vertebral body with extension into the spinal canal as well as nondisplaced fracture of L1 vertebral body. CT chest with posterolateral ribs fracture of ribs 4-9. Since admission, patient was hyponatremic (128) with a baseline of 133. She had an acute drop of sodium on 7/26 to 127 and to 120 on 7/27 with some worsening mentation. Hospital medicine was consulted for management of hyponatremia, but hyponatremia continued drop to 118. Patient transferred to Critical Care Medicine for management of hyponatremia in setting of encephalopathy.      Hospital/ICU Course:  No notes on file    Past Medical History:   Diagnosis  Date    Basal cell carcinoma     nose    Benign essential hypertension     Cerebral microvascular disease 2014    Closed fracture of distal phalanx of left hand with routine healing 2015    Closed mallet fracture of distal phalanx of finger with routine healing 10/6/2015    Coronary artery disease     DDD (degenerative disc disease), lumbar 2016    Depression     Fall at home 2016    Hyperlipidemia     Hypothyroidism due to acquired atrophy of thyroid     Meningiomas, multiple     MI (myocardial infarction)     80% blockage per patient    Migraine aura without headache 2014    Post PTCA 2012    Transient cerebral ischemia 2014       Past Surgical History:   Procedure Laterality Date    CARDIAC CATHETERIZATION  2004    S/P LAD PTCA, coated stent    CATARACT EXTRACTION W/  INTRAOCULAR LENS IMPLANT Bilateral     Dr Youssef     CHOLECYSTECTOMY      CORONARY ANGIOPLASTY WITH STENT PLACEMENT      LAD    EYE SURGERY      TOTAL THYROIDECTOMY         Review of patient's allergies indicates:   Allergen Reactions    Thiazides Other (See Comments)     Severe hyponatremia       Family History       Problem Relation (Age of Onset)    Cancer Brother, Sister    Colon cancer Brother    Dementia Sister    Diabetes Mother, Paternal Grandmother    Glaucoma Maternal Aunt    Hypertension Father    No Known Problems Maternal Uncle, Paternal Aunt, Paternal Uncle, Maternal Grandmother, Maternal Grandfather, Paternal Grandfather    Skin cancer Brother    Stroke Mother, Father          Tobacco Use    Smoking status: Former Smoker     Packs/day: 0.30     Years: 15.00     Pack years: 4.50     Types: Cigarettes     Quit date: 1973     Years since quittin.5    Smokeless tobacco: Never Used   Substance and Sexual Activity    Alcohol use: No     Alcohol/week: 0.0 standard drinks    Drug use: No    Sexual activity: Never      Review of Systems    Constitutional:  Negative for chills, diaphoresis, fatigue and fever.   HENT:  Negative for congestion, rhinorrhea, sore throat and trouble swallowing.    Respiratory:  Negative for cough, chest tightness, shortness of breath and wheezing.    Cardiovascular:  Positive for leg swelling. Negative for chest pain and palpitations.        Rib pain   Gastrointestinal:  Negative for abdominal distention, abdominal pain, blood in stool, diarrhea, nausea and vomiting.   Genitourinary:  Negative for difficulty urinating, dysuria, flank pain and hematuria.   Musculoskeletal:  Positive for back pain. Negative for arthralgias and neck pain.   Skin:  Negative for rash and wound.   Neurological:  Negative for dizziness, syncope, weakness, light-headedness, numbness and headaches.   Objective:     Vital Signs (Most Recent):  Temp: 97.7 °F (36.5 °C) (07/27/22 1701)  Pulse: (!) 57 (07/27/22 1701)  Resp: (!) 21 (07/27/22 1701)  BP: 130/65 (07/27/22 1701)  SpO2: 100 % (07/27/22 1701)   Vital Signs (24h Range):  Temp:  [97.6 °F (36.4 °C)-98 °F (36.7 °C)] 97.7 °F (36.5 °C)  Pulse:  [54-61] 57  Resp:  [16-22] 21  SpO2:  [88 %-100 %] 100 %  BP: (102-133)/(52-67) 130/65   Weight: 93.9 kg (207 lb 0.2 oz)  Body mass index is 33.41 kg/m².      Intake/Output Summary (Last 24 hours) at 7/27/2022 1737  Last data filed at 7/27/2022 1447  Gross per 24 hour   Intake 720 ml   Output 1350 ml   Net -630 ml       Physical Exam  Constitutional:       General: She is not in acute distress.     Appearance: Normal appearance. She is ill-appearing. She is not toxic-appearing or diaphoretic.   HENT:      Nose: No congestion or rhinorrhea.      Mouth/Throat:      Mouth: Mucous membranes are moist.      Pharynx: No oropharyngeal exudate or posterior oropharyngeal erythema.   Eyes:      General: No scleral icterus.        Right eye: No discharge.         Left eye: No discharge.   Cardiovascular:      Rate and Rhythm: Regular rhythm. Bradycardia present.       Pulses: Normal pulses.      Heart sounds: Murmur heard.     No friction rub. No gallop.      Comments: Trace edema BLE  Pulmonary:      Effort: Pulmonary effort is normal. No respiratory distress.      Breath sounds: Normal breath sounds. No stridor. No wheezing, rhonchi or rales.   Chest:      Chest wall: No tenderness.   Abdominal:      General: Abdomen is flat. Bowel sounds are normal. There is no distension.      Palpations: Abdomen is soft. There is no mass.      Tenderness: There is no abdominal tenderness. There is no guarding.   Musculoskeletal:      Right lower leg: No edema.      Left lower leg: No edema.   Skin:     General: Skin is warm and dry.      Coloration: Skin is not jaundiced.      Findings: Bruising present.   Neurological:      General: No focal deficit present.      Mental Status: She is oriented to person, place, and time.      Comments: Word searching, slurring of speech, diplopia       Vents:     Lines/Drains/Airways       Peripheral Intravenous Line  Duration                  Peripheral IV - Single Lumen 07/24/22 1643 18 G Left Antecubital 3 days                  Significant Labs:    CBC/Anemia Profile:  Recent Labs   Lab 07/26/22  0356 07/27/22  0454   WBC 8.03 9.60   HGB 10.0* 10.5*   HCT 29.9* 31.6*    215   MCV 94 95   RDW 13.1 13.1        Chemistries:  Recent Labs   Lab 07/26/22  0355 07/27/22  0454 07/27/22  0737 07/27/22  0953 07/27/22  1201   * 120* 119* 121* 118*   K 4.3 4.3  --  4.5 4.4   CL 95 88*  --  90* 89*   CO2 25 23  --  24 22*   BUN 12 14  --  16 17   CREATININE 0.7 0.8  --  1.0 1.1   CALCIUM 8.5* 8.3*  --  8.3* 8.3*   ALBUMIN 2.7* 2.8*  --   --   --    PROT 6.0 6.1  --   --   --    BILITOT 0.9 0.7  --   --   --    ALKPHOS 92 97  --   --   --    ALT 10 10  --   --   --    AST 18 18  --   --   --    MG 2.1 1.9  --   --   --    PHOS 3.1 3.2  --   --   --        Recent Lab Results  (Last 5 results in the past 24 hours)        07/27/22  1201   07/27/22  0953    07/27/22  0858   07/27/22  0737   07/27/22  0454        Albumin         2.8       Alkaline Phosphatase         97       ALT         10       Anion Gap 7   7       9       AST         18       Baso #         0.06       Basophil %         0.6       BILIRUBIN TOTAL         0.7  Comment: For infants and newborns, interpretation of results should be based  on gestational age, weight and in agreement with clinical  observations.    Premature Infant recommended reference ranges:  Up to 24 hours.............<8.0 mg/dL  Up to 48 hours............<12.0 mg/dL  3-5 days..................<15.0 mg/dL  6-29 days.................<15.0 mg/dL         BUN 17   16       14       Calcium 8.3   8.3       8.3       Chloride 89   90       88       CO2 22   24       23       Cortisol       4.20  Comment: Cortisol Reference Range:  Before 10:00 am: 4.46-22.7 ug/dL  After 5:00 pm:    1.7-14.1 ug/dL           Creatinine 1.1   1.0       0.8       Differential Method         Automated       eGFR if  51.1   57.3       >60.0       eGFR if non  44.3  Comment: Calculation used to obtain the estimated glomerular filtration  rate (eGFR) is the CKD-EPI equation.      49.7  Comment: Calculation used to obtain the estimated glomerular filtration  rate (eGFR) is the CKD-EPI equation.          >60.0  Comment: Calculation used to obtain the estimated glomerular filtration  rate (eGFR) is the CKD-EPI equation.          Eos #         0.4       Eosinophil %         4.2       Free T4       1.20         Glucose 115   118       85       Gran # (ANC)         6.7       Gran %         69.5       Hematocrit         31.6       Hemoglobin         10.5       Immature Grans (Abs)         0.04  Comment: Mild elevation in immature granulocytes is non specific and   can be seen in a variety of conditions including stress response,   acute inflammation, trauma and pregnancy. Correlation with other   laboratory and clinical findings is  essential.         Immature Granulocytes         0.4       Lymph #         1.7       Lymph %         17.9       Magnesium         1.9       MCH         31.4       MCHC         33.2       MCV         95       Mono #         0.7       Mono %         7.4       MPV         10.7       nRBC         0       Osmolality   263             Osmolality, Urine     284  Comment: The random urine reference ranges provided were established   for 24 hour urine collections.  No reference ranges exist for  random urine specimens.  Correlate clinically.             Phosphorus         3.2       Platelets         215       Potassium 4.4   4.5       4.3       Potassium, Urine     25  Comment: The random urine reference ranges provided were established   for 24 hour urine collections.  No reference ranges exist for  random urine specimens.  Correlate clinically.             PROTEIN TOTAL         6.1       RBC         3.34       RDW         13.1       Sodium 118  Comment: *Critical value notification by [facility] with confirmation of receipt to   WANDA PARRISH RN at  [id number].     121     119  Comment: *Critical value notification by TCC with confirmation of receipt to   WANDA PARRISH RN at  [id number].     120       Sodium, Urine     <10  Comment: The random urine reference ranges provided were established   for 24 hour urine collections.  No reference ranges exist for  random urine specimens.  Correlate clinically.             TSH       7.019         WBC         9.60                              Significant Imaging: I have reviewed all pertinent imaging results/findings within the past 24 hours.      ABG  No results for input(s): PH, PO2, PCO2, HCO3, BE in the last 168 hours.  Assessment/Plan:     Cardiac/Vascular  Coronary artery disease involving native coronary artery of native heart  - Cont ASA    Essential hypertension  - Hold all diuretics  - Cont Carvedilol    Renal/  * Hyponatremia  Patient with acute on  chronic hyponatremia. Baseline sodium approximately 133. On admission 128, trending down. 7/26 sodium was 127 and 7/27 sodium was 120. Repeat sodiums 119>121>118. Some worsening of mental status including increased confusion from baseline, slurring of speech, and diplopia. She has been required an extended hospitalization in the past for hyponatremia, requiring vaptans and strict fluid restriction. She also recently had her diuretic adjusted to PRN for weight gain and a new murmur by her outpt cardiologist. Symptoms are similar to previous symptomatic hyponatremia. She was given NS on the floor with sudden worsening of her sodium from 122-118.     - q4 BMP   - Strict fluid restriction <500cc  - Remove water containers from room  - If mental status acutely worsens give 3% NS and check q2 BMP   - Repeat urine electrolytes now off confounding medications          Endocrine  SIADH (syndrome of inappropriate ADH production)  See hyponatremia    Hypothyroidism due to acquired atrophy of thyroid  TSH- 7.019  T4- 1.2    - Continue home Synthroid    Orthopedic  Multiple rib fractures  - Surgery recommends Hydrocodone and Robaxin for pain control and monitor for splinting        Critical Care Daily Checklist:    A: Awake: RASS Goal/Actual Goal:    Actual: Ren Agitation Sedation Scale (RASS): Drowsy   B: Spontaneous Breathing Trial Performed?     C: SAT & SBT Coordinated?  Na                      D: Delirium: CAM-ICU Overall CAM-ICU: Negative   E: Early Mobility Performed? Yes   F: Feeding Goal:    Status:     Current Diet Order   Procedures    Diet Dysphagia Mechanical Soft (IDDSI Level 5) Fluid - 1000mL     Please send diet coke with meals: lunch and dinner     Order Specific Question:   Fluid restriction:     Answer:   Fluid - 1000mL      AS: Analgesia/Sedation no   T: Thromboembolic Prophylaxis SCD   H: HOB > 300 Yes   U: Stress Ulcer Prophylaxis (if needed) NA   G: Glucose Control NA   B: Bowel Function Stool  Occurrence: 0   I: Indwelling Catheter (Lines & Cline) Necessity Yes   D: De-escalation of Antimicrobials/Pharmacotherapies NA    Plan for the day/ETD Correct sodium     Code Status:  Family/Goals of Care: Full Code         Critical secondary to Patient has an abrupt change in neurologic status: Diplopia, confusion, headache in setting in hyponatremia     Critical care was time spent personally by me on the following activities: development of treatment plan with patient or surrogate and bedside caregivers, discussions with consultants, evaluation of patient's response to treatment, examination of patient, ordering and performing treatments and interventions, ordering and review of laboratory studies, ordering and review of radiographic studies, pulse oximetry, re-evaluation of patient's condition. This critical care time did not overlap with that of any other provider or involve time for any procedures.    Thank you for your consult. I will follow-up with patient. Please contact us if you have any additional questions.     Ran Strickland, DO  Critical Care Medicine  [facility] - Cardiac Medical ICU

## 2022-07-27 NOTE — ASSESSMENT & PLAN NOTE
Patient with acute L2 compression fracture without significant retropulsion or central canal narrowing and L1 compression fracture without extension into pedicles. Neurosurgery evaluated patient with plan for repeat imaging in brace at 6 weeks. No acute intervention.    - Continue LSO brace at all times  - Follow up in 6 weeks with repeat XR in the brace

## 2022-07-27 NOTE — HOSPITAL COURSE
07/27/2022 Patient somnolent with limited vertical gaze, but able to answer LOC questions, with no focal weakness in upper or lower extremities.

## 2022-07-27 NOTE — CONSULTS
Critical Care Service  Consult Note     Consult received and acknowledged.  Patient evaluated and will be admitted to Critical Care Medicine for further evaluation.  Full H&P to follow.     Leslie Panchal MD  Internal Medicine PGY-1  Critical Care Service

## 2022-07-27 NOTE — PLAN OF CARE
Problem: Adult Inpatient Plan of Care  Goal: Plan of Care Review  Outcome: Ongoing, Progressing  Goal: Patient-Specific Goal (Individualized)  Outcome: Ongoing, Progressing  Goal: Absence of Hospital-Acquired Illness or Injury  Outcome: Ongoing, Progressing  Goal: Optimal Comfort and Wellbeing  Outcome: Ongoing, Progressing  Goal: Readiness for Transition of Care  Outcome: Ongoing, Progressing     Problem: Fall Injury Risk  Goal: Absence of Fall and Fall-Related Injury  Outcome: Ongoing, Progressing     Problem: Skin Injury Risk Increased  Goal: Skin Health and Integrity  Outcome: Ongoing, Progressing     Problem: Pain Acute  Goal: Acceptable Pain Control and Functional Ability  Outcome: Ongoing, Progressing

## 2022-07-27 NOTE — SUBJECTIVE & OBJECTIVE
Past Medical History:   Diagnosis Date    Basal cell carcinoma     nose    Benign essential hypertension     Cerebral microvascular disease 9/8/2014    Closed fracture of distal phalanx of left hand with routine healing 9/17/2015    Closed mallet fracture of distal phalanx of finger with routine healing 10/6/2015    Coronary artery disease     DDD (degenerative disc disease), lumbar 4/12/2016    Depression     Fall at home 5/30/2016    Hyperlipidemia     Hypothyroidism due to acquired atrophy of thyroid     Meningiomas, multiple     MI (myocardial infarction) 2004    80% blockage per patient    Migraine aura without headache 12/1/2014    Post PTCA 12/12/2012    Transient cerebral ischemia 5/4/2014       Past Surgical History:   Procedure Laterality Date    CARDIAC CATHETERIZATION  03/29/2004    S/P LAD PTCA, coated stent    CATARACT EXTRACTION W/  INTRAOCULAR LENS IMPLANT Bilateral 2000    Dr Youssef     CHOLECYSTECTOMY      CORONARY ANGIOPLASTY WITH STENT PLACEMENT  2004    LAD    EYE SURGERY      TOTAL THYROIDECTOMY         Review of patient's allergies indicates:   Allergen Reactions    Thiazides Other (See Comments)     Severe hyponatremia       No current facility-administered medications on file prior to encounter.     Current Outpatient Medications on File Prior to Encounter   Medication Sig    alendronate (FOSAMAX) 70 MG tablet TAKE 1 TABLET BY MOUTH EVERY 7 DAYS    aspirin (ECOTRIN) 81 MG EC tablet Take 1 tablet (81 mg total) by mouth once daily.    atorvastatin (LIPITOR) 40 MG tablet Take 1 tablet (40 mg total) by mouth once daily.    calcium-vitamin D3 (OS-DANICA 500 + D3) 500 mg-5 mcg (200 unit) per tablet Take 1 tablet by mouth once daily.     carvediloL (COREG) 12.5 MG tablet Take 1 tablet (12.5 mg total) by mouth 2 (two) times daily.    levothyroxine (SYNTHROID) 150 MCG tablet Take 1 tablet (150 mcg total) by mouth once daily.    clotrimazole-betamethasone 1-0.05% (LOTRISONE) cream Apply topically 2  (two) times daily as needed.    cyanocobalamin (VITAMIN B-12) 100 MCG tablet Take 100 mcg by mouth once daily.    dextran 70-hypromellose (ARTIFICIAL TEARS) ophthalmic solution Place 1 drop into both eyes every 2 (two) hours as needed.    LINZESS 145 mcg Cap capsule TAKE 1 CAPSULE(145 MCG) BY MOUTH BEFORE BREAKFAST    losartan (COZAAR) 50 MG tablet Take 1 tablet (50 mg total) by mouth once daily.    melatonin 10 mg Cap Take by mouth.    torsemide (DEMADEX) 10 MG Tab Take 1 tablet (10 mg total) by mouth once daily. As need for weight gain of 3 lbs in 1 day or 5 lbs in 3 days. Do not take if weight stable or decreasing. Daily weights at home.     Family History       Problem Relation (Age of Onset)    Cancer Brother, Sister    Colon cancer Brother    Dementia Sister    Diabetes Mother, Paternal Grandmother    Glaucoma Maternal Aunt    Hypertension Father    No Known Problems Maternal Uncle, Paternal Aunt, Paternal Uncle, Maternal Grandmother, Maternal Grandfather, Paternal Grandfather    Skin cancer Brother    Stroke Mother, Father          Tobacco Use    Smoking status: Former Smoker     Packs/day: 0.30     Years: 15.00     Pack years: 4.50     Types: Cigarettes     Quit date: 1973     Years since quittin.5    Smokeless tobacco: Never Used   Substance and Sexual Activity    Alcohol use: No     Alcohol/week: 0.0 standard drinks    Drug use: No    Sexual activity: Never     Review of Systems   Unable to perform ROS: Mental status change   Objective:     Vital Signs (Most Recent):  Temp: 97.6 °F (36.4 °C) (22 1250)  Pulse: (!) 54 (22 1250)  Resp: 20 (22 1250)  BP: (!) 102/52 (22 1250)  SpO2: 97 % (22 1250)   Vital Signs (24h Range):  Temp:  [96.1 °F (35.6 °C)-98 °F (36.7 °C)] 97.6 °F (36.4 °C)  Pulse:  [54-61] 54  Resp:  [16-20] 20  SpO2:  [88 %-100 %] 97 %  BP: (102-132)/(52-63) 102/52     Weight: 93.9 kg (207 lb 0.2 oz)  Body mass index is 33.41 kg/m².    Physical  Exam  Constitutional:       General: She is not in acute distress.     Appearance: Normal appearance. She is ill-appearing. She is not toxic-appearing or diaphoretic.   HENT:      Nose: No congestion or rhinorrhea.      Mouth/Throat:      Mouth: Mucous membranes are moist.      Pharynx: No oropharyngeal exudate or posterior oropharyngeal erythema.   Eyes:      General: No scleral icterus.        Right eye: No discharge.         Left eye: No discharge.   Cardiovascular:      Rate and Rhythm: Regular rhythm. Bradycardia present.      Pulses: Normal pulses.      Heart sounds: Normal heart sounds. No murmur heard.    No friction rub. No gallop.   Pulmonary:      Effort: Pulmonary effort is normal. No respiratory distress.      Breath sounds: Normal breath sounds. No stridor. No wheezing, rhonchi or rales.   Chest:      Chest wall: No tenderness.   Abdominal:      General: Abdomen is flat. Bowel sounds are normal. There is no distension.      Palpations: Abdomen is soft. There is no mass.      Tenderness: There is no abdominal tenderness. There is no guarding.   Musculoskeletal:      Right lower leg: No edema.      Left lower leg: No edema.   Skin:     General: Skin is warm and dry.      Coloration: Skin is not jaundiced.      Findings: Bruising present.   Neurological:      General: No focal deficit present.      Comments: Word searching, slurring of speech, diplopia           Significant Labs: All pertinent labs within the past 24 hours have been reviewed.    Significant Imaging: I have reviewed all pertinent imaging results/findings within the past 24 hours.

## 2022-07-27 NOTE — SUBJECTIVE & OBJECTIVE
Past Medical History:   Diagnosis Date    Basal cell carcinoma     nose    Benign essential hypertension     Cerebral microvascular disease 2014    Closed fracture of distal phalanx of left hand with routine healing 2015    Closed mallet fracture of distal phalanx of finger with routine healing 10/6/2015    Coronary artery disease     DDD (degenerative disc disease), lumbar 2016    Depression     Fall at home 2016    Hyperlipidemia     Hypothyroidism due to acquired atrophy of thyroid     Meningiomas, multiple     MI (myocardial infarction)     80% blockage per patient    Migraine aura without headache 2014    Post PTCA 2012    Transient cerebral ischemia 2014       Past Surgical History:   Procedure Laterality Date    CARDIAC CATHETERIZATION  2004    S/P LAD PTCA, coated stent    CATARACT EXTRACTION W/  INTRAOCULAR LENS IMPLANT Bilateral     Dr Youssef     CHOLECYSTECTOMY      CORONARY ANGIOPLASTY WITH STENT PLACEMENT      LAD    EYE SURGERY      TOTAL THYROIDECTOMY         Review of patient's allergies indicates:   Allergen Reactions    Thiazides Other (See Comments)     Severe hyponatremia       Family History       Problem Relation (Age of Onset)    Cancer Brother, Sister    Colon cancer Brother    Dementia Sister    Diabetes Mother, Paternal Grandmother    Glaucoma Maternal Aunt    Hypertension Father    No Known Problems Maternal Uncle, Paternal Aunt, Paternal Uncle, Maternal Grandmother, Maternal Grandfather, Paternal Grandfather    Skin cancer Brother    Stroke Mother, Father          Tobacco Use    Smoking status: Former Smoker     Packs/day: 0.30     Years: 15.00     Pack years: 4.50     Types: Cigarettes     Quit date: 1973     Years since quittin.5    Smokeless tobacco: Never Used   Substance and Sexual Activity    Alcohol use: No     Alcohol/week: 0.0 standard drinks    Drug use: No    Sexual activity: Never      Review of Systems    Constitutional:  Negative for chills, diaphoresis, fatigue and fever.   HENT:  Negative for congestion, rhinorrhea, sore throat and trouble swallowing.    Respiratory:  Negative for cough, chest tightness, shortness of breath and wheezing.    Cardiovascular:  Positive for leg swelling. Negative for chest pain and palpitations.        Rib pain   Gastrointestinal:  Negative for abdominal distention, abdominal pain, blood in stool, diarrhea, nausea and vomiting.   Genitourinary:  Negative for difficulty urinating, dysuria, flank pain and hematuria.   Musculoskeletal:  Positive for back pain. Negative for arthralgias and neck pain.   Skin:  Negative for rash and wound.   Neurological:  Negative for dizziness, syncope, weakness, light-headedness, numbness and headaches.   Objective:     Vital Signs (Most Recent):  Temp: 97.7 °F (36.5 °C) (07/27/22 1701)  Pulse: (!) 57 (07/27/22 1701)  Resp: (!) 21 (07/27/22 1701)  BP: 130/65 (07/27/22 1701)  SpO2: 100 % (07/27/22 1701)   Vital Signs (24h Range):  Temp:  [97.6 °F (36.4 °C)-98 °F (36.7 °C)] 97.7 °F (36.5 °C)  Pulse:  [54-61] 57  Resp:  [16-22] 21  SpO2:  [88 %-100 %] 100 %  BP: (102-133)/(52-67) 130/65   Weight: 93.9 kg (207 lb 0.2 oz)  Body mass index is 33.41 kg/m².      Intake/Output Summary (Last 24 hours) at 7/27/2022 1737  Last data filed at 7/27/2022 1447  Gross per 24 hour   Intake 720 ml   Output 1350 ml   Net -630 ml       Physical Exam  Constitutional:       General: She is not in acute distress.     Appearance: Normal appearance. She is ill-appearing. She is not toxic-appearing or diaphoretic.   HENT:      Nose: No congestion or rhinorrhea.      Mouth/Throat:      Mouth: Mucous membranes are moist.      Pharynx: No oropharyngeal exudate or posterior oropharyngeal erythema.   Eyes:      General: No scleral icterus.        Right eye: No discharge.         Left eye: No discharge.   Cardiovascular:      Rate and Rhythm: Regular rhythm. Bradycardia present.       Pulses: Normal pulses.      Heart sounds: Murmur heard.     No friction rub. No gallop.      Comments: Trace edema BLE  Pulmonary:      Effort: Pulmonary effort is normal. No respiratory distress.      Breath sounds: Normal breath sounds. No stridor. No wheezing, rhonchi or rales.   Chest:      Chest wall: No tenderness.   Abdominal:      General: Abdomen is flat. Bowel sounds are normal. There is no distension.      Palpations: Abdomen is soft. There is no mass.      Tenderness: There is no abdominal tenderness. There is no guarding.   Musculoskeletal:      Right lower leg: No edema.      Left lower leg: No edema.   Skin:     General: Skin is warm and dry.      Coloration: Skin is not jaundiced.      Findings: Bruising present.   Neurological:      General: No focal deficit present.      Mental Status: She is oriented to person, place, and time.      Comments: Word searching, slurring of speech, diplopia       Vents:     Lines/Drains/Airways       Peripheral Intravenous Line  Duration                  Peripheral IV - Single Lumen 07/24/22 1643 18 G Left Antecubital 3 days                  Significant Labs:    CBC/Anemia Profile:  Recent Labs   Lab 07/26/22  0356 07/27/22  0454   WBC 8.03 9.60   HGB 10.0* 10.5*   HCT 29.9* 31.6*    215   MCV 94 95   RDW 13.1 13.1        Chemistries:  Recent Labs   Lab 07/26/22  0355 07/27/22  0454 07/27/22  0737 07/27/22  0953 07/27/22  1201   * 120* 119* 121* 118*   K 4.3 4.3  --  4.5 4.4   CL 95 88*  --  90* 89*   CO2 25 23  --  24 22*   BUN 12 14  --  16 17   CREATININE 0.7 0.8  --  1.0 1.1   CALCIUM 8.5* 8.3*  --  8.3* 8.3*   ALBUMIN 2.7* 2.8*  --   --   --    PROT 6.0 6.1  --   --   --    BILITOT 0.9 0.7  --   --   --    ALKPHOS 92 97  --   --   --    ALT 10 10  --   --   --    AST 18 18  --   --   --    MG 2.1 1.9  --   --   --    PHOS 3.1 3.2  --   --   --        Recent Lab Results  (Last 5 results in the past 24 hours)        07/27/22  1201   07/27/22  0953    07/27/22  0858   07/27/22  0737   07/27/22  0454        Albumin         2.8       Alkaline Phosphatase         97       ALT         10       Anion Gap 7   7       9       AST         18       Baso #         0.06       Basophil %         0.6       BILIRUBIN TOTAL         0.7  Comment: For infants and newborns, interpretation of results should be based  on gestational age, weight and in agreement with clinical  observations.    Premature Infant recommended reference ranges:  Up to 24 hours.............<8.0 mg/dL  Up to 48 hours............<12.0 mg/dL  3-5 days..................<15.0 mg/dL  6-29 days.................<15.0 mg/dL         BUN 17   16       14       Calcium 8.3   8.3       8.3       Chloride 89   90       88       CO2 22   24       23       Cortisol       4.20  Comment: Cortisol Reference Range:  Before 10:00 am: 4.46-22.7 ug/dL  After 5:00 pm:    1.7-14.1 ug/dL           Creatinine 1.1   1.0       0.8       Differential Method         Automated       eGFR if  51.1   57.3       >60.0       eGFR if non  44.3  Comment: Calculation used to obtain the estimated glomerular filtration  rate (eGFR) is the CKD-EPI equation.      49.7  Comment: Calculation used to obtain the estimated glomerular filtration  rate (eGFR) is the CKD-EPI equation.          >60.0  Comment: Calculation used to obtain the estimated glomerular filtration  rate (eGFR) is the CKD-EPI equation.          Eos #         0.4       Eosinophil %         4.2       Free T4       1.20         Glucose 115   118       85       Gran # (ANC)         6.7       Gran %         69.5       Hematocrit         31.6       Hemoglobin         10.5       Immature Grans (Abs)         0.04  Comment: Mild elevation in immature granulocytes is non specific and   can be seen in a variety of conditions including stress response,   acute inflammation, trauma and pregnancy. Correlation with other   laboratory and clinical findings is  essential.         Immature Granulocytes         0.4       Lymph #         1.7       Lymph %         17.9       Magnesium         1.9       MCH         31.4       MCHC         33.2       MCV         95       Mono #         0.7       Mono %         7.4       MPV         10.7       nRBC         0       Osmolality   263             Osmolality, Urine     284  Comment: The random urine reference ranges provided were established   for 24 hour urine collections.  No reference ranges exist for  random urine specimens.  Correlate clinically.             Phosphorus         3.2       Platelets         215       Potassium 4.4   4.5       4.3       Potassium, Urine     25  Comment: The random urine reference ranges provided were established   for 24 hour urine collections.  No reference ranges exist for  random urine specimens.  Correlate clinically.             PROTEIN TOTAL         6.1       RBC         3.34       RDW         13.1       Sodium 118  Comment: *Critical value notification by [facility] with confirmation of receipt to   WANDA PARRISH RN at  [id number].     121     119  Comment: *Critical value notification by [facility] with confirmation of receipt to   WANDA PARRISH RN at  [id number].     120       Sodium, Urine     <10  Comment: The random urine reference ranges provided were established   for 24 hour urine collections.  No reference ranges exist for  random urine specimens.  Correlate clinically.             TSH       7.019         WBC         9.60                              Significant Imaging: I have reviewed all pertinent imaging results/findings within the past 24 hours.

## 2022-07-27 NOTE — ASSESSMENT & PLAN NOTE
TSH elevated 7.019 with free T4 1.2, ordered in the workup of hyponatremia    - continue levothyroxine 150 mcg daily  - will need to follow up once out of acute illness

## 2022-07-27 NOTE — ASSESSMENT & PLAN NOTE
Increased confusion and worsening mental status 7/27. Suspect likely related to worsening hyponatremia but given slurred speech, vision changes will consider stroke. Additionally, consider delirium or other metabolic causes of encephalopathy.    - see hyponatremia  - code stroke called, vascular surgery consulted  - CTA head and neck pending

## 2022-07-27 NOTE — PT/OT/SLP PROGRESS
Occupational Therapy   Co-Treatment  Co-treatment with PT for maximal pt participation, safety, and activity tolerance     Name: Laila Hanna  MRN: [id number]  Admitting Diagnosis:  Multiple rib fractures       Recommendations:     Discharge Recommendations: nursing facility, skilled  Discharge Equipment Recommendations:  wheelchair, bedside commode  Barriers to discharge:       Assessment:     Laila Hanna is a 89 y.o. female with a medical diagnosis of Multiple rib fractures.  She presents with impaired ADL and mobility performance deficits. Pt found upright in bed and lethargici nature showing decreased participation in comparison to at evaluation with this therapist. RN and medical team aware of pt's Na+ levels affecting pt's limited performance in therapy today with presumable causation. Pt session included bed mobility, EOB UB dressing, and posturing/alertness strategies while at bedside. Pt followed 50% commands today with eyes closed 75% of session. Miss Ulloa required max A x2 for bed mobility and tolerated EOB ~5 minutes with min-mod A showing limited trunk engagement. Team aware of ill-fitting spinal brace with brace adjusted to best fit tolerated during today's session. At this time, pt is a high fall risk and not at her baseline. Pt would benefit from continued OT skilled services 4x/wk to improve daily living skills to optimize QOL.Pt is recommended to discharge to SNF at this time.    Performance deficits affecting function are impaired functional mobility, weakness, impaired endurance, gait instability, impaired balance, impaired self care skills, decreased lower extremity function, decreased upper extremity function, pain, impaired cognition, decreased safety awareness, orthopedic precautions.     Rehab Prognosis:  Good; patient would benefit from acute skilled OT services to address these deficits and reach maximum level of function.       Plan:     Patient to be seen 4 x/week to address the  above listed problems via self-care/home management, therapeutic activities, therapeutic exercises, cognitive retraining, wheelchair management/training  · Plan of Care Expires: 08/25/22  · Plan of Care Reviewed with: patient, caregiver    Subjective     Pain/Comfort:  · Pain Rating 1: other (see comments) (back pain voiced, increased pain with mobility not ranked)  · Location - Orientation 1: lower  · Location 1: back  · Pain Addressed 1: Reposition, Distraction, Cessation of Activity  · Pain Addressed 2: Nurse notified, Distraction, Cessation of Activity    Objective:     Communicated with: RN prior to session.  Patient found HOB elevated with telemetry, PureWick, TLSO upon OT entry to room.  *Caregiver Vicky present     General Precautions: Standard, fall   Orthopedic Precautions:spinal precautions   Braces: TLSO  Respiratory Status: Room air     Occupational Performance:     Bed Mobility:    · Patient completed Rolling/Turning to Right with maximal assistance and 2 persons  · Patient completed Scooting/Bridging with maximal assistance and 2 persons  · Patient completed Supine to Sit with maximal assistance and 2 persons  · Patient completed Sit to Supine with maximal assistance and 2 persons     Functional Mobility/Transfers:  · Functional Mobility: Pt sat EOB ~5 minutes with min-mod A with postural control and protective strategy training trialed. Pt encouraged to perform anterior weight shift using UEs however pt unable to maintain. Spinal brace adjusted x2 for appropriate fit.    Activities of Daily Living:  · Upper Body Dressing: total assistance adjusting brace x2 at EOB. gown donned at EOB with total A      [facility] 6 Click ADL: 7    Treatment & Education:  Pt educated on role of occupational therapy, POC, and safety during ADLs and functional mobility. Pt and OT discussed importance of safe, continued mobility to optimize daily living skills. Pt verbalized understanding. White board updated during session.  Pt given instruction to call for medical staff/nurse for assistance.       Patient left HOB elevated with all lines intact, call button in reach and RN notifiedEducation:      GOALS:   Multidisciplinary Problems     Occupational Therapy Goals        Problem: Occupational Therapy    Goal Priority Disciplines Outcome Interventions   Occupational Therapy Goal     OT, PT/OT Ongoing, Progressing    Description: Goals to be met by: 8/25/2022 (1 month)     Patient will increase functional independence with ADLs by performing:    UE Dressing with Moderate Assistance.  LE Dressing with Maximum Assistance.  Grooming while standing at sink with Minimal Assistance.  Toileting from toilet with Minimal Assistance for hygiene and clothing management.   Rolling to Bilateral with Standby Assistance.   Supine to sit with Minimal Assistance.  Step transfer with Stand-by Assistance  Toilet transfer to toilet with Stand-by Assistance.                     Time Tracking:     OT Date of Treatment: 07/27/22  OT Start Time: 1015  OT Stop Time: 1039  OT Total Time (min): 24 min    Billable Minutes:Self Care/Home Management 12 min  Therapeutic Activity 12 min    OT/OSCAR: OT          7/27/2022

## 2022-07-28 ENCOUNTER — TELEPHONE (OUTPATIENT)
Dept: ENDOCRINOLOGY | Facility: CLINIC | Age: 87
End: 2022-07-28
Payer: MEDICARE

## 2022-07-28 DIAGNOSIS — S32.020D COMPRESSION FRACTURE OF L2 VERTEBRA WITH ROUTINE HEALING, SUBSEQUENT ENCOUNTER: Primary | ICD-10-CM

## 2022-07-28 LAB
ALLENS TEST: ABNORMAL
ANION GAP SERPL CALC-SCNC: 10 MMOL/L (ref 8–16)
ANION GAP SERPL CALC-SCNC: 8 MMOL/L (ref 8–16)
BASOPHILS # BLD AUTO: 0.04 K/UL (ref 0–0.2)
BASOPHILS NFR BLD: 0.4 % (ref 0–1.9)
BUN SERPL-MCNC: 14 MG/DL (ref 8–23)
BUN SERPL-MCNC: 15 MG/DL (ref 8–23)
CALCIUM SERPL-MCNC: 8.9 MG/DL (ref 8.7–10.5)
CALCIUM SERPL-MCNC: 8.9 MG/DL (ref 8.7–10.5)
CHLORIDE SERPL-SCNC: 95 MMOL/L (ref 95–110)
CHLORIDE SERPL-SCNC: 97 MMOL/L (ref 95–110)
CO2 SERPL-SCNC: 24 MMOL/L (ref 23–29)
CO2 SERPL-SCNC: 24 MMOL/L (ref 23–29)
CREAT SERPL-MCNC: 0.7 MG/DL (ref 0.5–1.4)
CREAT SERPL-MCNC: 0.7 MG/DL (ref 0.5–1.4)
DELSYS: ABNORMAL
DIFFERENTIAL METHOD: ABNORMAL
EOSINOPHIL # BLD AUTO: 0.4 K/UL (ref 0–0.5)
EOSINOPHIL NFR BLD: 4.1 % (ref 0–8)
ERYTHROCYTE [DISTWIDTH] IN BLOOD BY AUTOMATED COUNT: 13.5 % (ref 11.5–14.5)
EST. GFR  (AFRICAN AMERICAN): >60 ML/MIN/1.73 M^2
EST. GFR  (AFRICAN AMERICAN): >60 ML/MIN/1.73 M^2
EST. GFR  (NON AFRICAN AMERICAN): >60 ML/MIN/1.73 M^2
EST. GFR  (NON AFRICAN AMERICAN): >60 ML/MIN/1.73 M^2
FLOW: 1
GLUCOSE SERPL-MCNC: 107 MG/DL (ref 70–110)
GLUCOSE SERPL-MCNC: 80 MG/DL (ref 70–110)
HCO3 UR-SCNC: 28.6 MMOL/L (ref 24–28)
HCT VFR BLD AUTO: 31.7 % (ref 37–48.5)
HCT VFR BLD CALC: 30 %PCV (ref 36–54)
HGB BLD-MCNC: 10.6 G/DL (ref 12–16)
IMM GRANULOCYTES # BLD AUTO: 0.06 K/UL (ref 0–0.04)
IMM GRANULOCYTES NFR BLD AUTO: 0.6 % (ref 0–0.5)
LYMPHOCYTES # BLD AUTO: 1.3 K/UL (ref 1–4.8)
LYMPHOCYTES NFR BLD: 12.8 % (ref 18–48)
MCH RBC QN AUTO: 31.2 PG (ref 27–31)
MCHC RBC AUTO-ENTMCNC: 33.4 G/DL (ref 32–36)
MCV RBC AUTO: 93 FL (ref 82–98)
MODE: ABNORMAL
MONOCYTES # BLD AUTO: 0.7 K/UL (ref 0.3–1)
MONOCYTES NFR BLD: 6.9 % (ref 4–15)
NEUTROPHILS # BLD AUTO: 7.6 K/UL (ref 1.8–7.7)
NEUTROPHILS NFR BLD: 75.2 % (ref 38–73)
NRBC BLD-RTO: 0 /100 WBC
PCO2 BLDA: 50.2 MMHG (ref 35–45)
PH SMN: 7.36 [PH] (ref 7.35–7.45)
PLATELET # BLD AUTO: 220 K/UL (ref 150–450)
PMV BLD AUTO: 11.6 FL (ref 9.2–12.9)
PO2 BLDA: 20 MMHG (ref 40–60)
POC BE: 3 MMOL/L
POC IONIZED CALCIUM: 1.18 MMOL/L (ref 1.06–1.42)
POC SATURATED O2: 28 % (ref 95–100)
POC TCO2: 30 MMOL/L (ref 24–29)
POTASSIUM BLD-SCNC: 4.6 MMOL/L (ref 3.5–5.1)
POTASSIUM SERPL-SCNC: 4.4 MMOL/L (ref 3.5–5.1)
POTASSIUM SERPL-SCNC: 4.5 MMOL/L (ref 3.5–5.1)
RBC # BLD AUTO: 3.4 M/UL (ref 4–5.4)
SAMPLE: ABNORMAL
SITE: ABNORMAL
SODIUM BLD-SCNC: 130 MMOL/L (ref 136–145)
SODIUM SERPL-SCNC: 129 MMOL/L (ref 136–145)
SODIUM SERPL-SCNC: 129 MMOL/L (ref 136–145)
SP02: 94
WBC # BLD AUTO: 10.03 K/UL (ref 3.9–12.7)

## 2022-07-28 PROCEDURE — 20000000 HC ICU ROOM

## 2022-07-28 PROCEDURE — 99900035 HC TECH TIME PER 15 MIN (STAT)

## 2022-07-28 PROCEDURE — 36415 COLL VENOUS BLD VENIPUNCTURE: CPT | Performed by: STUDENT IN AN ORGANIZED HEALTH CARE EDUCATION/TRAINING PROGRAM

## 2022-07-28 PROCEDURE — 82803 BLOOD GASES ANY COMBINATION: CPT

## 2022-07-28 PROCEDURE — 99233 PR SUBSEQUENT HOSPITAL CARE,LEVL III: ICD-10-PCS | Mod: ,,, | Performed by: INTERNAL MEDICINE

## 2022-07-28 PROCEDURE — 25000003 PHARM REV CODE 250: Performed by: STUDENT IN AN ORGANIZED HEALTH CARE EDUCATION/TRAINING PROGRAM

## 2022-07-28 PROCEDURE — 84295 ASSAY OF SERUM SODIUM: CPT

## 2022-07-28 PROCEDURE — 99233 SBSQ HOSP IP/OBS HIGH 50: CPT | Mod: ,,, | Performed by: INTERNAL MEDICINE

## 2022-07-28 PROCEDURE — 63600175 PHARM REV CODE 636 W HCPCS: Performed by: STUDENT IN AN ORGANIZED HEALTH CARE EDUCATION/TRAINING PROGRAM

## 2022-07-28 PROCEDURE — 80048 BASIC METABOLIC PNL TOTAL CA: CPT | Mod: 91 | Performed by: INTERNAL MEDICINE

## 2022-07-28 PROCEDURE — 36415 COLL VENOUS BLD VENIPUNCTURE: CPT | Performed by: INTERNAL MEDICINE

## 2022-07-28 PROCEDURE — 84132 ASSAY OF SERUM POTASSIUM: CPT

## 2022-07-28 PROCEDURE — 85014 HEMATOCRIT: CPT

## 2022-07-28 PROCEDURE — 94761 N-INVAS EAR/PLS OXIMETRY MLT: CPT

## 2022-07-28 PROCEDURE — 85025 COMPLETE CBC W/AUTO DIFF WBC: CPT | Performed by: STUDENT IN AN ORGANIZED HEALTH CARE EDUCATION/TRAINING PROGRAM

## 2022-07-28 PROCEDURE — 82330 ASSAY OF CALCIUM: CPT

## 2022-07-28 PROCEDURE — 27000221 HC OXYGEN, UP TO 24 HOURS

## 2022-07-28 RX ADMIN — ATORVASTATIN CALCIUM 40 MG: 20 TABLET, FILM COATED ORAL at 08:07

## 2022-07-28 RX ADMIN — OXYCODONE HYDROCHLORIDE 10 MG: 10 TABLET ORAL at 08:07

## 2022-07-28 RX ADMIN — LIDOCAINE 1 PATCH: 50 PATCH CUTANEOUS at 12:07

## 2022-07-28 RX ADMIN — LEVOTHYROXINE SODIUM 150 MCG: 150 TABLET ORAL at 07:07

## 2022-07-28 RX ADMIN — OXYCODONE HYDROCHLORIDE 10 MG: 10 TABLET ORAL at 12:07

## 2022-07-28 RX ADMIN — ASPIRIN 81 MG: 81 TABLET, COATED ORAL at 08:07

## 2022-07-28 RX ADMIN — ENOXAPARIN SODIUM 40 MG: 40 INJECTION SUBCUTANEOUS at 05:07

## 2022-07-28 NOTE — PLAN OF CARE
142 sent    Sandie Pleitez LCSW  Case Management/[facility]  [phone]

## 2022-07-28 NOTE — PLAN OF CARE
Care Plan    POC reviewed with Laila Hanna at 0400. Pt verbalized understanding; however, needs reinforcement. Questions and concerns addressed. Pt q4 BMP recollected and sent to lab x3 as lab informed RN they were all hemolyzed. Called lab to confirm new specimen in process. Pt progressing toward goals. Will continue to monitor. See below and flowsheets for full assessment and VS info.   -x1 oxycodone   -Lumbar Spine Lateral Supine and Lateral Upright x-ray completed   -Pending BMP results       Neuro:  Nikolski Coma Scale  Best Eye Response: 4-->(E4) spontaneous  Best Motor Response: 6-->(M6) obeys commands  Best Verbal Response: 4-->(V4) confused  Nikolski Coma Scale Score: 14  Assessment Qualifiers: patient not sedated/intubated  Pupil PERRLA: yes     24 hr Temp:  [97.6 °F (36.4 °C)-98.7 °F (37.1 °C)]     CV:   Rhythm: normal sinus rhythm, sinus bradycardia  BP goals:   SBP < 160  MAP > 85    Resp:   O2 Device (Oxygen Therapy): room air       Plan: N/A    GI/:     Diet/Nutrition Received: restrict fluids  Last Bowel Movement: 07/23/22  Voiding Characteristics: external catheter    Intake/Output Summary (Last 24 hours) at 7/28/2022 0511  Last data filed at 7/28/2022 0330  Gross per 24 hour   Intake 360 ml   Output 4450 ml   Net -4090 ml     Unmeasured Output  Stool Occurrence: 0    Labs/Accuchecks:  Recent Labs   Lab 07/28/22  0253   WBC 10.03   RBC 3.40*   HGB 10.6*   HCT 31.7*         Recent Labs   Lab 07/27/22  0454 07/27/22  0737 07/27/22  2056   *   < > 123*   K 4.3   < > 4.8   CO2 23   < > 24   CL 88*   < > 90*   BUN 14   < > 19   CREATININE 0.8   < > 0.9   ALKPHOS 97  --   --    ALT 10  --   --    AST 18  --   --    BILITOT 0.7  --   --     < > = values in this interval not displayed.      Recent Labs   Lab 07/24/22 2122   INR 1.1    No results for input(s): CPK, CPKMB, TROPONINI, MB in the last 168 hours.    Electrolytes: No replacement orders  Accuchecks:  none    Gtts:      LDA/Wounds:  Lines/Drains/Airways       Drain  Duration                  Urethral Catheter 07/27/22 1900 <1 day              Peripheral Intravenous Line  Duration                  Peripheral IV - Single Lumen 07/24/22 1643 18 G Left Antecubital 3 days         Peripheral IV - Single Lumen 07/27/22 2133 18 G Right Antecubital <1 day                  Wounds: No  Wound care consulted: Yes   Problem: Adult Inpatient Plan of Care  Goal: Plan of Care Review  Outcome: Ongoing, Progressing  Goal: Patient-Specific Goal (Individualized)  Outcome: Ongoing, Progressing  Goal: Readiness for Transition of Care  Outcome: Ongoing, Progressing     Problem: Skin Injury Risk Increased  Goal: Skin Health and Integrity  Outcome: Ongoing, Progressing     Problem: Pain Acute  Goal: Acceptable Pain Control and Functional Ability  Outcome: Ongoing, Progressing     Problem: Oral Intake Inadequate (Acute Kidney Injury/Impairment)  Goal: Optimal Nutrition Intake  Outcome: Ongoing, Progressing     Problem: Infection  Goal: Absence of Infection Signs and Symptoms  Outcome: Ongoing, Progressing

## 2022-07-28 NOTE — RESIDENT HANDOFF
Handoff     Primary Team: Networked reference to record [facility]  Room Number: 6083/6083 A     Patient Name: Laila Hanna MRN: [id number]     Date of Birth: [id number] Allergies: Thiazides     Age: 89 y.o. Admit Date: 7/24/2022     Sex: female  BMI: Body mass index is 33.41 kg/m².     Code Status: Full Code        Illness Level (current clinical status): Watcher - No    Reason for Admission: Hyponatremia    Brief HPI (pertinent PMH and diagnosis or differential diagnosis): 89 year old female with CAD, HLD, hypothyroidism, HTN, HFpEF presenting after mechanical fall at home. Patient lost her balance and fell while in her kitchen, landing on her right side without loss of consciousness. She was found to have rib 4-9 fracture and L4 fractures. Her initial sodium was 128 on admission but dropped to 118 after aprox 300cc NS IVF. She was noted to be confused, word finding issues and experiencing diplopia. She was admitted to the MICU for severe symptomatic hyponatremia. Of note she had a similar presentation in 2016 requiring a prolonged hospitalization for SIADH requiring Tolvaptan.       Hospital Course (updated, brief assessment by system or problem, significant events): Here she was placed on strict <500cc fluid restriction and a gates was placed for close UOP monitoring. She output 4500cc clear urine in 24 hours, her mentation improved to baseline and her repeat Na was 129 ().     Of note x4 BMPs overnight were hemolyzed overnight and multiple more during the day. We switched the q4 BMPs to ISTATs.     Tasks (specific, using if-then statements): Follow up Na. Closely monitor UOP for excessive free water diuresis. Patient already accepted at x4 SNFs per social work.     Contingency Plan (special circumstances anticipated and plan): If large volume of dilute urine noted, check sodium for overcorrection. If AMS check Na for Hyponatremia and fluid restrict to <500.     Estimated Discharge Date: TBD by hospital  med    Discharge Disposition: Skilled Nursing Facility

## 2022-07-28 NOTE — NURSING
Patient returned from ED x-ray to room 6083 with RN and PCT at bedside. Pt tolerated well c/o pain to right side r/t rib and vertebral fractures. Prn medication administered. Pt VSS, no acute events. Will continue to monitor.

## 2022-07-28 NOTE — PROGRESS NOTES
[facility] - Cardiac Medical ICU  General Surgery  Progress Note    Subjective:     History of Present Illness:  No notes on file    Post-Op Info:  * No surgery found *         Interval History:   No acute events overnight.   Patient stepped up to MICU yesterday for hyponatremia and AMS. Sodium improved this morning to 129. Mental status improved - oriented.   Minimal pain with deep inspiration.   On 1L NC with SpO2 92%    Medications:  Continuous Infusions:  Scheduled Meds:   aspirin  81 mg Oral Daily    atorvastatin  40 mg Oral Daily    enoxaparin  40 mg Subcutaneous Daily    levothyroxine  150 mcg Oral Daily    LIDOcaine  1 patch Transdermal Q24H     PRN Meds:docusate sodium, melatonin, ondansetron, oxyCODONE, oxyCODONE, sodium chloride 0.9%, DIPH,PERTUSS(ACELL),TET VACCINE (ADULT)(BOOSTRIX,ADACEL)     Review of patient's allergies indicates:   Allergen Reactions    Thiazides Other (See Comments)     Severe hyponatremia     Objective:     Vital Signs (Most Recent):  Temp: 96.4 °F (35.8 °C) (07/28/22 1200)  Pulse: 71 (07/28/22 1300)  Resp: (!) 24 (07/28/22 1300)  BP: 127/61 (07/28/22 1300)  SpO2: (!) 93 % (07/28/22 1300)   Vital Signs (24h Range):  Temp:  [96.4 °F (35.8 °C)-98.7 °F (37.1 °C)] 96.4 °F (35.8 °C)  Pulse:  [55-74] 71  Resp:  [10-32] 24  SpO2:  [88 %-100 %] 93 %  BP: (106-147)/(51-67) 127/61     Weight: 93.9 kg (207 lb 0.2 oz)  Body mass index is 33.41 kg/m².    Intake/Output - Last 3 Shifts         07/26 0700 07/27 0659 07/27 0700 07/28 0659 07/28 0700 07/29 0659    P.O. 1320 360 240    Total Intake(mL/kg) 1320 (14.1) 360 (3.8) 240 (2.6)    Urine (mL/kg/hr) 1105 (0.5) 4750 (2.1) 500 (0.7)    Stool  0 0    Total Output 1105 4750 500    Net +215 -4341 -260           Stool Occurrence  0 x 0 x            Physical Exam  Vitals and nursing note reviewed.   Constitutional:       General: She is not in acute distress.     Appearance: She is not ill-appearing.   HENT:      Head: Normocephalic and  atraumatic.   Eyes:      Extraocular Movements: Extraocular movements intact.   Cardiovascular:      Rate and Rhythm: Normal rate.   Pulmonary:      Effort: Pulmonary effort is normal. No respiratory distress.      Comments: No increased WOB  1L NC  Abdominal:      General: There is no distension.      Palpations: Abdomen is soft.      Tenderness: There is no abdominal tenderness.   Musculoskeletal:         General: No deformity.      Comments: Back brace in place  TTP to R chest wall.   Hematoma to R knee and R hand   Neurological:      General: No focal deficit present.      Mental Status: She is alert and oriented to person, place, and time.      Cranial Nerves: No cranial nerve deficit.      Sensory: No sensory deficit.      Motor: No weakness.   Psychiatric:         Mood and Affect: Mood normal.       Significant Labs:  I have reviewed all pertinent lab results within the past 24 hours.  CBC:   Recent Labs   Lab 07/28/22  0253   WBC 10.03   RBC 3.40*   HGB 10.6*   HCT 31.7*      MCV 93   MCH 31.2*   MCHC 33.4       BMP:   Recent Labs   Lab 07/27/22  0454 07/27/22  0737 07/28/22  0533   GLU 85   < > 80   *   < > 129*   K 4.3   < > 4.4   CL 88*   < > 95   CO2 23   < > 24   BUN 14   < > 15   CREATININE 0.8   < > 0.7   CALCIUM 8.3*   < > 8.9   MG 1.9  --   --     < > = values in this interval not displayed.         Significant Diagnostics:  I have reviewed all pertinent imaging results/findings within the past 24 hours.  I have reviewed and interpreted all pertinent imaging results/findings within the past 24 hours.    Assessment/Plan:       Multiple rib fractures  Patient is a 89 year old female with h/o HTN, CAD, degenerative disc disease, HLD, MI, TIA presenting after a fall from standing with R rib 4-9 fractures and L2 burst fracture. Clinically stable but with worsening hyponatremia     - Fine for diet, aspiration precautions  - Scheduled multimodal pain regimen  - PRN pain and nausea  medications  - Wound care   - DVT prophylaxis (SCDs and lovenox)  - OOB, ambulate  - Inhalation treatments  - Daily CXR  - Wean O2 as tolerated  - Rest of care per primary team      Lumbar compression fracture  - Neurosurgery consulted, appreciate assistance  - MRI shows an acute L2 compression fracture without significant retropulsion or central canal narrowing. There is an L1 fracture without extension into the pedicles.   - Upright/supine XR in brace  - Continue LSO brace at all times  - Follow up in 6 weeks with repeat XR in the brace           Stella Bernard MD  General Surgery  [facility] - Cardiac Medical ICU

## 2022-07-28 NOTE — NURSING
Pending BMP results at this time. 4 specimens have been sent to lab since 0025. Per lab, all have hemolyzed. RN called lab to confirm they received last specimen sent at 0400, they confirmed. Will continue to monitor.

## 2022-07-28 NOTE — PROGRESS NOTES
[facility] - Cardiac Medical ICU  Critical Care Medicine  Progress Note    Patient Name: Laila Hanna  MRN: [id number]  Admission Date: 7/24/2022  Hospital Length of Stay: 3 days  Code Status: Full Code  Attending Provider: Ricardo Enamorado MD  Primary Care Provider: Ama Graham MD   Principal Problem: Hyponatremia    Subjective:     HPI:  Ms. Hanna is a 89 year old female with CAD, HLD, hypothyroidism, HTN, HFpEF presenting after mechanical fall at home. Patient lost her balance and fell while in her kitchen, landing on her right side without loss of consciousness nor prodrome. Per caregiver patient completes all ADLs without assistance and only requires minimal supervision. She reports experiencing HA x1wk prior to her fall. She denies taking NSAIDs at home or drinking large volumes of water. She was seen at her cardiologist x1wk ago and was told to only take her Torsemide if she notices a sudden weight gain and had an echo scheduled for a new murmur. She reports a similar episode of hyponatremia in 2016 requiring hospitalization and treatment with Tolvaptan for multiple weeks. She was also placed on strict fluid restriction but has been drinking 2-3 glasses of water/soda recently.     On admission, with negative head CT. CT C-spine was notable for acute displaced burst fracture of L2 vertebral body with extension into the spinal canal as well as nondisplaced fracture of L1 vertebral body. CT chest with posterolateral ribs fracture of ribs 4-9. Since admission, patient was hyponatremic (128) with a baseline of 133. She had an acute drop of sodium on 7/26 to 127 and to 120 on 7/27 with some worsening mentation. Hospital medicine was consulted for management of hyponatremia, but hyponatremia continued drop to 118. Patient transferred to Critical Care Medicine for management of hyponatremia in setting of encephalopathy.      Hospital/ICU Course:  In the MICU she was placed on strict fluid restriction of <500cc  and had a gates placed for close UOP measurements. She had a 4.5L output of light/clear urine over 24 hours. Her sodium improved to 129 and her mentation improved significantly to baseline. Fluid restriction was relaxed to <1500cc. She is stable and was planned for step down on 7/28       Interval History/Significant Events: NAEON. She had large volume of clear urine in her gates bag. He mentation has improved drastically and she is now conversational without word finding issues    Review of Systems   Constitutional:  Negative for chills, diaphoresis, fatigue and fever.   HENT:  Negative for congestion, rhinorrhea, sore throat and trouble swallowing.    Eyes:  Positive for visual disturbance.   Respiratory:  Negative for cough, chest tightness, shortness of breath and wheezing.    Cardiovascular:  Negative for chest pain, palpitations and leg swelling.   Gastrointestinal:  Negative for abdominal distention, abdominal pain, blood in stool, diarrhea, nausea and vomiting.   Genitourinary:  Negative for difficulty urinating, dysuria, flank pain and hematuria.   Musculoskeletal:  Negative for arthralgias, back pain and neck pain.   Skin:  Negative for rash and wound.   Neurological:  Negative for dizziness, syncope, weakness, light-headedness, numbness and headaches.   Objective:     Vital Signs (Most Recent):  Temp: 96.4 °F (35.8 °C) (07/28/22 1200)  Pulse: 73 (07/28/22 1500)  Resp: (!) 37 (07/28/22 1500)  BP: (!) 113/59 (07/28/22 1500)  SpO2: 95 % (07/28/22 1500)   Vital Signs (24h Range):  Temp:  [96.4 °F (35.8 °C)-98.7 °F (37.1 °C)] 96.4 °F (35.8 °C)  Pulse:  [56-76] 73  Resp:  [10-37] 37  SpO2:  [88 %-100 %] 95 %  BP: (106-147)/(51-67) 113/59   Weight: 93.9 kg (207 lb 0.2 oz)  Body mass index is 33.41 kg/m².      Intake/Output Summary (Last 24 hours) at 7/28/2022 1539  Last data filed at 7/28/2022 1200  Gross per 24 hour   Intake 240 ml   Output 5000 ml   Net -4760 ml       Physical Exam  Constitutional:        General: She is not in acute distress.     Appearance: Normal appearance. She is ill-appearing. She is not toxic-appearing or diaphoretic.   HENT:      Nose: No congestion or rhinorrhea.      Mouth/Throat:      Mouth: Mucous membranes are moist.      Pharynx: No oropharyngeal exudate or posterior oropharyngeal erythema.   Eyes:      General: No scleral icterus.        Right eye: No discharge.         Left eye: No discharge.   Cardiovascular:      Rate and Rhythm: Regular rhythm. Bradycardia present.      Pulses: Normal pulses.      Heart sounds: Murmur heard.     No friction rub. No gallop.      Comments: Trace edema BLE  Pulmonary:      Effort: Pulmonary effort is normal. No respiratory distress.      Breath sounds: Normal breath sounds. No stridor. No wheezing, rhonchi or rales.   Chest:      Chest wall: No tenderness.   Abdominal:      General: Abdomen is flat. Bowel sounds are normal. There is no distension.      Palpations: Abdomen is soft. There is no mass.      Tenderness: There is no abdominal tenderness. There is no guarding.   Musculoskeletal:      Right lower leg: No edema.      Left lower leg: No edema.   Skin:     General: Skin is warm and dry.      Coloration: Skin is not jaundiced.      Findings: Bruising present.   Neurological:      General: No focal deficit present.      Mental Status: She is oriented to person, place, and time.      Comments: Improvement in mental status to patients baseline       Vents:     Lines/Drains/Airways       Drain  Duration                  Urethral Catheter 07/27/22 1900 <1 day              Peripheral Intravenous Line  Duration                  Peripheral IV - Single Lumen 07/24/22 1643 18 G Left Antecubital 3 days         Peripheral IV - Single Lumen 07/27/22 2133 18 G Right Antecubital <1 day                  Significant Labs:    CBC/Anemia Profile:  Recent Labs   Lab 07/27/22  0454 07/28/22  0253   WBC 9.60 10.03   HGB 10.5* 10.6*   HCT 31.6* 31.7*    220    MCV 95 93   RDW 13.1 13.5        Chemistries:  Recent Labs   Lab 07/27/22  0454 07/27/22  0737 07/27/22 2056 07/28/22  0533 07/28/22  1400   *   < > 123* 129* 129*   K 4.3   < > 4.8 4.4 4.5   CL 88*   < > 90* 95 97   CO2 23   < > 24 24 24   BUN 14   < > 19 15 14   CREATININE 0.8   < > 0.9 0.7 0.7   CALCIUM 8.3*   < > 8.1* 8.9 8.9   ALBUMIN 2.8*  --   --   --   --    PROT 6.1  --   --   --   --    BILITOT 0.7  --   --   --   --    ALKPHOS 97  --   --   --   --    ALT 10  --   --   --   --    AST 18  --   --   --   --    MG 1.9  --   --   --   --    PHOS 3.2  --   --   --   --     < > = values in this interval not displayed.       Recent Lab Results  (Last 5 results in the past 24 hours)        07/28/22  1400   07/28/22  0533   07/28/22  0253   07/27/22 2056 07/27/22  1837        Anion Gap 8   10     9   8       Baso #     0.04           Basophil %     0.4           BUN 14   15     19   17       Calcium 8.9   8.9     8.1   7.7       Chloride 97   95     90   94       CO2 24   24     24   19       Creatinine 0.7   0.7     0.9   0.8       Differential Method     Automated           eGFR if  >60.0   >60.0     >60.0   >60.0       eGFR if non  >60.0  Comment: Calculation used to obtain the estimated glomerular filtration  rate (eGFR) is the CKD-EPI equation.      >60.0  Comment: Calculation used to obtain the estimated glomerular filtration  rate (eGFR) is the CKD-EPI equation.        56.5  Comment: Calculation used to obtain the estimated glomerular filtration  rate (eGFR) is the CKD-EPI equation.      >60.0  Comment: Calculation used to obtain the estimated glomerular filtration  rate (eGFR) is the CKD-EPI equation.          Eos #     0.4           Eosinophil %     4.1           Glucose 107   80     100   107       Gran # (ANC)     7.6           Gran %     75.2           Hematocrit     31.7           Hemoglobin     10.6           Immature Grans (Abs)     0.06  Comment: Mild  elevation in immature granulocytes is non specific and   can be seen in a variety of conditions including stress response,   acute inflammation, trauma and pregnancy. Correlation with other   laboratory and clinical findings is essential.             Immature Granulocytes     0.6           Lymph #     1.3           Lymph %     12.8           MCH     31.2           MCHC     33.4           MCV     93           Mono #     0.7           Mono %     6.9           MPV     11.6           nRBC     0           Platelets     220           Potassium 4.5   4.4     4.8   4.5       RBC     3.40           RDW     13.5           Sodium 129   129     123   121       WBC     10.03                                  Significant Imaging:  I have reviewed all pertinent imaging results/findings within the past 24 hours.      ABG  No results for input(s): PH, PO2, PCO2, HCO3, BE in the last 168 hours.  Assessment/Plan:     Neuro  Acute encephalopathy  Mentation improved significantly with improvement in sodium. Likely Hyponatremic encephalopathy     Cardiac/Vascular  Coronary artery disease involving native coronary artery of native heart  - Cont ASA    Essential hypertension  - Hold all diuretics  - Hold Carvedilol for bradycardia    Renal/  * Hyponatremia  Patient with acute on chronic hyponatremia. Baseline sodium approximately 133. On admission 128, trending down. 7/26 sodium was 127 and 7/27 sodium was 120. Repeat sodiums 119>121>118. Some worsening of mental status including increased confusion from baseline, slurring of speech, and diplopia. She has been required an extended hospitalization in the past for hyponatremia, requiring vaptans and strict fluid restriction. She also recently had her diuretic adjusted to PRN for weight gain and a new murmur by her outpt cardiologist. Symptoms are similar to previous symptomatic hyponatremia. She was given NS on the floor with sudden worsening of her sodium from 122-118.     - q4 ISTAT Na  -  Fluid restriction relaxed to <1500cc  - If mental status acutely worsens give 3% NS and check q2 BMP   - Repeat urine electrolytes now off confounding medications          Endocrine  SIADH (syndrome of inappropriate ADH production)  See hyponatremia    Hypothyroidism due to acquired atrophy of thyroid  TSH- 7.019  T4- 1.2    - Continue home Synthroid    Orthopedic  Multiple rib fractures  Pain well controlled with Oxy PRN  IS ordered           Critical care was time spent personally by me on the following activities: development of treatment plan with patient or surrogate and bedside caregivers, discussions with consultants, evaluation of patient's response to treatment, examination of patient, ordering and performing treatments and interventions, ordering and review of laboratory studies, ordering and review of radiographic studies, pulse oximetry, re-evaluation of patient's condition. This critical care time did not overlap with that of any other provider or involve time for any procedures.     Ran Strickland, DO  Critical Care Medicine  [facility] - Cardiac Medical ICU

## 2022-07-28 NOTE — HOSPITAL COURSE
In the MICU she was placed on strict fluid restriction of <500cc and had a gates placed for close UOP measurements. She had a 4.5L output of light/clear urine over 24 hours. Her sodium improved to 129 and her mentation improved significantly to baseline. Fluid restriction was relaxed to <1500cc. She is stable and was planned for step down on 7/28

## 2022-07-28 NOTE — SUBJECTIVE & OBJECTIVE
Interval History/Significant Events: NAEON. She had large volume of clear urine in her gates bag. He mentation has improved drastically and she is now conversational without word finding issues    Review of Systems   Constitutional:  Negative for chills, diaphoresis, fatigue and fever.   HENT:  Negative for congestion, rhinorrhea, sore throat and trouble swallowing.    Eyes:  Positive for visual disturbance.   Respiratory:  Negative for cough, chest tightness, shortness of breath and wheezing.    Cardiovascular:  Negative for chest pain, palpitations and leg swelling.   Gastrointestinal:  Negative for abdominal distention, abdominal pain, blood in stool, diarrhea, nausea and vomiting.   Genitourinary:  Negative for difficulty urinating, dysuria, flank pain and hematuria.   Musculoskeletal:  Negative for arthralgias, back pain and neck pain.   Skin:  Negative for rash and wound.   Neurological:  Negative for dizziness, syncope, weakness, light-headedness, numbness and headaches.   Objective:     Vital Signs (Most Recent):  Temp: 96.4 °F (35.8 °C) (07/28/22 1200)  Pulse: 73 (07/28/22 1500)  Resp: (!) 37 (07/28/22 1500)  BP: (!) 113/59 (07/28/22 1500)  SpO2: 95 % (07/28/22 1500)   Vital Signs (24h Range):  Temp:  [96.4 °F (35.8 °C)-98.7 °F (37.1 °C)] 96.4 °F (35.8 °C)  Pulse:  [56-76] 73  Resp:  [10-37] 37  SpO2:  [88 %-100 %] 95 %  BP: (106-147)/(51-67) 113/59   Weight: 93.9 kg (207 lb 0.2 oz)  Body mass index is 33.41 kg/m².      Intake/Output Summary (Last 24 hours) at 7/28/2022 1539  Last data filed at 7/28/2022 1200  Gross per 24 hour   Intake 240 ml   Output 5000 ml   Net -4760 ml       Physical Exam  Constitutional:       General: She is not in acute distress.     Appearance: Normal appearance. She is ill-appearing. She is not toxic-appearing or diaphoretic.   HENT:      Nose: No congestion or rhinorrhea.      Mouth/Throat:      Mouth: Mucous membranes are moist.      Pharynx: No oropharyngeal exudate or  posterior oropharyngeal erythema.   Eyes:      General: No scleral icterus.        Right eye: No discharge.         Left eye: No discharge.   Cardiovascular:      Rate and Rhythm: Regular rhythm. Bradycardia present.      Pulses: Normal pulses.      Heart sounds: Murmur heard.     No friction rub. No gallop.      Comments: Trace edema BLE  Pulmonary:      Effort: Pulmonary effort is normal. No respiratory distress.      Breath sounds: Normal breath sounds. No stridor. No wheezing, rhonchi or rales.   Chest:      Chest wall: No tenderness.   Abdominal:      General: Abdomen is flat. Bowel sounds are normal. There is no distension.      Palpations: Abdomen is soft. There is no mass.      Tenderness: There is no abdominal tenderness. There is no guarding.   Musculoskeletal:      Right lower leg: No edema.      Left lower leg: No edema.   Skin:     General: Skin is warm and dry.      Coloration: Skin is not jaundiced.      Findings: Bruising present.   Neurological:      General: No focal deficit present.      Mental Status: She is oriented to person, place, and time.      Comments: Improvement in mental status to patients baseline       Vents:     Lines/Drains/Airways       Drain  Duration                  Urethral Catheter 07/27/22 1900 <1 day              Peripheral Intravenous Line  Duration                  Peripheral IV - Single Lumen 07/24/22 1643 18 G Left Antecubital 3 days         Peripheral IV - Single Lumen 07/27/22 2133 18 G Right Antecubital <1 day                  Significant Labs:    CBC/Anemia Profile:  Recent Labs   Lab 07/27/22  0454 07/28/22  0253   WBC 9.60 10.03   HGB 10.5* 10.6*   HCT 31.6* 31.7*    220   MCV 95 93   RDW 13.1 13.5        Chemistries:  Recent Labs   Lab 07/27/22  0454 07/27/22  0737 07/27/22  2056 07/28/22  0533 07/28/22  1400   *   < > 123* 129* 129*   K 4.3   < > 4.8 4.4 4.5   CL 88*   < > 90* 95 97   CO2 23   < > 24 24 24   BUN 14   < > 19 15 14   CREATININE 0.8   <  > 0.9 0.7 0.7   CALCIUM 8.3*   < > 8.1* 8.9 8.9   ALBUMIN 2.8*  --   --   --   --    PROT 6.1  --   --   --   --    BILITOT 0.7  --   --   --   --    ALKPHOS 97  --   --   --   --    ALT 10  --   --   --   --    AST 18  --   --   --   --    MG 1.9  --   --   --   --    PHOS 3.2  --   --   --   --     < > = values in this interval not displayed.       Recent Lab Results  (Last 5 results in the past 24 hours)        07/28/22  1400   07/28/22  0533   07/28/22  0253   07/27/22  2056   07/27/22  1837        Anion Gap 8   10     9   8       Baso #     0.04           Basophil %     0.4           BUN 14   15     19   17       Calcium 8.9   8.9     8.1   7.7       Chloride 97   95     90   94       CO2 24   24     24   19       Creatinine 0.7   0.7     0.9   0.8       Differential Method     Automated           eGFR if  >60.0   >60.0     >60.0   >60.0       eGFR if non  >60.0  Comment: Calculation used to obtain the estimated glomerular filtration  rate (eGFR) is the CKD-EPI equation.      >60.0  Comment: Calculation used to obtain the estimated glomerular filtration  rate (eGFR) is the CKD-EPI equation.        56.5  Comment: Calculation used to obtain the estimated glomerular filtration  rate (eGFR) is the CKD-EPI equation.      >60.0  Comment: Calculation used to obtain the estimated glomerular filtration  rate (eGFR) is the CKD-EPI equation.          Eos #     0.4           Eosinophil %     4.1           Glucose 107   80     100   107       Gran # (ANC)     7.6           Gran %     75.2           Hematocrit     31.7           Hemoglobin     10.6           Immature Grans (Abs)     0.06  Comment: Mild elevation in immature granulocytes is non specific and   can be seen in a variety of conditions including stress response,   acute inflammation, trauma and pregnancy. Correlation with other   laboratory and clinical findings is essential.             Immature Granulocytes     0.6           Lymph  #     1.3           Lymph %     12.8           MCH     31.2           MCHC     33.4           MCV     93           Mono #     0.7           Mono %     6.9           MPV     11.6           nRBC     0           Platelets     220           Potassium 4.5   4.4     4.8   4.5       RBC     3.40           RDW     13.5           Sodium 129   129     123   121       WBC     10.03                                  Significant Imaging:  I have reviewed all pertinent imaging results/findings within the past 24 hours.

## 2022-07-28 NOTE — ASSESSMENT & PLAN NOTE
Patient is a 89 year old female with h/o HTN, CAD, degenerative disc disease, HLD, MI, TIA presenting after a fall from standing with R rib 4-9 fractures and L2 burst fracture. Clinically stable but with worsening hyponatremia     - Fine for diet, aspiration precautions  - Scheduled multimodal pain regimen  - PRN pain and nausea medications  - Wound care   - DVT prophylaxis (SCDs and lovenox)  - OOB, ambulate  - Inhalation treatments  - Daily CXR  - Wean O2 as tolerated

## 2022-07-28 NOTE — SUBJECTIVE & OBJECTIVE
Past Medical History:   Diagnosis Date    Basal cell carcinoma     nose    Benign essential hypertension     Cerebral microvascular disease 2014    Closed fracture of distal phalanx of left hand with routine healing 2015    Closed mallet fracture of distal phalanx of finger with routine healing 10/6/2015    Coronary artery disease     DDD (degenerative disc disease), lumbar 2016    Depression     Fall at home 2016    Hyperlipidemia     Hypothyroidism due to acquired atrophy of thyroid     Meningiomas, multiple     MI (myocardial infarction)     80% blockage per patient    Migraine aura without headache 2014    Post PTCA 2012    Transient cerebral ischemia 2014       Past Surgical History:   Procedure Laterality Date    CARDIAC CATHETERIZATION  2004    S/P LAD PTCA, coated stent    CATARACT EXTRACTION W/  INTRAOCULAR LENS IMPLANT Bilateral     Dr Youssef     CHOLECYSTECTOMY      CORONARY ANGIOPLASTY WITH STENT PLACEMENT      LAD    EYE SURGERY      TOTAL THYROIDECTOMY         Review of patient's allergies indicates:   Allergen Reactions    Thiazides Other (See Comments)     Severe hyponatremia       Family History       Problem Relation (Age of Onset)    Cancer Brother, Sister    Colon cancer Brother    Dementia Sister    Diabetes Mother, Paternal Grandmother    Glaucoma Maternal Aunt    Hypertension Father    No Known Problems Maternal Uncle, Paternal Aunt, Paternal Uncle, Maternal Grandmother, Maternal Grandfather, Paternal Grandfather    Skin cancer Brother    Stroke Mother, Father          Tobacco Use    Smoking status: Former Smoker     Packs/day: 0.30     Years: 15.00     Pack years: 4.50     Types: Cigarettes     Quit date: 1973     Years since quittin.5    Smokeless tobacco: Never Used   Substance and Sexual Activity    Alcohol use: No     Alcohol/week: 0.0 standard drinks    Drug use: No    Sexual activity: Never      Review of Systems    Constitutional:  Negative for chills, diaphoresis, fatigue and fever.   HENT:  Negative for congestion, rhinorrhea, sore throat and trouble swallowing.    Respiratory:  Negative for cough, chest tightness, shortness of breath and wheezing.    Cardiovascular:  Positive for leg swelling. Negative for chest pain and palpitations.        Rib pain   Gastrointestinal:  Negative for abdominal distention, abdominal pain, blood in stool, diarrhea, nausea and vomiting.   Genitourinary:  Negative for difficulty urinating, dysuria, flank pain and hematuria.   Musculoskeletal:  Positive for back pain. Negative for arthralgias and neck pain.   Skin:  Negative for rash and wound.   Neurological:  Negative for dizziness, syncope, weakness, light-headedness, numbness and headaches.   Objective:     Vital Signs (Most Recent):  Temp: 97.9 °F (36.6 °C) (07/28/22 0330)  Pulse: 71 (07/28/22 0630)  Resp: 14 (07/28/22 0630)  BP: 128/61 (07/28/22 0630)  SpO2: (!) 92 % (07/28/22 0630)   Vital Signs (24h Range):  Temp:  [97.6 °F (36.4 °C)-98.7 °F (37.1 °C)] 97.9 °F (36.6 °C)  Pulse:  [54-74] 71  Resp:  [10-24] 14  SpO2:  [88 %-100 %] 92 %  BP: (102-137)/(51-67) 128/61   Weight: 93.9 kg (207 lb 0.2 oz)  Body mass index is 33.41 kg/m².      Intake/Output Summary (Last 24 hours) at 7/28/2022 0727  Last data filed at 7/28/2022 0600  Gross per 24 hour   Intake 360 ml   Output 4750 ml   Net -4390 ml       Physical Exam  Constitutional:       General: She is not in acute distress.     Appearance: Normal appearance. She is ill-appearing. She is not toxic-appearing or diaphoretic.   HENT:      Nose: No congestion or rhinorrhea.      Mouth/Throat:      Mouth: Mucous membranes are moist.      Pharynx: No oropharyngeal exudate or posterior oropharyngeal erythema.   Eyes:      General: No scleral icterus.        Right eye: No discharge.         Left eye: No discharge.   Cardiovascular:      Rate and Rhythm: Regular rhythm. Bradycardia present.       Pulses: Normal pulses.      Heart sounds: Murmur heard.     No friction rub. No gallop.      Comments: Trace edema BLE  Pulmonary:      Effort: Pulmonary effort is normal. No respiratory distress.      Breath sounds: Normal breath sounds. No stridor. No wheezing, rhonchi or rales.   Chest:      Chest wall: No tenderness.   Abdominal:      General: Abdomen is flat. Bowel sounds are normal. There is no distension.      Palpations: Abdomen is soft. There is no mass.      Tenderness: There is no abdominal tenderness. There is no guarding.   Musculoskeletal:      Right lower leg: No edema.      Left lower leg: No edema.   Skin:     General: Skin is warm and dry.      Coloration: Skin is not jaundiced.      Findings: Bruising present.   Neurological:      General: No focal deficit present.      Mental Status: She is oriented to person, place, and time.      Comments: Word searching, slurring of speech, diplopia       Vents:     Lines/Drains/Airways       Drain  Duration                  Urethral Catheter 07/27/22 1900 <1 day              Peripheral Intravenous Line  Duration                  Peripheral IV - Single Lumen 07/24/22 1643 18 G Left Antecubital 3 days         Peripheral IV - Single Lumen 07/27/22 2133 18 G Right Antecubital <1 day                  Significant Labs:    CBC/Anemia Profile:  Recent Labs   Lab 07/27/22  0454 07/28/22  0253   WBC 9.60 10.03   HGB 10.5* 10.6*   HCT 31.6* 31.7*    220   MCV 95 93   RDW 13.1 13.5        Chemistries:  Recent Labs   Lab 07/27/22  0454 07/27/22  0737 07/27/22  1837 07/27/22  2056 07/28/22  0533   *   < > 121* 123* 129*   K 4.3   < > 4.5 4.8 4.4   CL 88*   < > 94* 90* 95   CO2 23   < > 19* 24 24   BUN 14   < > 17 19 15   CREATININE 0.8   < > 0.8 0.9 0.7   CALCIUM 8.3*   < > 7.7* 8.1* 8.9   ALBUMIN 2.8*  --   --   --   --    PROT 6.1  --   --   --   --    BILITOT 0.7  --   --   --   --    ALKPHOS 97  --   --   --   --    ALT 10  --   --   --   --    AST 18  --    --   --   --    MG 1.9  --   --   --   --    PHOS 3.2  --   --   --   --     < > = values in this interval not displayed.       Recent Lab Results  (Last 5 results in the past 24 hours)        07/28/22  0533   07/28/22  0253   07/27/22  2056   07/27/22  1837   07/27/22  1201        Anion Gap 10     9   8   7       Baso #   0.04             Basophil %   0.4             BUN 15     19   17   17       Calcium 8.9     8.1   7.7   8.3       Chloride 95     90   94   89       CO2 24     24   19   22       Creatinine 0.7     0.9   0.8   1.1       Differential Method   Automated             eGFR if  >60.0     >60.0   >60.0   51.1       eGFR if non  >60.0  Comment: Calculation used to obtain the estimated glomerular filtration  rate (eGFR) is the CKD-EPI equation.        56.5  Comment: Calculation used to obtain the estimated glomerular filtration  rate (eGFR) is the CKD-EPI equation.      >60.0  Comment: Calculation used to obtain the estimated glomerular filtration  rate (eGFR) is the CKD-EPI equation.      44.3  Comment: Calculation used to obtain the estimated glomerular filtration  rate (eGFR) is the CKD-EPI equation.          Eos #   0.4             Eosinophil %   4.1             Glucose 80     100   107   115       Gran # (ANC)   7.6             Gran %   75.2             Hematocrit   31.7             Hemoglobin   10.6             Immature Grans (Abs)   0.06  Comment: Mild elevation in immature granulocytes is non specific and   can be seen in a variety of conditions including stress response,   acute inflammation, trauma and pregnancy. Correlation with other   laboratory and clinical findings is essential.               Immature Granulocytes   0.6             Lymph #   1.3             Lymph %   12.8             MCH   31.2             MCHC   33.4             MCV   93             Mono #   0.7             Mono %   6.9             MPV   11.6             nRBC   0             Platelets    220             Potassium 4.4     4.8   4.5   4.4       RBC   3.40             RDW   13.5             Sodium 129     123   121   118  Comment: *Critical value notification by [facility] with confirmation of receipt to   WANDA PARRISH RN at  [id number].         WBC   10.03                                    Significant Imaging: I have reviewed all pertinent imaging results/findings within the past 24 hours.

## 2022-07-28 NOTE — H&P
[facility]  Critical Care Medicine  History & Physical    Patient Name: Laila Hanna  MRN: [id number]  Admission Date: 7/24/2022  Hospital Length of Stay: 3 days  Code Status: Full Code  Attending Physician: Ricardo Enamorado MD   Primary Care Provider: Ama Graham MD   Principal Problem: Hyponatremia    Subjective:     HPI:  Ms. Hanna is a 89 year old female with CAD, HLD, hypothyroidism, HTN, HFpEF presenting after mechanical fall at home. Patient lost her balance and fell while in her kitchen, landing on her right side without loss of consciousness nor prodrome. Per caregiver patient completes all ADLs without assistance and only requires minimal supervision. She reports experiencing HA x1wk prior to her fall. She denies taking NSAIDs at home or drinking large volumes of water. She was seen at her cardiologist x1wk ago and was told to only take her Torsemide if she notices a sudden weight gain and had an echo scheduled for a new murmur. She reports a similar episode of hyponatremia in 2016 requiring hospitalization and treatment with Tolvaptan for multiple weeks. She was also placed on strict fluid restriction but has been drinking 2-3 glasses of water/soda recently.     On admission, with negative head CT. CT C-spine was notable for acute displaced burst fracture of L2 vertebral body with extension into the spinal canal as well as nondisplaced fracture of L1 vertebral body. CT chest with posterolateral ribs fracture of ribs 4-9. Since admission, patient was hyponatremic (128) with a baseline of 133. She had an acute drop of sodium on 7/26 to 127 and to 120 on 7/27 with some worsening mentation. Hospital medicine was consulted for management of hyponatremia, but hyponatremia continued drop to 118. Patient transferred to Critical Care Medicine for management of hyponatremia in setting of encephalopathy.      Hospital/ICU Course:  No notes on file     Past Medical History:   Diagnosis  Date    Basal cell carcinoma     nose    Benign essential hypertension     Cerebral microvascular disease 2014    Closed fracture of distal phalanx of left hand with routine healing 2015    Closed mallet fracture of distal phalanx of finger with routine healing 10/6/2015    Coronary artery disease     DDD (degenerative disc disease), lumbar 2016    Depression     Fall at home 2016    Hyperlipidemia     Hypothyroidism due to acquired atrophy of thyroid     Meningiomas, multiple     MI (myocardial infarction)     80% blockage per patient    Migraine aura without headache 2014    Post PTCA 2012    Transient cerebral ischemia 2014       Past Surgical History:   Procedure Laterality Date    CARDIAC CATHETERIZATION  2004    S/P LAD PTCA, coated stent    CATARACT EXTRACTION W/  INTRAOCULAR LENS IMPLANT Bilateral     Dr Youssef     CHOLECYSTECTOMY      CORONARY ANGIOPLASTY WITH STENT PLACEMENT      LAD    EYE SURGERY      TOTAL THYROIDECTOMY         Review of patient's allergies indicates:   Allergen Reactions    Thiazides Other (See Comments)     Severe hyponatremia       Family History       Problem Relation (Age of Onset)    Cancer Brother, Sister    Colon cancer Brother    Dementia Sister    Diabetes Mother, Paternal Grandmother    Glaucoma Maternal Aunt    Hypertension Father    No Known Problems Maternal Uncle, Paternal Aunt, Paternal Uncle, Maternal Grandmother, Maternal Grandfather, Paternal Grandfather    Skin cancer Brother    Stroke Mother, Father          Tobacco Use    Smoking status: Former Smoker     Packs/day: 0.30     Years: 15.00     Pack years: 4.50     Types: Cigarettes     Quit date: 1973     Years since quittin.5    Smokeless tobacco: Never Used   Substance and Sexual Activity    Alcohol use: No     Alcohol/week: 0.0 standard drinks    Drug use: No    Sexual activity: Never      Review of Systems    Constitutional:  Negative for chills, diaphoresis, fatigue and fever.   HENT:  Negative for congestion, rhinorrhea, sore throat and trouble swallowing.    Respiratory:  Negative for cough, chest tightness, shortness of breath and wheezing.    Cardiovascular:  Positive for leg swelling. Negative for chest pain and palpitations.        Rib pain   Gastrointestinal:  Negative for abdominal distention, abdominal pain, blood in stool, diarrhea, nausea and vomiting.   Genitourinary:  Negative for difficulty urinating, dysuria, flank pain and hematuria.   Musculoskeletal:  Positive for back pain. Negative for arthralgias and neck pain.   Skin:  Negative for rash and wound.   Neurological:  Negative for dizziness, syncope, weakness, light-headedness, numbness and headaches.   Objective:     Vital Signs (Most Recent):  Temp: 97.9 °F (36.6 °C) (07/28/22 0330)  Pulse: 71 (07/28/22 0630)  Resp: 14 (07/28/22 0630)  BP: 128/61 (07/28/22 0630)  SpO2: (!) 92 % (07/28/22 0630)   Vital Signs (24h Range):  Temp:  [97.6 °F (36.4 °C)-98.7 °F (37.1 °C)] 97.9 °F (36.6 °C)  Pulse:  [54-74] 71  Resp:  [10-24] 14  SpO2:  [88 %-100 %] 92 %  BP: (102-137)/(51-67) 128/61   Weight: 93.9 kg (207 lb 0.2 oz)  Body mass index is 33.41 kg/m².      Intake/Output Summary (Last 24 hours) at 7/28/2022 0727  Last data filed at 7/28/2022 0600  Gross per 24 hour   Intake 360 ml   Output 4750 ml   Net -4390 ml       Physical Exam  Constitutional:       General: She is not in acute distress.     Appearance: Normal appearance. She is ill-appearing. She is not toxic-appearing or diaphoretic.   HENT:      Nose: No congestion or rhinorrhea.      Mouth/Throat:      Mouth: Mucous membranes are moist.      Pharynx: No oropharyngeal exudate or posterior oropharyngeal erythema.   Eyes:      General: No scleral icterus.        Right eye: No discharge.         Left eye: No discharge.   Cardiovascular:      Rate and Rhythm: Regular rhythm. Bradycardia present.       Pulses: Normal pulses.      Heart sounds: Murmur heard.     No friction rub. No gallop.      Comments: Trace edema BLE  Pulmonary:      Effort: Pulmonary effort is normal. No respiratory distress.      Breath sounds: Normal breath sounds. No stridor. No wheezing, rhonchi or rales.   Chest:      Chest wall: No tenderness.   Abdominal:      General: Abdomen is flat. Bowel sounds are normal. There is no distension.      Palpations: Abdomen is soft. There is no mass.      Tenderness: There is no abdominal tenderness. There is no guarding.   Musculoskeletal:      Right lower leg: No edema.      Left lower leg: No edema.   Skin:     General: Skin is warm and dry.      Coloration: Skin is not jaundiced.      Findings: Bruising present.   Neurological:      General: No focal deficit present.      Mental Status: She is oriented to person, place, and time.      Comments: Word searching, slurring of speech, diplopia       Vents:     Lines/Drains/Airways       Drain  Duration                  Urethral Catheter 07/27/22 1900 <1 day              Peripheral Intravenous Line  Duration                  Peripheral IV - Single Lumen 07/24/22 1643 18 G Left Antecubital 3 days         Peripheral IV - Single Lumen 07/27/22 2133 18 G Right Antecubital <1 day                  Significant Labs:    CBC/Anemia Profile:  Recent Labs   Lab 07/27/22  0454 07/28/22  0253   WBC 9.60 10.03   HGB 10.5* 10.6*   HCT 31.6* 31.7*    220   MCV 95 93   RDW 13.1 13.5        Chemistries:  Recent Labs   Lab 07/27/22  0454 07/27/22  0737 07/27/22  1837 07/27/22  2056 07/28/22  0533   *   < > 121* 123* 129*   K 4.3   < > 4.5 4.8 4.4   CL 88*   < > 94* 90* 95   CO2 23   < > 19* 24 24   BUN 14   < > 17 19 15   CREATININE 0.8   < > 0.8 0.9 0.7   CALCIUM 8.3*   < > 7.7* 8.1* 8.9   ALBUMIN 2.8*  --   --   --   --    PROT 6.1  --   --   --   --    BILITOT 0.7  --   --   --   --    ALKPHOS 97  --   --   --   --    ALT 10  --   --   --   --    AST 18  --    --   --   --    MG 1.9  --   --   --   --    PHOS 3.2  --   --   --   --     < > = values in this interval not displayed.       Recent Lab Results  (Last 5 results in the past 24 hours)        07/28/22  0533   07/28/22  0253   07/27/22  2056   07/27/22  1837   07/27/22  1201        Anion Gap 10     9   8   7       Baso #   0.04             Basophil %   0.4             BUN 15     19   17   17       Calcium 8.9     8.1   7.7   8.3       Chloride 95     90   94   89       CO2 24     24   19   22       Creatinine 0.7     0.9   0.8   1.1       Differential Method   Automated             eGFR if  >60.0     >60.0   >60.0   51.1       eGFR if non  >60.0  Comment: Calculation used to obtain the estimated glomerular filtration  rate (eGFR) is the CKD-EPI equation.        56.5  Comment: Calculation used to obtain the estimated glomerular filtration  rate (eGFR) is the CKD-EPI equation.      >60.0  Comment: Calculation used to obtain the estimated glomerular filtration  rate (eGFR) is the CKD-EPI equation.      44.3  Comment: Calculation used to obtain the estimated glomerular filtration  rate (eGFR) is the CKD-EPI equation.          Eos #   0.4             Eosinophil %   4.1             Glucose 80     100   107   115       Gran # (ANC)   7.6             Gran %   75.2             Hematocrit   31.7             Hemoglobin   10.6             Immature Grans (Abs)   0.06  Comment: Mild elevation in immature granulocytes is non specific and   can be seen in a variety of conditions including stress response,   acute inflammation, trauma and pregnancy. Correlation with other   laboratory and clinical findings is essential.               Immature Granulocytes   0.6             Lymph #   1.3             Lymph %   12.8             MCH   31.2             MCHC   33.4             MCV   93             Mono #   0.7             Mono %   6.9             MPV   11.6             nRBC   0             Platelets    220             Potassium 4.4     4.8   4.5   4.4       RBC   3.40             RDW   13.5             Sodium 129     123   121   118  Comment: *Critical value notification by [facility] with confirmation of receipt to   WANDA PARRISH RN at  [id number].         WBC   10.03                                    Significant Imaging: I have reviewed all pertinent imaging results/findings within the past 24 hours.    Assessment/Plan:     Cardiac/Vascular  Coronary artery disease involving native coronary artery of native heart  - Cont ASA    Essential hypertension  - Hold all diuretics  - Cont Carvedilol    Renal/  * Hyponatremia  Patient with acute on chronic hyponatremia. Baseline sodium approximately 133. On admission 128, trending down. 7/26 sodium was 127 and 7/27 sodium was 120. Repeat sodiums 119>121>118. Some worsening of mental status including increased confusion from baseline, slurring of speech, and diplopia. She has been required an extended hospitalization in the past for hyponatremia, requiring vaptans and strict fluid restriction. She also recently had her diuretic adjusted to PRN for weight gain and a new murmur by her outpt cardiologist. Symptoms are similar to previous symptomatic hyponatremia. She was given NS on the floor with sudden worsening of her sodium from 122-118.     - q4 BMP   - Strict fluid restriction <500cc  - Remove water containers from room  - If mental status acutely worsens give 3% NS and check q2 BMP   - Repeat urine electrolytes now off confounding medications          Endocrine  SIADH (syndrome of inappropriate ADH production)  See hyponatremia    Hypothyroidism due to acquired atrophy of thyroid  TSH- 7.019  T4- 1.2    - Continue home Synthroid    Orthopedic  Multiple rib fractures  - Surgery recommends Hydrocodone and Robaxin for pain control and monitor for splinting        Critical Care Daily Checklist:    A: Awake: RASS Goal/Actual Goal:    Actual: Ren Agitation  Sedation Scale (RASS): Alert and calm   B: Spontaneous Breathing Trial Performed?     C: SAT & SBT Coordinated?  N/a                      D: Delirium: CAM-ICU Overall CAM-ICU: Positive   E: Early Mobility Performed? Yes   F: Feeding Goal:    Status:     Current Diet Order   Procedures    Diet Dysphagia Mechanical Soft (IDDSI Level 5) Fluid - 1000mL     Please send diet coke with meals: lunch and dinner     Order Specific Question:   Fluid restriction:     Answer:   Fluid - 1000mL      AS: Analgesia/Sedation N/a   T: Thromboembolic Prophylaxis SCD   H: HOB > 300 Yes   U: Stress Ulcer Prophylaxis (if needed) N/a   G: Glucose Control N/a   B: Bowel Function Stool Occurrence: 0   I: Indwelling Catheter (Lines & Cline) Necessity Yes   D: De-escalation of Antimicrobials/Pharmacotherapies N/a    Plan for the day/ETD Correct sodium    Code Status:  Family/Goals of Care: Full Code         Critical secondary to Patient has a condition that poses threat to life and bodily function: Symptomatic hyponatremia     Critical care was time spent personally by me on the following activities: development of treatment plan with patient or surrogate and bedside caregivers, discussions with consultants, evaluation of patient's response to treatment, examination of patient, ordering and performing treatments and interventions, ordering and review of laboratory studies, ordering and review of radiographic studies, pulse oximetry, re-evaluation of patient's condition. This critical care time did not overlap with that of any other provider or involve time for any procedures.     Ran Strickland, DO  Critical Care Medicine  [facility] - Cardiac Medical ICU

## 2022-07-28 NOTE — PT/OT/SLP DISCHARGE
"Physical Therapy Discharge Summary    Name: Laila Hanna  MRN: [id number]   Principal Problem: Hyponatremia     Patient Discharged from acute Physical Therapy on 2022.  Please refer to prior PT noted date on 2022 for functional status.     Assessment:     "Patient transferred to Critical Care Medicine for management of hyponatremia in setting of encephalopathy."    Objective:     GOALS:   Multidisciplinary Problems     Physical Therapy Goals        Problem: Physical Therapy    Goal Priority Disciplines Outcome Goal Variances Interventions   Physical Therapy Goal     PT, PT/OT Ongoing, Progressing     Description: Goals to be met by: 22     Patient will increase functional independence with mobility by performin. Supine to sit with Minimal Assistance  2. Sit to supine with MInimal Assistance  3. Rolling to Left and Right with Stand-by Assistance  4. Sit to stand transfer with Stand-by Assistance using LRAD  5. Gait  x 75 feet with Stand-by Assistance using LRAD                     Reasons for Discontinuation of Therapy Services  transfer to ICU . New orders needed when pt medically appropriate.       Plan:     Patient Discharged to: in house (ICU).      2022  "

## 2022-07-28 NOTE — PLAN OF CARE
LIV spoke with patient's niece Anamika this am. Advised of the facilities that are willing to accept patient for skilled nursing. Anamika advised that [facility] is there first choice, and [facility] second choice. LIV spoke with Briana at [facility]. She advised that she should have beds Monday or Tuesday and will keep patient on radar. Patient is not medically ready to discharge per MD. [facility] responded that they are willing to accept patient when medically ready also.  CM to follow for when patient is medically ready to discharge.    Makenna Castro RN     [phone]

## 2022-07-28 NOTE — SUBJECTIVE & OBJECTIVE
Interval History:   No acute events overnight.   Patient stepped up to MICU yesterday for hyponatremia and AMS. Sodium improved this morning to 129. Mental status improved - oriented.   Minimal pain with deep inspiration.   On 1L NC with SpO2 92%    Medications:  Continuous Infusions:  Scheduled Meds:   aspirin  81 mg Oral Daily    atorvastatin  40 mg Oral Daily    enoxaparin  40 mg Subcutaneous Daily    levothyroxine  150 mcg Oral Daily    LIDOcaine  1 patch Transdermal Q24H     PRN Meds:docusate sodium, melatonin, ondansetron, oxyCODONE, oxyCODONE, sodium chloride 0.9%, DIPH,PERTUSS(ACELL),TET VACCINE (ADULT)(BOOSTRIX,ADACEL)     Review of patient's allergies indicates:   Allergen Reactions    Thiazides Other (See Comments)     Severe hyponatremia     Objective:     Vital Signs (Most Recent):  Temp: 96.4 °F (35.8 °C) (07/28/22 1200)  Pulse: 71 (07/28/22 1300)  Resp: (!) 24 (07/28/22 1300)  BP: 127/61 (07/28/22 1300)  SpO2: (!) 93 % (07/28/22 1300)   Vital Signs (24h Range):  Temp:  [96.4 °F (35.8 °C)-98.7 °F (37.1 °C)] 96.4 °F (35.8 °C)  Pulse:  [55-74] 71  Resp:  [10-32] 24  SpO2:  [88 %-100 %] 93 %  BP: (106-147)/(51-67) 127/61     Weight: 93.9 kg (207 lb 0.2 oz)  Body mass index is 33.41 kg/m².    Intake/Output - Last 3 Shifts         07/26 0700 07/27 0659 07/27 0700 07/28 0659 07/28 0700 07/29 0659    P.O. 1320 360 240    Total Intake(mL/kg) 1320 (14.1) 360 (3.8) 240 (2.6)    Urine (mL/kg/hr) 1105 (0.5) 4750 (2.1) 500 (0.7)    Stool  0 0    Total Output 1105 4750 500    Net +215 -4390 -260           Stool Occurrence  0 x 0 x            Physical Exam  Vitals and nursing note reviewed.   Constitutional:       General: She is not in acute distress.     Appearance: She is not ill-appearing.   HENT:      Head: Normocephalic and atraumatic.   Eyes:      Extraocular Movements: Extraocular movements intact.   Cardiovascular:      Rate and Rhythm: Normal rate.   Pulmonary:      Effort: Pulmonary effort is normal. No  respiratory distress.      Comments: No increased WOB  1L NC  Abdominal:      General: There is no distension.      Palpations: Abdomen is soft.      Tenderness: There is no abdominal tenderness.   Musculoskeletal:         General: No deformity.      Comments: Back brace in place  TTP to R chest wall.   Hematoma to R knee and R hand   Neurological:      General: No focal deficit present.      Mental Status: She is alert and oriented to person, place, and time.      Cranial Nerves: No cranial nerve deficit.      Sensory: No sensory deficit.      Motor: No weakness.   Psychiatric:         Mood and Affect: Mood normal.       Significant Labs:  I have reviewed all pertinent lab results within the past 24 hours.  CBC:   Recent Labs   Lab 07/28/22  0253   WBC 10.03   RBC 3.40*   HGB 10.6*   HCT 31.7*      MCV 93   MCH 31.2*   MCHC 33.4       BMP:   Recent Labs   Lab 07/27/22  0454 07/27/22  0737 07/28/22  0533   GLU 85   < > 80   *   < > 129*   K 4.3   < > 4.4   CL 88*   < > 95   CO2 23   < > 24   BUN 14   < > 15   CREATININE 0.8   < > 0.7   CALCIUM 8.3*   < > 8.9   MG 1.9  --   --     < > = values in this interval not displayed.         Significant Diagnostics:  I have reviewed all pertinent imaging results/findings within the past 24 hours.  I have reviewed and interpreted all pertinent imaging results/findings within the past 24 hours.

## 2022-07-28 NOTE — NURSING
Pending BMP results. Multiple specimens  sent to lab since 0800. All labs have hemolyzed, per lab carrier, Luis. Charge nurse and pt care team notified. RN placed consult for phlebotomy. Will continue to monitor.

## 2022-07-29 PROBLEM — R40.0 SOMNOLENCE: Status: RESOLVED | Noted: 2022-07-27 | Resolved: 2022-07-29

## 2022-07-29 PROBLEM — S32.000A LUMBAR COMPRESSION FRACTURE: Status: RESOLVED | Noted: 2022-07-24 | Resolved: 2022-07-29

## 2022-07-29 LAB
ALLENS TEST: ABNORMAL
ANION GAP SERPL CALC-SCNC: 4 MMOL/L (ref 8–16)
BASOPHILS # BLD AUTO: 0.04 K/UL (ref 0–0.2)
BASOPHILS NFR BLD: 0.4 % (ref 0–1.9)
BUN SERPL-MCNC: 14 MG/DL (ref 8–23)
CALCIUM SERPL-MCNC: 8.3 MG/DL (ref 8.7–10.5)
CHLORIDE SERPL-SCNC: 98 MMOL/L (ref 95–110)
CO2 SERPL-SCNC: 27 MMOL/L (ref 23–29)
CREAT SERPL-MCNC: 0.6 MG/DL (ref 0.5–1.4)
DELSYS: ABNORMAL
DIFFERENTIAL METHOD: ABNORMAL
EOSINOPHIL # BLD AUTO: 0.4 K/UL (ref 0–0.5)
EOSINOPHIL NFR BLD: 4.4 % (ref 0–8)
ERYTHROCYTE [DISTWIDTH] IN BLOOD BY AUTOMATED COUNT: 13.2 % (ref 11.5–14.5)
EST. GFR  (AFRICAN AMERICAN): >60 ML/MIN/1.73 M^2
EST. GFR  (NON AFRICAN AMERICAN): >60 ML/MIN/1.73 M^2
FLOW: 2
GLUCOSE SERPL-MCNC: 95 MG/DL (ref 70–110)
HCO3 UR-SCNC: 27.2 MMOL/L (ref 24–28)
HCO3 UR-SCNC: 27.5 MMOL/L (ref 24–28)
HCO3 UR-SCNC: 28.5 MMOL/L (ref 24–28)
HCT VFR BLD AUTO: 30.4 % (ref 37–48.5)
HCT VFR BLD CALC: 29 %PCV (ref 36–54)
HCT VFR BLD CALC: 29 %PCV (ref 36–54)
HCT VFR BLD CALC: 32 %PCV (ref 36–54)
HGB BLD-MCNC: 10 G/DL (ref 12–16)
IMM GRANULOCYTES # BLD AUTO: 0.04 K/UL (ref 0–0.04)
IMM GRANULOCYTES NFR BLD AUTO: 0.4 % (ref 0–0.5)
LYMPHOCYTES # BLD AUTO: 1.6 K/UL (ref 1–4.8)
LYMPHOCYTES NFR BLD: 16.6 % (ref 18–48)
MAGNESIUM SERPL-MCNC: 1.9 MG/DL (ref 1.6–2.6)
MCH RBC QN AUTO: 31.4 PG (ref 27–31)
MCHC RBC AUTO-ENTMCNC: 32.9 G/DL (ref 32–36)
MCV RBC AUTO: 96 FL (ref 82–98)
MODE: ABNORMAL
MODE: ABNORMAL
MONOCYTES # BLD AUTO: 0.8 K/UL (ref 0.3–1)
MONOCYTES NFR BLD: 8.4 % (ref 4–15)
NEUTROPHILS # BLD AUTO: 6.6 K/UL (ref 1.8–7.7)
NEUTROPHILS NFR BLD: 69.8 % (ref 38–73)
NRBC BLD-RTO: 0 /100 WBC
PCO2 BLDA: 43.6 MMHG (ref 35–45)
PCO2 BLDA: 47.4 MMHG (ref 35–45)
PCO2 BLDA: 47.5 MMHG (ref 35–45)
PH SMN: 7.37 [PH] (ref 7.35–7.45)
PH SMN: 7.39 [PH] (ref 7.35–7.45)
PH SMN: 7.4 [PH] (ref 7.35–7.45)
PHOSPHATE SERPL-MCNC: 2.3 MG/DL (ref 2.7–4.5)
PLATELET # BLD AUTO: 244 K/UL (ref 150–450)
PMV BLD AUTO: 10.5 FL (ref 9.2–12.9)
PO2 BLDA: 29 MMHG (ref 40–60)
PO2 BLDA: 34 MMHG (ref 40–60)
PO2 BLDA: 36 MMHG (ref 40–60)
POC BE: 2 MMOL/L
POC BE: 2 MMOL/L
POC BE: 3 MMOL/L
POC IONIZED CALCIUM: 1.17 MMOL/L (ref 1.06–1.42)
POC IONIZED CALCIUM: 1.17 MMOL/L (ref 1.06–1.42)
POC IONIZED CALCIUM: 1.19 MMOL/L (ref 1.06–1.42)
POC SATURATED O2: 54 % (ref 95–100)
POC SATURATED O2: 65 % (ref 95–100)
POC SATURATED O2: 67 % (ref 95–100)
POC TCO2: 29 MMOL/L (ref 24–29)
POC TCO2: 29 MMOL/L (ref 24–29)
POC TCO2: 30 MMOL/L (ref 24–29)
POTASSIUM BLD-SCNC: 4.4 MMOL/L (ref 3.5–5.1)
POTASSIUM BLD-SCNC: 4.4 MMOL/L (ref 3.5–5.1)
POTASSIUM BLD-SCNC: 4.5 MMOL/L (ref 3.5–5.1)
POTASSIUM SERPL-SCNC: 4.4 MMOL/L (ref 3.5–5.1)
RBC # BLD AUTO: 3.18 M/UL (ref 4–5.4)
SAMPLE: ABNORMAL
SITE: ABNORMAL
SODIUM BLD-SCNC: 130 MMOL/L (ref 136–145)
SODIUM SERPL-SCNC: 129 MMOL/L (ref 136–145)
SODIUM SERPL-SCNC: 129 MMOL/L (ref 136–145)
SP02: 100
SP02: 96
WBC # BLD AUTO: 9.4 K/UL (ref 3.9–12.7)

## 2022-07-29 PROCEDURE — 80048 BASIC METABOLIC PNL TOTAL CA: CPT

## 2022-07-29 PROCEDURE — 25000003 PHARM REV CODE 250: Performed by: STUDENT IN AN ORGANIZED HEALTH CARE EDUCATION/TRAINING PROGRAM

## 2022-07-29 PROCEDURE — 82803 BLOOD GASES ANY COMBINATION: CPT

## 2022-07-29 PROCEDURE — 83605 ASSAY OF LACTIC ACID: CPT

## 2022-07-29 PROCEDURE — 99900035 HC TECH TIME PER 15 MIN (STAT)

## 2022-07-29 PROCEDURE — 63600175 PHARM REV CODE 636 W HCPCS: Performed by: STUDENT IN AN ORGANIZED HEALTH CARE EDUCATION/TRAINING PROGRAM

## 2022-07-29 PROCEDURE — 83735 ASSAY OF MAGNESIUM: CPT | Performed by: STUDENT IN AN ORGANIZED HEALTH CARE EDUCATION/TRAINING PROGRAM

## 2022-07-29 PROCEDURE — 84295 ASSAY OF SERUM SODIUM: CPT

## 2022-07-29 PROCEDURE — 84132 ASSAY OF SERUM POTASSIUM: CPT

## 2022-07-29 PROCEDURE — 99232 SBSQ HOSP IP/OBS MODERATE 35: CPT | Mod: 95,,, | Performed by: INTERNAL MEDICINE

## 2022-07-29 PROCEDURE — 27000221 HC OXYGEN, UP TO 24 HOURS

## 2022-07-29 PROCEDURE — 36415 COLL VENOUS BLD VENIPUNCTURE: CPT

## 2022-07-29 PROCEDURE — 84100 ASSAY OF PHOSPHORUS: CPT | Performed by: STUDENT IN AN ORGANIZED HEALTH CARE EDUCATION/TRAINING PROGRAM

## 2022-07-29 PROCEDURE — 20600001 HC STEP DOWN PRIVATE ROOM

## 2022-07-29 PROCEDURE — 99232 PR SUBSEQUENT HOSPITAL CARE,LEVL II: ICD-10-PCS | Mod: 95,,, | Performed by: INTERNAL MEDICINE

## 2022-07-29 PROCEDURE — 85025 COMPLETE CBC W/AUTO DIFF WBC: CPT | Performed by: STUDENT IN AN ORGANIZED HEALTH CARE EDUCATION/TRAINING PROGRAM

## 2022-07-29 PROCEDURE — 82330 ASSAY OF CALCIUM: CPT

## 2022-07-29 PROCEDURE — 82565 ASSAY OF CREATININE: CPT

## 2022-07-29 PROCEDURE — 85014 HEMATOCRIT: CPT

## 2022-07-29 PROCEDURE — 25000003 PHARM REV CODE 250

## 2022-07-29 PROCEDURE — 82800 BLOOD PH: CPT

## 2022-07-29 PROCEDURE — 94761 N-INVAS EAR/PLS OXIMETRY MLT: CPT

## 2022-07-29 RX ADMIN — LEVOTHYROXINE SODIUM 150 MCG: 150 TABLET ORAL at 08:07

## 2022-07-29 RX ADMIN — ENOXAPARIN SODIUM 40 MG: 40 INJECTION SUBCUTANEOUS at 04:07

## 2022-07-29 RX ADMIN — OXYCODONE HYDROCHLORIDE 10 MG: 10 TABLET ORAL at 09:07

## 2022-07-29 RX ADMIN — ATORVASTATIN CALCIUM 40 MG: 20 TABLET, FILM COATED ORAL at 09:07

## 2022-07-29 RX ADMIN — Medication 6 MG: at 09:07

## 2022-07-29 RX ADMIN — DOCUSATE SODIUM 50 MG: 50 CAPSULE, LIQUID FILLED ORAL at 02:07

## 2022-07-29 RX ADMIN — ASPIRIN 81 MG: 81 TABLET, COATED ORAL at 09:07

## 2022-07-29 RX ADMIN — OXYCODONE HYDROCHLORIDE 10 MG: 10 TABLET ORAL at 02:07

## 2022-07-29 NOTE — ASSESSMENT & PLAN NOTE
Deemed stable and nonsurgical per Neurosurgery   - SLO brace until rib fractures are heeled then TLSO brace  - PT/OT

## 2022-07-29 NOTE — PROGRESS NOTES
[facility]  General Surgery  Progress Note    Subjective:     History of Present Illness:  No notes on file    Post-Op Info:  * No surgery found *         Interval History:   No acute events overnight.   States rib pain well managed, mainly having back pain which is improved with pain medication.   Denies SOB. Able to cough without issue.   On RA since last night with SpO2 >92%.    Medications:  Continuous Infusions:  Scheduled Meds:   aspirin  81 mg Oral Daily    atorvastatin  40 mg Oral Daily    enoxaparin  40 mg Subcutaneous Daily    levothyroxine  150 mcg Oral Daily    LIDOcaine  1 patch Transdermal Q24H     PRN Meds:docusate sodium, melatonin, ondansetron, oxyCODONE, oxyCODONE, sodium chloride 0.9%, DIPH,PERTUSS(ACELL),TET VACCINE (ADULT)(BOOSTRIX,ADACEL)     Review of patient's allergies indicates:   Allergen Reactions    Thiazides Other (See Comments)     Severe hyponatremia     Objective:     Vital Signs (Most Recent):  Temp: 97.2 °F (36.2 °C) (07/29/22 0400)  Pulse: 71 (07/29/22 0700)  Resp: 15 (07/29/22 0700)  BP: (!) 145/82 (07/29/22 0700)  SpO2: 97 % (07/29/22 0700)   Vital Signs (24h Range):  Temp:  [96.4 °F (35.8 °C)-97.2 °F (36.2 °C)] 97.2 °F (36.2 °C)  Pulse:  [69-88] 71  Resp:  [11-37] 15  SpO2:  [86 %-100 %] 97 %  BP: (109-153)/(53-82) 145/82     Weight: 93.9 kg (207 lb 0.2 oz)  Body mass index is 33.41 kg/m².    Intake/Output - Last 3 Shifts         07/27 0700 07/28 0659 07/28 0700 07/29 0659 07/29 0700 07/30 0659    P.O. 360 240     Total Intake(mL/kg) 360 (3.8) 240 (2.6)     Urine (mL/kg/hr) 4750 (2.1) 1100 (0.5)     Stool 0 0     Total Output 4750 1100     Net -4390 -860            Stool Occurrence 0 x 0 x 0 x            Physical Exam  Vitals and nursing note reviewed.   Constitutional:       General: She is not in acute distress.     Appearance: She is not ill-appearing.   HENT:      Head: Normocephalic and atraumatic.   Eyes:      Extraocular Movements:  Extraocular movements intact.   Cardiovascular:      Rate and Rhythm: Normal rate.   Pulmonary:      Effort: Pulmonary effort is normal. No respiratory distress.      Comments: No increased WOB  1L NC  Abdominal:      General: There is no distension.      Palpations: Abdomen is soft.      Tenderness: There is no abdominal tenderness.   Musculoskeletal:         General: No deformity.      Comments: Back brace in place  TTP to R chest wall.   Hematoma to R knee and R hand   Neurological:      General: No focal deficit present.      Mental Status: She is alert and oriented to person, place, and time.      Cranial Nerves: No cranial nerve deficit.      Sensory: No sensory deficit.      Motor: No weakness.   Psychiatric:         Mood and Affect: Mood normal.       Significant Labs:  I have reviewed all pertinent lab results within the past 24 hours.  CBC:   Recent Labs   Lab 07/29/22 0347 07/29/22 0348   WBC 9.40  --    RBC 3.18*  --    HGB 10.0*  --    HCT 30.4* 29*     --    MCV 96  --    MCH 31.4*  --    MCHC 32.9  --        BMP:   Recent Labs   Lab 07/29/22 0347   GLU 95   *   K 4.4   CL 98   CO2 27   BUN 14   CREATININE 0.6   CALCIUM 8.3*   MG 1.9         Significant Diagnostics:  I have reviewed all pertinent imaging results/findings within the past 24 hours.  I have reviewed and interpreted all pertinent imaging results/findings within the past 24 hours.    Assessment/Plan:     Multiple rib fractures  Patient is a 89 year old female with h/o HTN, CAD, degenerative disc disease, HLD, MI, TIA presenting after a fall from standing with R rib 4-9 fractures and L2 burst fracture. Clinically stable but with worsening hyponatremia     - Fine for diet, aspiration precautions  - Scheduled multimodal pain regimen  - PRN pain and nausea medications  - Wound care   - DVT prophylaxis (SCDs and lovenox)  - OOB, ambulate  - Inhalation treatments  - Daily CXR   - Wean O2 as tolerated        Stella Bernard,  MD  General Surgery  [facility] - Cardiac Medical ICU

## 2022-07-29 NOTE — SUBJECTIVE & OBJECTIVE
Interval History/Significant Events: NAEOM. Her mentation continues to be at baseline. She complains of rib and back pain.     Review of Systems   Constitutional:  Negative for chills, diaphoresis, fatigue and fever.   HENT:  Negative for congestion, rhinorrhea, sore throat and trouble swallowing.    Eyes:  Positive for visual disturbance.   Respiratory:  Negative for cough, chest tightness, shortness of breath and wheezing.    Cardiovascular:  Negative for chest pain, palpitations and leg swelling.   Gastrointestinal:  Negative for abdominal distention, abdominal pain, blood in stool, diarrhea, nausea and vomiting.   Genitourinary:  Negative for difficulty urinating, dysuria, flank pain and hematuria.   Musculoskeletal:  Negative for arthralgias, back pain and neck pain.   Skin:  Negative for rash and wound.   Neurological:  Negative for dizziness, syncope, weakness, light-headedness, numbness and headaches.   Objective:     Vital Signs (Most Recent):  Temp: 97.1 °F (36.2 °C) (07/29/22 0800)  Pulse: 70 (07/29/22 1300)  Resp: 18 (07/29/22 1443)  BP: 124/61 (07/29/22 1300)  SpO2: (!) 93 % (07/29/22 1300)   Vital Signs (24h Range):  Temp:  [97 °F (36.1 °C)-97.2 °F (36.2 °C)] 97.1 °F (36.2 °C)  Pulse:  [68-88] 70  Resp:  [11-25] 18  SpO2:  [86 %-100 %] 93 %  BP: (123-153)/(58-82) 124/61   Weight: 93.9 kg (207 lb 0.2 oz)  Body mass index is 33.41 kg/m².      Intake/Output Summary (Last 24 hours) at 7/29/2022 1603  Last data filed at 7/29/2022 1200  Gross per 24 hour   Intake --   Output 615 ml   Net -615 ml       Physical Exam  Constitutional:       General: She is not in acute distress.     Appearance: Normal appearance. She is not ill-appearing, toxic-appearing or diaphoretic.   HENT:      Nose: No congestion or rhinorrhea.      Mouth/Throat:      Mouth: Mucous membranes are moist.      Pharynx: No oropharyngeal exudate or posterior oropharyngeal erythema.   Eyes:      General: No scleral icterus.        Right eye: No  discharge.         Left eye: No discharge.   Cardiovascular:      Rate and Rhythm: Regular rhythm. Bradycardia present.      Pulses: Normal pulses.      Heart sounds: Murmur heard.     No friction rub. No gallop.      Comments: Trace edema BLE  Pulmonary:      Effort: Pulmonary effort is normal. No respiratory distress.      Breath sounds: Normal breath sounds. No stridor. No wheezing, rhonchi or rales.   Chest:      Chest wall: No tenderness.   Abdominal:      General: Abdomen is flat. Bowel sounds are normal. There is no distension.      Palpations: Abdomen is soft. There is no mass.      Tenderness: There is no abdominal tenderness. There is no guarding.   Musculoskeletal:      Right lower leg: No edema.      Left lower leg: No edema.   Skin:     General: Skin is warm and dry.      Coloration: Skin is not jaundiced.      Findings: Bruising present.   Neurological:      General: No focal deficit present.      Mental Status: She is oriented to person, place, and time.      Comments: Word searching, slurring of speech, diplopia       Vents:     Lines/Drains/Airways       Drain  Duration                  Urethral Catheter 07/27/22 1900 1 day              Peripheral Intravenous Line  Duration                  Peripheral IV - Single Lumen 07/24/22 1643 18 G Left Antecubital 4 days         Peripheral IV - Single Lumen 07/27/22 2133 18 G Right Antecubital 1 day                  Significant Labs:    CBC/Anemia Profile:  Recent Labs   Lab 07/28/22  0253 07/28/22  1942 07/29/22  0347 07/29/22  0348 07/29/22  1103   WBC 10.03  --  9.40  --   --    HGB 10.6*  --  10.0*  --   --    HCT 31.7*   < > 30.4* 29* 32*     --  244  --   --    MCV 93  --  96  --   --    RDW 13.5  --  13.2  --   --     < > = values in this interval not displayed.        Chemistries:  Recent Labs   Lab 07/28/22  0533 07/28/22  1400 07/29/22  0347   * 129* 129*   K 4.4 4.5 4.4   CL 95 97 98   CO2 24 24 27   BUN 15 14 14   CREATININE 0.7 0.7  0.6   CALCIUM 8.9 8.9 8.3*   MG  --   --  1.9   PHOS  --   --  2.3*       Recent Lab Results  (Last 5 results in the past 24 hours)        07/29/22  1103   07/29/22  0348   07/29/22  0347   07/29/22  0008   07/28/22 1942        Allens Test N/A   N/A     N/A   N/A       Anion Gap     4           Baso #     0.04           Basophil %     0.4           Site Other   Other     Other   Other       BUN     14           Calcium     8.3           Chloride     98           CO2     27           Creatinine     0.6           DelSys Room Air   Room Air     Nasal Can   Nasal Can       Differential Method     Automated           eGFR if      >60.0           eGFR if non      >60.0  Comment: Calculation used to obtain the estimated glomerular filtration  rate (eGFR) is the CKD-EPI equation.              Eos #     0.4           Eosinophil %     4.4           Flow       2   1       Glucose     95           Gran # (ANC)     6.6           Gran %     69.8           Hematocrit     30.4           Hemoglobin     10.0           Immature Grans (Abs)     0.04  Comment: Mild elevation in immature granulocytes is non specific and   can be seen in a variety of conditions including stress response,   acute inflammation, trauma and pregnancy. Correlation with other   laboratory and clinical findings is essential.             Immature Granulocytes     0.4           Lymph #     1.6           Lymph %     16.6           Magnesium     1.9           MCH     31.4           MCHC     32.9           MCV     96           Mode   SPONT     SPONT   SPONT       Mono #     0.8           Mono %     8.4           MPV     10.5           nRBC     0           Phosphorus     2.3           Platelets     244           POC BE 2   3     2   3       POC HCO3 27.2   28.5     27.5   28.6       POC Hematocrit 32   29     29   30       POC Ionized Calcium 1.17   1.17     1.19   1.18       POC PCO2 43.6   47.5     47.4   50.2       POC PH 7.403    7.386     7.371   7.364       POC PO2 34   29     36   20       POC Potassium 4.4   4.4     4.5   4.6       POC SATURATED O2 65   54     67   28       POC Sodium 130   130     130   130       POC TCO2 29   30     29   30       Potassium     4.4           RBC     3.18           RDW     13.2           Sample VENOUS   VENOUS     VENOUS   VENOUS       Sodium     129           Sp02   96     100   94       WBC     9.40                                  Significant Imaging:  I have reviewed all pertinent imaging results/findings within the past 24 hours.

## 2022-07-29 NOTE — NURSING TRANSFER
Nursing Transfer Note      7/29/2022     Reason patient is being transferred: step down  Transfer To: [id number]    Transfer via bed    Transfer with cardiac monitoring    Transported by RN     Medicines sent: n/a    Any special needs or follow-up needed: new OT/PT orders and mobility orders from MD - information passed to RN on 14WT and ICU MD care team.    Chart send with patient: Yes    Notified: care provider accompanying patient      Upon arrival to floor: cardiac monitor applied, patient oriented to room, call bell and patient belongings withiin reach, and bed in lowest position

## 2022-07-29 NOTE — PLAN OF CARE
CMICU DAILY GOALS       A: Awake    RASS: Goal -    Actual - RASS (Ren Agitation-Sedation Scale): 0-->alert and calm   Restraint necessity:    B: Breathe   SBT: Not intubated   C: Coordinate A & B, analgesics/sedatives   Pain: managed    SAT: Not intubated  D: Delirium   CAM-ICU: Overall CAM-ICU: Negative  E: Early(intubated/ Progressive (non-intubated) Mobility   MOVE Screen: Pass   Activity: Activity Management: Rolling - L1  FAS: Feeding/Nutrition   Diet order: Diet/Nutrition Received: restrict fluids, Specialty Diet/Nutrition Received: dysphagia mechanically altered  T: Thrombus   DVT prophylaxis: VTE Required Core Measure: Patient refused interventions  H: HOB Elevation   Head of Bed (HOB) Positioning: HOB at 30-45 degrees  U: Ulcer Prophylaxis   GI: yes  G: Glucose control   managed    S: Skin   Bathing/Skin Care: bath, complete  Device Skin Pressure Protection: absorbent pad utilized/changed  Pressure Reduction Devices: specialty bed utilized  Pressure Reduction Techniques: frequent weight shift encouraged  Skin Protection: adhesive use limited  B: Bowel Function   no issues   I: Indwelling Catheters   Cline necessity:      Urethral Catheter 07/27/22 1900-Reason for Continuing Urinary Catheterization: Urinary retention   CVC necessity: No  D: De-escalation Antibiotics   Yes    Family/Goals of care/Code Status   Code Status: Full Code    24H Vital Sign Range  Temp:  [96.4 °F (35.8 °C)-97.4 °F (36.3 °C)]   Pulse:  [69-88]   Resp:  [11-37]   BP: (109-151)/(53-67)   SpO2:  [86 %-100 %]      Shift Events   No acute events throughout shift    VS and assessment per flow sheet, patient progressing towards goals as tolerated, plan of care reviewed with family, all concerns addressed, will continue to monitor.    Deni Camargo

## 2022-07-29 NOTE — PT/OT/SLP DISCHARGE
Occupational Therapy Discharge Summary    Laila Hanna  MRN: [id number]   Principal Problem: Hyponatremia      Patient Discharged from acute Occupational Therapy on 7/29/22.    Please refer to prior OT note dated 7/27/22 for functional status.    Due to patient being transferred to ICU therapy requires NEW orders when deemed medically appropriate to safety participate.    Assessment:      Patient transferred to ICU per Critical care note, due to encephalopathy/mentation and hyponatremia.    Objective:     GOALS:   Multidisciplinary Problems     Occupational Therapy Goals        Problem: Occupational Therapy    Goal Priority Disciplines Outcome Interventions   Occupational Therapy Goal     OT, PT/OT Ongoing, Progressing    Description: Goals to be met by: 8/25/2022 (1 month)     Patient will increase functional independence with ADLs by performing:    UE Dressing with Moderate Assistance.  LE Dressing with Maximum Assistance.  Grooming while standing at sink with Minimal Assistance.  Toileting from toilet with Minimal Assistance for hygiene and clothing management.   Rolling to Bilateral with Standby Assistance.   Supine to sit with Minimal Assistance.  Step transfer with Stand-by Assistance  Toilet transfer to toilet with Stand-by Assistance.                     Reasons for Discontinuation of Therapy Services  Transferred to ICU      Plan:     [facility]    7/29/2022

## 2022-07-29 NOTE — PLAN OF CARE
POC reviewed, AOX4, sitter at bedside, complains of pain, oxycodone given with good results, TSLO brace in use, neurovascular checks WNL, gates catheter draining clear yellow urine to  bag per gravity, turned Q2hrs, no distress noted, VSS, will continue to monitor.    Problem: Adult Inpatient Plan of Care  Goal: Plan of Care Review  Outcome: Ongoing, Progressing  Goal: Patient-Specific Goal (Individualized)  Outcome: Ongoing, Progressing  Goal: Absence of Hospital-Acquired Illness or Injury  Outcome: Ongoing, Progressing  Goal: Optimal Comfort and Wellbeing  Outcome: Ongoing, Progressing  Goal: Readiness for Transition of Care  Outcome: Ongoing, Progressing     Problem: Fall Injury Risk  Goal: Absence of Fall and Fall-Related Injury  Outcome: Ongoing, Progressing     Problem: Skin Injury Risk Increased  Goal: Skin Health and Integrity  Outcome: Ongoing, Progressing     Problem: Pain Acute  Goal: Acceptable Pain Control and Functional Ability  Outcome: Ongoing, Progressing     Problem: Fluid and Electrolyte Imbalance (Acute Kidney Injury/Impairment)  Goal: Fluid and Electrolyte Balance  Outcome: Ongoing, Progressing     Problem: Oral Intake Inadequate (Acute Kidney Injury/Impairment)  Goal: Optimal Nutrition Intake  Outcome: Ongoing, Progressing     Problem: Renal Function Impairment (Acute Kidney Injury/Impairment)  Goal: Effective Renal Function  Outcome: Ongoing, Progressing     Problem: Infection  Goal: Absence of Infection Signs and Symptoms  Outcome: Ongoing, Progressing

## 2022-07-29 NOTE — SUBJECTIVE & OBJECTIVE
Interval History:   No acute events overnight.   States rib pain well managed, mainly having back pain which is improved with pain medication.   Denies SOB. Able to cough without issue.   On RA since last night with SpO2 >92%.    Medications:  Continuous Infusions:  Scheduled Meds:   aspirin  81 mg Oral Daily    atorvastatin  40 mg Oral Daily    enoxaparin  40 mg Subcutaneous Daily    levothyroxine  150 mcg Oral Daily    LIDOcaine  1 patch Transdermal Q24H     PRN Meds:docusate sodium, melatonin, ondansetron, oxyCODONE, oxyCODONE, sodium chloride 0.9%, DIPH,PERTUSS(ACELL),TET VACCINE (ADULT)(BOOSTRIX,ADACEL)     Review of patient's allergies indicates:   Allergen Reactions    Thiazides Other (See Comments)     Severe hyponatremia     Objective:     Vital Signs (Most Recent):  Temp: 97.2 °F (36.2 °C) (07/29/22 0400)  Pulse: 71 (07/29/22 0700)  Resp: 15 (07/29/22 0700)  BP: (!) 145/82 (07/29/22 0700)  SpO2: 97 % (07/29/22 0700)   Vital Signs (24h Range):  Temp:  [96.4 °F (35.8 °C)-97.2 °F (36.2 °C)] 97.2 °F (36.2 °C)  Pulse:  [69-88] 71  Resp:  [11-37] 15  SpO2:  [86 %-100 %] 97 %  BP: (109-153)/(53-82) 145/82     Weight: 93.9 kg (207 lb 0.2 oz)  Body mass index is 33.41 kg/m².    Intake/Output - Last 3 Shifts         07/27 0700 07/28 0659 07/28 0700 07/29 0659 07/29 0700 07/30 0659    P.O. 360 240     Total Intake(mL/kg) 360 (3.8) 240 (2.6)     Urine (mL/kg/hr) 4750 (2.1) 1100 (0.5)     Stool 0 0     Total Output 4750 1100     Net -4390 -860            Stool Occurrence 0 x 0 x 0 x            Physical Exam  Vitals and nursing note reviewed.   Constitutional:       General: She is not in acute distress.     Appearance: She is not ill-appearing.   HENT:      Head: Normocephalic and atraumatic.   Eyes:      Extraocular Movements: Extraocular movements intact.   Cardiovascular:      Rate and Rhythm: Normal rate.   Pulmonary:      Effort: Pulmonary effort is normal. No respiratory distress.      Comments: No increased  WOB  1L NC  Abdominal:      General: There is no distension.      Palpations: Abdomen is soft.      Tenderness: There is no abdominal tenderness.   Musculoskeletal:         General: No deformity.      Comments: Back brace in place  TTP to R chest wall.   Hematoma to R knee and R hand   Neurological:      General: No focal deficit present.      Mental Status: She is alert and oriented to person, place, and time.      Cranial Nerves: No cranial nerve deficit.      Sensory: No sensory deficit.      Motor: No weakness.   Psychiatric:         Mood and Affect: Mood normal.       Significant Labs:  I have reviewed all pertinent lab results within the past 24 hours.  CBC:   Recent Labs   Lab 07/29/22 0347 07/29/22 0348   WBC 9.40  --    RBC 3.18*  --    HGB 10.0*  --    HCT 30.4* 29*     --    MCV 96  --    MCH 31.4*  --    MCHC 32.9  --        BMP:   Recent Labs   Lab 07/29/22 0347   GLU 95   *   K 4.4   CL 98   CO2 27   BUN 14   CREATININE 0.6   CALCIUM 8.3*   MG 1.9         Significant Diagnostics:  I have reviewed all pertinent imaging results/findings within the past 24 hours.  I have reviewed and interpreted all pertinent imaging results/findings within the past 24 hours.

## 2022-07-29 NOTE — PROGRESS NOTES
[facility] ([place] [address])  Critical Care Medicine  Progress Note    Patient Name: Laila Hanna  MRN: [id number]  Admission Date: 7/24/2022  Hospital Length of Stay: 4 days  Code Status: Full Code  Attending Provider: Jose Luis Smith*  Primary Care Provider: Ama Graham MD   Principal Problem: Hyponatremia    Subjective:     HPI:  Ms. Hanna is a 89 year old female with CAD, HLD, hypothyroidism, HTN, HFpEF presenting after mechanical fall at home. Patient lost her balance and fell while in her kitchen, landing on her right side without loss of consciousness nor prodrome. Per caregiver patient completes all ADLs without assistance and only requires minimal supervision. She reports experiencing HA x1wk prior to her fall. She denies taking NSAIDs at home or drinking large volumes of water. She was seen at her cardiologist x1wk ago and was told to only take her Torsemide if she notices a sudden weight gain and had an echo scheduled for a new murmur. She reports a similar episode of hyponatremia in 2016 requiring hospitalization and treatment with Tolvaptan for multiple weeks. She was also placed on strict fluid restriction but has been drinking 2-3 glasses of water/soda recently.     On admission, with negative head CT. CT C-spine was notable for acute displaced burst fracture of L2 vertebral body with extension into the spinal canal as well as nondisplaced fracture of L1 vertebral body. CT chest with posterolateral ribs fracture of ribs 4-9. Since admission, patient was hyponatremic (128) with a baseline of 133. She had an acute drop of sodium on 7/26 to 127 and to 120 on 7/27 with some worsening mentation. Hospital medicine was consulted for management of hyponatremia, but hyponatremia continued drop to 118. Patient transferred to Critical Care Medicine for management of hyponatremia in setting of encephalopathy.      Hospital/ICU Course:  In the MICU she was placed on strict fluid  restriction of <500cc and had a gates placed for close UOP measurements. She had a 4.5L output of light/clear urine over 24 hours. Her sodium improved to 129 and her mentation improved significantly to baseline. Fluid restriction was relaxed to <1500cc. She is stable and was planned for step down on 7/28       Interval History/Significant Events: NAEOM. Her mentation continues to be at baseline. She complains of rib and back pain.     Review of Systems   Constitutional:  Negative for chills, diaphoresis, fatigue and fever.   HENT:  Negative for congestion, rhinorrhea, sore throat and trouble swallowing.    Eyes:  Positive for visual disturbance.   Respiratory:  Negative for cough, chest tightness, shortness of breath and wheezing.    Cardiovascular:  Negative for chest pain, palpitations and leg swelling.   Gastrointestinal:  Negative for abdominal distention, abdominal pain, blood in stool, diarrhea, nausea and vomiting.   Genitourinary:  Negative for difficulty urinating, dysuria, flank pain and hematuria.   Musculoskeletal:  Negative for arthralgias, back pain and neck pain.   Skin:  Negative for rash and wound.   Neurological:  Negative for dizziness, syncope, weakness, light-headedness, numbness and headaches.   Objective:     Vital Signs (Most Recent):  Temp: 97.1 °F (36.2 °C) (07/29/22 0800)  Pulse: 70 (07/29/22 1300)  Resp: 18 (07/29/22 1443)  BP: 124/61 (07/29/22 1300)  SpO2: (!) 93 % (07/29/22 1300)   Vital Signs (24h Range):  Temp:  [97 °F (36.1 °C)-97.2 °F (36.2 °C)] 97.1 °F (36.2 °C)  Pulse:  [68-88] 70  Resp:  [11-25] 18  SpO2:  [86 %-100 %] 93 %  BP: (123-153)/(58-82) 124/61   Weight: 93.9 kg (207 lb 0.2 oz)  Body mass index is 33.41 kg/m².      Intake/Output Summary (Last 24 hours) at 7/29/2022 1603  Last data filed at 7/29/2022 1200  Gross per 24 hour   Intake --   Output 615 ml   Net -615 ml       Physical Exam  Constitutional:       General: She is not in acute distress.     Appearance: Normal  appearance. She is not ill-appearing, toxic-appearing or diaphoretic.   HENT:      Nose: No congestion or rhinorrhea.      Mouth/Throat:      Mouth: Mucous membranes are moist.      Pharynx: No oropharyngeal exudate or posterior oropharyngeal erythema.   Eyes:      General: No scleral icterus.        Right eye: No discharge.         Left eye: No discharge.   Cardiovascular:      Rate and Rhythm: Regular rhythm. Bradycardia present.      Pulses: Normal pulses.      Heart sounds: Murmur heard.     No friction rub. No gallop.      Comments: Trace edema BLE  Pulmonary:      Effort: Pulmonary effort is normal. No respiratory distress.      Breath sounds: Normal breath sounds. No stridor. No wheezing, rhonchi or rales.   Chest:      Chest wall: No tenderness.   Abdominal:      General: Abdomen is flat. Bowel sounds are normal. There is no distension.      Palpations: Abdomen is soft. There is no mass.      Tenderness: There is no abdominal tenderness. There is no guarding.   Musculoskeletal:      Right lower leg: No edema.      Left lower leg: No edema.   Skin:     General: Skin is warm and dry.      Coloration: Skin is not jaundiced.      Findings: Bruising present.   Neurological:      General: No focal deficit present.      Mental Status: She is oriented to person, place, and time.      Comments: Word searching, slurring of speech, diplopia       Vents:     Lines/Drains/Airways       Drain  Duration                  Urethral Catheter 07/27/22 1900 1 day              Peripheral Intravenous Line  Duration                  Peripheral IV - Single Lumen 07/24/22 1643 18 G Left Antecubital 4 days         Peripheral IV - Single Lumen 07/27/22 2133 18 G Right Antecubital 1 day                  Significant Labs:    CBC/Anemia Profile:  Recent Labs   Lab 07/28/22  0253 07/28/22  1942 07/29/22  0347 07/29/22  0348 07/29/22  1103   WBC 10.03  --  9.40  --   --    HGB 10.6*  --  10.0*  --   --    HCT 31.7*   < > 30.4* 29* 32*   PLT  220  --  244  --   --    MCV 93  --  96  --   --    RDW 13.5  --  13.2  --   --     < > = values in this interval not displayed.        Chemistries:  Recent Labs   Lab 07/28/22  0533 07/28/22  1400 07/29/22  0347   * 129* 129*   K 4.4 4.5 4.4   CL 95 97 98   CO2 24 24 27   BUN 15 14 14   CREATININE 0.7 0.7 0.6   CALCIUM 8.9 8.9 8.3*   MG  --   --  1.9   PHOS  --   --  2.3*       Recent Lab Results  (Last 5 results in the past 24 hours)        07/29/22  1103   07/29/22  0348   07/29/22  0347   07/29/22  0008   07/28/22  1942        Allens Test N/A   N/A     N/A   N/A       Anion Gap     4           Baso #     0.04           Basophil %     0.4           Site Other   Other     Other   Other       BUN     14           Calcium     8.3           Chloride     98           CO2     27           Creatinine     0.6           DelSys Room Air   Room Air     Nasal Can   Nasal Can       Differential Method     Automated           eGFR if      >60.0           eGFR if non      >60.0  Comment: Calculation used to obtain the estimated glomerular filtration  rate (eGFR) is the CKD-EPI equation.              Eos #     0.4           Eosinophil %     4.4           Flow       2   1       Glucose     95           Gran # (ANC)     6.6           Gran %     69.8           Hematocrit     30.4           Hemoglobin     10.0           Immature Grans (Abs)     0.04  Comment: Mild elevation in immature granulocytes is non specific and   can be seen in a variety of conditions including stress response,   acute inflammation, trauma and pregnancy. Correlation with other   laboratory and clinical findings is essential.             Immature Granulocytes     0.4           Lymph #     1.6           Lymph %     16.6           Magnesium     1.9           MCH     31.4           MCHC     32.9           MCV     96           Mode   SPONT     SPONT   SPONT       Mono #     0.8           Mono %     8.4           MPV     10.5            nRBC     0           Phosphorus     2.3           Platelets     244           POC BE 2   3     2   3       POC HCO3 27.2   28.5     27.5   28.6       POC Hematocrit 32   29     29   30       POC Ionized Calcium 1.17   1.17     1.19   1.18       POC PCO2 43.6   47.5     47.4   50.2       POC PH 7.403   7.386     7.371   7.364       POC PO2 34   29     36   20       POC Potassium 4.4   4.4     4.5   4.6       POC SATURATED O2 65   54     67   28       POC Sodium 130   130     130   130       POC TCO2 29   30     29   30       Potassium     4.4           RBC     3.18           RDW     13.2           Sample VENOUS   VENOUS     VENOUS   VENOUS       Sodium     129           Sp02   96     100   94       WBC     9.40                                  Significant Imaging:  I have reviewed all pertinent imaging results/findings within the past 24 hours.      ABG  Recent Labs   Lab 07/29/22  1103   PH 7.403   PO2 34*   PCO2 43.6   HCO3 27.2   BE 2     Assessment/Plan:     Neuro  Acute encephalopathy  Mentation improved significantly with improvement in sodium. Likely Hyponatremic encephalopathy     Closed fracture of lumbar spine without lesion of spinal cord  Deemed stable and nonsurgical per Neurosurgery   - SLO brace until rib fractures are heeled then TLSO brace  - PT/OT    Cardiac/Vascular  Coronary artery disease involving native coronary artery of native heart  - Cont ASA    Essential hypertension  - Hold all diuretics  - Hold Carvedilol for bradycardia    Renal/  * Hyponatremia  Patient with acute on chronic hyponatremia. Baseline sodium approximately 133. On admission 128, trending down. 7/26 sodium was 127 and 7/27 sodium was 120. Repeat sodiums 119>121>118. Some worsening of mental status including increased confusion from baseline, slurring of speech, and diplopia. She has been required an extended hospitalization in the past for hyponatremia, requiring vaptans and strict fluid restriction. She also  recently had her diuretic adjusted to PRN for weight gain and a new murmur by her outpt cardiologist. Symptoms are similar to previous symptomatic hyponatremia. She was given NS on the floor with sudden worsening of her sodium from 122-118.     - Q6h BMP  - Fluid restriction relaxed to <1500cc  - If mental status acutely worsens give 3% NS and check q2 BMP   - Repeat urine electrolytes now off confounding medications          Endocrine  SIADH (syndrome of inappropriate ADH production)  See hyponatremia    Hypothyroidism due to acquired atrophy of thyroid  TSH- 7.019  T4- 1.2    - Continue home Synthroid    Orthopedic  Multiple rib fractures  Pain well controlled with Oxy PRN  IS ordered       Critical Care Daily Checklist:    A: Awake: RASS Goal/Actual Goal:    Actual: Ren Agitation Sedation Scale (RASS): Alert and calm   B: Spontaneous Breathing Trial Performed?     C: SAT & SBT Coordinated?  Na                      D: Delirium: CAM-ICU Overall CAM-ICU: Negative   E: Early Mobility Performed? Yes   F: Feeding Goal:    Status:     Current Diet Order   Procedures    Diet Dysphagia Mechanical Soft (IDDSI Level 5)      AS: Analgesia/Sedation Oxy PRN   T: Thromboembolic Prophylaxis Heparin   H: HOB > 300 Yes   U: Stress Ulcer Prophylaxis (if needed) na   G: Glucose Control Na   B: Bowel Function Stool Occurrence: 0   I: Indwelling Catheter (Lines & Cline) Necessity Yes   D: De-escalation of Antimicrobials/Pharmacotherapies Na    Plan for the day/ETD Stepped down     Code Status:  Family/Goals of Care: Full Code            Critical care was time spent personally by me on the following activities: development of treatment plan with patient or surrogate and bedside caregivers, discussions with consultants, evaluation of patient's response to treatment, examination of patient, ordering and performing treatments and interventions, ordering and review of laboratory studies, ordering and review of radiographic studies,  pulse oximetry, re-evaluation of patient's condition. This critical care time did not overlap with that of any other provider or involve time for any procedures.     Ran Strickland,   Critical Care Medicine  [facility] ([place])

## 2022-07-29 NOTE — ASSESSMENT & PLAN NOTE
Patient with acute on chronic hyponatremia. Baseline sodium approximately 133. On admission 128, trending down. 7/26 sodium was 127 and 7/27 sodium was 120. Repeat sodiums 119>121>118. Some worsening of mental status including increased confusion from baseline, slurring of speech, and diplopia. She has been required an extended hospitalization in the past for hyponatremia, requiring vaptans and strict fluid restriction. She also recently had her diuretic adjusted to PRN for weight gain and a new murmur by her outpt cardiologist. Symptoms are similar to previous symptomatic hyponatremia. She was given NS on the floor with sudden worsening of her sodium from 122-118.     - Q6h BMP  - Fluid restriction relaxed to <1500cc  - If mental status acutely worsens give 3% NS and check q2 BMP   - Repeat urine electrolytes now off confounding medications

## 2022-07-30 LAB
BASOPHILS # BLD AUTO: 0.04 K/UL (ref 0–0.2)
BASOPHILS NFR BLD: 0.4 % (ref 0–1.9)
DIFFERENTIAL METHOD: ABNORMAL
EOSINOPHIL # BLD AUTO: 0.3 K/UL (ref 0–0.5)
EOSINOPHIL NFR BLD: 3.4 % (ref 0–8)
ERYTHROCYTE [DISTWIDTH] IN BLOOD BY AUTOMATED COUNT: 13.4 % (ref 11.5–14.5)
HCT VFR BLD AUTO: 32.9 % (ref 37–48.5)
HGB BLD-MCNC: 10.7 G/DL (ref 12–16)
IMM GRANULOCYTES # BLD AUTO: 0.07 K/UL (ref 0–0.04)
IMM GRANULOCYTES NFR BLD AUTO: 0.8 % (ref 0–0.5)
LYMPHOCYTES # BLD AUTO: 1.8 K/UL (ref 1–4.8)
LYMPHOCYTES NFR BLD: 19.5 % (ref 18–48)
MAGNESIUM SERPL-MCNC: 1.9 MG/DL (ref 1.6–2.6)
MCH RBC QN AUTO: 31.1 PG (ref 27–31)
MCHC RBC AUTO-ENTMCNC: 32.5 G/DL (ref 32–36)
MCV RBC AUTO: 96 FL (ref 82–98)
MONOCYTES # BLD AUTO: 1 K/UL (ref 0.3–1)
MONOCYTES NFR BLD: 10.4 % (ref 4–15)
NEUTROPHILS # BLD AUTO: 6.1 K/UL (ref 1.8–7.7)
NEUTROPHILS NFR BLD: 65.5 % (ref 38–73)
NRBC BLD-RTO: 0 /100 WBC
PHOSPHATE SERPL-MCNC: 2.3 MG/DL (ref 2.7–4.5)
PLATELET # BLD AUTO: 250 K/UL (ref 150–450)
PMV BLD AUTO: 10.7 FL (ref 9.2–12.9)
RBC # BLD AUTO: 3.44 M/UL (ref 4–5.4)
SODIUM SERPL-SCNC: 129 MMOL/L (ref 136–145)
WBC # BLD AUTO: 9.32 K/UL (ref 3.9–12.7)

## 2022-07-30 PROCEDURE — 36415 COLL VENOUS BLD VENIPUNCTURE: CPT | Performed by: STUDENT IN AN ORGANIZED HEALTH CARE EDUCATION/TRAINING PROGRAM

## 2022-07-30 PROCEDURE — 84295 ASSAY OF SERUM SODIUM: CPT | Mod: 91

## 2022-07-30 PROCEDURE — 84100 ASSAY OF PHOSPHORUS: CPT | Performed by: STUDENT IN AN ORGANIZED HEALTH CARE EDUCATION/TRAINING PROGRAM

## 2022-07-30 PROCEDURE — 84295 ASSAY OF SERUM SODIUM: CPT

## 2022-07-30 PROCEDURE — 83735 ASSAY OF MAGNESIUM: CPT | Performed by: STUDENT IN AN ORGANIZED HEALTH CARE EDUCATION/TRAINING PROGRAM

## 2022-07-30 PROCEDURE — 85025 COMPLETE CBC W/AUTO DIFF WBC: CPT | Performed by: STUDENT IN AN ORGANIZED HEALTH CARE EDUCATION/TRAINING PROGRAM

## 2022-07-30 PROCEDURE — 25000003 PHARM REV CODE 250: Performed by: STUDENT IN AN ORGANIZED HEALTH CARE EDUCATION/TRAINING PROGRAM

## 2022-07-30 PROCEDURE — 99233 PR SUBSEQUENT HOSPITAL CARE,LEVL III: ICD-10-PCS | Mod: ,,, | Performed by: STUDENT IN AN ORGANIZED HEALTH CARE EDUCATION/TRAINING PROGRAM

## 2022-07-30 PROCEDURE — 20600001 HC STEP DOWN PRIVATE ROOM

## 2022-07-30 PROCEDURE — 99233 SBSQ HOSP IP/OBS HIGH 50: CPT | Mod: ,,, | Performed by: STUDENT IN AN ORGANIZED HEALTH CARE EDUCATION/TRAINING PROGRAM

## 2022-07-30 PROCEDURE — 63600175 PHARM REV CODE 636 W HCPCS: Performed by: STUDENT IN AN ORGANIZED HEALTH CARE EDUCATION/TRAINING PROGRAM

## 2022-07-30 PROCEDURE — 25000003 PHARM REV CODE 250

## 2022-07-30 PROCEDURE — 36415 COLL VENOUS BLD VENIPUNCTURE: CPT

## 2022-07-30 PROCEDURE — 84295 ASSAY OF SERUM SODIUM: CPT | Mod: 91 | Performed by: STUDENT IN AN ORGANIZED HEALTH CARE EDUCATION/TRAINING PROGRAM

## 2022-07-30 RX ORDER — CELECOXIB 100 MG/1
100 CAPSULE ORAL DAILY
Status: DISCONTINUED | OUTPATIENT
Start: 2022-07-30 | End: 2022-08-04 | Stop reason: HOSPADM

## 2022-07-30 RX ORDER — AMOXICILLIN 250 MG
1 CAPSULE ORAL DAILY
Status: DISCONTINUED | OUTPATIENT
Start: 2022-07-30 | End: 2022-08-04 | Stop reason: HOSPADM

## 2022-07-30 RX ORDER — ACETAMINOPHEN 500 MG
1000 TABLET ORAL EVERY 8 HOURS
Status: DISCONTINUED | OUTPATIENT
Start: 2022-07-30 | End: 2022-08-04 | Stop reason: HOSPADM

## 2022-07-30 RX ADMIN — ACETAMINOPHEN 1000 MG: 500 TABLET ORAL at 09:07

## 2022-07-30 RX ADMIN — LEVOTHYROXINE SODIUM 150 MCG: 150 TABLET ORAL at 09:07

## 2022-07-30 RX ADMIN — SENNOSIDES AND DOCUSATE SODIUM 1 TABLET: 50; 8.6 TABLET ORAL at 10:07

## 2022-07-30 RX ADMIN — ENOXAPARIN SODIUM 40 MG: 40 INJECTION SUBCUTANEOUS at 05:07

## 2022-07-30 RX ADMIN — OXYCODONE HYDROCHLORIDE 10 MG: 10 TABLET ORAL at 04:07

## 2022-07-30 RX ADMIN — ASPIRIN 81 MG: 81 TABLET, COATED ORAL at 09:07

## 2022-07-30 RX ADMIN — CELECOXIB 100 MG: 100 CAPSULE ORAL at 09:07

## 2022-07-30 RX ADMIN — OXYCODONE HYDROCHLORIDE 10 MG: 10 TABLET ORAL at 09:07

## 2022-07-30 RX ADMIN — OXYCODONE HYDROCHLORIDE 10 MG: 10 TABLET ORAL at 05:07

## 2022-07-30 RX ADMIN — ATORVASTATIN CALCIUM 40 MG: 20 TABLET, FILM COATED ORAL at 09:07

## 2022-07-30 RX ADMIN — DOCUSATE SODIUM 50 MG: 50 CAPSULE, LIQUID FILLED ORAL at 10:07

## 2022-07-30 NOTE — PROGRESS NOTES
[facility] ([facility]  General Surgery  Progress Note    Subjective:     History of Present Illness:  No notes on file    Post-Op Info:  * No surgery found *         Interval History:   No acute events overnight.   Patient more confused this AM.   Complaining of more rib pain this morning - only getting prn oxy. Not on any scheduled multimodals.   States feels hard to breathe. SpO2 96 on RA.     Medications:  Continuous Infusions:  Scheduled Meds:   aspirin  81 mg Oral Daily    atorvastatin  40 mg Oral Daily    enoxaparin  40 mg Subcutaneous Daily    levothyroxine  150 mcg Oral Daily    LIDOcaine  1 patch Transdermal Q24H     PRN Meds:docusate sodium, melatonin, ondansetron, oxyCODONE, oxyCODONE, sodium chloride 0.9%, DIPH,PERTUSS(ACELL),TET VACCINE (ADULT)(BOOSTRIX,ADACEL)     Review of patient's allergies indicates:   Allergen Reactions    Thiazides Other (See Comments)     Severe hyponatremia     Objective:     Vital Signs (Most Recent):  Temp: 98.2 °F (36.8 °C) (07/30/22 0500)  Pulse: 72 (07/30/22 0500)  Resp: 18 (07/30/22 0500)  BP: (!) 163/73 (07/30/22 0500)  SpO2: (!) 93 % (07/30/22 0500)   Vital Signs (24h Range):  Temp:  [97.2 °F (36.2 °C)-98.6 °F (37 °C)] 98.2 °F (36.8 °C)  Pulse:  [68-80] 72  Resp:  [11-25] 18  SpO2:  [93 %-95 %] 93 %  BP: (124-165)/(60-78) 163/73     Weight: 93.9 kg (207 lb 0.2 oz)  Body mass index is 33.41 kg/m².    Intake/Output - Last 3 Shifts         07/28 0700 07/29 0659 07/29 0700 07/30 0659 07/30 0700 07/31 0659    P.O. 240 360     Total Intake(mL/kg) 240 (2.6) 360 (3.8)     Urine (mL/kg/hr) 1100 (0.5) 1015 (0.5)     Stool 0 0     Total Output 1100 1015     Net -860 -655            Stool Occurrence 0 x 0 x             Physical Exam  Vitals and nursing note reviewed.   Constitutional:       General: She is not in acute distress.     Appearance: She is not ill-appearing.   HENT:      Head: Normocephalic and atraumatic.   Eyes:      Extraocular Movements:  Extraocular movements intact.   Cardiovascular:      Rate and Rhythm: Normal rate.   Pulmonary:      Effort: Pulmonary effort is normal. No respiratory distress.      Comments: No increased WOB  On RA  Abdominal:      General: There is no distension.      Palpations: Abdomen is soft.      Tenderness: There is no abdominal tenderness.   Musculoskeletal:         General: No deformity.      Comments: Back brace in place  TTP to R chest wall.   Hematoma to R knee and R hand   Neurological:      General: No focal deficit present.      Mental Status: She is alert and oriented to person, place, and time.      Cranial Nerves: No cranial nerve deficit.      Sensory: No sensory deficit.      Motor: No weakness.   Psychiatric:         Mood and Affect: Mood normal.       Significant Labs:  I have reviewed all pertinent lab results within the past 24 hours.  CBC:   Recent Labs   Lab 07/30/22  0322   WBC 9.32   RBC 3.44*   HGB 10.7*   HCT 32.9*      MCV 96   MCH 31.1*   MCHC 32.5       BMP:   Recent Labs   Lab 07/29/22  0347 07/29/22  2036 07/30/22  0146 07/30/22  0322   GLU 95  --   --   --    *   < > 129*  --    K 4.4  --   --   --    CL 98  --   --   --    CO2 27  --   --   --    BUN 14  --   --   --    CREATININE 0.6  --   --   --    CALCIUM 8.3*  --   --   --    MG 1.9  --   --  1.9    < > = values in this interval not displayed.         Significant Diagnostics:  I have reviewed all pertinent imaging results/findings within the past 24 hours.  I have reviewed and interpreted all pertinent imaging results/findings within the past 24 hours.    Assessment/Plan:       Multiple rib fractures  Patient is a 89 year old female with h/o HTN, CAD, degenerative disc disease, HLD, MI, TIA presenting after a fall from standing with R rib 4-9 fractures and L2 burst fracture. Clinically stable but with worsening hyponatremia     - Patient needs to be on scheduled multimodal pain regimen in addition to prn pain medications. At  high risk for splinting due to rib fractures and increased risk for atelectasis or developing a pneumonia.   - Recommend continued RT treatments and pulmonary care (IS, acapella, etc)  - Will obtain CXR   - Fine for diet, aspiration precautions  - Scheduled multimodal pain regimen  - PRN pain and nausea medications  - Wound care   - DVT prophylaxis (SCDs and lovenox)  - OOB, ambulate  - Inhalation treatments  - Daily CXR   - Wean O2 as tolerated        Stella Bernard MD  General Surgery  [facility] - Intensive Care ([place])

## 2022-07-30 NOTE — PROGRESS NOTES
[facility] - [facility] ([place] [id number])  [facility] Medicine  Progress Note    Patient Name: Laila Hanna  MRN: [id number]  Patient Class: IP- Inpatient   Admission Date: 7/24/2022  Length of Stay: 5 days  Attending Physician: Jose Luis Smith*  Primary Care Provider: Ama Graham MD        Subjective:     Principal Problem:Hyponatremia        HPI:  Laila Hanna is a 89 year old female with CAD, HLD, hypothyroidism, HTN, HFpEF who presented after mechanical fall. Patient reportedly lost her balance and fell while in her kitchen, landing on her right side without loss of consciousness. Patient denies prodrome. Per caregiver at bedside, patient is typically able to complete all ADLs without assistance and only requires minimal supervision. On admission, patient was noted to have negative head CT. Imaging was notable for acute displaced burst fracture of L2 vertebral body with extension into the spinal canal as well as nondisplaced fracture of L1 vertebral body. Additionally, patient was noted to have posterolateral ribs fracture of ribs 4-9. Since admission, patient was noted to be hyponatremic (128). Baseline 133. She was noted to have acute drop of sodium (7/26 127) to 120 (7/27) with some worsening mentation. Hospital medicine was consulted for management of hyponatremia. Patient then stepped up to critical care due to worsening hyponatremia and mentation.       Overview/Hospital Course:  Patient stepped down to hospital medicine 7/30.       Interval History: Patient stated that mechanical soft too solid. Complaining of rib pain uncontrolled.     Review of Systems  Objective:     Vital Signs (Most Recent):  Temp: 98.2 °F (36.8 °C) (07/30/22 0500)  Pulse: 72 (07/30/22 0500)  Resp: 20 (07/30/22 0925)  BP: 132/68 (07/30/22 0900)  SpO2: (!) 93 % (07/30/22 0500)   Vital Signs (24h Range):  Temp:  [98.2 °F (36.8 °C)-98.6 °F (37 °C)] 98.2 °F (36.8 °C)  Pulse:  [72-80] 72  Resp:  [16-20] 20  SpO2:  [93 %-94 %]  93 %  BP: (132-165)/(61-78) 132/68     Weight: 93.9 kg (207 lb 0.2 oz)  Body mass index is 33.41 kg/m².    Intake/Output Summary (Last 24 hours) at 7/30/2022 1326  Last data filed at 7/30/2022 0600  Gross per 24 hour   Intake 360 ml   Output 800 ml   Net -440 ml      Physical Exam  Constitutional:       General: She is not in acute distress.     Appearance: Normal appearance. She is not toxic-appearing or diaphoretic.   Cardiovascular:      Rate and Rhythm: Normal rate and regular rhythm.      Heart sounds: No murmur heard.    No friction rub. No gallop.   Pulmonary:      Effort: Pulmonary effort is normal. No respiratory distress.      Breath sounds: Normal breath sounds.   Abdominal:      General: Abdomen is flat. There is no distension.      Palpations: Abdomen is soft.      Tenderness: There is no abdominal tenderness. There is no guarding or rebound.   Musculoskeletal:      Right lower leg: No edema.      Left lower leg: No edema.      Comments: Back brace   Neurological:      Mental Status: She is alert.       MELD-Na score: 7 at 7/26/2022  3:55 AM  MELD score: 7 at 7/26/2022  3:55 AM  Calculated from:  Serum Creatinine: 0.7 mg/dL (Using min of 1 mg/dL) at 7/26/2022  3:55 AM  Serum Sodium: 127 mmol/L at 7/26/2022  3:55 AM  Total Bilirubin: 0.9 mg/dL (Using min of 1 mg/dL) at 7/26/2022  3:55 AM  INR(ratio): 1.1 at 7/24/2022  9:22 PM  Age: 89 years    Significant Labs:  CBC:  Recent Labs   Lab 07/29/22  0347 07/29/22  0348 07/29/22  1103 07/30/22  0322   WBC 9.40  --   --  9.32   HGB 10.0*  --   --  10.7*   HCT 30.4* 29* 32* 32.9*     --   --  250     CMP:  Recent Labs   Lab 07/28/22  1400 07/29/22  0347 07/29/22  2036 07/30/22  0003 07/30/22  0146   * 129* 129* 129* 129*   K 4.5 4.4  --   --   --    CL 97 98  --   --   --    CO2 24 27  --   --   --     95  --   --   --    BUN 14 14  --   --   --    CREATININE 0.7 0.6  --   --   --    CALCIUM 8.9 8.3*  --   --   --    ANIONGAP 8 4*  --   --    --    EGFRNONAA >60.0 >60.0  --   --   --      PTINR:  No results for input(s): INR in the last 48 hours.    Significant Procedures:   Dobutamine Stress Test with Color Flow: No results found for this or any previous visit.      Assessment/Plan:      * Hyponatremia  Patient with acute on chronic hyponatremia. Baseline sodium approximately 133. On admission 128, trending down. 7/26 sodium was 127 and 7/27 sodium was 120. Repeat sodiums 119>121>118. Some worsening of mental status including increased confusion from baseline, slurring of speech, and diplopia. 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 Previously on thiazide diuretic but no longer taking it. Critical care consulted and patient admitted to ICU. Fluid restricted with improvement in Na and mentation. Stepped down to hospital medicine 7/30.         JEANINE (acute kidney injury)  Appears improved.  See above      Acute encephalopathy  Increased confusion and worsening mental status 7/27. Suspect likely related to worsening hyponatremia. Improved with improvement in sodium.   - hold gabapentin and flexeril    Multiple rib fractures  Multiple posterolateral rib fractures of ribs 4-9 after mechanical fall  General surgery consulted, appreciate assistance.  Multimodal pain control including tylenol, NSAIDs, PRN Oxycodone  Encourage IS    Coronary artery disease involving native coronary artery of native heart  - continue aspirin    Essential hypertension  - hold carvedilol and losartan, resume as tolerated      Hypothyroidism due to acquired atrophy of thyroid  TSH elevated 7.019 with free T4 1.2, ordered in the workup of hyponatremia    - continue levothyroxine 150 mcg daily  - will need to follow up once out of acute illness      VTE Risk Mitigation (From admission, onward)         Ordered     enoxaparin injection 40 mg  Daily         07/25/22 0040     IP VTE HIGH RISK PATIENT  Once         07/25/22 0040                 Discharge Planning   RACHAEL: 8/1/2022     Code Status: Full Code   Is the patient medically ready for discharge?:     Reason for patient still in hospital (select all that apply): Patient trending condition, Treatment and Consult recommendations  Discharge Plan A: Skilled Nursing Facility   Discharge Delays: None known at this time        Jose Luis Jeffries MD  Department of Hospital Medicine   [facility] - Intensive Care ([place])

## 2022-07-30 NOTE — ASSESSMENT & PLAN NOTE
Multiple posterolateral rib fractures of ribs 4-9 after mechanical fall  General surgery consulted, appreciate assistance.  Multimodal pain control including tylenol, NSAIDs, PRN Oxycodone  Encourage IS

## 2022-07-30 NOTE — ASSESSMENT & PLAN NOTE
Patient is a 89 year old female with h/o HTN, CAD, degenerative disc disease, HLD, MI, TIA presenting after a fall from standing with R rib 4-9 fractures and L2 burst fracture. Clinically stable but with worsening hyponatremia     - Patient needs to be on scheduled multimodal pain regimen in addition to prn pain medications. At high risk for splinting due to rib fractures and increased risk for atelectasis or developing a pneumonia.   - Recommend continued RT treatments and pulmonary care (IS, acapella, etc)  - Will obtain CXR   - Fine for diet, aspiration precautions  - Scheduled multimodal pain regimen  - PRN pain and nausea medications  - Wound care   - DVT prophylaxis (SCDs and lovenox)  - OOB, ambulate  - Inhalation treatments  - Daily CXR   - Wean O2 as tolerated

## 2022-07-30 NOTE — SUBJECTIVE & OBJECTIVE
Interval History: Patient stated that mechanical soft too solid. Complaining of rib pain uncontrolled.     Review of Systems  Objective:     Vital Signs (Most Recent):  Temp: 98.2 °F (36.8 °C) (07/30/22 0500)  Pulse: 72 (07/30/22 0500)  Resp: 20 (07/30/22 0925)  BP: 132/68 (07/30/22 0900)  SpO2: (!) 93 % (07/30/22 0500)   Vital Signs (24h Range):  Temp:  [98.2 °F (36.8 °C)-98.6 °F (37 °C)] 98.2 °F (36.8 °C)  Pulse:  [72-80] 72  Resp:  [16-20] 20  SpO2:  [93 %-94 %] 93 %  BP: (132-165)/(61-78) 132/68     Weight: 93.9 kg (207 lb 0.2 oz)  Body mass index is 33.41 kg/m².    Intake/Output Summary (Last 24 hours) at 7/30/2022 1326  Last data filed at 7/30/2022 0600  Gross per 24 hour   Intake 360 ml   Output 800 ml   Net -440 ml      Physical Exam  Constitutional:       General: She is not in acute distress.     Appearance: Normal appearance. She is not toxic-appearing or diaphoretic.   Cardiovascular:      Rate and Rhythm: Normal rate and regular rhythm.      Heart sounds: No murmur heard.    No friction rub. No gallop.   Pulmonary:      Effort: Pulmonary effort is normal. No respiratory distress.      Breath sounds: Normal breath sounds.   Abdominal:      General: Abdomen is flat. There is no distension.      Palpations: Abdomen is soft.      Tenderness: There is no abdominal tenderness. There is no guarding or rebound.   Musculoskeletal:      Right lower leg: No edema.      Left lower leg: No edema.      Comments: Back brace   Neurological:      Mental Status: She is alert.       MELD-Na score: 7 at 7/26/2022  3:55 AM  MELD score: 7 at 7/26/2022  3:55 AM  Calculated from:  Serum Creatinine: 0.7 mg/dL (Using min of 1 mg/dL) at 7/26/2022  3:55 AM  Serum Sodium: 127 mmol/L at 7/26/2022  3:55 AM  Total Bilirubin: 0.9 mg/dL (Using min of 1 mg/dL) at 7/26/2022  3:55 AM  INR(ratio): 1.1 at 7/24/2022  9:22 PM  Age: 89 years    Significant Labs:  CBC:  Recent Labs   Lab 07/29/22  0347 07/29/22  0348 07/29/22  1103  07/30/22  0322   WBC 9.40  --   --  9.32   HGB 10.0*  --   --  10.7*   HCT 30.4* 29* 32* 32.9*     --   --  250     CMP:  Recent Labs   Lab 07/28/22  1400 07/29/22  0347 07/29/22 2036 07/30/22  0003 07/30/22  0146   * 129* 129* 129* 129*   K 4.5 4.4  --   --   --    CL 97 98  --   --   --    CO2 24 27  --   --   --     95  --   --   --    BUN 14 14  --   --   --    CREATININE 0.7 0.6  --   --   --    CALCIUM 8.9 8.3*  --   --   --    ANIONGAP 8 4*  --   --   --    EGFRNONAA >60.0 >60.0  --   --   --      PTINR:  No results for input(s): INR in the last 48 hours.    Significant Procedures:   Dobutamine Stress Test with Color Flow: No results found for this or any previous visit.

## 2022-07-30 NOTE — SUBJECTIVE & OBJECTIVE
Interval History:   No acute events overnight.   Patient more confused this AM.   Complaining of more rib pain this morning - only getting prn oxy. Not on any scheduled multimodals.   States feels hard to breathe. SpO2 96 on RA.     Medications:  Continuous Infusions:  Scheduled Meds:   aspirin  81 mg Oral Daily    atorvastatin  40 mg Oral Daily    enoxaparin  40 mg Subcutaneous Daily    levothyroxine  150 mcg Oral Daily    LIDOcaine  1 patch Transdermal Q24H     PRN Meds:docusate sodium, melatonin, ondansetron, oxyCODONE, oxyCODONE, sodium chloride 0.9%, DIPH,PERTUSS(ACELL),TET VACCINE (ADULT)(BOOSTRIX,ADACEL)     Review of patient's allergies indicates:   Allergen Reactions    Thiazides Other (See Comments)     Severe hyponatremia     Objective:     Vital Signs (Most Recent):  Temp: 98.2 °F (36.8 °C) (07/30/22 0500)  Pulse: 72 (07/30/22 0500)  Resp: 18 (07/30/22 0500)  BP: (!) 163/73 (07/30/22 0500)  SpO2: (!) 93 % (07/30/22 0500)   Vital Signs (24h Range):  Temp:  [97.2 °F (36.2 °C)-98.6 °F (37 °C)] 98.2 °F (36.8 °C)  Pulse:  [68-80] 72  Resp:  [11-25] 18  SpO2:  [93 %-95 %] 93 %  BP: (124-165)/(60-78) 163/73     Weight: 93.9 kg (207 lb 0.2 oz)  Body mass index is 33.41 kg/m².    Intake/Output - Last 3 Shifts         07/28 0700 07/29 0659 07/29 0700 07/30 0659 07/30 0700 07/31 0659    P.O. 240 360     Total Intake(mL/kg) 240 (2.6) 360 (3.8)     Urine (mL/kg/hr) 1100 (0.5) 1015 (0.5)     Stool 0 0     Total Output 1100 1015     Net -860 -655            Stool Occurrence 0 x 0 x             Physical Exam  Vitals and nursing note reviewed.   Constitutional:       General: She is not in acute distress.     Appearance: She is not ill-appearing.   HENT:      Head: Normocephalic and atraumatic.   Eyes:      Extraocular Movements: Extraocular movements intact.   Cardiovascular:      Rate and Rhythm: Normal rate.   Pulmonary:      Effort: Pulmonary effort is normal. No respiratory distress.      Comments: No increased  WOB  On RA  Abdominal:      General: There is no distension.      Palpations: Abdomen is soft.      Tenderness: There is no abdominal tenderness.   Musculoskeletal:         General: No deformity.      Comments: Back brace in place  TTP to R chest wall.   Hematoma to R knee and R hand   Neurological:      General: No focal deficit present.      Mental Status: She is alert and oriented to person, place, and time.      Cranial Nerves: No cranial nerve deficit.      Sensory: No sensory deficit.      Motor: No weakness.   Psychiatric:         Mood and Affect: Mood normal.       Significant Labs:  I have reviewed all pertinent lab results within the past 24 hours.  CBC:   Recent Labs   Lab 07/30/22  0322   WBC 9.32   RBC 3.44*   HGB 10.7*   HCT 32.9*      MCV 96   MCH 31.1*   MCHC 32.5       BMP:   Recent Labs   Lab 07/29/22  0347 07/29/22  2036 07/30/22  0146 07/30/22  0322   GLU 95  --   --   --    *   < > 129*  --    K 4.4  --   --   --    CL 98  --   --   --    CO2 27  --   --   --    BUN 14  --   --   --    CREATININE 0.6  --   --   --    CALCIUM 8.3*  --   --   --    MG 1.9  --   --  1.9    < > = values in this interval not displayed.         Significant Diagnostics:  I have reviewed all pertinent imaging results/findings within the past 24 hours.  I have reviewed and interpreted all pertinent imaging results/findings within the past 24 hours.

## 2022-07-30 NOTE — HOSPITAL COURSE
Sodium decreased and mentation worsened. Hospital Medicine was consulted. She was transferred to Critical Care Medicine on 7/27/2022. She was put on fluid restriction. Hyponatremia and mentation improved. She was transferred to Hospital Medicine Team A on 7/30/2022. She had episodes of confusion. Repeat chest X-ray showed atelectasis or pneumonia. She was started on multimodal pain medications with improvement in pain control. Physical and Occupational Therapy recommended skilled nursing facility.

## 2022-07-31 LAB
BASOPHILS # BLD AUTO: 0.06 K/UL (ref 0–0.2)
BASOPHILS NFR BLD: 0.7 % (ref 0–1.9)
DIFFERENTIAL METHOD: ABNORMAL
EOSINOPHIL # BLD AUTO: 0.4 K/UL (ref 0–0.5)
EOSINOPHIL NFR BLD: 5.4 % (ref 0–8)
ERYTHROCYTE [DISTWIDTH] IN BLOOD BY AUTOMATED COUNT: 13.6 % (ref 11.5–14.5)
HCT VFR BLD AUTO: 30.6 % (ref 37–48.5)
HGB BLD-MCNC: 10.1 G/DL (ref 12–16)
IMM GRANULOCYTES # BLD AUTO: 0.08 K/UL (ref 0–0.04)
IMM GRANULOCYTES NFR BLD AUTO: 1 % (ref 0–0.5)
LYMPHOCYTES # BLD AUTO: 1.9 K/UL (ref 1–4.8)
LYMPHOCYTES NFR BLD: 23.4 % (ref 18–48)
MCH RBC QN AUTO: 31.1 PG (ref 27–31)
MCHC RBC AUTO-ENTMCNC: 33 G/DL (ref 32–36)
MCV RBC AUTO: 94 FL (ref 82–98)
MONOCYTES # BLD AUTO: 0.9 K/UL (ref 0.3–1)
MONOCYTES NFR BLD: 10.5 % (ref 4–15)
NEUTROPHILS # BLD AUTO: 4.8 K/UL (ref 1.8–7.7)
NEUTROPHILS NFR BLD: 59 % (ref 38–73)
NRBC BLD-RTO: 0 /100 WBC
PLATELET # BLD AUTO: 257 K/UL (ref 150–450)
PMV BLD AUTO: 10.3 FL (ref 9.2–12.9)
RBC # BLD AUTO: 3.25 M/UL (ref 4–5.4)
SODIUM SERPL-SCNC: 130 MMOL/L (ref 136–145)
WBC # BLD AUTO: 8.13 K/UL (ref 3.9–12.7)

## 2022-07-31 PROCEDURE — 97110 THERAPEUTIC EXERCISES: CPT

## 2022-07-31 PROCEDURE — 63600175 PHARM REV CODE 636 W HCPCS: Performed by: STUDENT IN AN ORGANIZED HEALTH CARE EDUCATION/TRAINING PROGRAM

## 2022-07-31 PROCEDURE — 97168 OT RE-EVAL EST PLAN CARE: CPT

## 2022-07-31 PROCEDURE — 99233 PR SUBSEQUENT HOSPITAL CARE,LEVL III: ICD-10-PCS | Mod: ,,, | Performed by: STUDENT IN AN ORGANIZED HEALTH CARE EDUCATION/TRAINING PROGRAM

## 2022-07-31 PROCEDURE — 97164 PT RE-EVAL EST PLAN CARE: CPT

## 2022-07-31 PROCEDURE — 97530 THERAPEUTIC ACTIVITIES: CPT

## 2022-07-31 PROCEDURE — 25000003 PHARM REV CODE 250: Performed by: STUDENT IN AN ORGANIZED HEALTH CARE EDUCATION/TRAINING PROGRAM

## 2022-07-31 PROCEDURE — 99233 SBSQ HOSP IP/OBS HIGH 50: CPT | Mod: ,,, | Performed by: STUDENT IN AN ORGANIZED HEALTH CARE EDUCATION/TRAINING PROGRAM

## 2022-07-31 PROCEDURE — 36415 COLL VENOUS BLD VENIPUNCTURE: CPT | Performed by: STUDENT IN AN ORGANIZED HEALTH CARE EDUCATION/TRAINING PROGRAM

## 2022-07-31 PROCEDURE — 25000003 PHARM REV CODE 250

## 2022-07-31 PROCEDURE — 97535 SELF CARE MNGMENT TRAINING: CPT

## 2022-07-31 PROCEDURE — 84295 ASSAY OF SERUM SODIUM: CPT | Performed by: STUDENT IN AN ORGANIZED HEALTH CARE EDUCATION/TRAINING PROGRAM

## 2022-07-31 PROCEDURE — 20600001 HC STEP DOWN PRIVATE ROOM

## 2022-07-31 PROCEDURE — 85025 COMPLETE CBC W/AUTO DIFF WBC: CPT | Performed by: STUDENT IN AN ORGANIZED HEALTH CARE EDUCATION/TRAINING PROGRAM

## 2022-07-31 RX ADMIN — DOCUSATE SODIUM 50 MG: 50 CAPSULE, LIQUID FILLED ORAL at 10:07

## 2022-07-31 RX ADMIN — Medication 6 MG: at 10:07

## 2022-07-31 RX ADMIN — SENNOSIDES AND DOCUSATE SODIUM 1 TABLET: 50; 8.6 TABLET ORAL at 08:07

## 2022-07-31 RX ADMIN — ENOXAPARIN SODIUM 40 MG: 40 INJECTION SUBCUTANEOUS at 05:07

## 2022-07-31 RX ADMIN — CELECOXIB 100 MG: 100 CAPSULE ORAL at 08:07

## 2022-07-31 RX ADMIN — ACETAMINOPHEN 1000 MG: 500 TABLET ORAL at 06:07

## 2022-07-31 RX ADMIN — ASPIRIN 81 MG: 81 TABLET, COATED ORAL at 08:07

## 2022-07-31 RX ADMIN — ACETAMINOPHEN 1000 MG: 500 TABLET ORAL at 10:07

## 2022-07-31 RX ADMIN — ACETAMINOPHEN 1000 MG: 500 TABLET ORAL at 02:07

## 2022-07-31 RX ADMIN — OXYCODONE HYDROCHLORIDE 10 MG: 10 TABLET ORAL at 09:07

## 2022-07-31 RX ADMIN — LEVOTHYROXINE SODIUM 150 MCG: 150 TABLET ORAL at 06:07

## 2022-07-31 RX ADMIN — OXYCODONE HYDROCHLORIDE 10 MG: 10 TABLET ORAL at 03:07

## 2022-07-31 RX ADMIN — ATORVASTATIN CALCIUM 40 MG: 20 TABLET, FILM COATED ORAL at 08:07

## 2022-07-31 NOTE — PLAN OF CARE
OT re-evaluation completed, POC updated as appropriate. OT continuing to recommend SNF once medically stable for discharge.    Problem: Occupational Therapy  Goal: Occupational Therapy Goal  Description: Goals to be met by: 8/25/2022 (1 month)     Patient will increase functional independence with ADLs by performing:    UE Dressing with Moderate Assistance.  LE Dressing with Maximum Assistance.  Grooming while standing at sink with Minimal Assistance.  Toileting from toilet with Minimal Assistance for hygiene and clothing management.   Rolling to Bilateral with Standby Assistance.   Supine to sit with Minimal Assistance.  Step transfer with Stand-by Assistance  Toilet transfer to toilet with Stand-by Assistance.    Outcome: Ongoing, Progressing

## 2022-07-31 NOTE — PT/OT/SLP RE-EVAL
Physical Therapy Re-Assessment and Treatment with Occupational Therapy    Patient Name:  Laila Hanna   MRN:  [id number]  Admit Date: 7/24/2022  Admitting Diagnosis:  Hyponatremia   Length of Stay: 6 days  Recent Surgery: * No surgery found *      Please refer to PT initial evaluation for PLOF and social history.  Recommendations:     Discharge Recommendations:  nursing facility, skilled   Discharge Equipment Recommendations: bedside commode, wheelchair   Barriers to discharge: decreased caregiver support and increased assistance required    Assessment:     Laila Hanna is a 89 y.o. female admitted to [facility] on 7/24/2022 with a medical diagnosis of Hyponatremia. Patient performed bed mobility with moderate assistance of 2 people. She had increased anxiety and fear of falling when sitting edge of bed. She gradually progressed from sitting edge of bed with minimal assistance to stand-by assistance over 20 minutes. She would not scoot to edge enough to position both feet on the floor and was too afraid to practice transfers. She presents with the following impairments/functional limitations: weakness, impaired endurance, impaired cognition, impaired self care skills, decreased upper extremity function, impaired functional mobility, gait instability, decreased safety awareness, pain, impaired balance. Patient will benefit from skilled acute physical therapy services to address the mentioned deficits in order to increase safety and independence with functional mobility.     Rehab Prognosis: Fair   Plan:     During this hospitalization, patient to be seen 4 x/week to address the above listed problems via gait training, therapeutic activities, therapeutic exercises, neuromuscular re-education  · Plan of Care Expires:  08/30/22   Plan of Care Reviewed with: patient    Subjective   Communicated with RN prior to session. Patient found HOB elevated with TLSO donned upon PT entry to room. Patient is alone during session.  Pt is  "agreeable to re-evaluation.     Additional staff present:OT  OT for co-evaluation due to patient's medical complexities requiring two skilled therapists in order to appropriately assess patient's functional deficits as well as ensure patient safety, accommodate for limited activity tolerance, and provide appropriate, skilled assistance to maximize functional potential during evaluation.    Pt's subjective: "I don't know what's going on."    Pain/Comfort:  · Pain Rating 1:  (patient unable to rate)  · Location - Orientation 1: generalized  · Location 1:  (back, ribs, legs)  · Pain Addressed 1: Reposition, Distraction, Cessation of Activity  · Pain Rating Post-Intervention 1:  (patient unable to rate)    Objective:   Patient found with: TLSO, pulse ox (continuous), telemetry, blood pressure cuff, gates catheter, peripheral IV   General Precautions: Standard, fall   Orthopedic Precautions:spinal precautions   Braces: TLSO (wear at all times)   Body mass index is 33.41 kg/m².  Oxygen Device: Room Air  Vitals: BP (!) 161/73   Pulse 65   Temp 98.4 °F (36.9 °C)   Resp 18   Ht 5' 6" (1.676 m)   Wt 93.9 kg (207 lb 0.2 oz)   LMP  (LMP Unknown) Comment: years ago  SpO2 (!) 93%   BMI 33.41 kg/m²     Exams:  · Cognition:   · Alert and anxious  · AxOx3 to person, time, and place  · Command following: Follows two-step commands  · Fluency: clear/fluent  · Skin Integrity: Bruising of right knee and right forehead  · Range of Motion:  · RLE: WFL  · LLE: WFL  · Strength Exam:  · RLE Strength: grossly 3+/5  · LLE Strength: grossly 3+/5  ·   Outcome Measures:   AM-PAC 6 CLICK MOBILITY: Turning over in bed (including adjusting bedclothes, sheets and blankets)?: 2   Sitting down on and standing up from a chair with arms (e.g., wheelchair, bedside commode, etc.): 1   Moving from lying on back to sitting on the side of the bed?: 2   Moving to and from a bed to a chair (including a wheelchair)?: 1   Need to walk in hospital " room?: 1   Climbing 3-5 steps with a railing?: 1   Basic Mobility Total Score: 8     Functional Mobility:    Bed Mobility:     Rolling Right: moderate assistance and of 2 persons with verbal cues for log rolling technique   Scooting supine: total assistance and of 2 persons    Right side lying to sit: moderate assistance and of 2 persons for LE management, trunk management and log rolling technique   Seated scooting towards EOB: total assistance and of 2 persons due to patient's fear of falling   Sit to side lying: moderate assistance and of 3 persons for LE management, trunk management and log rolling technique    Sitting Balance at Edge of Bed:   Assistance Level Required: Minimal Assistance with progression to stand-by assistance   Time: 20 minutes   Patient initially demos retropulsion due to fear of falling. With verbal education and relaxation techniques, patient progresses to sitting edge of bed with SBA.    Transfers:     Patient does not scoot to the edge of the bed far enough to position both feet on the floor due to her fear of falling    Exercises:    Patient performed 2 sets of 10 repetitions of the following seated exercises: ankle pumps and long arc quads for bilateral lower extremities. Patient required skilled PT for instruction of exercises and appropriate cues to perform exercises safely and appropriately.      Patient Education:   Patient educated on PT POC and role of PT in acute care   Patient educated on the importance of early mobility to prevent functional decline during hospital stay   White board updated to include patient's safest level of mobility with staff assistance   Patient educated on bed mobility training, Fall risk, Home exercise program, plan of care and posture by explanation.  Patient was confusion limiting to education and needs further education.       Patient left HOB elevated with TLSO donned, all lines intact, call button in reach and RN  notified.    GOALS:   Multidisciplinary Problems     Physical Therapy Goals        Problem: Physical Therapy    Goal Priority Disciplines Outcome Goal Variances Interventions   Physical Therapy Goal     PT, PT/OT Ongoing, Progressing     Description: Goals to be met by: 22     Patient will increase functional independence with mobility by performin. Supine to sit with Minimal Assistance  2. Sit to supine with MInimal Assistance  3. Rolling to Left and Right with Stand-by Assistance  4. Sit edge of bed with supervision for 25 minutes                    Time Tracking:     PT Received On: 22  PT Start Time: 1424     PT Stop Time: 1502  PT Total Time (min): 38 min     Billable Minutes: Re-eval 1 procedure, Therapeutic Activity 20 mins and Therapeutic Exercise 10 mins

## 2022-07-31 NOTE — PROGRESS NOTES
[facility] - Intensive Care ([place] [id number])  General Surgery  Progress Note    Subjective:     History of Present Illness:  No notes on file    Post-Op Info:  * No surgery found *         Interval History:   No acute events overnight.   Pain better today.   CXR stable.   Doing well on RA.   Mildly confused.    Medications:  Continuous Infusions:  Scheduled Meds:   acetaminophen  1,000 mg Oral Q8H    aspirin  81 mg Oral Daily    atorvastatin  40 mg Oral Daily    celecoxib  100 mg Oral Daily    enoxaparin  40 mg Subcutaneous Daily    levothyroxine  150 mcg Oral Daily    LIDOcaine  1 patch Transdermal Q24H    senna-docusate 8.6-50 mg  1 tablet Oral Daily     PRN Meds:docusate sodium, melatonin, ondansetron, oxyCODONE, oxyCODONE, sodium chloride 0.9%, DIPH,PERTUSS(ACELL),TET VACCINE (ADULT)(BOOSTRIX,ADACEL)     Review of patient's allergies indicates:   Allergen Reactions    Thiazides Other (See Comments)     Severe hyponatremia     Objective:     Vital Signs (Most Recent):  Temp: 98 °F (36.7 °C) (07/31/22 0738)  Pulse: 73 (07/31/22 0738)  Resp: 18 (07/31/22 0738)  BP: (!) 148/65 (07/31/22 0738)  SpO2: (!) 91 % (07/31/22 0738)   Vital Signs (24h Range):  Temp:  [97.4 °F (36.3 °C)-98 °F (36.7 °C)] 98 °F (36.7 °C)  Pulse:  [67-80] 73  Resp:  [18-27] 18  SpO2:  [91 %-98 %] 91 %  BP: (127-150)/(60-68) 148/65     Weight: 93.9 kg (207 lb 0.2 oz)  Body mass index is 33.41 kg/m².    Intake/Output - Last 3 Shifts         07/29 0700 07/30 0659 07/30 0700 07/31 0659 07/31 0700 08/01 0659    P.O. 360 180     Total Intake(mL/kg) 360 (3.8) 180 (1.9)     Urine (mL/kg/hr) 1015 (0.5) 950 (0.4)     Stool 0      Total Output 1015 950     Net -655 -770            Stool Occurrence 0 x              Physical Exam  Vitals and nursing note reviewed.   Constitutional:       General: She is not in acute distress.     Appearance: She is not ill-appearing.   HENT:      Head: Normocephalic and atraumatic.   Eyes:      Extraocular  Movements: Extraocular movements intact.   Cardiovascular:      Rate and Rhythm: Normal rate.   Pulmonary:      Effort: Pulmonary effort is normal. No respiratory distress.      Comments: No increased WOB  On RA  Abdominal:      General: There is no distension.      Palpations: Abdomen is soft.      Tenderness: There is no abdominal tenderness.   Musculoskeletal:         General: No deformity.      Comments: Back brace in place  TTP to R chest wall.   Hematoma to R knee and R hand   Neurological:      General: No focal deficit present.      Mental Status: She is alert and oriented to person, place, and time.      Cranial Nerves: No cranial nerve deficit.      Sensory: No sensory deficit.      Motor: No weakness.   Psychiatric:         Mood and Affect: Mood normal.       Significant Labs:  I have reviewed all pertinent lab results within the past 24 hours.  CBC:   Recent Labs   Lab 07/31/22  0353   WBC 8.13   RBC 3.25*   HGB 10.1*   HCT 30.6*      MCV 94   MCH 31.1*   MCHC 33.0       BMP:   Recent Labs   Lab 07/29/22  0347 07/29/22 2036 07/30/22 0322 07/30/22 2013   GLU 95  --   --   --    *   < >  --  129*   K 4.4  --   --   --    CL 98  --   --   --    CO2 27  --   --   --    BUN 14  --   --   --    CREATININE 0.6  --   --   --    CALCIUM 8.3*  --   --   --    MG 1.9  --  1.9  --     < > = values in this interval not displayed.         Significant Diagnostics:  I have reviewed all pertinent imaging results/findings within the past 24 hours.  I have reviewed and interpreted all pertinent imaging results/findings within the past 24 hours.    Assessment/Plan:       Multiple rib fractures  Patient is a 89 year old female with h/o HTN, CAD, degenerative disc disease, HLD, MI, TIA presenting after a fall from standing with R rib 4-9 fractures and L2 burst fracture. Clinically stable but with worsening hyponatremia     - Patient needs to be on scheduled multimodal pain regimen in addition to prn pain  medications. At high risk for splinting due to rib fractures and increased risk for atelectasis or developing a pneumonia.   - Recommend continued RT treatments and pulmonary care (IS, acapella, etc)  - Fine for diet, aspiration precautions  - Scheduled multimodal pain regimen  - PRN pain and nausea medications  - Wound care   - DVT prophylaxis (SCDs and lovenox)  - OOB, ambulate  - Inhalation treatments  - Daily CXR   - Wean O2 as tolerated      Stella Bernard MD  General Surgery  [facility] - Intensive Care ([place])

## 2022-07-31 NOTE — PROGRESS NOTES
[facility] - Intensive Care ([place] [id number])  [facility] Medicine  Progress Note    Patient Name: Laila Hanna  MRN: [id number]  Patient Class: IP- Inpatient   Admission Date: 7/24/2022  Length of Stay: 6 days  Attending Physician: Jose Luis Smith*  Primary Care Provider: Ama Graham MD        Subjective:     Principal Problem:Hyponatremia        HPI:  Laila Hanna is a 89 year old female with CAD, HLD, hypothyroidism, HTN, HFpEF who presented after mechanical fall. Patient reportedly lost her balance and fell while in her kitchen, landing on her right side without loss of consciousness. Patient denies prodrome. Per caregiver at bedside, patient is typically able to complete all ADLs without assistance and only requires minimal supervision. On admission, patient was noted to have negative head CT. Imaging was notable for acute displaced burst fracture of L2 vertebral body with extension into the spinal canal as well as nondisplaced fracture of L1 vertebral body. Additionally, patient was noted to have posterolateral ribs fracture of ribs 4-9. Since admission, patient was noted to be hyponatremic (128). Baseline 133. She was noted to have acute drop of sodium (7/26 127) to 120 (7/27) with some worsening mentation. Hospital medicine was consulted for management of hyponatremia. Patient then stepped up to critical care due to worsening hyponatremia and mentation.       Overview/Hospital Course:  Patient was put on fluid restriction with improvement in Na and mentation. Patient stepped down to hospital medicine 7/30.       Interval History: No acute events overnight. Na improved to 130. Pain a little better controlled with additional of multimodal pain regimen. Patient continues to have some confusion.     Review of Systems  Objective:     Vital Signs (Most Recent):  Temp: 98.4 °F (36.9 °C) (07/31/22 0805)  Pulse: 61 (07/31/22 1043)  Resp: 18 (07/31/22 0930)  BP: (!) 161/73 (07/31/22 0805)  SpO2: (!) 93 %  (07/31/22 1043)   Vital Signs (24h Range):  Temp:  [97.6 °F (36.4 °C)-98.4 °F (36.9 °C)] 98.4 °F (36.9 °C)  Pulse:  [61-80] 61  Resp:  [18-30] 18  SpO2:  [91 %-98 %] 93 %  BP: (127-161)/(60-73) 161/73     Weight: 93.9 kg (207 lb 0.2 oz)  Body mass index is 33.41 kg/m².    Intake/Output Summary (Last 24 hours) at 7/31/2022 1245  Last data filed at 7/31/2022 0600  Gross per 24 hour   Intake 180 ml   Output 950 ml   Net -770 ml      Physical Exam  Constitutional:       General: She is not in acute distress.     Appearance: Normal appearance. She is not toxic-appearing or diaphoretic.   Cardiovascular:      Rate and Rhythm: Normal rate and regular rhythm.      Heart sounds: No murmur heard.    No friction rub. No gallop.   Pulmonary:      Effort: Pulmonary effort is normal. No respiratory distress.      Breath sounds: Normal breath sounds.   Abdominal:      General: Abdomen is flat. There is no distension.      Palpations: Abdomen is soft.      Tenderness: There is no abdominal tenderness. There is no guarding or rebound.   Musculoskeletal:         General: Signs of injury present.      Right lower leg: No edema.      Left lower leg: No edema.      Comments: Back brace   Skin:     Findings: Bruising present.   Neurological:      Mental Status: She is alert.       MELD-Na score: 7 at 7/26/2022  3:55 AM  MELD score: 7 at 7/26/2022  3:55 AM  Calculated from:  Serum Creatinine: 0.7 mg/dL (Using min of 1 mg/dL) at 7/26/2022  3:55 AM  Serum Sodium: 127 mmol/L at 7/26/2022  3:55 AM  Total Bilirubin: 0.9 mg/dL (Using min of 1 mg/dL) at 7/26/2022  3:55 AM  INR(ratio): 1.1 at 7/24/2022  9:22 PM  Age: 89 years    Significant Labs:  CBC:  Recent Labs   Lab 07/30/22  0322 07/31/22  0353   WBC 9.32 8.13   HGB 10.7* 10.1*   HCT 32.9* 30.6*    257     CMP:  Recent Labs   Lab 07/30/22  0146 07/30/22 2013 07/31/22  0815   * 129* 130*     PTINR:  No results for input(s): INR in the last 48 hours.    Significant Procedures:    Dobutamine Stress Test with Color Flow: No results found for this or any previous visit.      Assessment/Plan:      * Hyponatremia  Patient with acute on chronic hyponatremia. Baseline sodium approximately 133. On admission 128, trending down. 7/26 sodium was 127 and 7/27 sodium was 120. Repeat sodiums 119>121>118. Some worsening of mental status including increased confusion from baseline, slurring of speech, and diplopia. 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 Previously on thiazide diuretic but no longer taking it. Critical care consulted and patient admitted to ICU. Fluid restricted with improvement in Na and mentation. Stepped down to hospital medicine 7/30.     JEANINE (acute kidney injury)  Appears improved.  See above      Acute encephalopathy  Increased confusion and worsening mental status 7/27. Thought to be secondary to worsening hyponatremia. Improved with improvement in sodium.   - hold gabapentin and flexeril    Multiple rib fractures  Multiple posterolateral rib fractures of ribs 4-9 after mechanical fall  General surgery consulted, appreciate assistance.  Multimodal pain control including tylenol, NSAIDs, PRN Oxycodone  Encourage IS    Coronary artery disease involving native coronary artery of native heart  - continue aspirin    Essential hypertension  - hold carvedilol and losartan, resume as tolerated      Hypothyroidism due to acquired atrophy of thyroid  TSH elevated 7.019 with free T4 1.2, ordered in the workup of hyponatremia    - continue levothyroxine 150 mcg daily  - will need to follow up once out of acute illness      VTE Risk Mitigation (From admission, onward)         Ordered     enoxaparin injection 40 mg  Daily         07/25/22 0040     IP VTE HIGH RISK PATIENT  Once         07/25/22 0040                Discharge Planning   RACHAEL: 8/1/2022     Code Status: Full Code   Is the patient medically ready for discharge?: Yes    Reason for patient  still in hospital (select all that apply): Patient trending condition and Treatment  Discharge Plan A: Skilled Nursing Facility   Discharge Delays: None known at this time              Jose Luis Jeffries MD  Department of Hospital Medicine   [facility] - Intensive Care ([place]-[facility])

## 2022-07-31 NOTE — SUBJECTIVE & OBJECTIVE
Interval History:   No acute events overnight.   Pain better today.   CXR stable.   Doing well on RA.   Mildly confused.    Medications:  Continuous Infusions:  Scheduled Meds:   acetaminophen  1,000 mg Oral Q8H    aspirin  81 mg Oral Daily    atorvastatin  40 mg Oral Daily    celecoxib  100 mg Oral Daily    enoxaparin  40 mg Subcutaneous Daily    levothyroxine  150 mcg Oral Daily    LIDOcaine  1 patch Transdermal Q24H    senna-docusate 8.6-50 mg  1 tablet Oral Daily     PRN Meds:docusate sodium, melatonin, ondansetron, oxyCODONE, oxyCODONE, sodium chloride 0.9%, DIPH,PERTUSS(ACELL),TET VACCINE (ADULT)(BOOSTRIX,ADACEL)     Review of patient's allergies indicates:   Allergen Reactions    Thiazides Other (See Comments)     Severe hyponatremia     Objective:     Vital Signs (Most Recent):  Temp: 98 °F (36.7 °C) (07/31/22 0738)  Pulse: 73 (07/31/22 0738)  Resp: 18 (07/31/22 0738)  BP: (!) 148/65 (07/31/22 0738)  SpO2: (!) 91 % (07/31/22 0738)   Vital Signs (24h Range):  Temp:  [97.4 °F (36.3 °C)-98 °F (36.7 °C)] 98 °F (36.7 °C)  Pulse:  [67-80] 73  Resp:  [18-27] 18  SpO2:  [91 %-98 %] 91 %  BP: (127-150)/(60-68) 148/65     Weight: 93.9 kg (207 lb 0.2 oz)  Body mass index is 33.41 kg/m².    Intake/Output - Last 3 Shifts         07/29 0700 07/30 0659 07/30 0700 07/31 0659 07/31 0700 08/01 0659    P.O. 360 180     Total Intake(mL/kg) 360 (3.8) 180 (1.9)     Urine (mL/kg/hr) 1015 (0.5) 950 (0.4)     Stool 0      Total Output 1015 950     Net -655 -770            Stool Occurrence 0 x              Physical Exam  Vitals and nursing note reviewed.   Constitutional:       General: She is not in acute distress.     Appearance: She is not ill-appearing.   HENT:      Head: Normocephalic and atraumatic.   Eyes:      Extraocular Movements: Extraocular movements intact.   Cardiovascular:      Rate and Rhythm: Normal rate.   Pulmonary:      Effort: Pulmonary effort is normal. No respiratory distress.      Comments: No increased  WOB  On RA  Abdominal:      General: There is no distension.      Palpations: Abdomen is soft.      Tenderness: There is no abdominal tenderness.   Musculoskeletal:         General: No deformity.      Comments: Back brace in place  TTP to R chest wall.   Hematoma to R knee and R hand   Neurological:      General: No focal deficit present.      Mental Status: She is alert and oriented to person, place, and time.      Cranial Nerves: No cranial nerve deficit.      Sensory: No sensory deficit.      Motor: No weakness.   Psychiatric:         Mood and Affect: Mood normal.       Significant Labs:  I have reviewed all pertinent lab results within the past 24 hours.  CBC:   Recent Labs   Lab 07/31/22  0353   WBC 8.13   RBC 3.25*   HGB 10.1*   HCT 30.6*      MCV 94   MCH 31.1*   MCHC 33.0       BMP:   Recent Labs   Lab 07/29/22  0347 07/29/22 2036 07/30/22 0322 07/30/22 2013   GLU 95  --   --   --    *   < >  --  129*   K 4.4  --   --   --    CL 98  --   --   --    CO2 27  --   --   --    BUN 14  --   --   --    CREATININE 0.6  --   --   --    CALCIUM 8.3*  --   --   --    MG 1.9  --  1.9  --     < > = values in this interval not displayed.         Significant Diagnostics:  I have reviewed all pertinent imaging results/findings within the past 24 hours.  I have reviewed and interpreted all pertinent imaging results/findings within the past 24 hours.

## 2022-07-31 NOTE — PT/OT/SLP RE-EVAL
Occupational Therapy  Co- Re-evaluation and Treatment    Name: Laila Hanna  MRN: [id number]  Admitting Diagnosis:  Hyponatremia  Recent Surgery: * No surgery found *      Recommendations:     Discharge Recommendations: nursing facility, skilled  Discharge Equipment Recommendations:  bedside commode, wheelchair  Barriers to discharge:  Decreased caregiver support, Other (Comment) (Increased skilled assistance required)    Assessment:     Laila Hanna is a 89 y.o. female with a medical diagnosis of Hyponatremia.  She presents with increased pain, impaired cognition, impaired endurance, increased fatigue, decreased activity tolerance, impaired balance, poor trunk stability/postural control, impaired ROM and decreased safety awareness. Patient with increased anxiety during EOB sitting and hyper aware to increased pain in ribs, back and legs, required increased time and cueing through pursed breathing technique. Patient with increased posterior push during attempts at EOB scooting, unable to safely bring feet to floor to attempt stand trial. Patient would continue to benefit from acute skilled OT services to improve functional performance. OT continuing to recommend SNF once medically stable for discharge.    Performance deficits affecting function are weakness, impaired endurance, impaired self care skills, impaired functional mobility, gait instability, impaired balance, pain, decreased safety awareness, decreased lower extremity function, decreased upper extremity function, decreased coordination, impaired cognition, decreased ROM, impaired coordination, impaired fine motor, impaired skin, edema, impaired cardiopulmonary response to activity, orthopedic precautions.      Rehab Prognosis:  Fair; patient would benefit from acute skilled OT services to address these deficits and reach maximum level of function.       Plan:     Patient to be seen 3 x/week to address the above listed problems via self-care/home  "management, therapeutic activities, therapeutic exercises, neuromuscular re-education, cognitive retraining  · Plan of Care Expires: 08/25/22  · Plan of Care Reviewed with: patient    Subjective   Patient states: "I don't know what I feel"    Chief Complaint: pain   Patient/Family stated goals: to return home at PLOF  Communicated with: Nurse prior to session.  Pain/Comfort:  · Pain Rating 1: other (see comments) (patient unable to rate)  · Location - Orientation 1: generalized  · Location 1: other (see comments) (back, ribs, legs)  · Pain Addressed 1: Reposition, Distraction, Cessation of Activity  · Pain Rating Post-Intervention 1: other (see comments) (patient unable to rate)    Objective:     Communicated with: Nurse prior to session.  Patient found HOB elevated with: TLSO, telemetry, pulse ox (continuous), peripheral IV, blood pressure cuff, gaets catheter upon OT entry to room.    General Precautions: Standard, fall   Orthopedic Precautions:spinal precautions   Braces: TLSO  Respiratory Status: Room air    Occupational Performance:    Bed Mobility:    · Patient completed Rolling/Turning to Right with moderate assistance and 2 persons  · Patient completed Scooting anteriorly towards EOB with total assistance and 2 persons; increased posterior push  · Patient completed Supine to Sit with moderate assistance and 2 persons  · Patient completed Sit to Supine with maximal assistance and 2 persons    Functional Mobility/Transfers:  · Patient tolerated EOB sitting for ~20 mintues while engaged in functional tasks, unable to progress this date due to increased fear and unsafe techniques when attempting mobility trials    Activities of Daily Living:  · Grooming: stand by assistance to wipe face with towel seated EOB  · Lower Body Dressing: maximal assistance to adjust B socks    Cognitive/Visual Perceptual:  Cognitive/Psychosocial Skills:     -       Oriented to: Person, Place, Time and Situation   -       Follows " Commands/attention:Easily distracted and Follows one-step commands  -       Communication: clear/fluent  -       Memory: Impaired STM, Impaired LTM and Poor immediate recall  -       Safety awareness/insight to disability: impaired   -       Mood/Affect/Coping skills/emotional control: Appropriate to situation  Visual/Perceptual:      -Intact     Physical Exam:  Balance:    -       Fair static EOB sitting  Postural examination/scapula alignment:    -       Kyphosis  Dominant hand:    -       R hand  Upper Extremity Range of Motion:     -       Right Upper Extremity: AAROM WFL  -       Left Upper Extremity: AAROM WFL  Upper Extremity Strength:    -       Right Upper Extremity: 3/5  -       Left Upper Extremity: 3/5   Strength:    -       Right Upper Extremity: 3+/5  -       Left Upper Extremity: 3+/5  Fine Motor Coordination:    -       Impaired  Gross motor coordination:   WFL    AMPAC 6 Click:  AMPAC Total Score: 11    Treatment & Education:  -Pt education on OT role and POC.  -Importance of E/OOB activity with staff assistance, emphasis on daily participation  -Safety during functional transfer and mobility ensured  -Patient provided with education on importance of Bilateral UB/LB integration during functional tasks for improvement in functional performance.   -Education provided/reviewed, questions answered within OT scope of practice.   -Patient will require reinforcement to demonstrate understanding and learning this date.         Patient left with bed in chair position with all lines intact, call button in reach and nurse notified    GOALS:   Multidisciplinary Problems     Occupational Therapy Goals        Problem: Occupational Therapy    Goal Priority Disciplines Outcome Interventions   Occupational Therapy Goal     OT, PT/OT Ongoing, Progressing    Description: Goals to be met by: 8/25/2022 (1 month)     Patient will increase functional independence with ADLs by performing:    UE Dressing with Moderate  Assistance.  LE Dressing with Maximum Assistance.  Grooming while standing at sink with Minimal Assistance.  Toileting from toilet with Minimal Assistance for hygiene and clothing management.   Rolling to Bilateral with Standby Assistance.   Supine to sit with Minimal Assistance.  Step transfer with Stand-by Assistance  Toilet transfer to toilet with Stand-by Assistance.                     History:     Past Medical History:   Diagnosis Date    Basal cell carcinoma     nose    Benign essential hypertension     Cerebral microvascular disease 9/8/2014    Closed fracture of distal phalanx of left hand with routine healing 9/17/2015    Closed mallet fracture of distal phalanx of finger with routine healing 10/6/2015    Coronary artery disease     DDD (degenerative disc disease), lumbar 4/12/2016    Depression     Fall at home 5/30/2016    Hyperlipidemia     Hypothyroidism due to acquired atrophy of thyroid     Meningiomas, multiple     MI (myocardial infarction) 2004    80% blockage per patient    Migraine aura without headache 12/1/2014    Post PTCA 12/12/2012    Transient cerebral ischemia 5/4/2014       Past Surgical History:   Procedure Laterality Date    CARDIAC CATHETERIZATION  03/29/2004    S/P LAD PTCA, coated stent    CATARACT EXTRACTION W/  INTRAOCULAR LENS IMPLANT Bilateral 2000    Dr Youssef     CHOLECYSTECTOMY      CORONARY ANGIOPLASTY WITH STENT PLACEMENT  2004    LAD    EYE SURGERY      TOTAL THYROIDECTOMY         Time Tracking:     OT Date of Treatment: 07/31/22  OT Start Time: 1424  OT Stop Time: 1502  OT Total Time (min): 38 min    Billable Minutes:Kasia-glenroy 8  Self Care/Home Management 20  Therapeutic Activity 10    7/31/2022

## 2022-07-31 NOTE — ASSESSMENT & PLAN NOTE
Patient is a 89 year old female with h/o HTN, CAD, degenerative disc disease, HLD, MI, TIA presenting after a fall from standing with R rib 4-9 fractures and L2 burst fracture. Clinically stable but with worsening hyponatremia     - Patient needs to be on scheduled multimodal pain regimen in addition to prn pain medications. At high risk for splinting due to rib fractures and increased risk for atelectasis or developing a pneumonia.   - Recommend continued RT treatments and pulmonary care (IS, acapella, etc)  - Fine for diet, aspiration precautions  - Scheduled multimodal pain regimen  - PRN pain and nausea medications  - Wound care   - DVT prophylaxis (SCDs and lovenox)  - OOB, ambulate  - Inhalation treatments  - Daily CXR   - Wean O2 as tolerated

## 2022-07-31 NOTE — PLAN OF CARE
Problem: Adult Inpatient Plan of Care  Goal: Plan of Care Review  Outcome: Ongoing, Not Progressing  Goal: Patient-Specific Goal (Individualized)  Outcome: Ongoing, Not Progressing  Goal: Absence of Hospital-Acquired Illness or Injury  Outcome: Ongoing, Not Progressing  Goal: Optimal Comfort and Wellbeing  Outcome: Ongoing, Not Progressing  Goal: Readiness for Transition of Care  Outcome: Ongoing, Not Progressing     Problem: Fall Injury Risk  Goal: Absence of Fall and Fall-Related Injury  Outcome: Ongoing, Not Progressing     Problem: Skin Injury Risk Increased  Goal: Skin Health and Integrity  Outcome: Ongoing, Not Progressing     Problem: Pain Acute  Goal: Acceptable Pain Control and Functional Ability  Outcome: Ongoing, Not Progressing     Problem: Fluid and Electrolyte Imbalance (Acute Kidney Injury/Impairment)  Goal: Fluid and Electrolyte Balance  Outcome: Ongoing, Not Progressing     Problem: Oral Intake Inadequate (Acute Kidney Injury/Impairment)  Goal: Optimal Nutrition Intake  Outcome: Ongoing, Not Progressing     Problem: Renal Function Impairment (Acute Kidney Injury/Impairment)  Goal: Effective Renal Function  Outcome: Ongoing, Not Progressing     Problem: Infection  Goal: Absence of Infection Signs and Symptoms  Outcome: Ongoing, Not Progressing

## 2022-07-31 NOTE — PLAN OF CARE
Problem: Physical Therapy  Goal: Physical Therapy Goal  Description: Goals to be met by: 22     Patient will increase functional independence with mobility by performin. Supine to sit with Minimal Assistance  2. Sit to supine with MInimal Assistance  3. Rolling to Left and Right with Stand-by Assistance  4. Sit edge of bed with supervision for 25 minutes    Outcome: Ongoing, Progressing     PT re-evaluated pt and re-established goals for pt today.

## 2022-07-31 NOTE — ASSESSMENT & PLAN NOTE
Increased confusion and worsening mental status 7/27. Thought to be secondary to worsening hyponatremia. Improved with improvement in sodium.   - hold gabapentin and flexeril

## 2022-07-31 NOTE — SUBJECTIVE & OBJECTIVE
Interval History: No acute events overnight. Na improved to 130. Pain a little better controlled with additional of multimodal pain regimen. Patient continues to have some confusion.     Review of Systems  Objective:     Vital Signs (Most Recent):  Temp: 98.4 °F (36.9 °C) (07/31/22 0805)  Pulse: 61 (07/31/22 1043)  Resp: 18 (07/31/22 0930)  BP: (!) 161/73 (07/31/22 0805)  SpO2: (!) 93 % (07/31/22 1043)   Vital Signs (24h Range):  Temp:  [97.6 °F (36.4 °C)-98.4 °F (36.9 °C)] 98.4 °F (36.9 °C)  Pulse:  [61-80] 61  Resp:  [18-30] 18  SpO2:  [91 %-98 %] 93 %  BP: (127-161)/(60-73) 161/73     Weight: 93.9 kg (207 lb 0.2 oz)  Body mass index is 33.41 kg/m².    Intake/Output Summary (Last 24 hours) at 7/31/2022 1245  Last data filed at 7/31/2022 0600  Gross per 24 hour   Intake 180 ml   Output 950 ml   Net -770 ml      Physical Exam  Constitutional:       General: She is not in acute distress.     Appearance: Normal appearance. She is not toxic-appearing or diaphoretic.   Cardiovascular:      Rate and Rhythm: Normal rate and regular rhythm.      Heart sounds: No murmur heard.    No friction rub. No gallop.   Pulmonary:      Effort: Pulmonary effort is normal. No respiratory distress.      Breath sounds: Normal breath sounds.   Abdominal:      General: Abdomen is flat. There is no distension.      Palpations: Abdomen is soft.      Tenderness: There is no abdominal tenderness. There is no guarding or rebound.   Musculoskeletal:         General: Signs of injury present.      Right lower leg: No edema.      Left lower leg: No edema.      Comments: Back brace   Skin:     Findings: Bruising present.   Neurological:      Mental Status: She is alert.       MELD-Na score: 7 at 7/26/2022  3:55 AM  MELD score: 7 at 7/26/2022  3:55 AM  Calculated from:  Serum Creatinine: 0.7 mg/dL (Using min of 1 mg/dL) at 7/26/2022  3:55 AM  Serum Sodium: 127 mmol/L at 7/26/2022  3:55 AM  Total Bilirubin: 0.9 mg/dL (Using min of 1 mg/dL) at 7/26/2022   3:55 AM  INR(ratio): 1.1 at 7/24/2022  9:22 PM  Age: 89 years    Significant Labs:  CBC:  Recent Labs   Lab 07/30/22  0322 07/31/22  0353   WBC 9.32 8.13   HGB 10.7* 10.1*   HCT 32.9* 30.6*    257     CMP:  Recent Labs   Lab 07/30/22  0146 07/30/22 2013 07/31/22  0815   * 129* 130*     PTINR:  No results for input(s): INR in the last 48 hours.    Significant Procedures:   Dobutamine Stress Test with Color Flow: No results found for this or any previous visit.

## 2022-08-01 LAB
ANION GAP SERPL CALC-SCNC: 8 MMOL/L (ref 8–16)
BUN SERPL-MCNC: 9 MG/DL (ref 8–23)
CALCIUM SERPL-MCNC: 8.5 MG/DL (ref 8.7–10.5)
CHLORIDE SERPL-SCNC: 98 MMOL/L (ref 95–110)
CO2 SERPL-SCNC: 26 MMOL/L (ref 23–29)
CREAT SERPL-MCNC: 0.6 MG/DL (ref 0.5–1.4)
EST. GFR  (NO RACE VARIABLE): >60 ML/MIN/1.73 M^2
GLUCOSE SERPL-MCNC: 83 MG/DL (ref 70–110)
POTASSIUM SERPL-SCNC: 4 MMOL/L (ref 3.5–5.1)
SODIUM SERPL-SCNC: 132 MMOL/L (ref 136–145)

## 2022-08-01 PROCEDURE — 25000003 PHARM REV CODE 250: Performed by: STUDENT IN AN ORGANIZED HEALTH CARE EDUCATION/TRAINING PROGRAM

## 2022-08-01 PROCEDURE — 20600001 HC STEP DOWN PRIVATE ROOM

## 2022-08-01 PROCEDURE — 99232 PR SUBSEQUENT HOSPITAL CARE,LEVL II: ICD-10-PCS | Mod: ,,, | Performed by: STUDENT IN AN ORGANIZED HEALTH CARE EDUCATION/TRAINING PROGRAM

## 2022-08-01 PROCEDURE — 99232 SBSQ HOSP IP/OBS MODERATE 35: CPT | Mod: ,,, | Performed by: STUDENT IN AN ORGANIZED HEALTH CARE EDUCATION/TRAINING PROGRAM

## 2022-08-01 PROCEDURE — 80048 BASIC METABOLIC PNL TOTAL CA: CPT | Performed by: STUDENT IN AN ORGANIZED HEALTH CARE EDUCATION/TRAINING PROGRAM

## 2022-08-01 PROCEDURE — 36415 COLL VENOUS BLD VENIPUNCTURE: CPT | Performed by: STUDENT IN AN ORGANIZED HEALTH CARE EDUCATION/TRAINING PROGRAM

## 2022-08-01 PROCEDURE — 63600175 PHARM REV CODE 636 W HCPCS: Performed by: STUDENT IN AN ORGANIZED HEALTH CARE EDUCATION/TRAINING PROGRAM

## 2022-08-01 RX ORDER — CARVEDILOL 12.5 MG/1
12.5 TABLET ORAL 2 TIMES DAILY
Status: DISCONTINUED | OUTPATIENT
Start: 2022-08-01 | End: 2022-08-04 | Stop reason: HOSPADM

## 2022-08-01 RX ORDER — BISACODYL 10 MG
10 SUPPOSITORY, RECTAL RECTAL DAILY PRN
Status: DISCONTINUED | OUTPATIENT
Start: 2022-08-01 | End: 2022-08-04 | Stop reason: HOSPADM

## 2022-08-01 RX ORDER — POLYETHYLENE GLYCOL 3350 17 G/17G
17 POWDER, FOR SOLUTION ORAL DAILY
Status: DISCONTINUED | OUTPATIENT
Start: 2022-08-01 | End: 2022-08-04 | Stop reason: HOSPADM

## 2022-08-01 RX ADMIN — POLYETHYLENE GLYCOL 3350 17 G: 17 POWDER, FOR SOLUTION ORAL at 12:08

## 2022-08-01 RX ADMIN — CELECOXIB 100 MG: 100 CAPSULE ORAL at 09:08

## 2022-08-01 RX ADMIN — OXYCODONE HYDROCHLORIDE 10 MG: 10 TABLET ORAL at 09:08

## 2022-08-01 RX ADMIN — LEVOTHYROXINE SODIUM 150 MCG: 150 TABLET ORAL at 05:08

## 2022-08-01 RX ADMIN — ACETAMINOPHEN 1000 MG: 500 TABLET ORAL at 05:08

## 2022-08-01 RX ADMIN — SENNOSIDES AND DOCUSATE SODIUM 1 TABLET: 50; 8.6 TABLET ORAL at 09:08

## 2022-08-01 RX ADMIN — CARVEDILOL 12.5 MG: 12.5 TABLET, FILM COATED ORAL at 09:08

## 2022-08-01 RX ADMIN — LEVOTHYROXINE SODIUM 150 MCG: 150 TABLET ORAL at 09:08

## 2022-08-01 RX ADMIN — ACETAMINOPHEN 1000 MG: 500 TABLET ORAL at 12:08

## 2022-08-01 RX ADMIN — OXYCODONE HYDROCHLORIDE 10 MG: 10 TABLET ORAL at 04:08

## 2022-08-01 RX ADMIN — ATORVASTATIN CALCIUM 40 MG: 20 TABLET, FILM COATED ORAL at 09:08

## 2022-08-01 RX ADMIN — Medication 6 MG: at 09:08

## 2022-08-01 RX ADMIN — ENOXAPARIN SODIUM 40 MG: 40 INJECTION SUBCUTANEOUS at 04:08

## 2022-08-01 RX ADMIN — ASPIRIN 81 MG: 81 TABLET, COATED ORAL at 09:08

## 2022-08-01 NOTE — SUBJECTIVE & OBJECTIVE
Interval History: No acute events overnight. Pain controlled on multi-modal regimen. Waxing waning confusion.    Medications:  Continuous Infusions:  Scheduled Meds:   acetaminophen  1,000 mg Oral Q8H    aspirin  81 mg Oral Daily    atorvastatin  40 mg Oral Daily    celecoxib  100 mg Oral Daily    enoxaparin  40 mg Subcutaneous Daily    levothyroxine  150 mcg Oral Daily    LIDOcaine  1 patch Transdermal Q24H    senna-docusate 8.6-50 mg  1 tablet Oral Daily     PRN Meds:docusate sodium, melatonin, ondansetron, oxyCODONE, oxyCODONE, sodium chloride 0.9%, DIPH,PERTUSS(ACELL),TET VACCINE (ADULT)(BOOSTRIX,ADACEL)     Review of patient's allergies indicates:   Allergen Reactions    Thiazides Other (See Comments)     Severe hyponatremia     Objective:     Vital Signs (Most Recent):  Temp: 97.7 °F (36.5 °C) (08/01/22 0500)  Pulse: 75 (08/01/22 0500)  Resp: 18 (08/01/22 0500)  BP: (!) 173/74 (08/01/22 0500)  SpO2: 97 % (08/01/22 0500)   Vital Signs (24h Range):  Temp:  [97.7 °F (36.5 °C)-98.4 °F (36.9 °C)] 97.7 °F (36.5 °C)  Pulse:  [61-82] 75  Resp:  [18-30] 18  SpO2:  [93 %-97 %] 97 %  BP: (161-173)/(69-74) 173/74     Weight: 93.9 kg (207 lb 0.2 oz)  Body mass index is 33.41 kg/m².    Intake/Output - Last 3 Shifts         07/30 0700 07/31 0659 07/31 0700 08/01 0659 08/01 0700 08/02 0659    P.O. 180      Total Intake(mL/kg) 180 (1.9)      Urine (mL/kg/hr) 950 (0.4) 1250 (0.6)     Stool  0     Total Output 950 1250     Net -770 -1250            Stool Occurrence  0 x             Physical Exam  HENT:      Head: Normocephalic and atraumatic.   Cardiovascular:      Rate and Rhythm: Normal rate.   Pulmonary:      Effort: Pulmonary effort is normal. No respiratory distress.   Abdominal:      General: There is no distension.      Palpations: Abdomen is soft.      Tenderness: There is no abdominal tenderness.   Skin:     General: Skin is warm.      Capillary Refill: Capillary refill takes less than 2 seconds.   Neurological:       Mental Status: She is alert.       Significant Labs:  I have reviewed all pertinent lab results within the past 24 hours.  CBC:   Recent Labs   Lab 07/31/22  0353   WBC 8.13   RBC 3.25*   HGB 10.1*   HCT 30.6*      MCV 94   MCH 31.1*   MCHC 33.0     CMP:   Recent Labs   Lab 07/27/22  0454 07/27/22  0737 08/01/22  0457   GLU 85   < > 83   CALCIUM 8.3*   < > 8.5*   ALBUMIN 2.8*  --   --    PROT 6.1  --   --    *   < > 132*   K 4.3   < > 4.0   CO2 23   < > 26   CL 88*   < > 98   BUN 14   < > 9   CREATININE 0.8   < > 0.6   ALKPHOS 97  --   --    ALT 10  --   --    AST 18  --   --    BILITOT 0.7  --   --     < > = values in this interval not displayed.       Significant Diagnostics:  I have reviewed all pertinent imaging results/findings within the past 24 hours.

## 2022-08-01 NOTE — ASSESSMENT & PLAN NOTE
Patient is a 89 year old female with h/o HTN, CAD, degenerative disc disease, HLD, MI, TIA presenting after a fall from standing with R rib 4-9 fractures and L2 burst fracture. Clinically stable but with worsening hyponatremia     - continue scheduled multimodal pain regimen in addition to prn pain medications. At high risk for splinting due to rib fractures and increased risk for atelectasis or developing a pneumonia.   - continue RT treatments and pulmonary care (IS, acapella, etc)  - Fine for diet, aspiration precautions  - Scheduled multimodal pain regimen  - PRN pain and nausea medications  - Wound care   - DVT prophylaxis (SCDs and lovenox)  - OOB, ambulate  - Inhalation treatments  - Daily CXR   - Wean O2 as tolerated

## 2022-08-01 NOTE — PROGRESS NOTES
[facility] - [facility] ([place] [address])  General Surgery  Progress Note    Subjective:     History of Present Illness:  No notes on file    Post-Op Info:  * No surgery found *         Interval History: No acute events overnight. Pain controlled on multi-modal regimen. Waxing waning confusion.    Medications:  Continuous Infusions:  Scheduled Meds:   acetaminophen  1,000 mg Oral Q8H    aspirin  81 mg Oral Daily    atorvastatin  40 mg Oral Daily    celecoxib  100 mg Oral Daily    enoxaparin  40 mg Subcutaneous Daily    levothyroxine  150 mcg Oral Daily    LIDOcaine  1 patch Transdermal Q24H    senna-docusate 8.6-50 mg  1 tablet Oral Daily     PRN Meds:docusate sodium, melatonin, ondansetron, oxyCODONE, oxyCODONE, sodium chloride 0.9%, DIPH,PERTUSS(ACELL),TET VACCINE (ADULT)(BOOSTRIX,ADACEL)     Review of patient's allergies indicates:   Allergen Reactions    Thiazides Other (See Comments)     Severe hyponatremia     Objective:     Vital Signs (Most Recent):  Temp: 97.7 °F (36.5 °C) (08/01/22 0500)  Pulse: 75 (08/01/22 0500)  Resp: 18 (08/01/22 0500)  BP: (!) 173/74 (08/01/22 0500)  SpO2: 97 % (08/01/22 0500)   Vital Signs (24h Range):  Temp:  [97.7 °F (36.5 °C)-98.4 °F (36.9 °C)] 97.7 °F (36.5 °C)  Pulse:  [61-82] 75  Resp:  [18-30] 18  SpO2:  [93 %-97 %] 97 %  BP: (161-173)/(69-74) 173/74     Weight: 93.9 kg (207 lb 0.2 oz)  Body mass index is 33.41 kg/m².    Intake/Output - Last 3 Shifts         07/30 0700 07/31 0659 07/31 0700 08/01 0659 08/01 0700 08/02 0659    P.O. 180      Total Intake(mL/kg) 180 (1.9)      Urine (mL/kg/hr) 950 (0.4) 1250 (0.6)     Stool  0     Total Output 950 1250     Net -770 -1250            Stool Occurrence  0 x             Physical Exam  HENT:      Head: Normocephalic and atraumatic.   Cardiovascular:      Rate and Rhythm: Normal rate.   Pulmonary:      Effort: Pulmonary effort is normal. No respiratory distress.   Abdominal:      General: There is no distension.       Palpations: Abdomen is soft.      Tenderness: There is no abdominal tenderness.   Skin:     General: Skin is warm.      Capillary Refill: Capillary refill takes less than 2 seconds.   Neurological:      Mental Status: She is alert.       Significant Labs:  I have reviewed all pertinent lab results within the past 24 hours.  CBC:   Recent Labs   Lab 07/31/22  0353   WBC 8.13   RBC 3.25*   HGB 10.1*   HCT 30.6*      MCV 94   MCH 31.1*   MCHC 33.0     CMP:   Recent Labs   Lab 07/27/22  0454 07/27/22  0737 08/01/22  0457   GLU 85   < > 83   CALCIUM 8.3*   < > 8.5*   ALBUMIN 2.8*  --   --    PROT 6.1  --   --    *   < > 132*   K 4.3   < > 4.0   CO2 23   < > 26   CL 88*   < > 98   BUN 14   < > 9   CREATININE 0.8   < > 0.6   ALKPHOS 97  --   --    ALT 10  --   --    AST 18  --   --    BILITOT 0.7  --   --     < > = values in this interval not displayed.       Significant Diagnostics:  I have reviewed all pertinent imaging results/findings within the past 24 hours.    Assessment/Plan:     * Hyponatremia  - Chronic but with acute drop to 120 this AM  - STAT Na repeat ordered     Multiple rib fractures  Patient is a 89 year old female with h/o HTN, CAD, degenerative disc disease, HLD, MI, TIA presenting after a fall from standing with R rib 4-9 fractures and L2 burst fracture. Clinically stable but with worsening hyponatremia     - continue scheduled multimodal pain regimen in addition to prn pain medications. At high risk for splinting due to rib fractures and increased risk for atelectasis or developing a pneumonia.   - continue RT treatments and pulmonary care (IS, acapella, etc)  - Fine for diet, aspiration precautions  - Scheduled multimodal pain regimen  - PRN pain and nausea medications  - Wound care   - DVT prophylaxis (SCDs and lovenox)  - OOB, ambulate  - Inhalation treatments  - Daily CXR   - Wean O2 as tolerated      Coronary artery disease involving native coronary artery of native heart  - Continue  home ASA, coreg, and statin     Essential hypertension  - Controlled  - Continue home coreg  - Continue to monitor with q4 vitals and adjust regimen PRN    Hypothyroidism due to acquired atrophy of thyroid  - Continue home levothyroxine         Malaika Tamez MD  General Surgery  [facility] - Intensive Care ([place])

## 2022-08-01 NOTE — PROGRESS NOTES
[facility] - [facility] ([place] [address])  [facility] Medicine  Progress Note    Patient Name: Laila Hanna  MRN: [id number]  Patient Class: IP- Inpatient   Admission Date: 7/24/2022  Length of Stay: 7 days  Attending Physician: Jose Luis Smith*  Primary Care Provider: Ama Graham MD        Subjective:     Principal Problem:Hyponatremia        HPI:  Laila Hanna is a 89 year old female with CAD, HLD, hypothyroidism, HTN, HFpEF who presented after mechanical fall. Patient reportedly lost her balance and fell while in her kitchen, landing on her right side without loss of consciousness. Patient denies prodrome. Per caregiver at bedside, patient is typically able to complete all ADLs without assistance and only requires minimal supervision. On admission, patient was noted to have negative head CT. Imaging was notable for acute displaced burst fracture of L2 vertebral body with extension into the spinal canal as well as nondisplaced fracture of L1 vertebral body. Additionally, patient was noted to have posterolateral ribs fracture of ribs 4-9. Since admission, patient was noted to be hyponatremic (128). Baseline 133. She was noted to have acute drop of sodium (7/26 127) to 120 (7/27) with some worsening mentation. Hospital medicine was consulted for management of hyponatremia. Patient then stepped up to critical care due to worsening hyponatremia and mentation.       Overview/Hospital Course:  Patient was put on fluid restriction with improvement in Na and mentation. Patient stepped down to hospital medicine 7/30. Her Na was stable, but patient did have episodes of confusion. Repeat CXR with no atelectasis or pneumonia.       Interval History: No acute events. Resuming coreg for elevated blood pressures. Patient seen with caregiver at bedside. Reports at home patient usually refuses to eat but then will eat if encouraged.     Review of Systems  Objective:     Vital Signs (Most Recent):  Temp: 97.7 °F (36.5  °C) (08/01/22 0500)  Pulse: 60 (08/01/22 1522)  Resp: 18 (08/01/22 0930)  BP: (!) 173/74 (08/01/22 0500)  SpO2: 97 % (08/01/22 0500)   Vital Signs (24h Range):  Temp:  [97.7 °F (36.5 °C)-98 °F (36.7 °C)] 97.7 °F (36.5 °C)  Pulse:  [60-80] 60  Resp:  [18] 18  SpO2:  [93 %-97 %] 97 %  BP: (169-173)/(69-74) 173/74     Weight: 93.9 kg (207 lb 0.2 oz)  Body mass index is 33.41 kg/m².    Intake/Output Summary (Last 24 hours) at 8/1/2022 1537  Last data filed at 8/1/2022 1000  Gross per 24 hour   Intake --   Output 1750 ml   Net -1750 ml      Physical Exam  Constitutional:       General: She is not in acute distress.     Appearance: Normal appearance. She is not toxic-appearing or diaphoretic.   Cardiovascular:      Rate and Rhythm: Normal rate and regular rhythm.      Heart sounds: No murmur heard.    No friction rub. No gallop.   Pulmonary:      Effort: Pulmonary effort is normal. No respiratory distress.      Breath sounds: Normal breath sounds.   Abdominal:      General: Abdomen is flat. There is no distension.      Palpations: Abdomen is soft.      Tenderness: There is no abdominal tenderness. There is no guarding or rebound.   Musculoskeletal:         General: Signs of injury present.      Right lower leg: No edema.      Left lower leg: No edema.      Comments: Back brace   Skin:     Findings: Bruising present.   Neurological:      Mental Status: She is alert.       MELD-Na score: 7 at 7/26/2022  3:55 AM  MELD score: 7 at 7/26/2022  3:55 AM  Calculated from:  Serum Creatinine: 0.7 mg/dL (Using min of 1 mg/dL) at 7/26/2022  3:55 AM  Serum Sodium: 127 mmol/L at 7/26/2022  3:55 AM  Total Bilirubin: 0.9 mg/dL (Using min of 1 mg/dL) at 7/26/2022  3:55 AM  INR(ratio): 1.1 at 7/24/2022  9:22 PM  Age: 89 years    Significant Labs:  CBC:  Recent Labs   Lab 07/31/22  0353   WBC 8.13   HGB 10.1*   HCT 30.6*        CMP:  Recent Labs   Lab 07/30/22 2013 07/31/22  0815 08/01/22  0457   * 130* 132*   K  --   --  4.0    CL  --   --  98   CO2  --   --  26   GLU  --   --  83   BUN  --   --  9   CREATININE  --   --  0.6   CALCIUM  --   --  8.5*   ANIONGAP  --   --  8     PTINR:  No results for input(s): INR in the last 48 hours.    Significant Procedures:   Dobutamine Stress Test with Color Flow: No results found for this or any previous visit.      Assessment/Plan:      * Hyponatremia  Patient with acute on chronic hyponatremia. Baseline sodium approximately 133. On admission 128, trending down. 7/26 sodium was 127 and 7/27 sodium was 120. Repeat sodiums 119>121>118. Some worsening of mental status including increased confusion from baseline, slurring of speech, and diplopia. 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 Previously on thiazide diuretic but no longer taking it. Critical care consulted and patient admitted to ICU. Fluid restricted with improvement in Na and mentation. Stepped down to hospital medicine 7/30.     JEANINE (acute kidney injury)  Appears improved.  See above      Acute encephalopathy  Increased confusion and worsening mental status 7/27. Thought to be secondary to worsening hyponatremia. Improved with improvement in sodium.   - hold gabapentin and flexeril    Multiple rib fractures  Multiple posterolateral rib fractures of ribs 4-9 after mechanical fall  General surgery consulted, appreciate assistance.  Multimodal pain control including tylenol, NSAIDs, PRN Oxycodone  Encourage IS    Coronary artery disease involving native coronary artery of native heart  - continue aspirin    Essential hypertension  - hold carvedilol and losartan, resume as tolerated      Hypothyroidism due to acquired atrophy of thyroid  TSH elevated 7.019 with free T4 1.2, ordered in the workup of hyponatremia    - continue levothyroxine 150 mcg daily  - will need to follow up once out of acute illness        VTE Risk Mitigation (From admission, onward)         Ordered     enoxaparin injection 40  mg  Daily         07/25/22 0040     IP VTE HIGH RISK PATIENT  Once         07/25/22 0040                Discharge Planning   RACHAEL: 8/2/2022     Code Status: Full Code   Is the patient medically ready for discharge?: Yes    Reason for patient still in hospital (select all that apply): Patient trending condition  Discharge Plan A: Skilled Nursing Facility   Discharge Delays: None known at this time              Jose Luis Jeffries MD  Department of Hospital Medicine   [facility] - Intensive Care ([place])

## 2022-08-01 NOTE — PLAN OF CARE
Patient able to answer orientation questions, confused at times. Tolerating room air. Meds crushed, puree diet. Turned q2h for skin breakdown control. Pending SNF placement.

## 2022-08-01 NOTE — SUBJECTIVE & OBJECTIVE
Interval History: No acute events. Resuming coreg for elevated blood pressures. Patient seen with caregiver at bedside. Reports at home patient usually refuses to eat but then will eat if encouraged.     Review of Systems  Objective:     Vital Signs (Most Recent):  Temp: 97.7 °F (36.5 °C) (08/01/22 0500)  Pulse: 60 (08/01/22 1522)  Resp: 18 (08/01/22 0930)  BP: (!) 173/74 (08/01/22 0500)  SpO2: 97 % (08/01/22 0500)   Vital Signs (24h Range):  Temp:  [97.7 °F (36.5 °C)-98 °F (36.7 °C)] 97.7 °F (36.5 °C)  Pulse:  [60-80] 60  Resp:  [18] 18  SpO2:  [93 %-97 %] 97 %  BP: (169-173)/(69-74) 173/74     Weight: 93.9 kg (207 lb 0.2 oz)  Body mass index is 33.41 kg/m².    Intake/Output Summary (Last 24 hours) at 8/1/2022 1537  Last data filed at 8/1/2022 1000  Gross per 24 hour   Intake --   Output 1750 ml   Net -1750 ml      Physical Exam  Constitutional:       General: She is not in acute distress.     Appearance: Normal appearance. She is not toxic-appearing or diaphoretic.   Cardiovascular:      Rate and Rhythm: Normal rate and regular rhythm.      Heart sounds: No murmur heard.    No friction rub. No gallop.   Pulmonary:      Effort: Pulmonary effort is normal. No respiratory distress.      Breath sounds: Normal breath sounds.   Abdominal:      General: Abdomen is flat. There is no distension.      Palpations: Abdomen is soft.      Tenderness: There is no abdominal tenderness. There is no guarding or rebound.   Musculoskeletal:         General: Signs of injury present.      Right lower leg: No edema.      Left lower leg: No edema.      Comments: Back brace   Skin:     Findings: Bruising present.   Neurological:      Mental Status: She is alert.       MELD-Na score: 7 at 7/26/2022  3:55 AM  MELD score: 7 at 7/26/2022  3:55 AM  Calculated from:  Serum Creatinine: 0.7 mg/dL (Using min of 1 mg/dL) at 7/26/2022  3:55 AM  Serum Sodium: 127 mmol/L at 7/26/2022  3:55 AM  Total Bilirubin: 0.9 mg/dL (Using min of 1 mg/dL) at  7/26/2022  3:55 AM  INR(ratio): 1.1 at 7/24/2022  9:22 PM  Age: 89 years    Significant Labs:  CBC:  Recent Labs   Lab 07/31/22  0353   WBC 8.13   HGB 10.1*   HCT 30.6*        CMP:  Recent Labs   Lab 07/30/22 2013 07/31/22  0815 08/01/22  0457   * 130* 132*   K  --   --  4.0   CL  --   --  98   CO2  --   --  26   GLU  --   --  83   BUN  --   --  9   CREATININE  --   --  0.6   CALCIUM  --   --  8.5*   ANIONGAP  --   --  8     PTINR:  No results for input(s): INR in the last 48 hours.    Significant Procedures:   Dobutamine Stress Test with Color Flow: No results found for this or any previous visit.

## 2022-08-02 PROBLEM — I51.89 LEFT VENTRICULAR DIASTOLIC DYSFUNCTION WITH PRESERVED SYSTOLIC FUNCTION: Chronic | Status: ACTIVE | Noted: 2018-01-26

## 2022-08-02 PROBLEM — Z86.73 HISTORY OF TRANSIENT ISCHEMIC ATTACK (TIA): Chronic | Status: ACTIVE | Noted: 2018-01-26

## 2022-08-02 PROBLEM — I70.0 AORTIC ATHEROSCLEROSIS: Chronic | Status: ACTIVE | Noted: 2018-01-26

## 2022-08-02 PROBLEM — I51.7 LEFT ATRIAL ENLARGEMENT: Chronic | Status: ACTIVE | Noted: 2022-08-02

## 2022-08-02 PROBLEM — Z98.61 HISTORY OF PTCA: Chronic | Status: ACTIVE | Noted: 2017-07-03

## 2022-08-02 PROBLEM — F02.818 EARLY ONSET ALZHEIMER'S DISEASE WITH BEHAVIORAL DISTURBANCE: Chronic | Status: ACTIVE | Noted: 2021-04-02

## 2022-08-02 PROBLEM — I25.10 CORONARY ARTERY DISEASE INVOLVING NATIVE CORONARY ARTERY OF NATIVE HEART: Chronic | Status: ACTIVE | Noted: 2020-01-15

## 2022-08-02 PROBLEM — I51.7 LEFT ATRIAL ENLARGEMENT: Status: ACTIVE | Noted: 2022-08-02

## 2022-08-02 PROBLEM — R33.9 URINARY RETENTION: Status: ACTIVE | Noted: 2022-08-02

## 2022-08-02 PROBLEM — G30.0 EARLY ONSET ALZHEIMER'S DISEASE WITH BEHAVIORAL DISTURBANCE: Chronic | Status: ACTIVE | Noted: 2021-04-02

## 2022-08-02 PROBLEM — N17.9 AKI (ACUTE KIDNEY INJURY): Status: RESOLVED | Noted: 2022-07-27 | Resolved: 2022-08-02

## 2022-08-02 PROBLEM — I77.9 BILATERAL CAROTID ARTERY DISEASE: Chronic | Status: ACTIVE | Noted: 2018-01-26

## 2022-08-02 PROCEDURE — 51798 US URINE CAPACITY MEASURE: CPT

## 2022-08-02 PROCEDURE — 25000003 PHARM REV CODE 250: Performed by: STUDENT IN AN ORGANIZED HEALTH CARE EDUCATION/TRAINING PROGRAM

## 2022-08-02 PROCEDURE — 94799 UNLISTED PULMONARY SVC/PX: CPT

## 2022-08-02 PROCEDURE — 51701 INSERT BLADDER CATHETER: CPT

## 2022-08-02 PROCEDURE — 99232 PR SUBSEQUENT HOSPITAL CARE,LEVL II: ICD-10-PCS | Mod: ,,, | Performed by: STUDENT IN AN ORGANIZED HEALTH CARE EDUCATION/TRAINING PROGRAM

## 2022-08-02 PROCEDURE — 97112 NEUROMUSCULAR REEDUCATION: CPT | Mod: CQ

## 2022-08-02 PROCEDURE — 99232 SBSQ HOSP IP/OBS MODERATE 35: CPT | Mod: ,,, | Performed by: STUDENT IN AN ORGANIZED HEALTH CARE EDUCATION/TRAINING PROGRAM

## 2022-08-02 PROCEDURE — 97530 THERAPEUTIC ACTIVITIES: CPT | Mod: CQ

## 2022-08-02 PROCEDURE — 63600175 PHARM REV CODE 636 W HCPCS: Performed by: STUDENT IN AN ORGANIZED HEALTH CARE EDUCATION/TRAINING PROGRAM

## 2022-08-02 PROCEDURE — 20600001 HC STEP DOWN PRIVATE ROOM

## 2022-08-02 PROCEDURE — 94761 N-INVAS EAR/PLS OXIMETRY MLT: CPT

## 2022-08-02 RX ADMIN — OXYCODONE 5 MG: 5 TABLET ORAL at 09:08

## 2022-08-02 RX ADMIN — ACETAMINOPHEN 1000 MG: 500 TABLET ORAL at 02:08

## 2022-08-02 RX ADMIN — ENOXAPARIN SODIUM 40 MG: 40 INJECTION SUBCUTANEOUS at 05:08

## 2022-08-02 RX ADMIN — Medication 6 MG: at 09:08

## 2022-08-02 RX ADMIN — OXYCODONE HYDROCHLORIDE 10 MG: 10 TABLET ORAL at 09:08

## 2022-08-02 RX ADMIN — CARVEDILOL 12.5 MG: 12.5 TABLET, FILM COATED ORAL at 09:08

## 2022-08-02 RX ADMIN — SENNOSIDES AND DOCUSATE SODIUM 1 TABLET: 50; 8.6 TABLET ORAL at 09:08

## 2022-08-02 RX ADMIN — CELECOXIB 100 MG: 100 CAPSULE ORAL at 09:08

## 2022-08-02 RX ADMIN — ACETAMINOPHEN 1000 MG: 500 TABLET ORAL at 06:08

## 2022-08-02 RX ADMIN — LEVOTHYROXINE SODIUM 150 MCG: 150 TABLET ORAL at 06:08

## 2022-08-02 RX ADMIN — ASPIRIN 81 MG: 81 TABLET, COATED ORAL at 09:08

## 2022-08-02 RX ADMIN — ATORVASTATIN CALCIUM 40 MG: 20 TABLET, FILM COATED ORAL at 09:08

## 2022-08-02 RX ADMIN — LIDOCAINE 1 PATCH: 50 PATCH CUTANEOUS at 12:08

## 2022-08-02 RX ADMIN — POLYETHYLENE GLYCOL 3350 17 G: 17 POWDER, FOR SOLUTION ORAL at 09:08

## 2022-08-02 NOTE — PLAN OF CARE
Chart reviewed and goals added by PT for transfers, ambulation, and standing balance with assistance.    Problem: Physical Therapy  Goal: Physical Therapy Goal  Description: Goals to be met by: 22     Patient will increase functional independence with mobility by performin. Supine to sit with Minimal Assistance  2. Sit to supine with MInimal Assistance  3. Rolling to Left and Right with Stand-by Assistance  4. Sit edge of bed with supervision for 25 minutes    5. Added on : Sit to stand with rolling walker and contact-guard assistance - Not met  6. Added on : Gait x 25 ft with rolling walker and minimal (A) - Not met  7. Added on : Pt will demo ability to stand with RW and CGA for 3 minutes before needing seated rest - Not met  Outcome: Ongoing, Progressing    Karthik Strong, PT  2022

## 2022-08-02 NOTE — PT/OT/SLP PROGRESS
Physical Therapy Treatment    Patient Name:  Laila Hanna   MRN:  [id number]    Recommendations:     Discharge Recommendations:  nursing facility, skilled   Discharge Equipment Recommendations: bedside commode, wheelchair   Barriers to discharge: Inaccessible home and Decreased caregiver support    Assessment:     Laila Hanna is a 89 y.o. female admitted with a medical diagnosis of Hyponatremia.  She presents with the following impairments/functional limitations:  weakness, gait instability, decreased lower extremity function, impaired balance, pain, impaired self care skills, impaired functional mobility, orthopedic precautions. Ms. Hanna tolerated treatment well.  She required less assistance with mobility and transfers.  She demonstrated good sitting and poor standing balance.  Noted fair strength with B LE's performing standing and transfers.  Ms. Hanna would benefit from further I therapy to increase endurance, strength, mobility and independence.    Rehab Prognosis: Fair; patient would benefit from acute skilled PT services to address these deficits and reach maximum level of function.    Recent Surgery: * No surgery found *      Plan:     During this hospitalization, patient to be seen 4 x/week to address the identified rehab impairments via gait training, therapeutic activities, therapeutic exercises, neuromuscular re-education and progress toward the following goals:    · Plan of Care Expires:  08/30/22    Subjective     Chief Complaint: Back pain  Patient/Family Comments/goals: I can't stand   Pain/Comfort:  · Pain Rating 1: 8/10  · Location 1: back  · Pain Addressed 1: Reposition, Distraction      Objective:     Communicated with NSG prior to session.  Patient found supine with pulse ox (continuous), telemetry, TLSO, gates catheter (LSO to be worn continueous) upon PT entry to room.     General Precautions: Standard, fall   Orthopedic Precautions:spinal precautions   Braces: LSO  Respiratory Status:  Room air     Functional Mobility:  · Bed Mobility:     · Rolling Left:  moderate assistance  · Rolling Right: moderate assistance  · Supine to Sit: moderate assistance  · Sit to Supine: moderate assistance  · Transfers:     · Sit to Stand:  maximal assistance with no AD  · Balance: Good sitting and poor standing      AM-PAC 6 CLICK MOBILITY  Turning over in bed (including adjusting bedclothes, sheets and blankets)?: 2  Sitting down on and standing up from a chair with arms (e.g., wheelchair, bedside commode, etc.): 2  Moving from lying on back to sitting on the side of the bed?: 2  Moving to and from a bed to a chair (including a wheelchair)?: 1  Need to walk in hospital room?: 1  Climbing 3-5 steps with a railing?: 1  Basic Mobility Total Score: 9       Therapeutic Activities and Exercises:   Seated EOB approx 15 min with SBA  Performed seated AP and SAQ xx10-15 reps B LE's  Tolerated static standing max assistance blocking B LE's x2 trials for 15-30sec each trial.  Repositioned LSO brace while in the bed.  Educated patient on spinal precautions, benefits of mobility and correct technique for bed mobility and transfers.    Patient left supine with caregiver present..    GOALS:   Multidisciplinary Problems     Physical Therapy Goals        Problem: Physical Therapy    Goal Priority Disciplines Outcome Goal Variances Interventions   Physical Therapy Goal     PT, PT/OT Ongoing, Progressing     Description: Goals to be met by: 22     Patient will increase functional independence with mobility by performin. Supine to sit with Minimal Assistance  2. Sit to supine with MInimal Assistance  3. Rolling to Left and Right with Stand-by Assistance  4. Sit edge of bed with supervision for 25 minutes    5. Added on : Sit to stand with rolling walker and contact-guard assistance - Not met  6. Added on : Gait x 25 ft with rolling walker and minimal (A) - Not met  7. Added on : Pt will demo ability to stand with  RW and CGA for 3 minutes before needing seated rest - Not met                   Time Tracking:     PT Received On: 08/02/22  PT Start Time: 1146     PT Stop Time: 1215  PT Total Time (min): 29 min     Billable Minutes: Therapeutic Activity 20 and Neuromuscular Re-education 9       PT/PTA: PTA     PTA Visit Number: 1     08/02/2022

## 2022-08-02 NOTE — PLAN OF CARE
Problem: Adult Inpatient Plan of Care  Goal: Plan of Care Review  Outcome: Ongoing, Progressing  Goal: Patient-Specific Goal (Individualized)  Outcome: Ongoing, Progressing  Goal: Absence of Hospital-Acquired Illness or Injury  Outcome: Ongoing, Progressing  Goal: Optimal Comfort and Wellbeing  Outcome: Ongoing, Progressing  Goal: Readiness for Transition of Care  Outcome: Ongoing, Progressing     Problem: Fall Injury Risk  Goal: Absence of Fall and Fall-Related Injury  Outcome: Ongoing, Progressing     Problem: Skin Injury Risk Increased  Goal: Skin Health and Integrity  Outcome: Ongoing, Progressing     Problem: Pain Acute  Goal: Acceptable Pain Control and Functional Ability  Outcome: Ongoing, Progressing     Problem: Fluid and Electrolyte Imbalance (Acute Kidney Injury/Impairment)  Goal: Fluid and Electrolyte Balance  Outcome: Ongoing, Progressing     Problem: Oral Intake Inadequate (Acute Kidney Injury/Impairment)  Goal: Optimal Nutrition Intake  Outcome: Ongoing, Progressing     Problem: Renal Function Impairment (Acute Kidney Injury/Impairment)  Goal: Effective Renal Function  Outcome: Ongoing, Progressing     Problem: Infection  Goal: Absence of Infection Signs and Symptoms  Outcome: Ongoing, Progressing

## 2022-08-02 NOTE — NURSING
Pt is alert and oriented x3, care giver is bedside. Pt failed voiding trial, re-inserted gates today. Call light within reach. VSS on RA.

## 2022-08-02 NOTE — SUBJECTIVE & OBJECTIVE
Interval History: No acute events.     Review of Systems  Objective:     Vital Signs (Most Recent):  Temp: 98.3 °F (36.8 °C) (08/02/22 1330)  Pulse: 64 (08/02/22 0800)  Resp: 16 (08/02/22 0947)  BP: (!) 114/58 (08/02/22 0800)  SpO2: 95 % (08/02/22 0800)   Vital Signs (24h Range):  Temp:  [97.7 °F (36.5 °C)-98.3 °F (36.8 °C)] 98.3 °F (36.8 °C)  Pulse:  [60-79] 64  Resp:  [16-20] 16  SpO2:  [95 %-96 %] 95 %  BP: (114-153)/(58-72) 114/58     Weight: 93.9 kg (207 lb 0.2 oz)  Body mass index is 33.41 kg/m².    Intake/Output Summary (Last 24 hours) at 8/2/2022 1406  Last data filed at 8/2/2022 0300  Gross per 24 hour   Intake --   Output 650 ml   Net -650 ml      Physical Exam  Constitutional:       General: She is not in acute distress.     Appearance: Normal appearance. She is not toxic-appearing or diaphoretic.   Cardiovascular:      Rate and Rhythm: Normal rate and regular rhythm.      Heart sounds: No murmur heard.    No friction rub. No gallop.   Pulmonary:      Effort: Pulmonary effort is normal. No respiratory distress.      Breath sounds: Normal breath sounds.   Abdominal:      General: Abdomen is flat. There is no distension.      Palpations: Abdomen is soft.      Tenderness: There is no abdominal tenderness. There is no guarding or rebound.   Musculoskeletal:         General: Signs of injury present.      Right lower leg: No edema.      Left lower leg: No edema.      Comments: Back brace   Skin:     Findings: Bruising present.   Neurological:      Mental Status: She is alert.       MELD-Na score: 7 at 7/26/2022  3:55 AM  MELD score: 7 at 7/26/2022  3:55 AM  Calculated from:  Serum Creatinine: 0.7 mg/dL (Using min of 1 mg/dL) at 7/26/2022  3:55 AM  Serum Sodium: 127 mmol/L at 7/26/2022  3:55 AM  Total Bilirubin: 0.9 mg/dL (Using min of 1 mg/dL) at 7/26/2022  3:55 AM  INR(ratio): 1.1 at 7/24/2022  9:22 PM  Age: 89 years    Significant Labs:  CBC:  No results for input(s): WBC, HGB, HCT, PLT in the last 48  hours.  CMP:  Recent Labs   Lab 08/01/22  0457   *   K 4.0   CL 98   CO2 26   GLU 83   BUN 9   CREATININE 0.6   CALCIUM 8.5*   ANIONGAP 8     PTINR:  No results for input(s): INR in the last 48 hours.    Significant Procedures:   Dobutamine Stress Test with Color Flow: No results found for this or any previous visit.

## 2022-08-02 NOTE — PROGRESS NOTES
[facility] - Intensive Care ([place] [address])  [facility]  Progress Note    Patient Name: Laial Hanna  MRN: [id number]  Patient Class: IP- Inpatient   Admission Date: 7/24/2022  Length of Stay: 8 days  Attending Physician: Jose Luis Smith*  Primary Care Provider: Ama Graham MD        Subjective:     Principal Problem:Hyponatremia        HPI:  Laila Hanna is a 89 year old female with CAD, HLD, hypothyroidism, HTN, HFpEF who presented after mechanical fall. Patient reportedly lost her balance and fell while in her kitchen, landing on her right side without loss of consciousness. Patient denies prodrome. Per caregiver at bedside, patient is typically able to complete all ADLs without assistance and only requires minimal supervision. On admission, patient was noted to have negative head CT. Imaging was notable for acute displaced burst fracture of L2 vertebral body with extension into the spinal canal as well as nondisplaced fracture of L1 vertebral body. Additionally, patient was noted to have posterolateral ribs fracture of ribs 4-9. Since admission, patient was noted to be hyponatremic (128). Baseline 133. She was noted to have acute drop of sodium (7/26 127) to 120 (7/27) with some worsening mentation. Hospital medicine was consulted for management of hyponatremia. Patient then stepped up to critical care due to worsening hyponatremia and mentation.       Overview/Hospital Course:  Patient was put on fluid restriction with improvement in Na and mentation. Patient stepped down to hospital medicine 7/30. Her Na was stable, but patient did have episodes of confusion. Repeat CXR with no atelectasis or pneumonia.       Interval History: No acute events.     Review of Systems  Objective:     Vital Signs (Most Recent):  Temp: 98.3 °F (36.8 °C) (08/02/22 1330)  Pulse: 64 (08/02/22 0800)  Resp: 16 (08/02/22 0947)  BP: (!) 114/58 (08/02/22 0800)  SpO2: 95 % (08/02/22 0800)   Vital Signs (24h Range):  Temp:   [97.7 °F (36.5 °C)-98.3 °F (36.8 °C)] 98.3 °F (36.8 °C)  Pulse:  [60-79] 64  Resp:  [16-20] 16  SpO2:  [95 %-96 %] 95 %  BP: (114-153)/(58-72) 114/58     Weight: 93.9 kg (207 lb 0.2 oz)  Body mass index is 33.41 kg/m².    Intake/Output Summary (Last 24 hours) at 8/2/2022 1406  Last data filed at 8/2/2022 0300  Gross per 24 hour   Intake --   Output 650 ml   Net -650 ml      Physical Exam  Constitutional:       General: She is not in acute distress.     Appearance: Normal appearance. She is not toxic-appearing or diaphoretic.   Cardiovascular:      Rate and Rhythm: Normal rate and regular rhythm.      Heart sounds: No murmur heard.    No friction rub. No gallop.   Pulmonary:      Effort: Pulmonary effort is normal. No respiratory distress.      Breath sounds: Normal breath sounds.   Abdominal:      General: Abdomen is flat. There is no distension.      Palpations: Abdomen is soft.      Tenderness: There is no abdominal tenderness. There is no guarding or rebound.   Musculoskeletal:         General: Signs of injury present.      Right lower leg: No edema.      Left lower leg: No edema.      Comments: Back brace   Skin:     Findings: Bruising present.   Neurological:      Mental Status: She is alert.       MELD-Na score: 7 at 7/26/2022  3:55 AM  MELD score: 7 at 7/26/2022  3:55 AM  Calculated from:  Serum Creatinine: 0.7 mg/dL (Using min of 1 mg/dL) at 7/26/2022  3:55 AM  Serum Sodium: 127 mmol/L at 7/26/2022  3:55 AM  Total Bilirubin: 0.9 mg/dL (Using min of 1 mg/dL) at 7/26/2022  3:55 AM  INR(ratio): 1.1 at 7/24/2022  9:22 PM  Age: 89 years    Significant Labs:  CBC:  No results for input(s): WBC, HGB, HCT, PLT in the last 48 hours.  CMP:  Recent Labs   Lab 08/01/22  0457   *   K 4.0   CL 98   CO2 26   GLU 83   BUN 9   CREATININE 0.6   CALCIUM 8.5*   ANIONGAP 8     PTINR:  No results for input(s): INR in the last 48 hours.    Significant Procedures:   Dobutamine Stress Test with Color Flow: No results found  for this or any previous visit.      Assessment/Plan:      * Hyponatremia  Patient with acute on chronic hyponatremia. Baseline sodium approximately 133. On admission 128, trending down. 7/26 sodium was 127 and 7/27 sodium was 120. Repeat sodiums 119>121>118. Some worsening of mental status including increased confusion from baseline, slurring of speech, and diplopia. 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 Previously on thiazide diuretic but no longer taking it. Critical care consulted and patient admitted to ICU. Fluid restricted with improvement in Na and mentation. Stepped down to hospital medicine 7/30.     Urinary retention  Failed voiding trial, replace gates  Possibly exacerbated by opiate use      JEANINE (acute kidney injury)  Appears improved.  See above      Acute encephalopathy  Increased confusion and worsening mental status 7/27. Thought to be secondary to worsening hyponatremia. Improved with improvement in sodium.   - hold gabapentin and flexeril    Multiple rib fractures  Multiple posterolateral rib fractures of ribs 4-9 after mechanical fall  General surgery consulted, appreciate assistance.  Multimodal pain control including tylenol, NSAIDs, PRN Oxycodone  Encourage IS    Coronary artery disease involving native coronary artery of native heart  - continue aspirin    Essential hypertension  - hold carvedilol and losartan, resume as tolerated      Hypothyroidism due to acquired atrophy of thyroid  TSH elevated 7.019 with free T4 1.2, ordered in the workup of hyponatremia    - continue levothyroxine 150 mcg daily  - will need to follow up once out of acute illness      VTE Risk Mitigation (From admission, onward)         Ordered     enoxaparin injection 40 mg  Daily         07/25/22 0040     IP VTE HIGH RISK PATIENT  Once         07/25/22 0040                Discharge Planning   RACHAEL: 8/2/2022     Code Status: Full Code   Is the patient medically ready for  discharge?: Yes    Reason for patient still in hospital (select all that apply): Pending disposition  Discharge Plan A: Skilled Nursing Facility   Discharge Delays: None known at this time              Jose Luis Jeffries MD  Department of Hospital Medicine   [facility] - Intensive Care ([place]-[facility])

## 2022-08-02 NOTE — PHYSICIAN QUERY
PT Name: Laila Hanna  MR #: [id number]    DOCUMENTATION CLARIFICATION     CDS/: Willem Ko RN, CCDS        Contact information:   [email or web address]      This form is a permanent document in the medical record.     Query Date: August 2, 2022    By submitting this query, we are merely seeking further clarification of documentation. Please utilize your independent clinical judgment when addressing the question(s) below.    The Medical Record contains the following:   Indicators   Supporting Clinical Findings Location in Medical Record   x AMS, Confusion,  LOC, etc.  ...acute drop of sodium on 7/26 to 127 and to 120 on 7/27 with some worsening mentation. Hospital medicine was consulted for management of hyponatremia, but hyponatremia continued drop to 118.;    Patient transferred to Critical Care Medicine for management of hyponatremia in setting of encephalopathy.  --Endocrine  SIADH (syndrome of inappropriate ADH production)    7/28 : Sodium improved this morning to 129. Mental status improved - oriented.   7/31 gen sx: Mildly confused.    7/27 : Patient somnolent during exam, likely metabolic in nature vs acute stroke symptoms    7/29 : Her mentation continues to be at baseline;  She is oriented to person, place, and time.     Comments: Word searching, slurring of speech, diplopia    7/27 H&P; Critical Care Medicine (CCM)                General surgery         Vascular Neurology      [facility], [facility]    x Acute/Chronic Illness 7/27  attest;  HM: Acute encephalopathy : Acute on chronic symptomatic hypoNa ;  with Na down to 119 (from 127), severe encephalopathy which was brand new per caretaker at bedside, diplopia (seeing double of people on TV), slurred speech, somnolence, appeared very ill. Euvolemic.     --Baseline 133. She was noted to have acute drop of sodium (7/26 127) to 120 (7/27) with some worsening mentation.   HM,PN   x Radiology Findings Impression:  No acute intracranial pathology.  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    Impression:  MRA of the brain and neck shows no significant abnormalities.   7/24 CT head         7/27 MRI brain    x Electrolyte Imbalance 7/24   Na: 128  7/25   Na:  125  7/26  Na: 127  7/27  Na: 120, 119, 121, 118, 121, 123  7/28  Na: 129 Labs     Medication      Treatment              Other       The noted clinical guidelines are only system guidelines and do not replace the providers clinical judgment.    Provider, please specify the acute encephalopathy diagnosis associated with above clinical findings.    [  x ] Metabolic Encephalopathy - Due to electrolyte imbalance, metabolic derangements, or infectious processes, includes Septic Encephalopathy, Uremic Encephalopathy   [   ] Encephalopathy, unspecified      [   ] Other Encephalopathy (please specify): ____________________   [   ] Other neurological condition- Includes Post-ictal altered mental status (please specify condition): __________   [   ]  Clinically Undetermined     Please document in your progress notes daily for the duration of treatment until resolved, and include in your discharge summary.    References:  DESHAUN Olmos RN, CCDS. (2018, June 9). Notes from the Instructor: Encephalopathy tips. Retrieved October 22, 2020, from https://acdis.org/articles/note-instructor-encephalopathy-tips    ICD-10-CM/PCS [employer] Integrated Codebook (Version V.20.8.10.0) [Computer software]. (2020). Retrieved October 21, 2020.    National [place] of Neurological Disorders and Stroke. (2019, March 27). Retrieved October 22, 2020, from https://www.ninds.nih.gov/Disorders/All-Disorders/Efpugfhknueuup-Paftkpuyswd-Fzei    Form No. 48089

## 2022-08-02 NOTE — PLAN OF CARE
SW has left a voicemail for patient niece. SW awaiting a call back. Patient has an accepting SNF at [facility]      Gretchen Santillan LMSW  [facility]   [id number]

## 2022-08-02 NOTE — PLAN OF CARE
NARENDRA spoke with patient niece in regard to the dc plan and she is stating that she would patient to admit to [facility]. NARENDRA reached out to Briana with [facility] verifying that they have no beds at this time. NARENDRA has requested coordinator with [facility] to submit for auth on 8-1. NARENDRA will continue to follow up.    Updated 8:51am  NARENDRA spoke to Yael, admissions coordinator with [facility] reporting that she will contact NARENDRA back today on bed availability.         Gretchen Santillan LMSW  [facility]   [id number]

## 2022-08-03 LAB
ANION GAP SERPL CALC-SCNC: 7 MMOL/L (ref 8–16)
BUN SERPL-MCNC: 13 MG/DL (ref 8–23)
CALCIUM SERPL-MCNC: 8.2 MG/DL (ref 8.7–10.5)
CHLORIDE SERPL-SCNC: 97 MMOL/L (ref 95–110)
CO2 SERPL-SCNC: 27 MMOL/L (ref 23–29)
CREAT SERPL-MCNC: 0.6 MG/DL (ref 0.5–1.4)
EST. GFR  (NO RACE VARIABLE): >60 ML/MIN/1.73 M^2
GLUCOSE SERPL-MCNC: 94 MG/DL (ref 70–110)
POTASSIUM SERPL-SCNC: 4.2 MMOL/L (ref 3.5–5.1)
SODIUM SERPL-SCNC: 131 MMOL/L (ref 136–145)

## 2022-08-03 PROCEDURE — 36415 COLL VENOUS BLD VENIPUNCTURE: CPT | Performed by: STUDENT IN AN ORGANIZED HEALTH CARE EDUCATION/TRAINING PROGRAM

## 2022-08-03 PROCEDURE — 97530 THERAPEUTIC ACTIVITIES: CPT

## 2022-08-03 PROCEDURE — 99232 PR SUBSEQUENT HOSPITAL CARE,LEVL II: ICD-10-PCS | Mod: ,,, | Performed by: STUDENT IN AN ORGANIZED HEALTH CARE EDUCATION/TRAINING PROGRAM

## 2022-08-03 PROCEDURE — 25000003 PHARM REV CODE 250: Performed by: STUDENT IN AN ORGANIZED HEALTH CARE EDUCATION/TRAINING PROGRAM

## 2022-08-03 PROCEDURE — 99232 SBSQ HOSP IP/OBS MODERATE 35: CPT | Mod: ,,, | Performed by: STUDENT IN AN ORGANIZED HEALTH CARE EDUCATION/TRAINING PROGRAM

## 2022-08-03 PROCEDURE — 63600175 PHARM REV CODE 636 W HCPCS: Performed by: STUDENT IN AN ORGANIZED HEALTH CARE EDUCATION/TRAINING PROGRAM

## 2022-08-03 PROCEDURE — 20600001 HC STEP DOWN PRIVATE ROOM

## 2022-08-03 PROCEDURE — 80048 BASIC METABOLIC PNL TOTAL CA: CPT | Performed by: STUDENT IN AN ORGANIZED HEALTH CARE EDUCATION/TRAINING PROGRAM

## 2022-08-03 PROCEDURE — 97110 THERAPEUTIC EXERCISES: CPT

## 2022-08-03 PROCEDURE — 97535 SELF CARE MNGMENT TRAINING: CPT

## 2022-08-03 RX ORDER — CELECOXIB 100 MG/1
100 CAPSULE ORAL DAILY
Qty: 10 CAPSULE | Refills: 0
Start: 2022-08-04 | End: 2022-08-14

## 2022-08-03 RX ORDER — OXYCODONE HYDROCHLORIDE 10 MG/1
10 TABLET ORAL EVERY 4 HOURS PRN
Refills: 0
Start: 2022-08-03 | End: 2022-08-03 | Stop reason: SDUPTHER

## 2022-08-03 RX ORDER — LOSARTAN POTASSIUM 50 MG/1
50 TABLET ORAL DAILY
Qty: 90 TABLET | Refills: 3 | Status: SHIPPED | OUTPATIENT
Start: 2022-08-03 | End: 2022-08-04 | Stop reason: SDUPTHER

## 2022-08-03 RX ORDER — ACETAMINOPHEN 500 MG
1000 TABLET ORAL EVERY 8 HOURS
Refills: 0
Start: 2022-08-03

## 2022-08-03 RX ORDER — AMOXICILLIN 250 MG
1 CAPSULE ORAL DAILY
Start: 2022-08-04

## 2022-08-03 RX ORDER — POLYETHYLENE GLYCOL 3350 17 G/17G
17 POWDER, FOR SOLUTION ORAL DAILY
Refills: 0
Start: 2022-08-04

## 2022-08-03 RX ORDER — OXYCODONE HYDROCHLORIDE 10 MG/1
10 TABLET ORAL EVERY 4 HOURS PRN
Qty: 42 TABLET | Refills: 0 | Status: SHIPPED | OUTPATIENT
Start: 2022-08-03

## 2022-08-03 RX ORDER — LACTULOSE 10 G/15ML
10 SOLUTION ORAL DAILY
Status: DISCONTINUED | OUTPATIENT
Start: 2022-08-03 | End: 2022-08-04 | Stop reason: HOSPADM

## 2022-08-03 RX ADMIN — OXYCODONE HYDROCHLORIDE 10 MG: 10 TABLET ORAL at 09:08

## 2022-08-03 RX ADMIN — CARVEDILOL 12.5 MG: 12.5 TABLET, FILM COATED ORAL at 09:08

## 2022-08-03 RX ADMIN — LIDOCAINE 1 PATCH: 50 PATCH CUTANEOUS at 12:08

## 2022-08-03 RX ADMIN — CELECOXIB 100 MG: 100 CAPSULE ORAL at 09:08

## 2022-08-03 RX ADMIN — ACETAMINOPHEN 1000 MG: 500 TABLET ORAL at 06:08

## 2022-08-03 RX ADMIN — LACTULOSE 10 G: 20 SOLUTION ORAL at 10:08

## 2022-08-03 RX ADMIN — OXYCODONE 5 MG: 5 TABLET ORAL at 02:08

## 2022-08-03 RX ADMIN — ENOXAPARIN SODIUM 40 MG: 40 INJECTION SUBCUTANEOUS at 05:08

## 2022-08-03 RX ADMIN — POLYETHYLENE GLYCOL 3350 17 G: 17 POWDER, FOR SOLUTION ORAL at 09:08

## 2022-08-03 RX ADMIN — Medication 6 MG: at 09:08

## 2022-08-03 RX ADMIN — ACETAMINOPHEN 1000 MG: 500 TABLET ORAL at 02:08

## 2022-08-03 RX ADMIN — LEVOTHYROXINE SODIUM 150 MCG: 150 TABLET ORAL at 06:08

## 2022-08-03 RX ADMIN — ASPIRIN 81 MG: 81 TABLET, COATED ORAL at 09:08

## 2022-08-03 RX ADMIN — SENNOSIDES AND DOCUSATE SODIUM 1 TABLET: 50; 8.6 TABLET ORAL at 09:08

## 2022-08-03 RX ADMIN — ATORVASTATIN CALCIUM 40 MG: 20 TABLET, FILM COATED ORAL at 09:08

## 2022-08-03 NOTE — ASSESSMENT & PLAN NOTE
TSH elevated 7.019 with free T4 1.2, ordered in the workup of hyponatremia  - continue levothyroxine 150 mcg daily

## 2022-08-03 NOTE — PLAN OF CARE
Problem: Occupational Therapy  Goal: Occupational Therapy Goal  Description: Goals to be met by: 8/25/2022 (1 month)     Patient will increase functional independence with ADLs by performing:    UE Dressing with Moderate Assistance.  LE Dressing with Maximum Assistance.  Grooming while standing at sink with Minimal Assistance.  Toileting from toilet with Minimal Assistance for hygiene and clothing management.   Rolling to Bilateral with Standby Assistance.   Supine to sit with Minimal Assistance.  Step transfer with Stand-by Assistance  Toilet transfer to toilet with Stand-by Assistance.    Outcome: Ongoing, Progressing     Goals remain appropriate

## 2022-08-03 NOTE — PLAN OF CARE
NURSING HOME ORDERS    08/04/2022  [facility]  [facility] - INTENSIVE CARE ([place])  [address]  [facility] [address]  Dept: [phone]  Loc: [phone]     Admit to Nursing Home:  [facility]    Diagnoses:  Active Hospital Problems    Diagnosis  POA    *Hyponatremia [E87.1]  Yes    Urinary retention [R33.9]  Unknown    Acute encephalopathy [G93.40]  Unknown    Multiple rib fractures [S22.49XA]  Yes    Closed fracture of lumbar spine without lesion of spinal cord [S32.009A]  Yes    Coronary artery disease involving native coronary artery of native heart [I25.10]  Yes     Chronic    Essential hypertension [I10]  Yes     Chronic    SIADH (syndrome of inappropriate ADH production) [E22.2]  Yes     Chronic    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]  Yes     Chronic      Resolved Hospital Problems    Diagnosis Date Resolved POA    Somnolence [R40.0] 07/29/2022 No    JEANINE (acute kidney injury) [N17.9] 08/02/2022 Yes    Lumbar compression fracture [S32.000A] 07/29/2022 Yes       Patient is homebound due to:  Hyponatremia    Allergies:  Review of patient's allergies indicates:   Allergen Reactions    Thiazides Other (See Comments)     Severe hyponatremia       Vitals:  Routine    Diet: regular diet    Activities:   Activity as tolerated   Regan bearing as tolerated    Goals of Care Treatment Preferences:  Code Status: Full Code    Labs:  Per facility protocol    Nursing Precautions:  Fall and Pressure ulcer prevention    Consults:   PT to evaluate and treat- 5 times a week, OT to evaluate and treat- 5 times a week and Nutrition to evaluate and recommend diet     Miscellaneous Care: Cline Care: Empty Cline bag every shift.  Change Cline every month  Routine Skin for Bedridden Patients:  Apply moisture barrier cream to all     Maintain LSO at all times                  Medications: Discontinue all previous medication orders, if any. See new list below.      Medication List      START taking these medications    acetaminophen 500 MG tablet  Commonly known as: TYLENOL  Take 2 tablets (1,000 mg total) by mouth every 8 (eight) hours.     celecoxib 100 MG capsule  Commonly known as: CeleBREX  Take 1 capsule (100 mg total) by mouth once daily. for 10 days  Start taking on: August 4, 2022     oxyCODONE 10 mg Tab immediate release tablet  Commonly known as: ROXICODONE  Take 1 tablet (10 mg total) by mouth every 4 (four) hours as needed (severe pain).     polyethylene glycol 17 gram Pwpk  Commonly known as: GLYCOLAX  Take 17 g by mouth once daily.  Start taking on: August 4, 2022     senna-docusate 8.6-50 mg 8.6-50 mg per tablet  Commonly known as: PERICOLACE  Take 1 tablet by mouth once daily.  Start taking on: August 4, 2022        CHANGE how you take these medications    losartan 50 MG tablet  Commonly known as: COZAAR  HOLD until instructed to resume by your PCP  What changed: additional instructions        CONTINUE taking these medications    alendronate 70 MG tablet  Commonly known as: FOSAMAX  TAKE 1 TABLET BY MOUTH EVERY 7 DAYS     aspirin 81 MG EC tablet  Commonly known as: ECOTRIN  Take 1 tablet (81 mg total) by mouth once daily.     atorvastatin 40 MG tablet  Commonly known as: LIPITOR  Take 1 tablet (40 mg total) by mouth once daily.     calcium-vitamin D3 500 mg-5 mcg (200 unit) per tablet  Commonly known as: OS-DANICA 500 + D3  Take 1 tablet by mouth once daily.     carvediloL 12.5 MG tablet  Commonly known as: COREG  Take 1 tablet (12.5 mg total) by mouth 2 (two) times daily.     clotrimazole-betamethasone 1-0.05% cream  Commonly known as: LOTRISONE  Apply topically 2 (two) times daily as needed.     cyanocobalamin 100 MCG tablet  Commonly known as: VITAMIN B-12  Take 100 mcg by mouth once daily.     dextran 70-hypromellose ophthalmic solution  Commonly known as: ARTIFICIAL TEARS([id number])  Place 1 drop into both eyes every 2 (two) hours as needed.      levothyroxine 150 MCG tablet  Commonly known as: SYNTHROID  Take 1 tablet (150 mcg total) by mouth once daily.     LINZESS 145 mcg Cap capsule  Generic drug: linaCLOtide  TAKE 1 CAPSULE(145 MCG) BY MOUTH BEFORE BREAKFAST        torsemide 10 MG Tab  Commonly known as: DEMADEX  Take 1 tablet (10 mg total) by mouth once daily as needed for weight gain of 3 lbs in 1 day or 5 lbs in 3 days. Do not take if weight stable or decreasing. Daily weights at home.              Immunizations Administered as of 8/4/2022     Name Date Dose VIS Date Route Exp Date    COVID-19, vector-nr, rS-Ad26 (J&J) 3/5/2021  7:44 AM 0.5 mL 1/13/2021 Intramuscular 5/25/2021    Site: Right deltoid     Given By: Ginger Muniz RN     : [employer]     Lot: [id number]             Some patients may experience side effects after vaccination.  These may include fever, headache, muscle or joint aches.  Most symptoms resolve with 24-48 hours and do not require urgent medical evaluation unless they persist for more than 72 hours or symptoms are concerning for an unrelated medical condition.          ____Electronically signed_____________________________  Bam Watkins MD  08/04/2022

## 2022-08-03 NOTE — PROGRESS NOTES
[facility] - [facility] ([address] Medicine  Progress Note    Patient Name: Laila Hanna  MRN: [id number]  Patient Class: IP- Inpatient   Admission Date: 7/24/2022  Length of Stay: 9 days  Attending Physician: Jose Luis Smith*  Primary Care Provider: Ama Graham MD        Subjective:     Principal Problem:Hyponatremia        HPI:  Laila Hanna is a 89 year old female with CAD, HLD, hypothyroidism, HTN, HFpEF who presented after mechanical fall. Patient reportedly lost her balance and fell while in her kitchen, landing on her right side without loss of consciousness. Patient denies prodrome. Per caregiver at bedside, patient is typically able to complete all ADLs without assistance and only requires minimal supervision. On admission, patient was noted to have negative head CT. Imaging was notable for acute displaced burst fracture of L2 vertebral body with extension into the spinal canal as well as nondisplaced fracture of L1 vertebral body. Additionally, patient was noted to have posterolateral ribs fracture of ribs 4-9. Since admission, patient was noted to be hyponatremic (128). Baseline 133. She was noted to have acute drop of sodium (7/26 127) to 120 (7/27) with some worsening mentation. Hospital medicine was consulted for management of hyponatremia. Patient then stepped up to critical care due to worsening hyponatremia and mentation.       Overview/Hospital Course:  Patient was put on fluid restriction with improvement in Na and mentation. Patient stepped down to hospital medicine 7/30. Her Na was stable, but patient did have episodes of confusion. Repeat CXR with no atelectasis or pneumonia. She was started on multimodal pain control with improved pain control.      Interval History: Awaiting SNF. Caregiver at bedside. Improved appetite this am.     Review of Systems  Objective:     Vital Signs (Most Recent):  Temp: 98.1 °F (36.7 °C) (08/03/22 0800)  Pulse: (!) 59 (08/03/22  1100)  Resp: 16 (08/03/22 1437)  BP: (!) 164/70 (08/03/22 0800)  SpO2: 97 % (08/03/22 1100)   Vital Signs (24h Range):  Temp:  [97.7 °F (36.5 °C)-98.3 °F (36.8 °C)] 98.1 °F (36.7 °C)  Pulse:  [59-68] 59  Resp:  [12-19] 16  SpO2:  [93 %-98 %] 97 %  BP: (127-164)/(61-70) 164/70     Weight: 93.9 kg (207 lb 0.2 oz)  Body mass index is 33.41 kg/m².    Intake/Output Summary (Last 24 hours) at 8/3/2022 1516  Last data filed at 8/3/2022 0400  Gross per 24 hour   Intake --   Output 1600 ml   Net -1600 ml      Physical Exam  Constitutional:       General: She is not in acute distress.     Appearance: Normal appearance. She is not toxic-appearing or diaphoretic.   Cardiovascular:      Rate and Rhythm: Normal rate and regular rhythm.      Heart sounds: No murmur heard.    No friction rub. No gallop.   Pulmonary:      Effort: Pulmonary effort is normal. No respiratory distress.      Breath sounds: Normal breath sounds.   Abdominal:      General: Abdomen is flat. There is no distension.      Palpations: Abdomen is soft.      Tenderness: There is no abdominal tenderness. There is no guarding or rebound.   Musculoskeletal:         General: Signs of injury present.      Right lower leg: No edema.      Left lower leg: No edema.      Comments: Back brace   Skin:     Findings: Bruising present.   Neurological:      Mental Status: She is alert.       MELD-Na score: 7 at 7/26/2022  3:55 AM  MELD score: 7 at 7/26/2022  3:55 AM  Calculated from:  Serum Creatinine: 0.7 mg/dL (Using min of 1 mg/dL) at 7/26/2022  3:55 AM  Serum Sodium: 127 mmol/L at 7/26/2022  3:55 AM  Total Bilirubin: 0.9 mg/dL (Using min of 1 mg/dL) at 7/26/2022  3:55 AM  INR(ratio): 1.1 at 7/24/2022  9:22 PM  Age: 89 years    Significant Labs:  CBC:  No results for input(s): WBC, HGB, HCT, PLT in the last 48 hours.  CMP:  Recent Labs   Lab 08/03/22  0544   *   K 4.2   CL 97   CO2 27   GLU 94   BUN 13   CREATININE 0.6   CALCIUM 8.2*   ANIONGAP 7*     PTINR:  No  results for input(s): INR in the last 48 hours.    Significant Procedures:   Dobutamine Stress Test with Color Flow: No results found for this or any previous visit.      Assessment/Plan:      * Hyponatremia  Patient with acute on chronic hyponatremia. Baseline sodium approximately 133. On admission 128, trending down. 7/26 sodium was 127 and 7/27 sodium was 120. Repeat sodiums 119>121>118. Some worsening of mental status including increased confusion from baseline, slurring of speech, and diplopia. 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 Previously on thiazide diuretic but no longer taking it. Critical care consulted and patient admitted to ICU. Fluid restricted with improvement in Na and mentation. Stepped down to hospital medicine 7/30.     Urinary retention  Failed voiding trial, replace gates  Possibly exacerbated by opiate use      Acute encephalopathy  Increased confusion and worsening mental status 7/27. Thought to be secondary to worsening hyponatremia. Improved with improvement in sodium.   - hold gabapentin and flexeril    Multiple rib fractures  Multiple posterolateral rib fractures of ribs 4-9 after mechanical fall  General surgery consulted, appreciate assistance.  Multimodal pain control including tylenol, NSAIDs, PRN Oxycodone  Encourage IS    Coronary artery disease involving native coronary artery of native heart  - continue aspirin    Essential hypertension  - hold carvedilol and losartan, resume as tolerated      Hypothyroidism due to acquired atrophy of thyroid  TSH elevated 7.019 with free T4 1.2, ordered in the workup of hyponatremia  - continue levothyroxine 150 mcg daily    VTE Risk Mitigation (From admission, onward)         Ordered     enoxaparin injection 40 mg  Daily         07/25/22 0040     IP VTE HIGH RISK PATIENT  Once         07/25/22 0040                Discharge Planning   RACHAEL: 8/3/2022     Code Status: Full Code   Is the patient  medically ready for discharge?: Yes    Reason for patient still in hospital (select all that apply): Pending disposition  Discharge Plan A: Skilled Nursing Facility   Discharge Delays: None known at this time        Jose Luis Jeffries MD  Department of Hospital Medicine   [facility] - Intensive Care ([place])

## 2022-08-03 NOTE — PROGRESS NOTES
[facility] - [facility] ([facility]  General Surgery  Progress Note    Subjective:     History of Present Illness:  Ms Hanna is a 89yo F with extensive PMH who presents to the ED after falling from standing. She states she was standing in her kitchen trying to get a glass of water when she lost her balance and fell. She landed on her R side and was taken to the ED for evaluation. Did not have loss of consciousness. CT head was negative for acute pathology. CT chest/spine showed acute displaced burst fracture of L2 vertebral body with fracture component extending into spinal canal. Also nondisplaced fracture of L1 vertebral body. Also has acute R posterolateral rib fractures of ribs 4-9. She is not on anticoagulation.    Post-Op Info:  * No surgery found *         Interval History: No acute events overnight. Pain controlled on multi-modal regimen. Patient does still complain of rib and back pain, and discomfort with wearing the brace.   Per family member in room, likely discharging to facility tomorrow.    Medications:  Continuous Infusions:  Scheduled Meds:   acetaminophen  1,000 mg Oral Q8H    aspirin  81 mg Oral Daily    atorvastatin  40 mg Oral Daily    carvediloL  12.5 mg Oral BID    celecoxib  100 mg Oral Daily    enoxaparin  40 mg Subcutaneous Daily    levothyroxine  150 mcg Oral Daily    LIDOcaine  1 patch Transdermal Q24H    polyethylene glycol  17 g Oral Daily    senna-docusate 8.6-50 mg  1 tablet Oral Daily     PRN Meds:bisacodyL, docusate sodium, melatonin, ondansetron, oxyCODONE, oxyCODONE, sodium chloride 0.9%, DIPH,PERTUSS(ACELL),TET VACCINE (ADULT)(BOOSTRIX,ADACEL)     Review of patient's allergies indicates:   Allergen Reactions    Thiazides Other (See Comments)     Severe hyponatremia     Objective:     Vital Signs (Most Recent):  Temp: 97.7 °F (36.5 °C) (08/02/22 1615)  Pulse: 67 (08/02/22 1615)  Resp: 16 (08/02/22 1615)  BP: 127/63 (08/02/22 1615)  SpO2: (!) 93 % (08/02/22  1615)   Vital Signs (24h Range):  Temp:  [97.7 °F (36.5 °C)-98.3 °F (36.8 °C)] 97.7 °F (36.5 °C)  Pulse:  [64-79] 67  Resp:  [16-24] 16  SpO2:  [93 %-96 %] 93 %  BP: (114-153)/(58-72) 127/63     Weight: 93.9 kg (207 lb 0.2 oz)  Body mass index is 33.41 kg/m².    Intake/Output - Last 3 Shifts         07/31 0700 08/01 0659 08/01 0700 08/02 0659 08/02 0700 08/03 0659    P.O.       Total Intake(mL/kg)       Urine (mL/kg/hr) 1250 (0.6) 1150 (0.5) 950 (0.8)    Stool 0      Total Output 1250 1150 950    Net -1250 -1150 -950           Stool Occurrence 0 x              Physical Exam  HENT:      Head: Normocephalic and atraumatic.   Eyes:      Extraocular Movements: Extraocular movements intact.   Cardiovascular:      Rate and Rhythm: Normal rate.   Pulmonary:      Effort: Pulmonary effort is normal. No respiratory distress.   Abdominal:      General: There is no distension.      Palpations: Abdomen is soft.      Tenderness: There is no abdominal tenderness.   Musculoskeletal:         General: Normal range of motion.   Skin:     General: Skin is warm and dry.   Neurological:      General: No focal deficit present.      Mental Status: She is alert.       Significant Labs:  I have reviewed all pertinent lab results within the past 24 hours.  CBC:   Recent Labs   Lab 07/31/22  0353   WBC 8.13   RBC 3.25*   HGB 10.1*   HCT 30.6*      MCV 94   MCH 31.1*   MCHC 33.0       CMP:   Recent Labs   Lab 07/27/22  0454 07/27/22  0737 08/01/22  0457   GLU 85   < > 83   CALCIUM 8.3*   < > 8.5*   ALBUMIN 2.8*  --   --    PROT 6.1  --   --    *   < > 132*   K 4.3   < > 4.0   CO2 23   < > 26   CL 88*   < > 98   BUN 14   < > 9   CREATININE 0.8   < > 0.6   ALKPHOS 97  --   --    ALT 10  --   --    AST 18  --   --    BILITOT 0.7  --   --     < > = values in this interval not displayed.         Significant Diagnostics:  I have reviewed all pertinent imaging results/findings within the past 24 hours.    Assessment/Plan:     *  Hyponatremia  - Chronic but with acute drop to 120 this AM  - STAT Na repeat ordered     Multiple rib fractures  Patient is a 89 year old female with h/o HTN, CAD, degenerative disc disease, HLD, MI, TIA presenting after a fall from standing with R rib 4-9 fractures and L2 burst fracture. Clinically stable but with worsening hyponatremia     - likely d/c to facility tomorrow   - continue scheduled multimodal pain regimen in addition to prn pain medications. At high risk for splinting due to rib fractures and increased risk for atelectasis or developing a pneumonia.   - continue RT treatments and pulmonary care (IS, acapella, etc)  - Fine for diet, aspiration precautions  - Scheduled multimodal pain regimen  - PRN pain and nausea medications  - Wound care   - DVT prophylaxis (SCDs and lovenox)  - OOB, ambulate  - Inhalation treatments        Sonali Mclaughlin MD  General Surgery, PGY-1  [facility] - Intensive [facility] ([place])

## 2022-08-03 NOTE — ASSESSMENT & PLAN NOTE
Patient is a 89 year old female with h/o HTN, CAD, degenerative disc disease, HLD, MI, TIA presenting after a fall from standing with R rib 4-9 fractures and L2 burst fracture. Clinically stable but with worsening hyponatremia     - likely d/c to facility today  - continue scheduled multimodal pain regimen in addition to prn pain medications. At high risk for splinting due to rib fractures and increased risk for atelectasis or developing a pneumonia.   - continue RT treatments and pulmonary care (IS, acapella, etc)  - Fine for diet, aspiration precautions  - Scheduled multimodal pain regimen  - PRN pain and nausea medications  - Wound care   - DVT prophylaxis (SCDs and lovenox)  - OOB, ambulate  - Inhalation treatments  - Daily CXR   - Wean O2 as tolerated

## 2022-08-03 NOTE — SUBJECTIVE & OBJECTIVE
Interval History:   No acute events overnight.   Patient on RA with SpO2 98%.   Complaining of back pain. Pain well managed on multimodal pain regimen.   Per primary, to be d/c'd to facility today.    Medications:  Continuous Infusions:  Scheduled Meds:   acetaminophen  1,000 mg Oral Q8H    aspirin  81 mg Oral Daily    atorvastatin  40 mg Oral Daily    carvediloL  12.5 mg Oral BID    celecoxib  100 mg Oral Daily    enoxaparin  40 mg Subcutaneous Daily    levothyroxine  150 mcg Oral Daily    LIDOcaine  1 patch Transdermal Q24H    polyethylene glycol  17 g Oral Daily    senna-docusate 8.6-50 mg  1 tablet Oral Daily     PRN Meds:bisacodyL, docusate sodium, melatonin, ondansetron, oxyCODONE, oxyCODONE, sodium chloride 0.9%, DIPH,PERTUSS(ACELL),TET VACCINE (ADULT)(BOOSTRIX,ADACEL)     Review of patient's allergies indicates:   Allergen Reactions    Thiazides Other (See Comments)     Severe hyponatremia     Objective:     Vital Signs (Most Recent):  Temp: 97.9 °F (36.6 °C) (08/03/22 0345)  Pulse: 66 (08/03/22 0345)  Resp: 19 (08/03/22 0345)  BP: (!) 147/65 (08/03/22 0345)  SpO2: 98 % (08/03/22 0345)   Vital Signs (24h Range):  Temp:  [97.7 °F (36.5 °C)-98.3 °F (36.8 °C)] 97.9 °F (36.6 °C)  Pulse:  [62-68] 66  Resp:  [15-24] 19  SpO2:  [93 %-98 %] 98 %  BP: (127-147)/(61-67) 147/65     Weight: 93.9 kg (207 lb 0.2 oz)  Body mass index is 33.41 kg/m².    Intake/Output - Last 3 Shifts         08/01 0700  08/02 0659 08/02 0700 08/03 0659 08/03 0700  08/04 0659    Urine (mL/kg/hr) 1150 (0.5) 1600 (0.7)     Stool       Total Output 1150 1600     Net -1150 -1600                    Physical Exam  HENT:      Head: Normocephalic and atraumatic.   Eyes:      Extraocular Movements: Extraocular movements intact.   Cardiovascular:      Rate and Rhythm: Normal rate.   Pulmonary:      Effort: Pulmonary effort is normal. No respiratory distress.   Abdominal:      General: There is no distension.      Palpations: Abdomen is soft.       Tenderness: There is no abdominal tenderness.   Musculoskeletal:         General: Normal range of motion.   Skin:     General: Skin is warm and dry.   Neurological:      General: No focal deficit present.      Mental Status: She is alert.       Significant Labs:  I have reviewed all pertinent lab results within the past 24 hours.  CBC:   Recent Labs   Lab 07/31/22  0353   WBC 8.13   RBC 3.25*   HGB 10.1*   HCT 30.6*      MCV 94   MCH 31.1*   MCHC 33.0       CMP:   Recent Labs   Lab 08/03/22  0544   GLU 94   CALCIUM 8.2*   *   K 4.2   CO2 27   CL 97   BUN 13   CREATININE 0.6         Significant Diagnostics:  I have reviewed all pertinent imaging results/findings within the past 24 hours.

## 2022-08-03 NOTE — PT/OT/SLP PROGRESS
Occupational Therapy   Co-Treatment    Name: Laila Hanna  MRN: [id number]  Admitting Diagnosis:  Hyponatremia       Recommendations:     Discharge Recommendations: nursing facility, skilled  Discharge Equipment Recommendations:  bedside commode, wheelchair  Barriers to discharge:  Decreased caregiver support (increased (A) required)    Assessment:     Laila Hanna is a 89 y.o. female with a medical diagnosis of Hyponatremia.  She presents with fair participation and motivation. Pt continues to require (A) with all activities & is at risk for falls. Goals remain appropriate. Performance deficits affecting function are weakness, impaired endurance, impaired self care skills, impaired functional mobility, impaired balance, decreased safety awareness, decreased lower extremity function, decreased upper extremity function, pain. Co-treatment performed due to patient's multiple deficits requiring two skilled therapists to safely assess patient's ability to complete ADLs and functional mobility in addition to accommodating for patient's activity tolerance, pain, & fear of falling while in acute care setting.    Rehab Prognosis:  Fair; patient would benefit from acute skilled OT services to address these deficits and reach maximum level of function.       Plan:     Patient to be seen 3 x/week to address the above listed problems via self-care/home management, therapeutic activities, therapeutic exercises, neuromuscular re-education, cognitive retraining  · Plan of Care Expires: 08/25/22  · Plan of Care Reviewed with: caregiver, patient    Subjective   Pt reported that she was scared of falling.  Pain/Comfort:  · Pain Rating 1:  (did not rate)  · Location 1: back (as well as right breast region)  · Pain Addressed 1: Reposition, Distraction, Cessation of Activity, Nurse notified  · Pain Rating Post-Intervention 1:  (did not rate)    Objective:     Communicated with: RN prior to session.  Patient found supine with  telemetry, pulse ox (continuous), TLSO, gates catheter (caregiver present) upon OT entry to room.    General Precautions: Standard, fall   Orthopedic Precautions:spinal precautions   Braces: TLSO     Occupational Performance:     Bed Mobility:    · Patient completed Rolling/Turning to Left with  minimum assistance x 2-3 trials  · Patient completed Rolling/Turning to Right with moderate assistance x 2 trials  · Patient completed Scooting/Bridging with maximal assistance- min (A) scooting forward on EOB, dependent drawsheet transfer up [facility] while supine  · Patient completed Supine to Sit with moderate assistance  · Patient completed Sit to Supine with moderate assistance     Functional Mobility/Transfers:  · Patient completed Sit <> Stand Transfer with moderate assistance and of 2 persons  with  no assistive device     Activities of Daily Living:  · Grooming: stand by assistance washing face while seated EOB & Min (A) combing hair while seated EOB  · Toileting: total assistance while supine due to BM incontinence      [facility] 6 Click ADL: 12    Treatment & Education:  Pt required SBA-Min (A) for postural control while seated EOB.  Provided verbal & physical cues to facilitate postural control.  Provided education on safety & need for (A) with OOB activities.  Provided re-adjustment of brace while supine.  Pt had no further questions & when asked whether there were any concerns pt reported none.        Patient left supine with all lines intact, call button in reach, bed alarm on, RN notified and caregiver presentEducation:      GOALS:   Multidisciplinary Problems     Occupational Therapy Goals        Problem: Occupational Therapy    Goal Priority Disciplines Outcome Interventions   Occupational Therapy Goal     OT, PT/OT Ongoing, Progressing    Description: Goals to be met by: 8/25/2022 (1 month)     Patient will increase functional independence with ADLs by performing:    UE Dressing with Moderate Assistance.  LE  Dressing with Maximum Assistance.  Grooming while standing at sink with Minimal Assistance.  Toileting from toilet with Minimal Assistance for hygiene and clothing management.   Rolling to Bilateral with Standby Assistance.   Supine to sit with Minimal Assistance.  Step transfer with Stand-by Assistance  Toilet transfer to toilet with Stand-by Assistance.                     Time Tracking:     OT Date of Treatment: 08/03/22  OT Start Time: 1112  OT Stop Time: 1144  OT Total Time (min): 32 min    Billable Minutes:Self Care/Home Management 12  Therapeutic Activity 13    OT/OSCAR: OT          8/3/2022

## 2022-08-03 NOTE — PLAN OF CARE
Patient has an accepting SNF at [facility]. SW has sent updates as requested. Insurance auth is still pending as well as the admission packet that needs to be completed by patients family.     Auth obtained, pending admission packet that needs to be completed by patient family         Gretchen Santillan LMSW  [facility]   [id number]

## 2022-08-03 NOTE — PLAN OF CARE
Pt Aox4. Pt fearfull of what will happen to her. Cline cath for retention. No BM. Pt anxious. Low appetite. Pt to be DC today to [facility]

## 2022-08-03 NOTE — PT/OT/SLP PROGRESS
Physical Therapy Treatment    Patient Name:  Laila Hanna   MRN:  [id number]    Recommendations:     Discharge Recommendations:  nursing facility, skilled   Discharge Equipment Recommendations: bedside commode, wheelchair   Barriers to discharge: requires increased assistance for functional mobility     Assessment:     Laila Hanna is a 89 y.o. female admitted with a medical diagnosis of Hyponatremia.  She presents with the following impairments/functional limitations:  weakness, impaired endurance, impaired functional mobility, gait instability, impaired balance, pain, decreased safety awareness, orthopedic precautions. Patient tolerated PT session fairly well today but was limited by pain and fear of falling. She required moderate assistance for bed mobility and mod A x2 people to stand from edge of bed. She would continue to benefit from PT in order to increase strength and balance while decreasing pain for functional mobility.     Rehab Prognosis: Good; patient would benefit from acute skilled PT services to address these deficits and reach maximum level of function.    Recent Surgery: * No surgery found *      Plan:     During this hospitalization, patient to be seen 4 x/week to address the identified rehab impairments via gait training, therapeutic activities, therapeutic exercises, neuromuscular re-education and progress toward the following goals:    · Plan of Care Expires:  08/30/22    Subjective     Chief Complaint: Pain.   Patient/Family Comments/goals: To get back to PLOF.   Pain/Comfort:  · Pain Rating 1:  (yes but did not rate with number)  · Location 1: back  · Pain Addressed 1: Distraction, Reposition, Cessation of Activity, Nurse notified      Objective:     Communicated with RN prior to session.  Patient found supine with telemetry, pulse ox (continuous), gates catheter (LSO brace) upon PT entry to room. Caregiver present in room.     General Precautions: Standard, fall   Orthopedic  Precautions:spinal precautions   Braces: LSO  Respiratory Status: Room air     Functional Mobility:  · Bed Mobility:     · Rolling Left:  minimum assistance  · Rolling Right: moderate assistance  · Scooting: dependent for drawsheet to get higher in bed  · Supine to Sit: moderate assistance  · Sit to Supine: moderate assistance  · Transfers:     · Sit to Stand:  moderate assistance of 2 persons with no AD x1 from bed  · Patient tolerated standing ~10 seconds.   · Gait:  unable to tolerate      AM-PAC 6 CLICK MOBILITY  Turning over in bed (including adjusting bedclothes, sheets and blankets)?: 2  Sitting down on and standing up from a chair with arms (e.g., wheelchair, bedside commode, etc.): 2  Moving from lying on back to sitting on the side of the bed?: 2  Moving to and from a bed to a chair (including a wheelchair)?: 1  Need to walk in hospital room?: 1  Climbing 3-5 steps with a railing?: 1  Basic Mobility Total Score: 9       Therapeutic Activities and Exercises:  Patient sat on edge of bed ~18 minutes with with fluctuating sit balance between stand by assistance to minimal assistance. She performed B LE seated exercises x10 reps for ankle pumps and long arc quad.     Patient educated in:  -PT role and POC  -safety with transfers including hand placement  -gait sequencing and RW management  -OOB activity to maximize recovery  -spinal precautions and LSO brace      Patient left supine with all lines intact, call button in reach, RN notified and caregiver present.    GOALS:   Multidisciplinary Problems     Physical Therapy Goals        Problem: Physical Therapy    Goal Priority Disciplines Outcome Goal Variances Interventions   Physical Therapy Goal     PT, PT/OT Ongoing, Progressing     Description: Goals to be met by: 22     Patient will increase functional independence with mobility by performin. Supine to sit with Minimal Assistance  2. Sit to supine with MInimal Assistance  3. Rolling to Left and  Right with Stand-by Assistance  4. Sit edge of bed with supervision for 25 minutes    5. Added on 8/2: Sit to stand with rolling walker and contact-guard assistance - Not met  6. Added on 8/2: Gait x 25 ft with rolling walker and minimal (A) - Not met  7. Added on 8/2: Pt will demo ability to stand with RW and CGA for 3 minutes before needing seated rest - Not met                   Time Tracking:     PT Received On: 08/03/22  PT Start Time: 1112     PT Stop Time: 1146  PT Total Time (min): 34 min     Billable Minutes: Therapeutic Activity 20 and Therapeutic Exercise 14    Treatment Type: Treatment  PT/PTA: PT     PTA Visit Number: 0     Co-treatment performed for this visit due to patient need for two skilled therapists to ensure patient and staff safety and to accommodate for patient activity tolerance/pain management.      08/03/2022

## 2022-08-03 NOTE — SUBJECTIVE & OBJECTIVE
Interval History: No acute events overnight. Pain controlled on multi-modal regimen. Patient does still complain of rib and back pain, and discomfort with wearing the brace.   Per family member in room, likely discharging to facility tomorrow.    Medications:  Continuous Infusions:  Scheduled Meds:   acetaminophen  1,000 mg Oral Q8H    aspirin  81 mg Oral Daily    atorvastatin  40 mg Oral Daily    carvediloL  12.5 mg Oral BID    celecoxib  100 mg Oral Daily    enoxaparin  40 mg Subcutaneous Daily    levothyroxine  150 mcg Oral Daily    LIDOcaine  1 patch Transdermal Q24H    polyethylene glycol  17 g Oral Daily    senna-docusate 8.6-50 mg  1 tablet Oral Daily     PRN Meds:bisacodyL, docusate sodium, melatonin, ondansetron, oxyCODONE, oxyCODONE, sodium chloride 0.9%, DIPH,PERTUSS(ACELL),TET VACCINE (ADULT)(BOOSTRIX,ADACEL)     Review of patient's allergies indicates:   Allergen Reactions    Thiazides Other (See Comments)     Severe hyponatremia     Objective:     Vital Signs (Most Recent):  Temp: 97.7 °F (36.5 °C) (08/02/22 1615)  Pulse: 67 (08/02/22 1615)  Resp: 16 (08/02/22 1615)  BP: 127/63 (08/02/22 1615)  SpO2: (!) 93 % (08/02/22 1615)   Vital Signs (24h Range):  Temp:  [97.7 °F (36.5 °C)-98.3 °F (36.8 °C)] 97.7 °F (36.5 °C)  Pulse:  [64-79] 67  Resp:  [16-24] 16  SpO2:  [93 %-96 %] 93 %  BP: (114-153)/(58-72) 127/63     Weight: 93.9 kg (207 lb 0.2 oz)  Body mass index is 33.41 kg/m².    Intake/Output - Last 3 Shifts         07/31 0700 08/01 0659 08/01 0700 08/02 0659 08/02 0700 08/03 0659    P.O.       Total Intake(mL/kg)       Urine (mL/kg/hr) 1250 (0.6) 1150 (0.5) 950 (0.8)    Stool 0      Total Output 1250 1150 950    Net -1250 -1150 -950           Stool Occurrence 0 x              Physical Exam  HENT:      Head: Normocephalic and atraumatic.   Eyes:      Extraocular Movements: Extraocular movements intact.   Cardiovascular:      Rate and Rhythm: Normal rate.   Pulmonary:      Effort: Pulmonary effort is  normal. No respiratory distress.   Abdominal:      General: There is no distension.      Palpations: Abdomen is soft.      Tenderness: There is no abdominal tenderness.   Musculoskeletal:         General: Normal range of motion.   Skin:     General: Skin is warm and dry.   Neurological:      General: No focal deficit present.      Mental Status: She is alert.       Significant Labs:  I have reviewed all pertinent lab results within the past 24 hours.  CBC:   Recent Labs   Lab 07/31/22  0353   WBC 8.13   RBC 3.25*   HGB 10.1*   HCT 30.6*      MCV 94   MCH 31.1*   MCHC 33.0       CMP:   Recent Labs   Lab 07/27/22  0454 07/27/22  0737 08/01/22  0457   GLU 85   < > 83   CALCIUM 8.3*   < > 8.5*   ALBUMIN 2.8*  --   --    PROT 6.1  --   --    *   < > 132*   K 4.3   < > 4.0   CO2 23   < > 26   CL 88*   < > 98   BUN 14   < > 9   CREATININE 0.8   < > 0.6   ALKPHOS 97  --   --    ALT 10  --   --    AST 18  --   --    BILITOT 0.7  --   --     < > = values in this interval not displayed.         Significant Diagnostics:  I have reviewed all pertinent imaging results/findings within the past 24 hours.

## 2022-08-03 NOTE — SUBJECTIVE & OBJECTIVE
Interval History: Awaiting SNF. Caregiver at bedside. Improved appetite this am.     Review of Systems  Objective:     Vital Signs (Most Recent):  Temp: 98.1 °F (36.7 °C) (08/03/22 0800)  Pulse: (!) 59 (08/03/22 1100)  Resp: 16 (08/03/22 1437)  BP: (!) 164/70 (08/03/22 0800)  SpO2: 97 % (08/03/22 1100)   Vital Signs (24h Range):  Temp:  [97.7 °F (36.5 °C)-98.3 °F (36.8 °C)] 98.1 °F (36.7 °C)  Pulse:  [59-68] 59  Resp:  [12-19] 16  SpO2:  [93 %-98 %] 97 %  BP: (127-164)/(61-70) 164/70     Weight: 93.9 kg (207 lb 0.2 oz)  Body mass index is 33.41 kg/m².    Intake/Output Summary (Last 24 hours) at 8/3/2022 1516  Last data filed at 8/3/2022 0400  Gross per 24 hour   Intake --   Output 1600 ml   Net -1600 ml      Physical Exam  Constitutional:       General: She is not in acute distress.     Appearance: Normal appearance. She is not toxic-appearing or diaphoretic.   Cardiovascular:      Rate and Rhythm: Normal rate and regular rhythm.      Heart sounds: No murmur heard.    No friction rub. No gallop.   Pulmonary:      Effort: Pulmonary effort is normal. No respiratory distress.      Breath sounds: Normal breath sounds.   Abdominal:      General: Abdomen is flat. There is no distension.      Palpations: Abdomen is soft.      Tenderness: There is no abdominal tenderness. There is no guarding or rebound.   Musculoskeletal:         General: Signs of injury present.      Right lower leg: No edema.      Left lower leg: No edema.      Comments: Back brace   Skin:     Findings: Bruising present.   Neurological:      Mental Status: She is alert.       MELD-Na score: 7 at 7/26/2022  3:55 AM  MELD score: 7 at 7/26/2022  3:55 AM  Calculated from:  Serum Creatinine: 0.7 mg/dL (Using min of 1 mg/dL) at 7/26/2022  3:55 AM  Serum Sodium: 127 mmol/L at 7/26/2022  3:55 AM  Total Bilirubin: 0.9 mg/dL (Using min of 1 mg/dL) at 7/26/2022  3:55 AM  INR(ratio): 1.1 at 7/24/2022  9:22 PM  Age: 89 years    Significant Labs:  CBC:  No results for  input(s): WBC, HGB, HCT, PLT in the last 48 hours.  CMP:  Recent Labs   Lab 08/03/22  0544   *   K 4.2   CL 97   CO2 27   GLU 94   BUN 13   CREATININE 0.6   CALCIUM 8.2*   ANIONGAP 7*     PTINR:  No results for input(s): INR in the last 48 hours.    Significant Procedures:   Dobutamine Stress Test with Color Flow: No results found for this or any previous visit.

## 2022-08-03 NOTE — PROGRESS NOTES
[facility] - [facility] ([facility]-)  General Surgery  Progress Note    Subjective:     History of Present Illness:  No notes on file    Post-Op Info:  * No surgery found *         Interval History:   No acute events overnight.   Patient on RA with SpO2 98%.   Complaining of back pain. Pain well managed on multimodal pain regimen.   Per primary, to be d/c'd to facility today.    Medications:  Continuous Infusions:  Scheduled Meds:   acetaminophen  1,000 mg Oral Q8H    aspirin  81 mg Oral Daily    atorvastatin  40 mg Oral Daily    carvediloL  12.5 mg Oral BID    celecoxib  100 mg Oral Daily    enoxaparin  40 mg Subcutaneous Daily    levothyroxine  150 mcg Oral Daily    LIDOcaine  1 patch Transdermal Q24H    polyethylene glycol  17 g Oral Daily    senna-docusate 8.6-50 mg  1 tablet Oral Daily     PRN Meds:bisacodyL, docusate sodium, melatonin, ondansetron, oxyCODONE, oxyCODONE, sodium chloride 0.9%, DIPH,PERTUSS(ACELL),TET VACCINE (ADULT)(BOOSTRIX,ADACEL)     Review of patient's allergies indicates:   Allergen Reactions    Thiazides Other (See Comments)     Severe hyponatremia     Objective:     Vital Signs (Most Recent):  Temp: 97.9 °F (36.6 °C) (08/03/22 0345)  Pulse: 66 (08/03/22 0345)  Resp: 19 (08/03/22 0345)  BP: (!) 147/65 (08/03/22 0345)  SpO2: 98 % (08/03/22 0345)   Vital Signs (24h Range):  Temp:  [97.7 °F (36.5 °C)-98.3 °F (36.8 °C)] 97.9 °F (36.6 °C)  Pulse:  [62-68] 66  Resp:  [15-24] 19  SpO2:  [93 %-98 %] 98 %  BP: (127-147)/(61-67) 147/65     Weight: 93.9 kg (207 lb 0.2 oz)  Body mass index is 33.41 kg/m².    Intake/Output - Last 3 Shifts         08/01 0700  08/02 0659 08/02 0700  08/03 0659 08/03 0700  08/04 0659    Urine (mL/kg/hr) 1150 (0.5) 1600 (0.7)     Stool       Total Output 1150 1600     Net -1150 -1600                    Physical Exam  HENT:      Head: Normocephalic and atraumatic.   Eyes:      Extraocular Movements: Extraocular movements intact.   Cardiovascular:      Rate  and Rhythm: Normal rate.   Pulmonary:      Effort: Pulmonary effort is normal. No respiratory distress.   Abdominal:      General: There is no distension.      Palpations: Abdomen is soft.      Tenderness: There is no abdominal tenderness.   Musculoskeletal:         General: Normal range of motion.   Skin:     General: Skin is warm and dry.   Neurological:      General: No focal deficit present.      Mental Status: She is alert.       Significant Labs:  I have reviewed all pertinent lab results within the past 24 hours.  CBC:   Recent Labs   Lab 07/31/22  0353   WBC 8.13   RBC 3.25*   HGB 10.1*   HCT 30.6*      MCV 94   MCH 31.1*   MCHC 33.0       CMP:   Recent Labs   Lab 08/03/22  0544   GLU 94   CALCIUM 8.2*   *   K 4.2   CO2 27   CL 97   BUN 13   CREATININE 0.6         Significant Diagnostics:  I have reviewed all pertinent imaging results/findings within the past 24 hours.    Assessment/Plan:       Multiple rib fractures  Patient is a 89 year old female with h/o HTN, CAD, degenerative disc disease, HLD, MI, TIA presenting after a fall from standing with R rib 4-9 fractures and L2 burst fracture. Clinically stable but with worsening hyponatremia     - likely d/c to facility today. Does not need follow up with surgery.  - continue scheduled multimodal pain regimen in addition to prn pain medications. At high risk for splinting due to rib fractures and increased risk for atelectasis or developing a pneumonia.   - continue RT treatments and pulmonary care (IS, acapella, etc)  - Fine for diet, aspiration precautions        Stella Bernard MD  General Surgery  [facility] - [facility] ([place]-)

## 2022-08-03 NOTE — ASSESSMENT & PLAN NOTE
Patient is a 89 year old female with h/o HTN, CAD, degenerative disc disease, HLD, MI, TIA presenting after a fall from standing with R rib 4-9 fractures and L2 burst fracture. Clinically stable but with worsening hyponatremia     - likely d/c to facility tomorrow   - continue scheduled multimodal pain regimen in addition to prn pain medications. At high risk for splinting due to rib fractures and increased risk for atelectasis or developing a pneumonia.   - continue RT treatments and pulmonary care (IS, acapella, etc)  - Fine for diet, aspiration precautions  - Scheduled multimodal pain regimen  - PRN pain and nausea medications  - Wound care   - DVT prophylaxis (SCDs and lovenox)  - OOB, ambulate  - Inhalation treatments  - Daily CXR   - Wean O2 as tolerated

## 2022-08-04 VITALS
HEIGHT: 66 IN | BODY MASS INDEX: 33.27 KG/M2 | RESPIRATION RATE: 19 BRPM | WEIGHT: 207 LBS | HEART RATE: 66 BPM | SYSTOLIC BLOOD PRESSURE: 114 MMHG | TEMPERATURE: 98 F | DIASTOLIC BLOOD PRESSURE: 56 MMHG | OXYGEN SATURATION: 95 %

## 2022-08-04 PROCEDURE — 25000003 PHARM REV CODE 250: Performed by: HOSPITALIST

## 2022-08-04 PROCEDURE — 90715 TDAP VACCINE 7 YRS/> IM: CPT | Performed by: STUDENT IN AN ORGANIZED HEALTH CARE EDUCATION/TRAINING PROGRAM

## 2022-08-04 PROCEDURE — 90471 IMMUNIZATION ADMIN: CPT | Performed by: STUDENT IN AN ORGANIZED HEALTH CARE EDUCATION/TRAINING PROGRAM

## 2022-08-04 PROCEDURE — 63600175 PHARM REV CODE 636 W HCPCS: Performed by: STUDENT IN AN ORGANIZED HEALTH CARE EDUCATION/TRAINING PROGRAM

## 2022-08-04 PROCEDURE — A4216 STERILE WATER/SALINE, 10 ML: HCPCS | Performed by: STUDENT IN AN ORGANIZED HEALTH CARE EDUCATION/TRAINING PROGRAM

## 2022-08-04 PROCEDURE — 99232 PR SUBSEQUENT HOSPITAL CARE,LEVL II: ICD-10-PCS | Mod: ,,, | Performed by: HOSPITALIST

## 2022-08-04 PROCEDURE — 25000003 PHARM REV CODE 250: Performed by: STUDENT IN AN ORGANIZED HEALTH CARE EDUCATION/TRAINING PROGRAM

## 2022-08-04 PROCEDURE — 99232 SBSQ HOSP IP/OBS MODERATE 35: CPT | Mod: ,,, | Performed by: HOSPITALIST

## 2022-08-04 RX ORDER — LOSARTAN POTASSIUM 25 MG/1
25 TABLET ORAL DAILY
Status: DISCONTINUED | OUTPATIENT
Start: 2022-08-04 | End: 2022-08-04 | Stop reason: HOSPADM

## 2022-08-04 RX ORDER — LOSARTAN POTASSIUM 25 MG/1
25 TABLET ORAL DAILY
Start: 2022-08-04 | End: 2022-08-04 | Stop reason: SDUPTHER

## 2022-08-04 RX ORDER — LOSARTAN POTASSIUM 25 MG/1
25 TABLET ORAL DAILY
Start: 2022-08-04

## 2022-08-04 RX ADMIN — ATORVASTATIN CALCIUM 40 MG: 20 TABLET, FILM COATED ORAL at 09:08

## 2022-08-04 RX ADMIN — Medication 10 ML: at 09:08

## 2022-08-04 RX ADMIN — LACTULOSE 10 G: 20 SOLUTION ORAL at 09:08

## 2022-08-04 RX ADMIN — POLYETHYLENE GLYCOL 3350 17 G: 17 POWDER, FOR SOLUTION ORAL at 09:08

## 2022-08-04 RX ADMIN — LEVOTHYROXINE SODIUM 150 MCG: 150 TABLET ORAL at 06:08

## 2022-08-04 RX ADMIN — OXYCODONE 5 MG: 5 TABLET ORAL at 11:08

## 2022-08-04 RX ADMIN — LIDOCAINE 1 PATCH: 50 PATCH CUTANEOUS at 11:08

## 2022-08-04 RX ADMIN — ASPIRIN 81 MG: 81 TABLET, COATED ORAL at 09:08

## 2022-08-04 RX ADMIN — LOSARTAN POTASSIUM 25 MG: 25 TABLET, FILM COATED ORAL at 09:08

## 2022-08-04 RX ADMIN — CARVEDILOL 12.5 MG: 12.5 TABLET, FILM COATED ORAL at 09:08

## 2022-08-04 RX ADMIN — SENNOSIDES AND DOCUSATE SODIUM 1 TABLET: 50; 8.6 TABLET ORAL at 09:08

## 2022-08-04 RX ADMIN — CELECOXIB 100 MG: 100 CAPSULE ORAL at 09:08

## 2022-08-04 RX ADMIN — ACETAMINOPHEN 1000 MG: 500 TABLET ORAL at 06:08

## 2022-08-04 RX ADMIN — TETANUS TOXOID, REDUCED DIPHTHERIA TOXOID AND ACELLULAR PERTUSSIS VACCINE, ADSORBED 0.5 ML: 5; 2.5; 8; 8; 2.5 SUSPENSION INTRAMUSCULAR at 01:08

## 2022-08-04 NOTE — PROGRESS NOTES
[facility] - Intensive Care ([address] Medicine  Progress Note    Patient Name: Laila Hanna  MRN: [id number]  Patient Class: IP- Inpatient   Admission Date: 7/24/2022  Length of Stay: 10 days  Attending Physician: Bam Watkins MD  Primary Care Provider: Ama Graham MD        Subjective:     Principal Problem:Hyponatremia        HPI:  Laila Hanna is a 89 year old white woman with obesity, hypertension, coronary artery disease status post stent placement to left anterior descending artery on 3/29/2004, left atrial enlargement, left ventricular diastolic dysfunction, aortic and itral valve annular calcifications, carotid artery disease, aortic atherosclerosis, history of transient ischemic attack, syndrome of inappropriate antidiuretic hormone production, Alzheimer's disease, osteoporosis, history of multinodular goiter status post total thyroidectomy on 10/16/2006 with hypothyroidism, chronic constipation, history of cholecystectomy. She lives in [place], [place]. Her primary care physician is Dr. Ama Graham. Her cardiologist is Dr. Bubba Severino.               Sodium was 133 mmol/L on 6/30/2022.              She was seen at [facility] clinic on 7/21/2022. Due to recent syncope, her torsemide was changed to take as needed.               She presented to [facility] Emergency Department on 7/24/2022 after losing her balance and falling in her kitchen, falling onto her right side without losing consciousness. She denied a prodrome. Her caregiver reported that she is typically able to complete activities of daily living without assistance and only requires minimal supervision. Imaging showed an acute displaced burst fracture of the L2 vertebral body with extension into the spinal canal and nondisplaced fracture of the L1 vertebral body, posterolateral fractures of ribs 4 through 9. Sodium was 128 mmol/L. She was admitted to  General Surgery.      Overview/Hospital Course:  Sodium decreased and mentation worsened. Hospital Medicine was consulted. She was transferred to Critical Care Medicine on 7/27/2022. She was put on fluid restriction. Hyponatremia and mentation improved. She was transferred to Hospital Medicine Team A on 7/30/2022. She had episodes of confusion. Repeat chest X-ray showed atelectasis or pneumonia. She was started on multimodal pain medications with improvement in pain control. Physical and Occupational Therapy recommended skilled nursing facility.       Interval History: Awaiting insurance authorization for SNF.    Review of Systems   Constitutional:  Negative for chills and fever.   Respiratory:  Negative for cough and shortness of breath.    Neurological:  Negative for seizures and syncope.   Objective:     Vital Signs (Most Recent):  Temp: 98.5 °F (36.9 °C) (08/04/22 0500)  Pulse: 61 (08/04/22 0500)  Resp: 12 (08/04/22 0500)  BP: (!) 163/74 (08/04/22 0500)  SpO2: (!) 93 % (08/04/22 0500)   Vital Signs (24h Range):  Temp:  [97.6 °F (36.4 °C)-98.7 °F (37.1 °C)] 98.5 °F (36.9 °C)  Pulse:  [57-65] 61  Resp:  [12-18] 12  SpO2:  [93 %-97 %] 93 %  BP: (115-168)/(59-74) 163/74     Weight: 93.9 kg (207 lb 0.2 oz)  Body mass index is 33.41 kg/m².    Intake/Output Summary (Last 24 hours) at 8/4/2022 0753  Last data filed at 8/4/2022 0600  Gross per 24 hour   Intake 120 ml   Output 1200 ml   Net -1080 ml        Physical Exam  Constitutional:       General: She is not in acute distress.     Appearance: Normal appearance. She is not toxic-appearing or diaphoretic.   Cardiovascular:      Rate and Rhythm: Normal rate and regular rhythm.   Pulmonary:      Effort: Pulmonary effort is normal. No respiratory distress.   Musculoskeletal:         General: Signs of injury present.      Right lower leg: No edema.      Left lower leg: No edema.      Comments: Back brace   Skin:     Findings: Bruising present.   Neurological:      Mental  Status: She is alert. Mental status is at baseline.      Motor: No tremor or seizure activity.       Significant Labs:  CBC:  No results for input(s): WBC, HGB, HCT, PLT in the last 48 hours.  CMP:  Recent Labs   Lab 08/03/22  0544   *   K 4.2   CL 97   CO2 27   GLU 94   BUN 13   CREATININE 0.6   CALCIUM 8.2*   ANIONGAP 7*       X-Ray Tibia Fibula Bilateral X-Ray Femur 2 AP/LAT Right X-Ray Knee 3 View Right 7/24/22: FINDINGS:  There is diffuse osteopenia.  Right femur: No acute fracture or dislocation.  Alignment is normal.  The femoral head is well seated within the acetabulum.  Mild joint space narrowing of the femoroacetabular joint noted.  Right knee: No acute fracture or dislocation of the right knee.  Alignment is normal.  Mild tricompartmental joint space narrowing and mild osteophytes noted.  There is prepatellar and infrapatellar soft tissue edema.  There is no joint effusion.  Right tibia/fibula: No acute fracture or dislocation.  Alignment is normal.  There are vascular calcifications.  Impression:  No acute fracture or dislocation of the right femur, knee, or tib-fib.  Right prepatellar and infrapatellar soft tissue edema.  CT Head Without Contrast 7/24/22: FINDINGS:  Generalized cerebral volume loss with compensatory enlargement of the ventricular system.  No hydrocephalus.  Patchy periventricular hypodensity throughout the supratentorial white matter, likely representing chronic microvascular ischemic change. No intracerebral mass, acute hemorrhage, edema or major vascular distribution infarct.  Stable hyperdense extra-axial lesion at right paraclinoid region likely corresponds to lesion seen on prior MRI concerning for partially calcified meningioma.  Partially empty sella turcica, similar to the prior.  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  Mastoid air cells and other paranasal sinuses are essentially clear.  There are postoperative changes in the globes.  Impression:  No acute  intracranial pathology.  Stable small extra-axial lesion at the right paraclinoid region, likely representing partially calcified meningioma.  CT Cervical Thoracic Lumbar Spine Without Contrast 7/24/22: FINDINGS:  Cervical spine:  Alignment: Sagittal reformatted images demonstrate adequate alignment of the cervical spine.  C1-C2: The dens, lateral masses, and occipital condyles are intact.  Vertebrae: There is no evidence of fracture or dislocation.  There is fusion across the bilateral C3-C4 and C4-C5 facet joints.  Discs: Multilevel disc height loss.  C2-C3:Mild bilateral facet arthropathy.  No spinal canal stenosis or neural foraminal narrowing.  C3-C4:Severe bilateral facet arthropathy and uncovertebral spurring resulting in moderate right neural foraminal narrowing.  No spinal canal stenosis.  C4-C5:Severe bilateral facet arthropathy and uncovertebral spurring resulting in moderate right neural foraminal narrowing.  No spinal canal stenosis.  C5-C6:Moderate right and mild left facet arthropathy resulting in severe right neural foraminal narrowing.  No spinal canal stenosis.  C6-C7:Mild bilateral neural foraminal narrowing.  No spinal canal stenosis or neural foraminal narrowing.  C7-T1:Mild bilateral facet arthropathy.  No spinal canal stenosis or neural foraminal narrowing.  Bilateral apical scarring.  Calcific atherosclerosis of the aorta.  The airway is patent and the lung apices are unremarkable.  The visualized portions of the brain demonstrate no significant abnormality.  Thoracic Spine:  Alignment: Exaggerated kyphotic posture of the thoracic spine.  Vertebrae: No infiltrative marrow replacing process.  Stable vertebral body height loss at T11, unchanged compared to lumbar x-ray from 01/20/2016.  Flowing anterior osteophytes consistent with DISH.  Discs: Severe multilevel disc height loss.  Degenerative findings: Multilevel facet arthropathy.  No significant spinal canal stenosis or neural foraminal  narrowing.  Soft tissue: Fusiform aneurysmal dilation of the ascending aorta measuring 4.5 cm (coronal series 601, image 16).  Severe coronary artery atherosclerotic calcifications.  Cardiomegaly.  Hypodense right adrenal gland nodule measuring 2.4 cm in diameter (axial series 2, image 352).  Moderate hiatal hernia.  Hypodensity in the left hepatic lobe, favored represent hepatic cyst.  Bilateral bandlike opacifications in the lung bases favored to represent scarring or atelectasis.  Small right pleural effusion.  Ground-glass opacifications throughout the bilateral lung fields.  There are partially visualized right-sided rib fractures.  Please see the dedicated CT chest examination.  Lumbar spine:  Alignment: There is straightening of the normal lumbar lordosis.  Vertebrae: No infiltrative marrow replacing process.  There is an acute burst fracture of L2 with retropulsed superior posterior fragment extending approximately 0.4 cm into the canal.  There is approximately 20% loss of the vertebral body height.  There is an acute nondisplaced fracture involving the inferior endplate of the L1 vertebral body extending from the large osteophyte.  Discs: Multilevel disc height loss most pronounced at L2-L3 with vacuum disc phenomenon at L1-L2 and L2-L3.  Degenerative findings:  T12-L1: Mild bilateral facet arthropathy.  No spinal canal stenosis or neural foraminal narrowing.  L1-L2: Burst fracture of the superior endplate of L2 with 4 mm retropulsion resulting at least mild spinal canal stenosis.  No spinal foraminal narrowing  L2-L3: Circumferential disc bulge and mild bilateral facet arthropathy resulting in mild spinal canal stenosis and mild bilateral neural foraminal narrowing.  L3-L4: Circumferential disc bulge and mild bilateral facet arthropathy resulting in mild spinal canal stenosis and mild bilateral neural foraminal narrowing.  L4-L5: Circumferential disc bulge and mild bilateral facet arthropathy resulting in  mild spinal canal stenosis and mild bilateral neural foraminal narrowing.  L5-S1: Small circumferential disc bulge and severe right and mild left facet arthropathy.  No spinal canal stenosis or neural foraminal narrowing.  Upper SI joints/sacrum: Unremarkable.  Soft tissue: Calcific atherosclerosis of the aorta and its branch vessels.  Impression:  1. Acute displaced burst type fracture of the L2 vertebral body with small fracture component extending into the spinal canal.  Contrast MRI the lumbar spine may be attempted for further evaluation.  2. Acute nondisplaced fracture involving the inferior endplate of the L1 vertebral body.  3. Multilevel degenerative changes of the cervical, thoracic, and lumbar spine, as detailed above.  4. Fusiform aneurysmal dilation of the ascending aorta.  5. Partially visualized right-sided rib fractures.  6. Additional findings as above.  MRI Lumbar Spine W WO Cont 7/25/22: FINDINGS:  Alignment: Normal.  Vertebrae: There is a compression fracture the L2 vertebral body with unchanged height loss from prior.  There is slight retropulsion into the anterior spinal canal measuring approximately 2 mm, contributing to mild spinal canal stenosis, with effacement of the anterior thecal sac.  There is mild edema and enhancement involving an osteophyte projecting from the left anterior aspect of the L1 vertebral body, corresponding to a possible nondisplaced fracture as seen recently performed CT.  There is no evidence of a focal aggressive osseous lesion in the lumbar spine.  Discs: There is mild disc height loss at L2-L3.  Remaining discs are normal in signal and height.  Cord: Normal.  Conus terminates at the L1 inferior endplate.  Degenerative findings:  T12-L1: No spinal canal stenosis or neural foraminal narrowing.  L1-L2: There is a small circumferential disc bulge and minimal bilateral facet arthropathy.  No spinal canal stenosis or neural foraminal narrowing. Mild spinal canal stenosis  at the level the L2 superior endplate as previously discussed.  L2-L3: There is a posterior disc bulge with a small superimposed central disc protrusion with mild bilateral facet arthropathy and ligamentum flavum hypertrophy resulting in mild spinal canal stenosis and mild bilateral neural foraminal narrowing.  L3-L4: There is a small circumferential disc bulge and mild bilateral facet arthropathy and ligamentum flavum hypertrophy.  No spinal canal stenosis.  Mild bilateral neural foraminal narrowing.  L4-L5: There is a small circumferential disc bulge and mild bilateral facet arthropathy.  No spinal canal stenosis or neural foraminal narrowing.  L5-S1: There is mild bilateral facet arthropathy.  No spinal canal stenosis or neural foraminal narrowing.  Paraspinal muscles & soft tissues: Paraspinal soft tissues demonstrate mild fatty atrophy.  There is atherosclerosis of the partially imaged abdominal aorta.  Impression:  1. Acute compression fracture of the L2 vertebral body.  Slight retropulsion into the anterior spinal canal contributes to mild spinal canal stenosis at the level of the L2 superior endplate.  2. Multilevel degenerative changes of the lumbar spine as detailed above.  MRA Brain without contrast MRA Neck without contrast 7/27/22: FINDINGS:  MRA brain:  Petrous, cavernous, supraclinoid internal carotid arteries are patent and symmetric in caliber.  Major anterior and middle cerebral artery branches are patent.  Distal vertebral, basilar and major posterior cerebral artery branches are patent.  Limited review of the intracranial structures show no acute abnormality.  MRA neck:  Bilateral common carotid arteries are patent.  No significant stenosis at the carotid bifurcations.  Cervical internal carotid arteries are patent.  Vertebral arteries are symmetric in caliber and patent throughout their course in the neck.  Vertebral artery origins are difficult to fully evaluate.  Impression:  MRA of the brain  and neck shows no significant abnormalities.  No high-grade stenosis, large vessel occlusion or aneurysm.  X-Ray Lumbar Spine Lateral Supine and Lateral Upright 7/28/22: FINDINGS:  Limited assessment due to reported overlying support device in place.  Alignment is grossly maintained.  Stable height loss at L2 vertebral body compatible with known acute mild compression deformity.  Remaining lumbar vertebral body heights are adequately maintained.  Degenerative changes, better demonstrated on recent CT and MRI.  No definite aggressive osseous lesion.  Calcific aortic atherosclerosis.      Assessment/Plan:      * Hyponatremia  SIADH (syndrome of inappropriate ADH production)  Fluid restriction. Improving.     Urinary retention  Cline catheter placed.    Acute encephalopathy  7/27/2022, due to hyponatremia. Improved with treatment of hyponatremia. Holding home gabapentin and cyclobenzaprine.     Multiple rib fractures  Multiple posterolateral rib fractures of ribs 4-9 after mechanical fall  General surgery consulted, appreciate assistance.  Multimodal pain control including acetaminophen, NSAIDs, PRN oxycodone  Encourage incentive spirometry.    Coronary artery disease involving native coronary artery of native heart  Continue home aspirin.    Essential hypertension  Continue home carvedilol. Resume home losartan at 25 mg instead of 50 mg.    Postoperative hypothyroidism  Continue home levothyroxine 150 mcg daily      VTE Risk Mitigation (From admission, onward)         Ordered     enoxaparin injection 40 mg  Daily         07/25/22 0040     IP VTE HIGH RISK PATIENT  Once         07/25/22 0040                Discharge Planning   RACHAEL: 8/3/2022     Code Status: Full Code   Is the patient medically ready for discharge?: Yes    Reason for patient still in hospital (select all that apply): Pending disposition  Discharge Plan A: Skilled Nursing Facility   Discharge Delays: None known at this time              Bam Watkins,  MD  Department of Hospital Medicine   [facility] - Intensive Care ([place])

## 2022-08-04 NOTE — PLAN OF CARE
Problem: Adult Inpatient Plan of Care  Goal: Plan of Care Review  Outcome: Met  Goal: Patient-Specific Goal (Individualized)  Outcome: Met  Goal: Absence of Hospital-Acquired Illness or Injury  Outcome: Met  Goal: Optimal Comfort and Wellbeing  Outcome: Met  Goal: Readiness for Transition of Care  Outcome: Met     Problem: Fall Injury Risk  Goal: Absence of Fall and Fall-Related Injury  Outcome: Met     Problem: Skin Injury Risk Increased  Goal: Skin Health and Integrity  Outcome: Met     Problem: Pain Acute  Goal: Acceptable Pain Control and Functional Ability  Outcome: Met     Problem: Fluid and Electrolyte Imbalance (Acute Kidney Injury/Impairment)  Goal: Fluid and Electrolyte Balance  Outcome: Met     Problem: Oral Intake Inadequate (Acute Kidney Injury/Impairment)  Goal: Optimal Nutrition Intake  Outcome: Met     Problem: Renal Function Impairment (Acute Kidney Injury/Impairment)  Goal: Effective Renal Function  Outcome: Met     Problem: Infection  Goal: Absence of Infection Signs and Symptoms  Outcome: Met

## 2022-08-04 NOTE — ASSESSMENT & PLAN NOTE
7/27/2022, due to hyponatremia. Improved with treatment of hyponatremia. Holding home gabapentin and cyclobenzaprine.

## 2022-08-04 NOTE — ASSESSMENT & PLAN NOTE
Multiple posterolateral rib fractures of ribs 4-9 after mechanical fall  General surgery consulted, appreciate assistance.  Multimodal pain control including acetaminophen, NSAIDs, PRN oxycodone  Encourage incentive spirometry.

## 2022-08-04 NOTE — SUBJECTIVE & OBJECTIVE
Interval History: Awaiting insurance authorization for SNF.    Review of Systems   Constitutional:  Negative for chills and fever.   Respiratory:  Negative for cough and shortness of breath.    Neurological:  Negative for seizures and syncope.   Objective:     Vital Signs (Most Recent):  Temp: 98.5 °F (36.9 °C) (08/04/22 0500)  Pulse: 61 (08/04/22 0500)  Resp: 12 (08/04/22 0500)  BP: (!) 163/74 (08/04/22 0500)  SpO2: (!) 93 % (08/04/22 0500)   Vital Signs (24h Range):  Temp:  [97.6 °F (36.4 °C)-98.7 °F (37.1 °C)] 98.5 °F (36.9 °C)  Pulse:  [57-65] 61  Resp:  [12-18] 12  SpO2:  [93 %-97 %] 93 %  BP: (115-168)/(59-74) 163/74     Weight: 93.9 kg (207 lb 0.2 oz)  Body mass index is 33.41 kg/m².    Intake/Output Summary (Last 24 hours) at 8/4/2022 0753  Last data filed at 8/4/2022 0600  Gross per 24 hour   Intake 120 ml   Output 1200 ml   Net -1080 ml        Physical Exam  Constitutional:       General: She is not in acute distress.     Appearance: Normal appearance. She is not toxic-appearing or diaphoretic.   Cardiovascular:      Rate and Rhythm: Normal rate and regular rhythm.   Pulmonary:      Effort: Pulmonary effort is normal. No respiratory distress.   Musculoskeletal:         General: Signs of injury present.      Right lower leg: No edema.      Left lower leg: No edema.      Comments: Back brace   Skin:     Findings: Bruising present.   Neurological:      Mental Status: She is alert. Mental status is at baseline.      Motor: No tremor or seizure activity.       Significant Labs:  CBC:  No results for input(s): WBC, HGB, HCT, PLT in the last 48 hours.  CMP:  Recent Labs   Lab 08/03/22  0544   *   K 4.2   CL 97   CO2 27   GLU 94   BUN 13   CREATININE 0.6   CALCIUM 8.2*   ANIONGAP 7*       X-Ray Tibia Fibula Bilateral X-Ray Femur 2 AP/LAT Right X-Ray Knee 3 View Right 7/24/22: FINDINGS:  There is diffuse osteopenia.  Right femur: No acute fracture or dislocation.  Alignment is normal.  The femoral head is  well seated within the acetabulum.  Mild joint space narrowing of the femoroacetabular joint noted.  Right knee: No acute fracture or dislocation of the right knee.  Alignment is normal.  Mild tricompartmental joint space narrowing and mild osteophytes noted.  There is prepatellar and infrapatellar soft tissue edema.  There is no joint effusion.  Right tibia/fibula: No acute fracture or dislocation.  Alignment is normal.  There are vascular calcifications.  Impression:  No acute fracture or dislocation of the right femur, knee, or tib-fib.  Right prepatellar and infrapatellar soft tissue edema.  CT Head Without Contrast 7/24/22: FINDINGS:  Generalized cerebral volume loss with compensatory enlargement of the ventricular system.  No hydrocephalus.  Patchy periventricular hypodensity throughout the supratentorial white matter, likely representing chronic microvascular ischemic change. No intracerebral mass, acute hemorrhage, edema or major vascular distribution infarct.  Stable hyperdense extra-axial lesion at right paraclinoid region likely corresponds to lesion seen on prior MRI concerning for partially calcified meningioma.  Partially empty sella turcica, similar to the prior.  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  Mastoid air cells and other paranasal sinuses are essentially clear.  There are postoperative changes in the globes.  Impression:  No acute intracranial pathology.  Stable small extra-axial lesion at the right paraclinoid region, likely representing partially calcified meningioma.  CT Cervical Thoracic Lumbar Spine Without Contrast 7/24/22: FINDINGS:  Cervical spine:  Alignment: Sagittal reformatted images demonstrate adequate alignment of the cervical spine.  C1-C2: The dens, lateral masses, and occipital condyles are intact.  Vertebrae: There is no evidence of fracture or dislocation.  There is fusion across the bilateral C3-C4 and C4-C5 facet joints.  Discs: Multilevel disc height  loss.  C2-C3:Mild bilateral facet arthropathy.  No spinal canal stenosis or neural foraminal narrowing.  C3-C4:Severe bilateral facet arthropathy and uncovertebral spurring resulting in moderate right neural foraminal narrowing.  No spinal canal stenosis.  C4-C5:Severe bilateral facet arthropathy and uncovertebral spurring resulting in moderate right neural foraminal narrowing.  No spinal canal stenosis.  C5-C6:Moderate right and mild left facet arthropathy resulting in severe right neural foraminal narrowing.  No spinal canal stenosis.  C6-C7:Mild bilateral neural foraminal narrowing.  No spinal canal stenosis or neural foraminal narrowing.  C7-T1:Mild bilateral facet arthropathy.  No spinal canal stenosis or neural foraminal narrowing.  Bilateral apical scarring.  Calcific atherosclerosis of the aorta.  The airway is patent and the lung apices are unremarkable.  The visualized portions of the brain demonstrate no significant abnormality.  Thoracic Spine:  Alignment: Exaggerated kyphotic posture of the thoracic spine.  Vertebrae: No infiltrative marrow replacing process.  Stable vertebral body height loss at T11, unchanged compared to lumbar x-ray from 01/20/2016.  Flowing anterior osteophytes consistent with DISH.  Discs: Severe multilevel disc height loss.  Degenerative findings: Multilevel facet arthropathy.  No significant spinal canal stenosis or neural foraminal narrowing.  Soft tissue: Fusiform aneurysmal dilation of the ascending aorta measuring 4.5 cm (coronal series 601, image 16).  Severe coronary artery atherosclerotic calcifications.  Cardiomegaly.  Hypodense right adrenal gland nodule measuring 2.4 cm in diameter (axial series 2, image 352).  Moderate hiatal hernia.  Hypodensity in the left hepatic lobe, favored represent hepatic cyst.  Bilateral bandlike opacifications in the lung bases favored to represent scarring or atelectasis.  Small right pleural effusion.  Ground-glass opacifications  throughout the bilateral lung fields.  There are partially visualized right-sided rib fractures.  Please see the dedicated CT chest examination.  Lumbar spine:  Alignment: There is straightening of the normal lumbar lordosis.  Vertebrae: No infiltrative marrow replacing process.  There is an acute burst fracture of L2 with retropulsed superior posterior fragment extending approximately 0.4 cm into the canal.  There is approximately 20% loss of the vertebral body height.  There is an acute nondisplaced fracture involving the inferior endplate of the L1 vertebral body extending from the large osteophyte.  Discs: Multilevel disc height loss most pronounced at L2-L3 with vacuum disc phenomenon at L1-L2 and L2-L3.  Degenerative findings:  T12-L1: Mild bilateral facet arthropathy.  No spinal canal stenosis or neural foraminal narrowing.  L1-L2: Burst fracture of the superior endplate of L2 with 4 mm retropulsion resulting at least mild spinal canal stenosis.  No spinal foraminal narrowing  L2-L3: Circumferential disc bulge and mild bilateral facet arthropathy resulting in mild spinal canal stenosis and mild bilateral neural foraminal narrowing.  L3-L4: Circumferential disc bulge and mild bilateral facet arthropathy resulting in mild spinal canal stenosis and mild bilateral neural foraminal narrowing.  L4-L5: Circumferential disc bulge and mild bilateral facet arthropathy resulting in mild spinal canal stenosis and mild bilateral neural foraminal narrowing.  L5-S1: Small circumferential disc bulge and severe right and mild left facet arthropathy.  No spinal canal stenosis or neural foraminal narrowing.  Upper SI joints/sacrum: Unremarkable.  Soft tissue: Calcific atherosclerosis of the aorta and its branch vessels.  Impression:  1. Acute displaced burst type fracture of the L2 vertebral body with small fracture component extending into the spinal canal.  Contrast MRI the lumbar spine may be attempted for further  evaluation.  2. Acute nondisplaced fracture involving the inferior endplate of the L1 vertebral body.  3. Multilevel degenerative changes of the cervical, thoracic, and lumbar spine, as detailed above.  4. Fusiform aneurysmal dilation of the ascending aorta.  5. Partially visualized right-sided rib fractures.  6. Additional findings as above.  MRI Lumbar Spine W WO Cont 7/25/22: FINDINGS:  Alignment: Normal.  Vertebrae: There is a compression fracture the L2 vertebral body with unchanged height loss from prior.  There is slight retropulsion into the anterior spinal canal measuring approximately 2 mm, contributing to mild spinal canal stenosis, with effacement of the anterior thecal sac.  There is mild edema and enhancement involving an osteophyte projecting from the left anterior aspect of the L1 vertebral body, corresponding to a possible nondisplaced fracture as seen recently performed CT.  There is no evidence of a focal aggressive osseous lesion in the lumbar spine.  Discs: There is mild disc height loss at L2-L3.  Remaining discs are normal in signal and height.  Cord: Normal.  Conus terminates at the L1 inferior endplate.  Degenerative findings:  T12-L1: No spinal canal stenosis or neural foraminal narrowing.  L1-L2: There is a small circumferential disc bulge and minimal bilateral facet arthropathy.  No spinal canal stenosis or neural foraminal narrowing. Mild spinal canal stenosis at the level the L2 superior endplate as previously discussed.  L2-L3: There is a posterior disc bulge with a small superimposed central disc protrusion with mild bilateral facet arthropathy and ligamentum flavum hypertrophy resulting in mild spinal canal stenosis and mild bilateral neural foraminal narrowing.  L3-L4: There is a small circumferential disc bulge and mild bilateral facet arthropathy and ligamentum flavum hypertrophy.  No spinal canal stenosis.  Mild bilateral neural foraminal narrowing.  L4-L5: There is a small  circumferential disc bulge and mild bilateral facet arthropathy.  No spinal canal stenosis or neural foraminal narrowing.  L5-S1: There is mild bilateral facet arthropathy.  No spinal canal stenosis or neural foraminal narrowing.  Paraspinal muscles & soft tissues: Paraspinal soft tissues demonstrate mild fatty atrophy.  There is atherosclerosis of the partially imaged abdominal aorta.  Impression:  1. Acute compression fracture of the L2 vertebral body.  Slight retropulsion into the anterior spinal canal contributes to mild spinal canal stenosis at the level of the L2 superior endplate.  2. Multilevel degenerative changes of the lumbar spine as detailed above.  MRA Brain without contrast MRA Neck without contrast 7/27/22: FINDINGS:  MRA brain:  Petrous, cavernous, supraclinoid internal carotid arteries are patent and symmetric in caliber.  Major anterior and middle cerebral artery branches are patent.  Distal vertebral, basilar and major posterior cerebral artery branches are patent.  Limited review of the intracranial structures show no acute abnormality.  MRA neck:  Bilateral common carotid arteries are patent.  No significant stenosis at the carotid bifurcations.  Cervical internal carotid arteries are patent.  Vertebral arteries are symmetric in caliber and patent throughout their course in the neck.  Vertebral artery origins are difficult to fully evaluate.  Impression:  MRA of the brain and neck shows no significant abnormalities.  No high-grade stenosis, large vessel occlusion or aneurysm.  X-Ray Lumbar Spine Lateral Supine and Lateral Upright 7/28/22: FINDINGS:  Limited assessment due to reported overlying support device in place.  Alignment is grossly maintained.  Stable height loss at L2 vertebral body compatible with known acute mild compression deformity.  Remaining lumbar vertebral body heights are adequately maintained.  Degenerative changes, better demonstrated on recent CT and MRI.  No definite  aggressive osseous lesion.  Calcific aortic atherosclerosis.

## 2022-08-04 NOTE — PLAN OF CARE
Spoke with [facility] admissions office earlier, sent updated orders with today's date and Oxy script to them through [facility]. Pending family signatures- admissions stated they were promised signed papers today. Pending call from facility for report and transportation set up time.         12:00n    [facility] request nurse call report to [phone] ( state calling in report and they will connect to nurse). Patient room #133a    Notified patient and her CG of ambulance transportation per [facility] time of 1:00pm and that time is not guaranteed.

## 2022-08-04 NOTE — NURSING
Transport arrived and transferred patient to [facility] for discharge to external facility. Patient is alert and oriented, denies pain or shortness of breath. Patient and caregiver (at bedside) removed all personal belongings at time of discharge. All questions were answered by this RN.

## 2022-08-04 NOTE — DISCHARGE SUMMARY
[facility] Intensive Care ([address] Medicine  Discharge Summary      Patient Name: Laila Hanna  MRN: [id number]  Patient Class: IP- Inpatient  Admission Date: 7/24/2022  Hospital Length of Stay: 10 days  Discharge Date and Time: 8/4/2022  2:43 PM  Attending Physician: Bam Watkins MD   Discharging Provider: Bam Watkins MD  Primary Care Provider: Ama Graham MD  [facility] Medicine Team: [facility] A Bam Watkins MD    HPI:   Laila Hanna is a 89 year old white woman with obesity, hypertension, coronary artery disease status post stent placement to left anterior descending artery on 3/29/2004, left atrial enlargement, left ventricular diastolic dysfunction, aortic and itral valve annular calcifications, carotid artery disease, aortic atherosclerosis, history of transient ischemic attack, syndrome of inappropriate antidiuretic hormone production, Alzheimer's disease, osteoporosis, history of multinodular goiter status post total thyroidectomy on 10/16/2006 with hypothyroidism, chronic constipation, history of cholecystectomy. She lives in [place], [place]. Her primary care physician is Dr. Ama Graham. Her cardiologist is Dr. Bubba Severino.               Sodium was 133 mmol/L on 6/30/2022.              She was seen at [facility] clinic on 7/21/2022. Due to recent syncope, her torsemide was changed to take as needed.               She presented to [facility] Emergency Department on 7/24/2022 after losing her balance and falling in her kitchen, falling onto her right side without losing consciousness. She denied a prodrome. Her caregiver reported that she is typically able to complete activities of daily living without assistance and only requires minimal supervision. Imaging showed an acute displaced burst fracture of the L2 vertebral body with extension into the spinal canal and nondisplaced fracture of the L1  vertebral body, posterolateral fractures of ribs 4 through 9. Sodium was 128 mmol/L. She was admitted to General Surgery.        Hospital Course:   Sodium decreased and mentation worsened. Hospital Medicine was consulted. She was transferred to Critical Care Medicine on 7/27/2022. She was put on fluid restriction. Hyponatremia and mentation improved. She was transferred to Hospital Medicine Team A on 7/30/2022. She had episodes of confusion. Repeat chest X-ray showed atelectasis or pneumonia. She was started on multimodal pain medications with improvement in pain control. Physical and Occupational Therapy recommended skilled nursing facility.        Goals of Care Treatment Preferences:  Code Status: Full Code      Consults:   Consults (From admission, onward)        Status Ordering Provider     Inpatient consult to Vascular (Stroke) Neurology  Once        Provider:  (Not yet assigned)    Completed ERIN QUICK     Inpatient consult to Critical Care Medicine  Once        Provider:  (Not yet assigned)    Completed PATRIC AMBROCIO     Inpatient consult to Hospital Medicine-General  Once        Provider:  (Not yet assigned)    Completed MONICA VIDALES     Inpatient consult to Neurosurgery  Once        Provider:  (Not yet assigned)    Completed EWA HUYNH     Inpatient consult to General surgery  Once        Provider:  (Not yet assigned)    Completed TAMI THOMAS        Final Active Diagnoses:    Diagnosis Date Noted POA    PRINCIPAL PROBLEM:  Hyponatremia [E87.1] 05/05/2014 Yes    Urinary retention [R33.9] 08/02/2022 Unknown    Acute encephalopathy [G93.40] 07/27/2022 Unknown    Multiple rib fractures [S22.49XA] 07/24/2022 Yes    Closed fracture of lumbar spine without lesion of spinal cord [S32.009A] 07/24/2022 Yes    Coronary artery disease involving native coronary artery of native heart [I25.10] 01/15/2020 Yes     Chronic    Essential hypertension [I10] 05/29/2016 Yes     Chronic    SIADH  (syndrome of inappropriate ADH production) [E22.2] 05/28/2016 Yes     Chronic    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]  Yes     Chronic      Problems Resolved During this Admission:    Diagnosis Date Noted Date Resolved POA    Somnolence [R40.0] 07/27/2022 07/29/2022 No    JEANINE (acute kidney injury) [N17.9] 07/27/2022 08/02/2022 Yes    Lumbar compression fracture [S32.000A] 07/24/2022 07/29/2022 Yes       Discharged Condition: good    Disposition: Skilled Nursing Facility    Follow Up: Skilled nursing facility    Patient Instructions:   No discharge procedures on file.    Significant Diagnostic Studies:   X-Ray Tibia Fibula Bilateral X-Ray Femur 2 AP/LAT Right X-Ray Knee 3 View Right 7/24/22: FINDINGS:  There is diffuse osteopenia.  Right femur: No acute fracture or dislocation.  Alignment is normal.  The femoral head is well seated within the acetabulum.  Mild joint space narrowing of the femoroacetabular joint noted.  Right knee: No acute fracture or dislocation of the right knee.  Alignment is normal.  Mild tricompartmental joint space narrowing and mild osteophytes noted.  There is prepatellar and infrapatellar soft tissue edema.  There is no joint effusion.  Right tibia/fibula: No acute fracture or dislocation.  Alignment is normal.  There are vascular calcifications.  Impression:  No acute fracture or dislocation of the right femur, knee, or tib-fib.  Right prepatellar and infrapatellar soft tissue edema.  CT Head Without Contrast 7/24/22: FINDINGS:  Generalized cerebral volume loss with compensatory enlargement of the ventricular system.  No hydrocephalus.  Patchy periventricular hypodensity throughout the supratentorial white matter, likely representing chronic microvascular ischemic change. No intracerebral mass, acute hemorrhage, edema or major vascular distribution infarct.  Stable hyperdense extra-axial lesion at right paraclinoid region likely corresponds to lesion seen on prior MRI concerning for  partially calcified meningioma.  Partially empty sella turcica, similar to the prior.  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  Mastoid air cells and other paranasal sinuses are essentially clear.  There are postoperative changes in the globes.  Impression:  No acute intracranial pathology.  Stable small extra-axial lesion at the right paraclinoid region, likely representing partially calcified meningioma.  CT Cervical Thoracic Lumbar Spine Without Contrast 7/24/22: FINDINGS:  Cervical spine:  Alignment: Sagittal reformatted images demonstrate adequate alignment of the cervical spine.  C1-C2: The dens, lateral masses, and occipital condyles are intact.  Vertebrae: There is no evidence of fracture or dislocation.  There is fusion across the bilateral C3-C4 and C4-C5 facet joints.  Discs: Multilevel disc height loss.  C2-C3:Mild bilateral facet arthropathy.  No spinal canal stenosis or neural foraminal narrowing.  C3-C4:Severe bilateral facet arthropathy and uncovertebral spurring resulting in moderate right neural foraminal narrowing.  No spinal canal stenosis.  C4-C5:Severe bilateral facet arthropathy and uncovertebral spurring resulting in moderate right neural foraminal narrowing.  No spinal canal stenosis.  C5-C6:Moderate right and mild left facet arthropathy resulting in severe right neural foraminal narrowing.  No spinal canal stenosis.  C6-C7:Mild bilateral neural foraminal narrowing.  No spinal canal stenosis or neural foraminal narrowing.  C7-T1:Mild bilateral facet arthropathy.  No spinal canal stenosis or neural foraminal narrowing.  Bilateral apical scarring.  Calcific atherosclerosis of the aorta.  The airway is patent and the lung apices are unremarkable.  The visualized portions of the brain demonstrate no significant abnormality.  Thoracic Spine:  Alignment: Exaggerated kyphotic posture of the thoracic spine.  Vertebrae: No infiltrative marrow replacing process.  Stable vertebral body height  loss at T11, unchanged compared to lumbar x-ray from 01/20/2016.  Flowing anterior osteophytes consistent with DISH.  Discs: Severe multilevel disc height loss.  Degenerative findings: Multilevel facet arthropathy.  No significant spinal canal stenosis or neural foraminal narrowing.  Soft tissue: Fusiform aneurysmal dilation of the ascending aorta measuring 4.5 cm (coronal series 601, image 16).  Severe coronary artery atherosclerotic calcifications.  Cardiomegaly.  Hypodense right adrenal gland nodule measuring 2.4 cm in diameter (axial series 2, image 352).  Moderate hiatal hernia.  Hypodensity in the left hepatic lobe, favored represent hepatic cyst.  Bilateral bandlike opacifications in the lung bases favored to represent scarring or atelectasis.  Small right pleural effusion.  Ground-glass opacifications throughout the bilateral lung fields.  There are partially visualized right-sided rib fractures.  Please see the dedicated CT chest examination.  Lumbar spine:  Alignment: There is straightening of the normal lumbar lordosis.  Vertebrae: No infiltrative marrow replacing process.  There is an acute burst fracture of L2 with retropulsed superior posterior fragment extending approximately 0.4 cm into the canal.  There is approximately 20% loss of the vertebral body height.  There is an acute nondisplaced fracture involving the inferior endplate of the L1 vertebral body extending from the large osteophyte.  Discs: Multilevel disc height loss most pronounced at L2-L3 with vacuum disc phenomenon at L1-L2 and L2-L3.  Degenerative findings:  T12-L1: Mild bilateral facet arthropathy.  No spinal canal stenosis or neural foraminal narrowing.  L1-L2: Burst fracture of the superior endplate of L2 with 4 mm retropulsion resulting at least mild spinal canal stenosis.  No spinal foraminal narrowing  L2-L3: Circumferential disc bulge and mild bilateral facet arthropathy resulting in mild spinal canal stenosis and mild bilateral  neural foraminal narrowing.  L3-L4: Circumferential disc bulge and mild bilateral facet arthropathy resulting in mild spinal canal stenosis and mild bilateral neural foraminal narrowing.  L4-L5: Circumferential disc bulge and mild bilateral facet arthropathy resulting in mild spinal canal stenosis and mild bilateral neural foraminal narrowing.  L5-S1: Small circumferential disc bulge and severe right and mild left facet arthropathy.  No spinal canal stenosis or neural foraminal narrowing.  Upper SI joints/sacrum: Unremarkable.  Soft tissue: Calcific atherosclerosis of the aorta and its branch vessels.  Impression:  1. Acute displaced burst type fracture of the L2 vertebral body with small fracture component extending into the spinal canal.  Contrast MRI the lumbar spine may be attempted for further evaluation.  2. Acute nondisplaced fracture involving the inferior endplate of the L1 vertebral body.  3. Multilevel degenerative changes of the cervical, thoracic, and lumbar spine, as detailed above.  4. Fusiform aneurysmal dilation of the ascending aorta.  5. Partially visualized right-sided rib fractures.  6. Additional findings as above.  MRI Lumbar Spine W WO Cont 7/25/22: FINDINGS:  Alignment: Normal.  Vertebrae: There is a compression fracture the L2 vertebral body with unchanged height loss from prior.  There is slight retropulsion into the anterior spinal canal measuring approximately 2 mm, contributing to mild spinal canal stenosis, with effacement of the anterior thecal sac.  There is mild edema and enhancement involving an osteophyte projecting from the left anterior aspect of the L1 vertebral body, corresponding to a possible nondisplaced fracture as seen recently performed CT.  There is no evidence of a focal aggressive osseous lesion in the lumbar spine.  Discs: There is mild disc height loss at L2-L3.  Remaining discs are normal in signal and height.  Cord: Normal.  Conus terminates at the L1 inferior  endplate.  Degenerative findings:  T12-L1: No spinal canal stenosis or neural foraminal narrowing.  L1-L2: There is a small circumferential disc bulge and minimal bilateral facet arthropathy.  No spinal canal stenosis or neural foraminal narrowing. Mild spinal canal stenosis at the level the L2 superior endplate as previously discussed.  L2-L3: There is a posterior disc bulge with a small superimposed central disc protrusion with mild bilateral facet arthropathy and ligamentum flavum hypertrophy resulting in mild spinal canal stenosis and mild bilateral neural foraminal narrowing.  L3-L4: There is a small circumferential disc bulge and mild bilateral facet arthropathy and ligamentum flavum hypertrophy.  No spinal canal stenosis.  Mild bilateral neural foraminal narrowing.  L4-L5: There is a small circumferential disc bulge and mild bilateral facet arthropathy.  No spinal canal stenosis or neural foraminal narrowing.  L5-S1: There is mild bilateral facet arthropathy.  No spinal canal stenosis or neural foraminal narrowing.  Paraspinal muscles & soft tissues: Paraspinal soft tissues demonstrate mild fatty atrophy.  There is atherosclerosis of the partially imaged abdominal aorta.  Impression:  1. Acute compression fracture of the L2 vertebral body.  Slight retropulsion into the anterior spinal canal contributes to mild spinal canal stenosis at the level of the L2 superior endplate.  2. Multilevel degenerative changes of the lumbar spine as detailed above.  MRA Brain without contrast MRA Neck without contrast 7/27/22: FINDINGS:  MRA brain:  Petrous, cavernous, supraclinoid internal carotid arteries are patent and symmetric in caliber.  Major anterior and middle cerebral artery branches are patent.  Distal vertebral, basilar and major posterior cerebral artery branches are patent.  Limited review of the intracranial structures show no acute abnormality.  MRA neck:  Bilateral common carotid arteries are patent.  No  significant stenosis at the carotid bifurcations.  Cervical internal carotid arteries are patent.  Vertebral arteries are symmetric in caliber and patent throughout their course in the neck.  Vertebral artery origins are difficult to fully evaluate.  Impression:  MRA of the brain and neck shows no significant abnormalities.  No high-grade stenosis, large vessel occlusion or aneurysm.  X-Ray Lumbar Spine Lateral Supine and Lateral Upright 7/28/22: FINDINGS:  Limited assessment due to reported overlying support device in place.  Alignment is grossly maintained.  Stable height loss at L2 vertebral body compatible with known acute mild compression deformity.  Remaining lumbar vertebral body heights are adequately maintained.  Degenerative changes, better demonstrated on recent CT and MRI.  No definite aggressive osseous lesion.  Calcific aortic atherosclerosis.     Medications:  Reconciled Home Medications:      Medication List      START taking these medications    acetaminophen 500 MG tablet  Commonly known as: TYLENOL  Take 2 tablets (1,000 mg total) by mouth every 8 (eight) hours.     celecoxib 100 MG capsule  Commonly known as: CeleBREX  Take 1 capsule (100 mg total) by mouth once daily. for 10 days     oxyCODONE 10 mg Tab immediate release tablet  Commonly known as: ROXICODONE  Take 1 tablet (10 mg total) by mouth every 4 (four) hours as needed (severe pain).     polyethylene glycol 17 gram Pwpk  Commonly known as: GLYCOLAX  Take 17 g by mouth once daily.     senna-docusate 8.6-50 mg 8.6-50 mg per tablet  Commonly known as: PERICOLACE  Take 1 tablet by mouth once daily.        CHANGE how you take these medications    losartan 25 MG tablet  Commonly known as: COZAAR  Take 1 tablet (25 mg total) by mouth once daily.  What changed:   · medication strength  · how much to take        CONTINUE taking these medications    alendronate 70 MG tablet  Commonly known as: FOSAMAX  TAKE 1 TABLET BY MOUTH EVERY 7 DAYS      aspirin 81 MG EC tablet  Commonly known as: ECOTRIN  Take 1 tablet (81 mg total) by mouth once daily.     atorvastatin 40 MG tablet  Commonly known as: LIPITOR  Take 1 tablet (40 mg total) by mouth once daily.     calcium-vitamin D3 500 mg-5 mcg (200 unit) per tablet  Commonly known as: OS-DANICA 500 + D3  Take 1 tablet by mouth once daily.     carvediloL 12.5 MG tablet  Commonly known as: COREG  Take 1 tablet (12.5 mg total) by mouth 2 (two) times daily.     clotrimazole-betamethasone 1-0.05% cream  Commonly known as: LOTRISONE  Apply topically 2 (two) times daily as needed.     cyanocobalamin 100 MCG tablet  Commonly known as: VITAMIN B-12  Take 100 mcg by mouth once daily.     dextran 70-hypromellose ophthalmic solution  Commonly known as: ARTIFICIAL TEARS([id number])  Place 1 drop into both eyes every 2 (two) hours as needed.     levothyroxine 150 MCG tablet  Commonly known as: SYNTHROID  Take 1 tablet (150 mcg total) by mouth once daily.     LINZESS 145 mcg Cap capsule  Generic drug: linaCLOtide  TAKE 1 CAPSULE(145 MCG) BY MOUTH BEFORE BREAKFAST     melatonin 10 mg Cap  Take by mouth.     torsemide 10 MG Tab  Commonly known as: DEMADEX  Take 1 tablet (10 mg total) by mouth once daily. As need for weight gain of 3 lbs in 1 day or 5 lbs in 3 days. Do not take if weight stable or decreasing. Daily weights at home.            Indwelling Lines/Drains at time of discharge:   Lines/Drains/Airways     Drain  Duration                Urethral Catheter 08/02/22 1200 1 day                Time spent on the discharge of patient: 35 minutes         Bam Watkins MD  Department of Hospital Medicine  [facility] - Intensive Care ([place]-[facility])

## 2022-08-09 NOTE — PLAN OF CARE
[facility] - Intensive Care ([facility])  Discharge Final Note    Primary Care Provider: Ama Graham MD    Expected Discharge Date: 8/4/2022    Final Discharge Note (most recent)     Final Note - 08/04/22 1443        Final Note    Assessment Type Final Discharge Note     Anticipated Discharge Disposition Skilled Nursing Facility   [facility]    What phone number can be called within the next 1-3 days to see how you are doing after discharge? [phone]     Hospital Resources/Appts/Education Provided Provided patient/caregiver with written discharge plan information;Appointments scheduled by Navigator/Coordinator               Future Appointments   Date Time Provider Department Center   9/7/2022 10:00 AM [facility] OIC-XRAY [facility] XRAY [facility]   9/7/2022 11:00 AM Lita Vallejo PA-C [facility] NEUROS8 [facility]   9/12/2022  9:30 AM Jayjay Retana MD [facility] GASTR[facility]   11/9/2022 11:30 AM Bubba Severino MD [facility] CARDIO [facility]         Important Message from Medicare  Important Message from Medicare regarding Discharge Appeal Rights: Given to patient/caregiver, Explained to patient/caregiver, Signed/date by patient/caregiver     Date IMM was signed: 08/02/22  Time IMM was signed: [id number]

## 2022-09-06 ENCOUNTER — TELEPHONE (OUTPATIENT)
Dept: NEUROSURGERY | Facility: CLINIC | Age: 87
End: 2022-09-06
Payer: MEDICARE

## 2022-09-06 NOTE — TELEPHONE ENCOUNTER
Spoke with patient and contacting [facility]  regarding rescheduling appointment due to PA not being in clinic

## 2022-09-06 NOTE — TELEPHONE ENCOUNTER
2nd attempt . I spoke with patient relative, Ms Restrepo, and advised her of the change in schedule ,as we will have to r/s f/u for next week .   I offered to r/s xray as well however [facility] and Ms Restrepo it is most likely better \to not have appointments same day as patient gets tired.   Kailee stated she is sending an email to [facility] in Transportation to notify her of this change.

## 2022-09-07 ENCOUNTER — HOSPITAL ENCOUNTER (OUTPATIENT)
Dept: RADIOLOGY | Facility: HOSPITAL | Age: 87
Discharge: HOME | End: 2022-09-07
Attending: PHYSICIAN ASSISTANT
Payer: MEDICARE

## 2022-09-07 DIAGNOSIS — S32.020D COMPRESSION FRACTURE OF L2 VERTEBRA WITH ROUTINE HEALING, SUBSEQUENT ENCOUNTER: ICD-10-CM

## 2022-09-07 PROCEDURE — 72100 XR LUMBAR SPINE AP AND LATERAL: ICD-10-PCS | Mod: 26,,, | Performed by: RADIOLOGY

## 2022-09-07 PROCEDURE — 72100 X-RAY EXAM L-S SPINE 2/3 VWS: CPT | Mod: 26,,, | Performed by: RADIOLOGY

## 2022-09-07 PROCEDURE — 72100 X-RAY EXAM L-S SPINE 2/3 VWS: CPT | Mod: TC

## 2022-09-14 ENCOUNTER — OFFICE VISIT (OUTPATIENT)
Dept: NEUROSURGERY | Facility: CLINIC | Age: 87
End: 2022-09-14
Payer: MEDICARE

## 2022-09-14 VITALS — DIASTOLIC BLOOD PRESSURE: 59 MMHG | SYSTOLIC BLOOD PRESSURE: 116 MMHG | HEART RATE: 65 BPM

## 2022-09-14 DIAGNOSIS — S32.009D CLOSED FRACTURE OF LUMBAR SPINE WITHOUT LESION OF SPINAL CORD WITH ROUTINE HEALING, SUBSEQUENT ENCOUNTER: Primary | ICD-10-CM

## 2022-09-14 PROCEDURE — 99213 OFFICE O/P EST LOW 20 MIN: CPT | Mod: PBBFAC | Performed by: PHYSICIAN ASSISTANT

## 2022-09-14 PROCEDURE — 99999 PR PBB SHADOW E&M-EST. PATIENT-LVL III: CPT | Mod: PBBFAC,,, | Performed by: PHYSICIAN ASSISTANT

## 2022-09-14 PROCEDURE — 99999 PR PBB SHADOW E&M-EST. PATIENT-LVL III: ICD-10-PCS | Mod: PBBFAC,,, | Performed by: PHYSICIAN ASSISTANT

## 2022-09-14 PROCEDURE — 99215 OFFICE O/P EST HI 40 MIN: CPT | Mod: S$PBB,,, | Performed by: PHYSICIAN ASSISTANT

## 2022-09-14 PROCEDURE — 99215 PR OFFICE/OUTPT VISIT, EST, LEVL V, 40-54 MIN: ICD-10-PCS | Mod: S$PBB,,, | Performed by: PHYSICIAN ASSISTANT

## 2022-09-14 NOTE — PROGRESS NOTES
Neurosurgery  Established Patient    SUBJECTIVE:     History of Present Illness:  Laila Hanna is a 89 y.o. female with a h/o chronic constipation, chronic back pain, HTN, CAD, HLD, TIA, hypothyroidism who presents to clinic today for 6 week follow up of L1 and L3 fractures sustained after mechanical fall. Patient is currently still at SNF, rehab is going well. She reports LSO brace is uncomfortable and orders are for her wear the brace at all times. She reports some back pain, but improved since hospital discharge. Reports bilateral lower extremity swelling which is bothersome to her. No other complaints today. Hopes she will get back her home. At baseline, she was able to walk with a walker and live independently with a daily aid.     Review of patient's allergies indicates:   Allergen Reactions    Thiazides Other (See Comments)     Severe hyponatremia       Current Outpatient Medications   Medication Sig Dispense Refill    acetaminophen (TYLENOL) 500 MG tablet Take 2 tablets (1,000 mg total) by mouth every 8 (eight) hours.  0    alendronate (FOSAMAX) 70 MG tablet TAKE 1 TABLET BY MOUTH EVERY 7 DAYS 12 tablet 1    aspirin (ECOTRIN) 81 MG EC tablet Take 1 tablet (81 mg total) by mouth once daily.      atorvastatin (LIPITOR) 40 MG tablet Take 1 tablet (40 mg total) by mouth once daily. 90 tablet 1    calcium-vitamin D3 (OS-DANICA 500 + D3) 500 mg-5 mcg (200 unit) per tablet Take 1 tablet by mouth once daily.       carvediloL (COREG) 12.5 MG tablet Take 1 tablet (12.5 mg total) by mouth 2 (two) times daily. 180 tablet 1    clotrimazole-betamethasone 1-0.05% (LOTRISONE) cream Apply topically 2 (two) times daily as needed. 15 g 1    cyanocobalamin (VITAMIN B-12) 100 MCG tablet Take 100 mcg by mouth once daily.      dextran 70-hypromellose (ARTIFICIAL TEARS) ophthalmic solution Place 1 drop into both eyes every 2 (two) hours as needed.  0    levothyroxine (SYNTHROID) 150 MCG tablet Take 1 tablet (150 mcg total) by mouth  once daily. 90 tablet 1    losartan (COZAAR) 25 MG tablet Take 1 tablet (25 mg total) by mouth once daily.      melatonin 10 mg Cap Take by mouth.      oxyCODONE (ROXICODONE) 10 mg Tab immediate release tablet Take 1 tablet (10 mg total) by mouth every 4 (four) hours as needed (severe pain). 42 tablet 0    polyethylene glycol (GLYCOLAX) 17 gram PwPk Take 17 g by mouth once daily.  0    torsemide (DEMADEX) 10 MG Tab Take 1 tablet (10 mg total) by mouth once daily. As need for weight gain of 3 lbs in 1 day or 5 lbs in 3 days. Do not take if weight stable or decreasing. Daily weights at home. 90 tablet 3    LINZESS 145 mcg Cap capsule TAKE 1 CAPSULE(145 MCG) BY MOUTH BEFORE BREAKFAST (Patient not taking: Reported on 9/14/2022) 30 capsule 0    LINZESS 145 mcg Cap capsule TAKE 1 CAPSULE(145 MCG) BY MOUTH BEFORE BREAKFAST (Patient not taking: Reported on 9/14/2022) 30 capsule 0    senna-docusate 8.6-50 mg (PERICOLACE) 8.6-50 mg per tablet Take 1 tablet by mouth once daily.       No current facility-administered medications for this visit.       Past Medical History:   Diagnosis Date    Basal cell carcinoma     nose    Benign essential hypertension     Cerebral microvascular disease 09/08/2014    Closed fracture of distal phalanx of left hand with routine healing 09/17/2015    Closed mallet fracture of distal phalanx of finger with routine healing 10/06/2015    Coronary artery disease     DDD (degenerative disc disease), lumbar 04/12/2016    Depression     Fall at home 05/30/2016    Hyperlipidemia     Hypothyroidism due to acquired atrophy of thyroid     Left atrial enlargement     Meningiomas, multiple     MI (myocardial infarction) 2004    80% blockage per patient    Migraine aura without headache 12/01/2014    Post PTCA 12/12/2012    Transient cerebral ischemia 05/04/2014     Past Surgical History:   Procedure Laterality Date    CARDIAC CATHETERIZATION  03/29/2004    S/P LAD PTCA, coated stent    CATARACT EXTRACTION W/   INTRAOCULAR LENS IMPLANT Bilateral     Dr Youssef     CHOLECYSTECTOMY      CORONARY ANGIOPLASTY WITH STENT PLACEMENT      LAD    EYE SURGERY      TOTAL THYROIDECTOMY       Family History       Problem Relation (Age of Onset)    Cancer Brother, Sister    Colon cancer Brother    Dementia Sister    Diabetes Mother, Paternal Grandmother    Glaucoma Maternal Aunt    Hypertension Father    No Known Problems Maternal Uncle, Paternal Aunt, Paternal Uncle, Maternal Grandmother, Maternal Grandfather, Paternal Grandfather    Skin cancer Brother    Stroke Mother, Father          Social History     Socioeconomic History    Marital status: Single   Occupational History     Employer: [email or web address]   Tobacco Use    Smoking status: Former     Packs/day: 0.30     Years: 15.00     Pack years: 4.50     Types: Cigarettes     Quit date: 1973     Years since quittin.6    Smokeless tobacco: Never   Substance and Sexual Activity    Alcohol use: No     Alcohol/week: 0.0 standard drinks    Drug use: No    Sexual activity: Never     Social Determinants of Health     Financial Resource Strain: Low Risk     Difficulty of Paying Living Expenses: Not hard at all   Food Insecurity: No Food Insecurity    Worried About Running Out of Food in the Last Year: Never true    Ran Out of Food in the Last Year: Never true   Transportation Needs: No Transportation Needs    Lack of Transportation (Medical): No    Lack of Transportation (Non-Medical): No   Physical Activity: Insufficiently Active    Days of Exercise per Week: 3 days    Minutes of Exercise per Session: 20 min   Stress: Stress Concern Present    Feeling of Stress : To some extent   Social Connections: Socially Isolated    Frequency of Communication with Friends and Family: Never    Frequency of Social Gatherings with Friends and Family: Once a week    Attends Taoism Services: Never    Active Member of Clubs or Organizations: No    Attends Club or Organization  Meetings: Never    Marital Status: Never    Housing Stability: Unknown    Unable to Pay for Housing in the Last Year: No    Unstable Housing in the Last Year: No       Review of Systems   Constitutional:  Positive for activity change. Negative for chills, fatigue and fever.   Eyes:  Negative for photophobia and visual disturbance.   Respiratory:  Negative for cough and shortness of breath.    Cardiovascular:  Positive for leg swelling. Negative for chest pain and palpitations.   Gastrointestinal:  Negative for constipation, diarrhea, nausea and vomiting.   Genitourinary:  Negative for difficulty urinating, dysuria, frequency and urgency.   Musculoskeletal:  Positive for back pain and gait problem. Negative for neck pain.   Neurological:  Negative for dizziness, seizures, speech difficulty, weakness, numbness and headaches.   Psychiatric/Behavioral:  Negative for confusion. The patient is not nervous/anxious.      OBJECTIVE:     Vital Signs  Pulse: 65  BP: (!) 116/59  Pain Score: 0-No pain  There is no height or weight on file to calculate BMI.    Neurosurgery Physical Exam  General: well developed, well nourished, no distress.   Head: normocephalic, atraumatic  Neurologic: Alert and oriented. Thought content appropriate.  GCS: Motor: 6/Verbal: 5/Eyes: 4 GCS Total: 15  Mental Status: Awake, Alert, Oriented x 4  Language: No aphasia  Speech: No dysarthria  Cranial nerves: face symmetric, tongue midline, CN II-XII grossly intact.   Eyes: pupils equal, round, reactive to light with accommodation, EOMI.   Pulmonary: normal respirations, no signs of respiratory distress  Skin: Skin is warm, dry and intact.  Sensory: intact to light touch throughout  Motor Strength:Moves all extremities spontaneously with good tone.  Full strength upper and lower extremities. No abnormal movements seen.   Strength  Deltoids Triceps Biceps Wrist Extension Wrist Flexion Hand    Upper: R 5/5 5/5 5/5 5/5 5/5 5/5    L 5/5 5/5 5/5 5/5  5/5 5/5     Iliopsoas Quadriceps Knee  Flexion Tibialis  anterior Gastro- cnemius EHL   Lower: R 5/5 5/5 5/5 5/5 5/5 5/5    L 5/5 5/5 5/5 5/5 5/5 5/5   No TTP over cervical or thoracic spine  TTP midline in mid lumbar spine  Cervical spine with full ROM    Bilateral lower extremity edema evident; 2+    Diagnostic Results:  I independently reviewed the imaging.     Lumbar AP/Lateral XR (9/7/22): stable compression fracture of L1 and L2. New new fractures or malalignment.   ASSESSMENT/PLAN:     89 y.o. female with a h/o chronic constipation, chronic back pain, HTN, CAD, HLD, TIA, hypothyroidism who presents to clinic today for 6 week follow up of L1 and L3 fractures sustained after mechanical fall.    -LSO brace on with activities. May remove when seated in wheelchair, laying in bed, using the restroom.   -Follow up in clinic in 6 weeks with repeat lumbar AP/lat XR prior to appointment, sooner if any acute changes.  -Recommended elevation of legs and follow up with PCP regarding BLE edema  -Signs and symptoms that prompt urgent medical attention were discussed.  All questions answered.      Lita Vallejo PA-C  Neurosurgery  [facility]    Time spent on this encounter: 60 minutes. This includes face-to-face time and non-face to face time preparing to see the patient (eg, review of tests), obtaining and/or reviewing separately obtained history, documenting clinical information in the electronic or other health record, independently interpreting results and communicating results to the patient/family/caregiver, or care coordinator           Note dictated with voice recognition software, please excuse any grammatical errors.

## 2022-10-13 ENCOUNTER — TELEPHONE (OUTPATIENT)
Dept: NEUROSURGERY | Facility: CLINIC | Age: 87
End: 2022-10-13
Payer: MEDICARE

## 2022-10-13 NOTE — TELEPHONE ENCOUNTER
Attempted to reach pt's nurse Deb, was informed she had left work for the day. Will call back tomorrow.     ----- Message from Lizeth Acosta sent at 10/13/2022  2:09 PM CDT -----  Contact: Pt nurse  Pt nurse Deb is calling in regard to f/u appt. The pt is unsure if she needs to be seen again for another follow up. She was last seen back in Sept. Please adv pt.     Confirmed contact below:   Contact Name Pt Nurse   Phone Number:  [phone]

## 2022-10-14 ENCOUNTER — TELEPHONE (OUTPATIENT)
Dept: NEUROSURGERY | Facility: CLINIC | Age: 87
End: 2022-10-14
Payer: MEDICARE

## 2022-10-14 NOTE — TELEPHONE ENCOUNTER
SW pt's nurse Deb, pt scheduled for XR and f/u appt with Lita. Pt's nurse happy with date and time, she did not have any further questions at this time.     ----- Message -----  From: Lizeth Acosta  Sent: 10/13/2022   2:12 PM CDT  To: [facility] Staff    Pt nurse Deb is calling in regard to f/u appt. The pt is unsure if she needs to be seen again for another follow up. She was last seen back in Sept. Please adv pt.     Confirmed contact below:   Contact Name Pt Nurse   Phone Number:  [phone]

## 2022-10-26 ENCOUNTER — HOSPITAL ENCOUNTER (OUTPATIENT)
Dept: RADIOLOGY | Facility: HOSPITAL | Age: 87
Discharge: HOME | End: 2022-10-26
Attending: PHYSICIAN ASSISTANT
Payer: MEDICARE

## 2022-10-26 ENCOUNTER — OFFICE VISIT (OUTPATIENT)
Dept: NEUROSURGERY | Facility: CLINIC | Age: 87
End: 2022-10-26
Payer: MEDICARE

## 2022-10-26 VITALS — HEIGHT: 66 IN | BODY MASS INDEX: 33.11 KG/M2 | WEIGHT: 206 LBS

## 2022-10-26 DIAGNOSIS — S32.009D CLOSED FRACTURE OF LUMBAR SPINE WITHOUT LESION OF SPINAL CORD WITH ROUTINE HEALING, SUBSEQUENT ENCOUNTER: Primary | ICD-10-CM

## 2022-10-26 DIAGNOSIS — S32.009D CLOSED FRACTURE OF LUMBAR SPINE WITHOUT LESION OF SPINAL CORD WITH ROUTINE HEALING, SUBSEQUENT ENCOUNTER: ICD-10-CM

## 2022-10-26 PROCEDURE — 99214 OFFICE O/P EST MOD 30 MIN: CPT | Mod: S$PBB,,, | Performed by: PHYSICIAN ASSISTANT

## 2022-10-26 PROCEDURE — 72100 X-RAY EXAM L-S SPINE 2/3 VWS: CPT | Mod: 26,,, | Performed by: RADIOLOGY

## 2022-10-26 PROCEDURE — 99214 PR OFFICE/OUTPT VISIT, EST, LEVL IV, 30-39 MIN: ICD-10-PCS | Mod: S$PBB,,, | Performed by: PHYSICIAN ASSISTANT

## 2022-10-26 PROCEDURE — 99999 PR PBB SHADOW E&M-EST. PATIENT-LVL III: CPT | Mod: PBBFAC,,, | Performed by: PHYSICIAN ASSISTANT

## 2022-10-26 PROCEDURE — 72100 XR LUMBAR SPINE AP AND LATERAL  UPRIGHT: ICD-10-PCS | Mod: 26,,, | Performed by: RADIOLOGY

## 2022-10-26 PROCEDURE — 99999 PR PBB SHADOW E&M-EST. PATIENT-LVL III: ICD-10-PCS | Mod: PBBFAC,,, | Performed by: PHYSICIAN ASSISTANT

## 2022-10-26 PROCEDURE — 99213 OFFICE O/P EST LOW 20 MIN: CPT | Mod: PBBFAC | Performed by: PHYSICIAN ASSISTANT

## 2022-10-26 PROCEDURE — 72100 X-RAY EXAM L-S SPINE 2/3 VWS: CPT | Mod: TC

## 2022-10-26 NOTE — PROGRESS NOTES
Neurosurgery  Established Patient    SUBJECTIVE:     History of Present Illness (9/14/22):  Laila Hanna is a 89 y.o. female with a h/o chronic constipation, chronic back pain, HTN, CAD, HLD, TIA, hypothyroidism who presents to clinic today for 6 week follow up of L1 and L3 fractures sustained after mechanical fall. Patient is currently still at SNF, rehab is going well. She reports LSO brace is uncomfortable and orders are for her wear the brace at all times. She reports some back pain, but improved since hospital discharge. Reports bilateral lower extremity swelling which is bothersome to her. No other complaints today. Hopes she will get back her home. At baseline, she was able to walk with a walker and live independently with a daily aid    Interval History (10/26/22):  Patient presents for 3 month follow up for L1 and L3 compression fractures. Patient is doing well, is now walking with walker (back to baseline). She reports pain is essentially gone. BLE edema improved with fluid pill.     Review of patient's allergies indicates:   Allergen Reactions    Thiazides Other (See Comments)     Severe hyponatremia       Current Outpatient Medications   Medication Sig Dispense Refill    acetaminophen (TYLENOL) 500 MG tablet Take 2 tablets (1,000 mg total) by mouth every 8 (eight) hours.  0    alendronate (FOSAMAX) 70 MG tablet TAKE 1 TABLET BY MOUTH EVERY 7 DAYS 12 tablet 1    aspirin (ECOTRIN) 81 MG EC tablet Take 1 tablet (81 mg total) by mouth once daily.      atorvastatin (LIPITOR) 40 MG tablet Take 1 tablet (40 mg total) by mouth once daily. 90 tablet 1    calcium-vitamin D3 (OS-DANICA 500 + D3) 500 mg-5 mcg (200 unit) per tablet Take 1 tablet by mouth once daily.       carvediloL (COREG) 12.5 MG tablet Take 1 tablet (12.5 mg total) by mouth 2 (two) times daily. 180 tablet 1    clotrimazole-betamethasone 1-0.05% (LOTRISONE) cream Apply topically 2 (two) times daily as needed. 15 g 1    cyanocobalamin (VITAMIN B-12)  100 MCG tablet Take 100 mcg by mouth once daily.      dextran 70-hypromellose (ARTIFICIAL TEARS) ophthalmic solution Place 1 drop into both eyes every 2 (two) hours as needed.  0    levothyroxine (SYNTHROID) 150 MCG tablet Take 1 tablet (150 mcg total) by mouth once daily. 90 tablet 1    LINZESS 145 mcg Cap capsule TAKE 1 CAPSULE(145 MCG) BY MOUTH BEFORE BREAKFAST 30 capsule 0    LINZESS 145 mcg Cap capsule TAKE 1 CAPSULE(145 MCG) BY MOUTH BEFORE BREAKFAST 30 capsule 0    losartan (COZAAR) 25 MG tablet Take 1 tablet (25 mg total) by mouth once daily.      melatonin 10 mg Cap Take by mouth.      oxyCODONE (ROXICODONE) 10 mg Tab immediate release tablet Take 1 tablet (10 mg total) by mouth every 4 (four) hours as needed (severe pain). 42 tablet 0    polyethylene glycol (GLYCOLAX) 17 gram PwPk Take 17 g by mouth once daily.  0    senna-docusate 8.6-50 mg (PERICOLACE) 8.6-50 mg per tablet Take 1 tablet by mouth once daily.      torsemide (DEMADEX) 10 MG Tab Take 1 tablet (10 mg total) by mouth once daily. As need for weight gain of 3 lbs in 1 day or 5 lbs in 3 days. Do not take if weight stable or decreasing. Daily weights at home. 90 tablet 3     No current facility-administered medications for this visit.       Past Medical History:   Diagnosis Date    Basal cell carcinoma     nose    Benign essential hypertension     Cerebral microvascular disease 09/08/2014    Closed fracture of distal phalanx of left hand with routine healing 09/17/2015    Closed mallet fracture of distal phalanx of finger with routine healing 10/06/2015    Coronary artery disease     DDD (degenerative disc disease), lumbar 04/12/2016    Depression     Fall at home 05/30/2016    Hyperlipidemia     Hypothyroidism due to acquired atrophy of thyroid     Left atrial enlargement     Meningiomas, multiple     MI (myocardial infarction) 2004    80% blockage per patient    Migraine aura without headache 12/01/2014    Post PTCA 12/12/2012    Transient  cerebral ischemia 2014     Past Surgical History:   Procedure Laterality Date    CARDIAC CATHETERIZATION  2004    S/P LAD PTCA, coated stent    CATARACT EXTRACTION W/  INTRAOCULAR LENS IMPLANT Bilateral     Dr Youssef     CHOLECYSTECTOMY      CORONARY ANGIOPLASTY WITH STENT PLACEMENT      LAD    EYE SURGERY      TOTAL THYROIDECTOMY       Family History       Problem Relation (Age of Onset)    Cancer Brother, Sister    Colon cancer Brother    Dementia Sister    Diabetes Mother, Paternal Grandmother    Glaucoma Maternal Aunt    Hypertension Father    No Known Problems Maternal Uncle, Paternal Aunt, Paternal Uncle, Maternal Grandmother, Maternal Grandfather, Paternal Grandfather    Skin cancer Brother    Stroke Mother, Father          Social History     Socioeconomic History    Marital status: Single   Occupational History     Employer: [employer]   Tobacco Use    Smoking status: Former     Packs/day: 0.30     Years: 15.00     Pack years: 4.50     Types: Cigarettes     Quit date: 1973     Years since quittin.7    Smokeless tobacco: Never   Substance and Sexual Activity    Alcohol use: No     Alcohol/week: 0.0 standard drinks    Drug use: No    Sexual activity: Never     Social Determinants of Health     Financial Resource Strain: Low Risk     Difficulty of Paying Living Expenses: Not hard at all   Food Insecurity: No Food Insecurity    Worried About Running Out of Food in the Last Year: Never true    Ran Out of Food in the Last Year: Never true   Transportation Needs: No Transportation Needs    Lack of Transportation (Medical): No    Lack of Transportation (Non-Medical): No   Physical Activity: Insufficiently Active    Days of Exercise per Week: 3 days    Minutes of Exercise per Session: 20 min   Stress: Stress Concern Present    Feeling of Stress : To some extent   Social Connections: Socially Isolated    Frequency of Communication with Friends and Family: Never    Frequency  "of Social Gatherings with Friends and Family: Once a week    Attends Mormonism Services: Never    Active Member of Clubs or Organizations: No    Attends Club or Organization Meetings: Never    Marital Status: Never    Housing Stability: Unknown    Unable to Pay for Housing in the Last Year: No    Unstable Housing in the Last Year: No       Review of Systems   Constitutional:  Negative for chills, fatigue and fever.   Eyes:  Negative for photophobia and visual disturbance.   Respiratory:  Negative for cough and shortness of breath.    Cardiovascular:  Negative for chest pain, palpitations and leg swelling.   Gastrointestinal:  Negative for constipation, diarrhea, nausea and vomiting.   Genitourinary:  Negative for difficulty urinating, dysuria, frequency and urgency.   Musculoskeletal:  Positive for back pain and gait problem. Negative for neck pain.   Neurological:  Negative for dizziness, seizures, speech difficulty, weakness, numbness and headaches.   Psychiatric/Behavioral:  Negative for confusion. The patient is not nervous/anxious.      OBJECTIVE:     Vital Signs  Pain Score:   6  Height: 5' 6" (167.6 cm)  Weight: 93.4 kg (206 lb)  Body mass index is 33.25 kg/m².    Neurosurgery Physical Exam  General: well developed, well nourished, no distress.   Head: normocephalic, atraumatic  Neurologic: Alert and oriented. Thought content appropriate.  Language: No aphasia  Speech: No dysarthria  Cranial nerves: face symmetric, tongue midline, CN II-XII grossly intact.   Eyes: pupils equal, round, reactive to light with accommodation, EOMI.   Pulmonary: normal respirations, no signs of respiratory distress  Skin: Skin is warm, dry and intact.  Sensory: intact to light touch throughout  Motor Strength:Moves all extremities spontaneously with good tone. Full strength upper and lower extremities. No abnormal movements seen.   Strength   Deltoids Triceps Biceps Wrist Extension Wrist Flexion Hand    Upper: R 5/5 5/5 " 5/5 5/5 5/5 5/5     L 5/5 5/5 5/5 5/5 5/5 5/5       Iliopsoas Quadriceps Knee  Flexion Tibialis  anterior Gastro- cnemius EHL   Lower: R 5/5 5/5 5/5 5/5 5/5 5/5     L 5/5 5/5 5/5 5/5 5/5 5/5   No TTP over cervical/thoracic/lumbar spine  Cervical spine with full ROM     Bilateral lower extremity edema improved, 1+    Diagnostic Results:  I have personally reviewed imaging and agree with the findings    Lumbar AP/Lat XR (10/26/22): stable L1 and L3 compression fractures     ASSESSMENT/PLAN:     89 y.o. female with a h/o chronic constipation, chronic back pain, HTN, CAD, HLD, TIA, hypothyroidism who presents to clinic today for 12 week follow up of L1 and L3 fractures sustained after mechanical fall. Fractures stable on repeat imaging. Patient's pain is improved as her functional status has as well.     -Wean LSO brace. Wear only as needed for pain.   -I would like the patient to follow-up in clinic PRN. I have encouraged her to contact the clinic with any questions, concerns, or adverse clinical changes. She verbalized understanding.       Lita Vallejo PA-C  Neurosurgery     Time spent on this encounter: 30 minutes. This includes face-to-face time and non-face to face time preparing to see the patient (eg, review of tests), obtaining and/or reviewing separately obtained history, documenting clinical information in the electronic or other health record, independently interpreting results and communicating results to the patient/family/caregiver, or care coordinator.         Note dictated with voice recognition software, please excuse any grammatical errors.

## 2023-04-19 ENCOUNTER — OFFICE VISIT (OUTPATIENT)
Dept: OTOLARYNGOLOGY | Facility: CLINIC | Age: 88
End: 2023-04-19
Payer: MEDICARE

## 2023-04-19 VITALS — HEIGHT: 66 IN | BODY MASS INDEX: 33.1 KG/M2 | WEIGHT: 205.94 LBS

## 2023-04-19 DIAGNOSIS — H61.23 BILATERAL IMPACTED CERUMEN: Primary | ICD-10-CM

## 2023-04-19 PROCEDURE — 69210 REMOVE IMPACTED EAR WAX UNI: CPT | Mod: S$PBB,,,

## 2023-04-19 PROCEDURE — 99214 PR OFFICE/OUTPT VISIT, EST, LEVL IV, 30-39 MIN: ICD-10-PCS | Mod: 25,S$PBB,,

## 2023-04-19 PROCEDURE — 99999 PR PBB SHADOW E&M-EST. PATIENT-LVL III: CPT | Mod: PBBFAC,,,

## 2023-04-19 PROCEDURE — 99214 OFFICE O/P EST MOD 30 MIN: CPT | Mod: 25,S$PBB,,

## 2023-04-19 PROCEDURE — 99213 OFFICE O/P EST LOW 20 MIN: CPT | Mod: PBBFAC,25

## 2023-04-19 PROCEDURE — 99999 PR PBB SHADOW E&M-EST. PATIENT-LVL III: ICD-10-PCS | Mod: PBBFAC,,,

## 2023-04-19 PROCEDURE — 69210 REMOVE IMPACTED EAR WAX UNI: CPT | Mod: PBBFAC

## 2023-04-19 PROCEDURE — 69210 PR REMOVAL IMPACTED CERUMEN REQUIRING INSTRUMENTATION, UNILATERAL: ICD-10-PCS | Mod: S$PBB,,,

## 2023-04-19 NOTE — PROGRESS NOTES
Bilateral cerumen impaction :     Patient denied any otalgia, otorrhea or itching.     Procedure Note:    The patient was brought to the minor procedure room and placed under the operating microscope. Using a combination of suction, curettes and cup forceps the patient's cerumen impaction was removed left ear canal. The tympanic membrane was evaluated and was unremarkable. The patient tolerated the procedure well. There were no complications.    Procedure Note:    Patient was brought to the minor procedure room and using the operating microscope the right ear canal  was cleaned of ceruminous debris. There was a significant cerumen impaction.  The forceps and suction were both used to perform this. Tympanic membrane intact. Pt tolerated well. There were no complications.Procedure Note:      Patient had dried cerumen.     Patient reported her hearing was better after cerumen removal.   RTC in 3 months for ear cleaning and audiogram.

## 2023-05-25 ENCOUNTER — OFFICE VISIT (OUTPATIENT)
Dept: OPTOMETRY | Facility: CLINIC | Age: 88
End: 2023-05-25
Payer: MEDICARE

## 2023-05-25 DIAGNOSIS — Z13.5 GLAUCOMA SCREENING: ICD-10-CM

## 2023-05-25 DIAGNOSIS — H52.13 MYOPIA OF BOTH EYES WITH ASTIGMATISM AND PRESBYOPIA: ICD-10-CM

## 2023-05-25 DIAGNOSIS — H52.203 MYOPIA OF BOTH EYES WITH ASTIGMATISM AND PRESBYOPIA: ICD-10-CM

## 2023-05-25 DIAGNOSIS — H52.4 MYOPIA OF BOTH EYES WITH ASTIGMATISM AND PRESBYOPIA: ICD-10-CM

## 2023-05-25 DIAGNOSIS — H16.223 KERATOCONJUNCTIVITIS SICCA, NOT SPECIFIED AS SJÖGREN'S, BILATERAL: Primary | ICD-10-CM

## 2023-05-25 DIAGNOSIS — Z96.1 PSEUDOPHAKIA OF BOTH EYES: ICD-10-CM

## 2023-05-25 PROCEDURE — 92004 PR EYE EXAM, NEW PATIENT,COMPREHESV: ICD-10-PCS | Mod: S$PBB,,, | Performed by: OPTOMETRIST

## 2023-05-25 PROCEDURE — 99999 PR PBB SHADOW E&M-EST. PATIENT-LVL III: ICD-10-PCS | Mod: PBBFAC,,, | Performed by: OPTOMETRIST

## 2023-05-25 PROCEDURE — 99213 OFFICE O/P EST LOW 20 MIN: CPT | Mod: PBBFAC | Performed by: OPTOMETRIST

## 2023-05-25 PROCEDURE — 99999 PR PBB SHADOW E&M-EST. PATIENT-LVL III: CPT | Mod: PBBFAC,,, | Performed by: OPTOMETRIST

## 2023-05-25 PROCEDURE — 92015 PR REFRACTION: ICD-10-PCS | Mod: ,,, | Performed by: OPTOMETRIST

## 2023-05-25 PROCEDURE — 92004 COMPRE OPH EXAM NEW PT 1/>: CPT | Mod: S$PBB,,, | Performed by: OPTOMETRIST

## 2023-05-25 PROCEDURE — 92015 DETERMINE REFRACTIVE STATE: CPT | Mod: ,,, | Performed by: OPTOMETRIST

## 2023-05-25 RX ORDER — MECLIZINE HYDROCHLORIDE 50 MG/1
50 TABLET ORAL 3 TIMES DAILY PRN
COMMUNITY

## 2023-05-25 NOTE — PROGRESS NOTES
HPI    DACIA: 8/21/2019 - Dr. Gabriel    CC: Laila is a 89 y/o female here today for a routine eye exam for   glasses. Pt states that she has no complaints about vision but does states   that OD becomes red occasionally. Pt denies eye pain.    (+)Dryness  (-)Burning  (-)Itchiness  (-)Tearing  (-)Flashes  (+)Floaters   (-)Photophobia    Contact Lens: none    Eye Meds: none    Ocular History:  Vitreous floaters of both eyes   Pseudophakia   Last edited by Nahed Jordan MA on 5/25/2023 10:20 AM.            Assessment /Plan     For exam results, see Encounter Report.    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

## 2023-09-05 ENCOUNTER — OFFICE VISIT (OUTPATIENT)
Dept: CARDIOLOGY | Facility: CLINIC | Age: 88
End: 2023-09-05
Payer: MEDICARE

## 2023-09-05 VITALS
OXYGEN SATURATION: 98 % | DIASTOLIC BLOOD PRESSURE: 60 MMHG | BODY MASS INDEX: 27.78 KG/M2 | SYSTOLIC BLOOD PRESSURE: 120 MMHG | HEART RATE: 70 BPM | WEIGHT: 177 LBS | HEIGHT: 67 IN

## 2023-09-05 DIAGNOSIS — I25.10 CORONARY ARTERY DISEASE INVOLVING NATIVE CORONARY ARTERY OF NATIVE HEART WITHOUT ANGINA PECTORIS: ICD-10-CM

## 2023-09-05 DIAGNOSIS — I50.32 CHRONIC DIASTOLIC CONGESTIVE HEART FAILURE: Primary | ICD-10-CM

## 2023-09-05 DIAGNOSIS — I10 ESSENTIAL HYPERTENSION: Chronic | ICD-10-CM

## 2023-09-05 DIAGNOSIS — Z98.61 POST PTCA: ICD-10-CM

## 2023-09-05 PROCEDURE — 99999 PR PBB SHADOW E&M-EST. PATIENT-LVL IV: CPT | Mod: PBBFAC,,, | Performed by: INTERNAL MEDICINE

## 2023-09-05 PROCEDURE — 99214 OFFICE O/P EST MOD 30 MIN: CPT | Mod: PBBFAC | Performed by: INTERNAL MEDICINE

## 2023-09-05 PROCEDURE — 99999 PR PBB SHADOW E&M-EST. PATIENT-LVL IV: ICD-10-PCS | Mod: PBBFAC,,, | Performed by: INTERNAL MEDICINE

## 2023-09-05 PROCEDURE — 99213 PR OFFICE/OUTPT VISIT, EST, LEVL III, 20-29 MIN: ICD-10-PCS | Mod: S$PBB,,, | Performed by: INTERNAL MEDICINE

## 2023-09-05 PROCEDURE — 99213 OFFICE O/P EST LOW 20 MIN: CPT | Mod: S$PBB,,, | Performed by: INTERNAL MEDICINE

## 2023-09-05 NOTE — PROGRESS NOTES
Subjective:    Patient ID:  Laila Hanna is a 89 y.o. female who presents for follow-up of CAD    HPI  The patient is a 89 year old female last seen 5/31/21 was admitted 7/14/22 following a fall and was found to have altered mental status due to hyponatremia.  She has obesity, hypertension, coronary artery disease status post stent placement to left anterior descending artery on 3/29/2004, left atrial enlargement, left ventricular diastolic dysfunction, aortic and itral valve annular calcifications, carotid artery disease, aortic atherosclerosis, history of transient ischemic attack, syndrome of inappropriate antidiuretic hormone production and Alzheimer's disease. She was referred to a Nephrologist, Dr Harvey CARVALHO ,for CKD 3-4 but repeat test revealed a GFR of 60. She was referred fo a Cardiology follow up. She is on PRN torsemide and reports no edema or increased SOB.    Summary 12/17/19    Normal left ventricular systolic function. The estimated ejection fraction is 60%  Concentric left ventricular remodeling.  Grade I (mild) left ventricular diastolic dysfunction consistent with impaired relaxation.  Normal right ventricular systolic function.  Severe left atrial enlargement.  Mild mitral regurgitation.  Mild tricuspid regurgitation.  The estimated PA systolic pressure is 37 mm Hg  Normal central venous pressure (3 mm Hg).  The ascending aorta is moderately dilated and measures 4 cm.        Lab Results   Component Value Date     (L) 08/03/2022    K 4.2 08/03/2022    CL 97 08/03/2022    CO2 27 08/03/2022    BUN 13 08/03/2022    CREATININE 0.6 08/03/2022    GLU 94 08/03/2022    HGBA1C 5.8 01/29/2016    MG 1.9 07/30/2022    AST 18 07/27/2022    ALT 10 07/27/2022    ALBUMIN 2.8 (L) 07/27/2022    PROT 6.1 07/27/2022    BILITOT 0.7 07/27/2022    WBC 8.13 07/31/2022    HGB 10.1 (L) 07/31/2022    HCT 30.6 (L) 07/31/2022    HCT 32 (L) 07/29/2022    MCV 94 07/31/2022     07/31/2022    INR 1.1 07/24/2022     INR 1.1 05/28/2014    INR 2.4 (H) 09/19/2008    TSH 7.019 (H) 07/27/2022         Lab Results   Component Value Date    CHOL 100 (L) 07/05/2022    HDL 37 (L) 07/05/2022    TRIG 91 07/05/2022       Lab Results   Component Value Date    LDLCALC 44.8 (L) 07/05/2022       Past Medical History:   Diagnosis Date    Basal cell carcinoma     nose    Benign essential hypertension     Cerebral microvascular disease 09/08/2014    Closed fracture of distal phalanx of left hand with routine healing 09/17/2015    Closed mallet fracture of distal phalanx of finger with routine healing 10/06/2015    Coronary artery disease     DDD (degenerative disc disease), lumbar 04/12/2016    Depression     Fall at home 05/30/2016    Hyperlipidemia     Hypothyroidism due to acquired atrophy of thyroid     Left atrial enlargement     Meningiomas, multiple     MI (myocardial infarction) 2004    80% blockage per patient    Migraine aura without headache 12/01/2014    Post PTCA 12/12/2012    Transient cerebral ischemia 05/04/2014       Current Outpatient Medications:     acetaminophen (TYLENOL) 500 MG tablet, Take 2 tablets (1,000 mg total) by mouth every 8 (eight) hours., Disp: , Rfl: 0    alendronate (FOSAMAX) 70 MG tablet, TAKE 1 TABLET BY MOUTH EVERY 7 DAYS, Disp: 12 tablet, Rfl: 1    aspirin (ECOTRIN) 81 MG EC tablet, Take 1 tablet (81 mg total) by mouth once daily., Disp: , Rfl:     atorvastatin (LIPITOR) 40 MG tablet, Take 1 tablet (40 mg total) by mouth once daily., Disp: 90 tablet, Rfl: 1    calcium-vitamin D3 (OS-DANICA 500 + D3) 500 mg-5 mcg (200 unit) per tablet, Take 1 tablet by mouth once daily. , Disp: , Rfl:     carvediloL (COREG) 12.5 MG tablet, Take 1 tablet (12.5 mg total) by mouth 2 (two) times daily., Disp: 180 tablet, Rfl: 1    cyanocobalamin (VITAMIN B-12) 100 MCG tablet, Take 100 mcg by mouth once daily., Disp: , Rfl:     levothyroxine (SYNTHROID) 150 MCG tablet, Take 1 tablet (150 mcg total) by mouth once daily., Disp: 90  tablet, Rfl: 1    losartan (COZAAR) 25 MG tablet, Take 1 tablet (25 mg total) by mouth once daily., Disp: , Rfl:     meclizine (ANTIVERT) 50 MG tablet, Take 50 mg by mouth 3 (three) times daily as needed., Disp: , Rfl:     melatonin 10 mg Cap, Take by mouth., Disp: , Rfl:     torsemide (DEMADEX) 10 MG Tab, Take 1 tablet (10 mg total) by mouth once daily. As need for weight gain of 3 lbs in 1 day or 5 lbs in 3 days. Do not take if weight stable or decreasing. Daily weights at home., Disp: 90 tablet, Rfl: 3    clotrimazole-betamethasone 1-0.05% (LOTRISONE) cream, Apply topically 2 (two) times daily as needed. (Patient not taking: Reported on 9/5/2023), Disp: 15 g, Rfl: 1    dextran 70-hypromellose (ARTIFICIAL TEARS) ophthalmic solution, Place 1 drop into both eyes every 2 (two) hours as needed. (Patient not taking: Reported on 5/25/2023), Disp: , Rfl: 0    LINZESS 145 mcg Cap capsule, TAKE 1 CAPSULE(145 MCG) BY MOUTH BEFORE BREAKFAST (Patient not taking: Reported on 5/25/2023), Disp: 30 capsule, Rfl: 0    LINZESS 145 mcg Cap capsule, TAKE 1 CAPSULE(145 MCG) BY MOUTH BEFORE BREAKFAST (Patient not taking: Reported on 5/25/2023), Disp: 30 capsule, Rfl: 0    oxyCODONE (ROXICODONE) 10 mg Tab immediate release tablet, Take 1 tablet (10 mg total) by mouth every 4 (four) hours as needed (severe pain). (Patient not taking: Reported on 5/25/2023), Disp: 42 tablet, Rfl: 0    polyethylene glycol (GLYCOLAX) 17 gram PwPk, Take 17 g by mouth once daily. (Patient not taking: Reported on 5/25/2023), Disp: , Rfl: 0    senna-docusate 8.6-50 mg (PERICOLACE) 8.6-50 mg per tablet, Take 1 tablet by mouth once daily. (Patient not taking: Reported on 5/25/2023), Disp: , Rfl:           Review of Systems   Constitutional: Negative for decreased appetite, diaphoresis, fever, malaise/fatigue, weight gain and weight loss.   HENT:  Negative for congestion, ear discharge, ear pain and nosebleeds.    Eyes:  Negative for blurred vision, double vision  "and visual disturbance.   Cardiovascular:  Negative for chest pain, claudication, cyanosis, dyspnea on exertion, irregular heartbeat, leg swelling, near-syncope, orthopnea, palpitations, paroxysmal nocturnal dyspnea and syncope.   Respiratory:  Negative for cough, hemoptysis, shortness of breath, sleep disturbances due to breathing, snoring, sputum production and wheezing.    Endocrine: Negative for polydipsia, polyphagia and polyuria.   Hematologic/Lymphatic: Negative for adenopathy and bleeding problem. Does not bruise/bleed easily.   Skin:  Negative for color change, nail changes, poor wound healing and rash.   Musculoskeletal:  Negative for muscle cramps and muscle weakness.   Gastrointestinal:  Negative for abdominal pain, anorexia, change in bowel habit, hematochezia, nausea and vomiting.   Genitourinary:  Negative for dysuria, frequency and hematuria.   Neurological:  Positive for disturbances in coordination and weakness. Negative for brief paralysis, difficulty with concentration, excessive daytime sleepiness, dizziness, focal weakness, headaches, light-headedness, seizures and vertigo.   Psychiatric/Behavioral:  Positive for memory loss. Negative for altered mental status and depression.    Allergic/Immunologic: Negative for persistent infections.        Objective:/60   Pulse 70   Ht 5' 7" (1.702 m)   Wt 80.3 kg (177 lb)   LMP  (LMP Unknown) Comment: years ago  SpO2 98%   BMI 27.72 kg/m²             Physical Exam  Constitutional:       Appearance: Normal appearance. She is well-developed.   HENT:      Head: Normocephalic.      Right Ear: External ear normal.      Left Ear: External ear normal.      Nose: Nose normal.   Eyes:      General: No scleral icterus.     Conjunctiva/sclera: Conjunctivae normal.      Pupils: Pupils are equal, round, and reactive to light.   Neck:      Thyroid: No thyromegaly.      Vascular: No JVD.      Trachea: No tracheal deviation.   Cardiovascular:      Rate and " Rhythm: Normal rate and regular rhythm.      Pulses: Intact distal pulses.           Carotid pulses are 2+ on the right side and 2+ on the left side.     Heart sounds: Normal heart sounds. No murmur heard.     No friction rub. No gallop.      Comments: No edema  JVP normal sittting  Summary    · Normal left ventricular systolic function. The estimated ejection fraction is 60%  · Concentric left ventricular remodeling.  · Grade I (mild) left ventricular diastolic dysfunction consistent with impaired relaxation.  · Normal right ventricular systolic function.  · Severe left atrial enlargement.  · Mild mitral regurgitation.  · Mild tricuspid regurgitation.  · The estimated PA systolic pressure is 37 mm Hg  · Normal central venous pressure (3 mm Hg).  · The ascending aorta is moderately dilated and measures 4 cm.       Pulmonary:      Effort: Pulmonary effort is normal. No respiratory distress.      Breath sounds: Normal breath sounds. No wheezing or rales.   Chest:      Chest wall: No tenderness.   Abdominal:      General: Bowel sounds are normal. There is no distension.      Palpations: Abdomen is soft.      Tenderness: There is no abdominal tenderness. There is no guarding.   Musculoskeletal:         General: No tenderness. Normal range of motion.      Cervical back: Normal range of motion.   Lymphadenopathy:      Comments: Palpation of lymph nodes of neck and groin normal   Skin:     General: Skin is warm and dry.      Coloration: Skin is not pale.      Findings: No erythema or rash.      Comments: Palpation of skin normal   Neurological:      Mental Status: She is alert and oriented to person, place, and time.      Cranial Nerves: No cranial nerve deficit.      Motor: No abnormal muscle tone.      Coordination: Coordination normal.   Psychiatric:         Behavior: Behavior normal.         Thought Content: Thought content normal.         Judgment: Judgment normal.           Assessment:       1. Chronic diastolic  congestive heart failure    2. Coronary artery disease involving native coronary artery of native heart without angina pectoris    3. Essential hypertension    4. Post PTCA         Plan:       Diagnoses and all orders for this visit:    Chronic diastolic congestive heart failure    Coronary artery disease involving native coronary artery of native heart without angina pectoris    Essential hypertension    Post PTCA

## 2024-05-14 NOTE — TELEPHONE ENCOUNTER
Daksha stated couldn't find lamasil fungal spray. Suggested a few pharmacies and what the actual spray looks like. Pt stated if fungus doesn't clear would life a follow up appt    .

## 2024-05-26 ENCOUNTER — HOSPITAL ENCOUNTER (EMERGENCY)
Facility: HOSPITAL | Age: 89
Discharge: HOME | End: 2024-05-26
Attending: EMERGENCY MEDICINE
Payer: MEDICARE

## 2024-05-26 VITALS
TEMPERATURE: 98 F | DIASTOLIC BLOOD PRESSURE: 57 MMHG | BODY MASS INDEX: 27.78 KG/M2 | WEIGHT: 177 LBS | HEIGHT: 67 IN | OXYGEN SATURATION: 99 % | RESPIRATION RATE: 19 BRPM | HEART RATE: 66 BPM | SYSTOLIC BLOOD PRESSURE: 110 MMHG

## 2024-05-26 DIAGNOSIS — R07.9 ACUTE CHEST PAIN: Primary | ICD-10-CM

## 2024-05-26 LAB
ALBUMIN SERPL BCP-MCNC: 2.9 G/DL (ref 3.5–5.2)
ALP SERPL-CCNC: 61 U/L (ref 55–135)
ALT SERPL W/O P-5'-P-CCNC: 7 U/L (ref 10–44)
ANION GAP SERPL CALC-SCNC: 9 MMOL/L (ref 8–16)
AST SERPL-CCNC: 11 U/L (ref 10–40)
BASOPHILS # BLD AUTO: 0.03 K/UL (ref 0–0.2)
BASOPHILS NFR BLD: 0.3 % (ref 0–1.9)
BILIRUB SERPL-MCNC: 0.5 MG/DL (ref 0.1–1)
BNP SERPL-MCNC: 479 PG/ML (ref 0–99)
BUN SERPL-MCNC: 28 MG/DL (ref 10–30)
CALCIUM SERPL-MCNC: 8.5 MG/DL (ref 8.7–10.5)
CHLORIDE SERPL-SCNC: 97 MMOL/L (ref 95–110)
CO2 SERPL-SCNC: 24 MMOL/L (ref 23–29)
CREAT SERPL-MCNC: 1 MG/DL (ref 0.5–1.4)
DIFFERENTIAL METHOD BLD: ABNORMAL
EOSINOPHIL # BLD AUTO: 0.2 K/UL (ref 0–0.5)
EOSINOPHIL NFR BLD: 1.3 % (ref 0–8)
ERYTHROCYTE [DISTWIDTH] IN BLOOD BY AUTOMATED COUNT: 14 % (ref 11.5–14.5)
EST. GFR  (NO RACE VARIABLE): 52.9 ML/MIN/1.73 M^2
GLUCOSE SERPL-MCNC: 110 MG/DL (ref 70–110)
HCT VFR BLD AUTO: 32.1 % (ref 37–48.5)
HGB BLD-MCNC: 10.3 G/DL (ref 12–16)
IMM GRANULOCYTES # BLD AUTO: 0.05 K/UL (ref 0–0.04)
IMM GRANULOCYTES NFR BLD AUTO: 0.4 % (ref 0–0.5)
LYMPHOCYTES # BLD AUTO: 1.2 K/UL (ref 1–4.8)
LYMPHOCYTES NFR BLD: 10.4 % (ref 18–48)
MCH RBC QN AUTO: 30.7 PG (ref 27–31)
MCHC RBC AUTO-ENTMCNC: 32.1 G/DL (ref 32–36)
MCV RBC AUTO: 96 FL (ref 82–98)
MONOCYTES # BLD AUTO: 1.5 K/UL (ref 0.3–1)
MONOCYTES NFR BLD: 12.8 % (ref 4–15)
NEUTROPHILS # BLD AUTO: 8.6 K/UL (ref 1.8–7.7)
NEUTROPHILS NFR BLD: 74.8 % (ref 38–73)
NRBC BLD-RTO: 0 /100 WBC
OHS QRS DURATION: 118 MS
OHS QTC CALCULATION: 445 MS
PLATELET # BLD AUTO: 221 K/UL (ref 150–450)
PMV BLD AUTO: 10.3 FL (ref 9.2–12.9)
POTASSIUM SERPL-SCNC: 4.1 MMOL/L (ref 3.5–5.1)
PROT SERPL-MCNC: 6.4 G/DL (ref 6–8.4)
RBC # BLD AUTO: 3.36 M/UL (ref 4–5.4)
SODIUM SERPL-SCNC: 130 MMOL/L (ref 136–145)
TROPONIN I SERPL DL<=0.01 NG/ML-MCNC: <0.006 NG/ML (ref 0–0.03)
WBC # BLD AUTO: 11.5 K/UL (ref 3.9–12.7)

## 2024-05-26 PROCEDURE — 27000221 HC OXYGEN, UP TO 24 HOURS

## 2024-05-26 PROCEDURE — 99285 EMERGENCY DEPT VISIT HI MDM: CPT | Mod: 25

## 2024-05-26 PROCEDURE — 80053 COMPREHEN METABOLIC PANEL: CPT | Performed by: EMERGENCY MEDICINE

## 2024-05-26 PROCEDURE — 93010 ELECTROCARDIOGRAM REPORT: CPT | Mod: ,,, | Performed by: INTERNAL MEDICINE

## 2024-05-26 PROCEDURE — 25000003 PHARM REV CODE 250: Performed by: EMERGENCY MEDICINE

## 2024-05-26 PROCEDURE — 85025 COMPLETE CBC W/AUTO DIFF WBC: CPT | Performed by: EMERGENCY MEDICINE

## 2024-05-26 PROCEDURE — 94761 N-INVAS EAR/PLS OXIMETRY MLT: CPT

## 2024-05-26 PROCEDURE — 84484 ASSAY OF TROPONIN QUANT: CPT | Performed by: EMERGENCY MEDICINE

## 2024-05-26 PROCEDURE — 83880 ASSAY OF NATRIURETIC PEPTIDE: CPT | Performed by: EMERGENCY MEDICINE

## 2024-05-26 PROCEDURE — 93005 ELECTROCARDIOGRAM TRACING: CPT

## 2024-05-26 RX ORDER — ACETAMINOPHEN 325 MG/1
650 TABLET ORAL
Status: COMPLETED | OUTPATIENT
Start: 2024-05-26 | End: 2024-05-26

## 2024-05-26 RX ADMIN — ACETAMINOPHEN 650 MG: 325 TABLET ORAL at 04:05

## 2024-05-26 NOTE — ED PROVIDER NOTES
Emergency Department Provider Note    Laila Hanna   89 y.o. female   [id number]      5/26/2024       History     This history was obtained from the patient's without limitations.  She presented by ambulance from [facility].    She complains of lower midline chest pain when she inhales deeply. She first noticed it yesterday. She denies trauma to her chest but has been coughing. She denies shortness of breath, nausea, and vomiting. She also complains of acute generalized headache for one day and chronic back pain that is unchanged.  She denies fever and chills.  She denies palpitations and syncope.  She took acetaminophen earlier but is unsure of whether it had an affect on her pain.         Past Medical History:   Diagnosis Date    Basal cell carcinoma     nose    Benign essential hypertension     Cerebral microvascular disease 09/08/2014    Closed fracture of distal phalanx of left hand with routine healing 09/17/2015    Closed mallet fracture of distal phalanx of finger with routine healing 10/06/2015    Coronary artery disease     DDD (degenerative disc disease), lumbar 04/12/2016    Depression     Fall at home 05/30/2016    Hyperlipidemia     Hypothyroidism due to acquired atrophy of thyroid     Left atrial enlargement     Meningiomas, multiple     MI (myocardial infarction) 2004    80% blockage per patient    Migraine aura without headache 12/01/2014    Post PTCA 12/12/2012    Transient cerebral ischemia 05/04/2014      Past Surgical History:   Procedure Laterality Date    CARDIAC CATHETERIZATION  03/29/2004    S/P LAD PTCA, coated stent    CATARACT EXTRACTION W/  INTRAOCULAR LENS IMPLANT Bilateral 2000    Dr Youssef     CHOLECYSTECTOMY      CORONARY ANGIOPLASTY WITH STENT PLACEMENT  2004    LAD    EYE SURGERY      TOTAL THYROIDECTOMY        Family History   Problem Relation Name Age of Onset    Stroke Mother      Diabetes Mother      Hypertension Father      Stroke Father      Cancer Brother           colon    Skin cancer Brother      Colon cancer Brother      Diabetes Paternal Grandmother      Cancer Sister          breast    Dementia Sister      Glaucoma Maternal Aunt      No Known Problems Maternal Uncle      No Known Problems Paternal Aunt      No Known Problems Paternal Uncle      No Known Problems Maternal Grandmother      No Known Problems Maternal Grandfather      No Known Problems Paternal Grandfather      Amblyopia Neg Hx      Blindness Neg Hx      Cataracts Neg Hx      Macular degeneration Neg Hx      Retinal detachment Neg Hx      Strabismus Neg Hx      Thyroid disease Neg Hx      Esophageal cancer Neg Hx        Social History     Socioeconomic History    Marital status: Single   Occupational History     Employer: [employer]   Tobacco Use    Smoking status: Former     Current packs/day: 0.00     Average packs/day: 0.3 packs/day for 15.0 years (4.5 ttl pk-yrs)     Types: Cigarettes     Start date: 1958     Quit date: 1973     Years since quittin.3    Smokeless tobacco: Never   Substance and Sexual Activity    Alcohol use: No     Alcohol/week: 0.0 standard drinks of alcohol    Drug use: No    Sexual activity: Never     Social Determinants of Health     Financial Resource Strain: Low Risk  (2022)    Overall Financial Resource Strain (CARDIA)     Difficulty of Paying Living Expenses: Not hard at all   Food Insecurity: No Food Insecurity (2022)    Hunger Vital Sign     Worried About Running Out of Food in the Last Year: Never true     Ran Out of Food in the Last Year: Never true   Transportation Needs: No Transportation Needs (2022)    PRAPARE - Transportation     Lack of Transportation (Medical): No     Lack of Transportation (Non-Medical): No   Physical Activity: Insufficiently Active (2022)    Exercise Vital Sign     Days of Exercise per Week: 3 days     Minutes of Exercise per Session: 20 min   Stress: Stress Concern Present (2022)    [employer] - Occupational Stress Questionnaire     Feeling of Stress : To some extent   Housing Stability: Unknown (6/7/2022)    Housing Stability Vital Sign     Unable to Pay for Housing in the Last Year: No     Unstable Housing in the Last Year: No      Review of patient's allergies indicates:   Allergen Reactions    Thiazides Other (See Comments)     Severe hyponatremia           Physical Examination     Initial Vitals [05/26/24 0314]   BP Pulse Resp Temp SpO2   101/67 71 18 98.7 °F (37.1 °C) (!) 94 %      MAP       --           Physical Exam    Nursing note and vitals reviewed.  Constitutional: She is not diaphoretic. No distress.   HENT:   Head: Normocephalic and atraumatic.   Dry tongue.   Cardiovascular:  Normal rate, regular rhythm and normal heart sounds.     Exam reveals no gallop and no friction rub.       No murmur heard.  Pulmonary/Chest: No respiratory distress. She has no wheezes. She has no rhonchi. She exhibits tenderness. She exhibits no deformity.     Abdominal: Abdomen is soft. She exhibits no distension. There is no abdominal tenderness.   Musculoskeletal:         General: Edema present. No tenderness.     Neurological: She is alert and oriented to person, place, and time. No cranial nerve deficit. GCS score is 15. GCS eye subscore is 4. GCS verbal subscore is 5. GCS motor subscore is 6.   Skin: Skin is warm and dry. No pallor.   Psychiatric: Her speech is normal and behavior is normal. She is not actively hallucinating. She is attentive.            Labs     Labs Reviewed   CBC W/ AUTO DIFFERENTIAL - Abnormal; Notable for the following components:       Result Value    RBC 3.36 (*)     Hemoglobin 10.3 (*)     Hematocrit 32.1 (*)     Gran # (ANC) 8.6 (*)     Immature Grans (Abs) 0.05 (*)     Mono # 1.5 (*)     Gran % 74.8 (*)     Lymph % 10.4 (*)     All other components within normal limits   COMPREHENSIVE METABOLIC PANEL - Abnormal; Notable for the following components:    Sodium 130  (*)     Calcium 8.5 (*)     Albumin 2.9 (*)     ALT 7 (*)     eGFR 52.9 (*)     All other components within normal limits   B-TYPE NATRIURETIC PEPTIDE - Abnormal; Notable for the following components:     (*)     All other components within normal limits   TROPONIN I        Imaging     Imaging Results              X-Ray Chest AP Portable (Final result)  Result time 05/26/24 04:33:14      Final result by Errol Woods MD (05/26/24 04:33:14)                   Impression:      Radiographic findings as above.      Electronically signed by: Errol Woods  Date:    05/26/2024  Time:    04:33               Narrative:    EXAMINATION:  XR CHEST AP PORTABLE    CLINICAL HISTORY:  Chest Pain;    TECHNIQUE:  Single frontal view of the chest was performed.    COMPARISON:  Chest radiograph July 30, 2022    FINDINGS:  Single portable chest view is submitted.  There is elevation of the left hemidiaphragm appearing similar to the prior study.  The patient is mildly rotated, when accounting for position and technique and depth of inspiration, the appearance of the cardiomediastinal silhouette is stable.    There is accentuation of the interstitial markings throughout the lungs bilaterally, this is similar to the prior examination and may relate to chronic interstitial change the possibility of a component of acute on chronic change is to be considered, however the overall appearance is similar to the prior exam.  Mild greater opacity seen at the right lung apex medially appears similar to the prior study as well.    There is no additional evidence for focal consolidation, significant pleural effusion or pneumothorax.    The osseous structures appear intact.                                        ED Course     The patient received the following medications:  Medications   acetaminophen tablet 650 mg (650 mg Oral Given 5/26/24 0411)           ED Course as of 05/26/24 2153   Sun May 26, 2024   0541 EKG  12-lead  Independently interpreted by me:   Sinus rhythm with first-degree AV block (IA interval 296 ms).  Ventricular rate 74 beats per minute.  Leftward axis.  Left anterior fascicular block.  No ST segment elevation or depression.  Normal T-wave morphology.  Unchanged from most recent prior EKG. [LP]   0552 CBC auto differential(!) [LP]   0553 Hemoglobin(!): 10.3 [LP]   0553 Hematocrit(!): 32.1 [LP]   0553 RBC(!): 3.36 [LP]   0553 Comprehensive metabolic panel(!) [LP]   0553 Sodium(!): 130 [LP]   0553 Calcium(!): 8.5 [LP]   0553 Albumin(!): 2.9 [LP]   0553 Troponin I: <0.006 [LP]   0553 BNP(!): 479 [LP]      ED Course User Index  [LP] Ronak Mcclure III, MD        Medical Decision Making                 Medical Decision Making   The patient's presentation, exam, workup most consistent with acute chest wall pain.  Differential also included arrhythmia, acute coronary syndromes, decompensated heart failure, spontaneous pneumothorax, pericarditis, and other conditions.    She had no fever.  She had no signs respiratory distress.  EKG was negative for arrhythmia and findings concerning for acute ischemia.  Chest x-ray was negative for acute processes.  She had no white count elevation, critical anemia, concerning electrolyte anomalies, or renal insufficiency.  Troponin was not elevated.  BNP was elevated but she had no other findings of decompensated heart failure.  Serial troponins were not indicated  due to the prolonged nature of her complaint and overall clinical picture.  She had no findings that indicate a need for admission or emergent specialist consultation and was discharged.    Problems Addressed:  Acute chest pain: acute illness or injury    Amount and/or Complexity of Data Reviewed  Labs: ordered. Decision-making details documented in ED Course.  Radiology: ordered.  ECG/medicine tests:  Decision-making details documented in ED Course.    Risk  OTC drugs.              Diagnoses       ICD-10-CM ICD-9-CM    1. Acute chest pain  R07.9 786.50         Dispostion      ED Disposition Condition    Discharge Stable            ED Prescriptions    None         Follow-up Information       Follow up With Specialties Details Why Contact Info    Ama Graham MD Internal Medicine  Schedule a close in-person or virtual ER follow-up visit with your primary care provider. [address] [facility] [address]  [phone]      ER   Return to the ER if your condition worsens or you have any other concerns that you feel need immediate attention.               Ronak Mcclure III, MD  05/26/24 1351

## 2024-05-26 NOTE — ED TRIAGE NOTES
Laila MANRIQUEZ Adalbertomolly, a 89 y.o. female presents to the ED w/ complaint of chest pain that started yesterday. CP when taking deep breaths. Hx of CHF. Pt also complaining of back pain     Triage note:  Chief Complaint   Patient presents with    Chest Pain     Since yesterday, sharp on left side 5/10, more painful after a deep breath; hx of stents/CHF, BLE swelling     Review of patient's allergies indicates:   Allergen Reactions    Thiazides Other (See Comments)     Severe hyponatremia     Past Medical History:   Diagnosis Date    Basal cell carcinoma     nose    Benign essential hypertension     Cerebral microvascular disease 09/08/2014    Closed fracture of distal phalanx of left hand with routine healing 09/17/2015    Closed mallet fracture of distal phalanx of finger with routine healing 10/06/2015    Coronary artery disease     DDD (degenerative disc disease), lumbar 04/12/2016    Depression     Fall at home 05/30/2016    Hyperlipidemia     Hypothyroidism due to acquired atrophy of thyroid     Left atrial enlargement     Meningiomas, multiple     MI (myocardial infarction) 2004    80% blockage per patient    Migraine aura without headache 12/01/2014    Post PTCA 12/12/2012    Transient cerebral ischemia 05/04/2014

## 2024-05-26 NOTE — DISCHARGE INSTRUCTIONS
We know that you have many choices and are honored that you chose us. We hope that we met or exceeded your expectations and goals for this visit and will keep the [facility] family in mind for your future needs and those of your family and friends.     - Dr. Mcclure

## 2024-06-06 ENCOUNTER — HOSPITAL ENCOUNTER (OUTPATIENT)
Facility: HOSPITAL | Age: 89
Discharge: HOME | End: 2024-06-07
Attending: EMERGENCY MEDICINE | Admitting: EMERGENCY MEDICINE
Payer: MEDICARE

## 2024-06-06 DIAGNOSIS — R06.02 SOB (SHORTNESS OF BREATH): ICD-10-CM

## 2024-06-06 DIAGNOSIS — R07.9 CHEST PAIN: ICD-10-CM

## 2024-06-06 DIAGNOSIS — I50.33 ACUTE ON CHRONIC DIASTOLIC HEART FAILURE: Primary | ICD-10-CM

## 2024-06-06 DIAGNOSIS — I50.9 CHF (CONGESTIVE HEART FAILURE): ICD-10-CM

## 2024-06-06 DIAGNOSIS — I51.89 LEFT VENTRICULAR DIASTOLIC DYSFUNCTION WITH PRESERVED SYSTOLIC FUNCTION: ICD-10-CM

## 2024-06-06 PROBLEM — I50.22 CHRONIC SYSTOLIC HEART FAILURE: Status: ACTIVE | Noted: 2023-08-23

## 2024-06-06 LAB
ALBUMIN SERPL BCP-MCNC: 2.4 G/DL (ref 3.5–5.2)
ALP SERPL-CCNC: 77 U/L (ref 55–135)
ALT SERPL W/O P-5'-P-CCNC: 14 U/L (ref 10–44)
ANION GAP SERPL CALC-SCNC: 9 MMOL/L (ref 8–16)
ASCENDING AORTA: 3.55 CM
AST SERPL-CCNC: 17 U/L (ref 10–40)
AV INDEX (PROSTH): 0.48
AV MEAN GRADIENT: 11 MMHG
AV PEAK GRADIENT: 18 MMHG
AV VALVE AREA BY VELOCITY RATIO: 1.48 CM²
AV VALVE AREA: 1.62 CM²
AV VELOCITY RATIO: 0.44
BASOPHILS # BLD AUTO: 0.03 K/UL (ref 0–0.2)
BASOPHILS NFR BLD: 0.3 % (ref 0–1.9)
BILIRUB SERPL-MCNC: 0.3 MG/DL (ref 0.1–1)
BNP SERPL-MCNC: 846 PG/ML (ref 0–99)
BSA FOR ECHO PROCEDURE: 1.94 M2
BUN SERPL-MCNC: 29 MG/DL (ref 10–30)
CALCIUM SERPL-MCNC: 8.4 MG/DL (ref 8.7–10.5)
CHLORIDE SERPL-SCNC: 94 MMOL/L (ref 95–110)
CO2 SERPL-SCNC: 25 MMOL/L (ref 23–29)
CREAT SERPL-MCNC: 1 MG/DL (ref 0.5–1.4)
CV ECHO LV RWT: 0.41 CM
DIFFERENTIAL METHOD BLD: ABNORMAL
DOP CALC AO PEAK VEL: 2.13 M/S
DOP CALC AO VTI: 54.65 CM
DOP CALC LVOT AREA: 3.4 CM2
DOP CALC LVOT DIAMETER: 2.08 CM
DOP CALC LVOT PEAK VEL: 0.93 M/S
DOP CALC LVOT STROKE VOLUME: 88.51 CM3
DOP CALC MV VTI: 68.64 CM
DOP CALCLVOT PEAK VEL VTI: 26.06 CM
E WAVE DECELERATION TIME: 602.23 MSEC
E/A RATIO: 1.04
E/E' RATIO: 29.78 M/S
ECHO LV POSTERIOR WALL: 0.85 CM (ref 0.6–1.1)
EOSINOPHIL # BLD AUTO: 0.2 K/UL (ref 0–0.5)
EOSINOPHIL NFR BLD: 1.7 % (ref 0–8)
ERYTHROCYTE [DISTWIDTH] IN BLOOD BY AUTOMATED COUNT: 13.6 % (ref 11.5–14.5)
EST. GFR  (NO RACE VARIABLE): 52.9 ML/MIN/1.73 M^2
FRACTIONAL SHORTENING: 37 % (ref 28–44)
GLUCOSE SERPL-MCNC: 103 MG/DL (ref 70–110)
HCT VFR BLD AUTO: 33.1 % (ref 37–48.5)
HGB BLD-MCNC: 10.4 G/DL (ref 12–16)
IMM GRANULOCYTES # BLD AUTO: 0.12 K/UL (ref 0–0.04)
IMM GRANULOCYTES NFR BLD AUTO: 1.2 % (ref 0–0.5)
INTERVENTRICULAR SEPTUM: 0.87 CM (ref 0.6–1.1)
LA MAJOR: 6 CM
LA MINOR: 5.38 CM
LA WIDTH: 5.54 CM
LEFT ATRIUM SIZE: 5.5 CM
LEFT ATRIUM VOLUME INDEX MOD: 51.9 ML/M2
LEFT ATRIUM VOLUME INDEX: 76.5 ML/M2
LEFT ATRIUM VOLUME MOD: 99.62 CM3
LEFT ATRIUM VOLUME: 146.93 CM3
LEFT INTERNAL DIMENSION IN SYSTOLE: 2.58 CM (ref 2.1–4)
LEFT VENTRICLE DIASTOLIC VOLUME INDEX: 38.76 ML/M2
LEFT VENTRICLE DIASTOLIC VOLUME: 74.42 ML
LEFT VENTRICLE MASS INDEX: 56 G/M2
LEFT VENTRICLE SYSTOLIC VOLUME INDEX: 12.5 ML/M2
LEFT VENTRICLE SYSTOLIC VOLUME: 24.09 ML
LEFT VENTRICULAR INTERNAL DIMENSION IN DIASTOLE: 4.1 CM (ref 3.5–6)
LEFT VENTRICULAR MASS: 107.28 G
LV LATERAL E/E' RATIO: 22.33 M/S
LV SEPTAL E/E' RATIO: 44.67 M/S
LYMPHOCYTES # BLD AUTO: 0.9 K/UL (ref 1–4.8)
LYMPHOCYTES NFR BLD: 9.1 % (ref 18–48)
MAGNESIUM SERPL-MCNC: 2.2 MG/DL (ref 1.6–2.6)
MCH RBC QN AUTO: 30.6 PG (ref 27–31)
MCHC RBC AUTO-ENTMCNC: 31.4 G/DL (ref 32–36)
MCV RBC AUTO: 97 FL (ref 82–98)
MONOCYTES # BLD AUTO: 1 K/UL (ref 0.3–1)
MONOCYTES NFR BLD: 10 % (ref 4–15)
MV MEAN GRADIENT: 4 MMHG
MV PEAK A VEL: 1.29 M/S
MV PEAK E VEL: 1.34 M/S
MV PEAK GRADIENT: 8 MMHG
MV STENOSIS PRESSURE HALF TIME: 145 MS
MV VALVE AREA BY CONTINUITY EQUATION: 1.29 CM2
MV VALVE AREA P 1/2 METHOD: 1.52 CM2
NEUTROPHILS # BLD AUTO: 7.7 K/UL (ref 1.8–7.7)
NEUTROPHILS NFR BLD: 77.7 % (ref 38–73)
NRBC BLD-RTO: 0 /100 WBC
OHS CV RV/LV RATIO: 1.07 CM
OHS QRS DURATION: 118 MS
OHS QTC CALCULATION: 449 MS
PISA MRMAX VEL: 0.05 M/S
PISA TR MAX VEL: 3.52 M/S
PLATELET # BLD AUTO: 394 K/UL (ref 150–450)
PMV BLD AUTO: 9.8 FL (ref 9.2–12.9)
POTASSIUM SERPL-SCNC: 4.6 MMOL/L (ref 3.5–5.1)
PROT SERPL-MCNC: 6.3 G/DL (ref 6–8.4)
RA MAJOR: 5.9 CM
RA PRESSURE ESTIMATED: 8 MMHG
RA WIDTH: 4.82 CM
RBC # BLD AUTO: 3.4 M/UL (ref 4–5.4)
RIGHT VENTRICULAR END-DIASTOLIC DIMENSION: 4.37 CM
RV TB RVSP: 12 MMHG
SINUS: 2.99 CM
SODIUM SERPL-SCNC: 128 MMOL/L (ref 136–145)
STJ: 2.15 CM
TDI LATERAL: 0.06 M/S
TDI SEPTAL: 0.03 M/S
TDI: 0.05 M/S
TR MAX PG: 50 MMHG
TRICUSPID ANNULAR PLANE SYSTOLIC EXCURSION: 1.23 CM
TROPONIN I SERPL DL<=0.01 NG/ML-MCNC: <0.006 NG/ML (ref 0–0.03)
TV REST PULMONARY ARTERY PRESSURE: 58 MMHG
WBC # BLD AUTO: 9.91 K/UL (ref 3.9–12.7)
Z-SCORE OF LEFT VENTRICULAR DIMENSION IN END DIASTOLE: -2.79
Z-SCORE OF LEFT VENTRICULAR DIMENSION IN END SYSTOLE: -2.03

## 2024-06-06 PROCEDURE — 85025 COMPLETE CBC W/AUTO DIFF WBC: CPT

## 2024-06-06 PROCEDURE — 93010 ELECTROCARDIOGRAM REPORT: CPT | Mod: ,,, | Performed by: INTERNAL MEDICINE

## 2024-06-06 PROCEDURE — G0378 HOSPITAL OBSERVATION PER HR: HCPCS

## 2024-06-06 PROCEDURE — 80053 COMPREHEN METABOLIC PANEL: CPT

## 2024-06-06 PROCEDURE — 93005 ELECTROCARDIOGRAM TRACING: CPT

## 2024-06-06 PROCEDURE — 25000003 PHARM REV CODE 250

## 2024-06-06 PROCEDURE — 96372 THER/PROPH/DIAG INJ SC/IM: CPT | Mod: 59

## 2024-06-06 PROCEDURE — 83880 ASSAY OF NATRIURETIC PEPTIDE: CPT

## 2024-06-06 PROCEDURE — 83735 ASSAY OF MAGNESIUM: CPT

## 2024-06-06 PROCEDURE — 63600175 PHARM REV CODE 636 W HCPCS

## 2024-06-06 PROCEDURE — 96376 TX/PRO/DX INJ SAME DRUG ADON: CPT | Mod: 59

## 2024-06-06 PROCEDURE — 99285 EMERGENCY DEPT VISIT HI MDM: CPT | Mod: 25

## 2024-06-06 PROCEDURE — 84484 ASSAY OF TROPONIN QUANT: CPT

## 2024-06-06 PROCEDURE — 96374 THER/PROPH/DIAG INJ IV PUSH: CPT

## 2024-06-06 RX ORDER — SODIUM CHLORIDE 0.9 % (FLUSH) 0.9 %
10 SYRINGE (ML) INJECTION
Status: DISCONTINUED | OUTPATIENT
Start: 2024-06-06 | End: 2024-06-07 | Stop reason: HOSPADM

## 2024-06-06 RX ORDER — FUROSEMIDE 10 MG/ML
60 INJECTION INTRAMUSCULAR; INTRAVENOUS EVERY 12 HOURS
Status: DISCONTINUED | OUTPATIENT
Start: 2024-06-06 | End: 2024-06-07

## 2024-06-06 RX ORDER — BISACODYL 10 MG/1
10 SUPPOSITORY RECTAL DAILY PRN
Status: DISCONTINUED | OUTPATIENT
Start: 2024-06-06 | End: 2024-06-07 | Stop reason: HOSPADM

## 2024-06-06 RX ORDER — ACETAMINOPHEN 325 MG/1
650 TABLET ORAL EVERY 4 HOURS PRN
Status: DISCONTINUED | OUTPATIENT
Start: 2024-06-06 | End: 2024-06-07 | Stop reason: HOSPADM

## 2024-06-06 RX ORDER — IBUPROFEN 200 MG
24 TABLET ORAL
Status: DISCONTINUED | OUTPATIENT
Start: 2024-06-06 | End: 2024-06-07 | Stop reason: HOSPADM

## 2024-06-06 RX ORDER — LISINOPRIL 10 MG/1
10 TABLET ORAL DAILY
COMMUNITY
Start: 2024-03-22

## 2024-06-06 RX ORDER — LEVOFLOXACIN 500 MG/1
500 TABLET, FILM COATED ORAL DAILY
COMMUNITY
Start: 2024-06-05

## 2024-06-06 RX ORDER — GUAIFENESIN 100 MG/5ML
200 SOLUTION ORAL EVERY 4 HOURS PRN
COMMUNITY

## 2024-06-06 RX ORDER — CARVEDILOL 25 MG/1
25 TABLET ORAL 2 TIMES DAILY WITH MEALS
Status: ON HOLD | COMMUNITY
End: 2024-06-07 | Stop reason: HOSPADM

## 2024-06-06 RX ORDER — ENOXAPARIN SODIUM 100 MG/ML
40 INJECTION SUBCUTANEOUS EVERY 24 HOURS
Status: DISCONTINUED | OUTPATIENT
Start: 2024-06-06 | End: 2024-06-07 | Stop reason: HOSPADM

## 2024-06-06 RX ORDER — LOSARTAN POTASSIUM 25 MG/1
25 TABLET ORAL DAILY
Status: DISCONTINUED | OUTPATIENT
Start: 2024-06-07 | End: 2024-06-07 | Stop reason: HOSPADM

## 2024-06-06 RX ORDER — ALBUTEROL SULFATE 0.83 MG/ML
2.5 SOLUTION RESPIRATORY (INHALATION) EVERY 4 HOURS PRN
COMMUNITY
Start: 2024-06-05

## 2024-06-06 RX ORDER — PROMETHAZINE HYDROCHLORIDE 25 MG/1
25 TABLET ORAL EVERY 6 HOURS PRN
Status: DISCONTINUED | OUTPATIENT
Start: 2024-06-06 | End: 2024-06-07 | Stop reason: HOSPADM

## 2024-06-06 RX ORDER — TALC
6 POWDER (GRAM) TOPICAL NIGHTLY PRN
Status: DISCONTINUED | OUTPATIENT
Start: 2024-06-06 | End: 2024-06-07 | Stop reason: HOSPADM

## 2024-06-06 RX ORDER — ACETAMINOPHEN 500 MG
1000 TABLET ORAL
Status: DISPENSED | OUTPATIENT
Start: 2024-06-06 | End: 2024-06-06

## 2024-06-06 RX ORDER — IPRATROPIUM BROMIDE AND ALBUTEROL SULFATE 2.5; .5 MG/3ML; MG/3ML
3 SOLUTION RESPIRATORY (INHALATION) EVERY 4 HOURS PRN
Status: DISCONTINUED | OUTPATIENT
Start: 2024-06-06 | End: 2024-06-07 | Stop reason: HOSPADM

## 2024-06-06 RX ORDER — GLUCAGON 1 MG
1 KIT INJECTION
Status: DISCONTINUED | OUTPATIENT
Start: 2024-06-06 | End: 2024-06-07 | Stop reason: HOSPADM

## 2024-06-06 RX ORDER — CARVEDILOL 12.5 MG/1
12.5 TABLET ORAL 2 TIMES DAILY
Status: DISCONTINUED | OUTPATIENT
Start: 2024-06-06 | End: 2024-06-07 | Stop reason: HOSPADM

## 2024-06-06 RX ORDER — CLOTRIMAZOLE AND BETAMETHASONE DIPROPIONATE 10; .5 MG/ML; MG/ML
LOTION TOPICAL 2 TIMES DAILY PRN
COMMUNITY

## 2024-06-06 RX ORDER — BUPROPION HYDROCHLORIDE 300 MG/1
300 TABLET ORAL DAILY
COMMUNITY

## 2024-06-06 RX ORDER — IBUPROFEN 200 MG
16 TABLET ORAL
Status: DISCONTINUED | OUTPATIENT
Start: 2024-06-06 | End: 2024-06-07 | Stop reason: HOSPADM

## 2024-06-06 RX ORDER — TRAZODONE HYDROCHLORIDE 50 MG/1
25 TABLET ORAL NIGHTLY PRN
COMMUNITY
Start: 2024-04-05

## 2024-06-06 RX ORDER — ALBUTEROL SULFATE 90 UG/1
2 AEROSOL, METERED RESPIRATORY (INHALATION) EVERY 6 HOURS PRN
COMMUNITY
Start: 2024-06-05

## 2024-06-06 RX ORDER — ASPIRIN 81 MG/1
81 TABLET ORAL DAILY
Status: DISCONTINUED | OUTPATIENT
Start: 2024-06-07 | End: 2024-06-07 | Stop reason: HOSPADM

## 2024-06-06 RX ORDER — ESCITALOPRAM OXALATE 10 MG/1
20 TABLET ORAL NIGHTLY
COMMUNITY
Start: 2023-08-10

## 2024-06-06 RX ORDER — ONDANSETRON 8 MG/1
8 TABLET, ORALLY DISINTEGRATING ORAL EVERY 8 HOURS PRN
Status: DISCONTINUED | OUTPATIENT
Start: 2024-06-06 | End: 2024-06-07 | Stop reason: HOSPADM

## 2024-06-06 RX ORDER — BISACODYL 5 MG
2 TABLET, DELAYED RELEASE (ENTERIC COATED) ORAL DAILY PRN
COMMUNITY

## 2024-06-06 RX ORDER — DICLOFENAC SODIUM 10 MG/G
GEL TOPICAL
COMMUNITY
Start: 2024-06-05

## 2024-06-06 RX ORDER — ATORVASTATIN CALCIUM 40 MG/1
40 TABLET, FILM COATED ORAL DAILY
Status: DISCONTINUED | OUTPATIENT
Start: 2024-06-07 | End: 2024-06-07 | Stop reason: HOSPADM

## 2024-06-06 RX ORDER — POLYETHYLENE GLYCOL 3350 17 G/17G
17 POWDER, FOR SOLUTION ORAL DAILY PRN
Status: DISCONTINUED | OUTPATIENT
Start: 2024-06-06 | End: 2024-06-07 | Stop reason: HOSPADM

## 2024-06-06 RX ORDER — LEVOTHYROXINE SODIUM 75 UG/1
150 TABLET ORAL DAILY
Status: DISCONTINUED | OUTPATIENT
Start: 2024-06-07 | End: 2024-06-07 | Stop reason: HOSPADM

## 2024-06-06 RX ORDER — LEVOTHYROXINE SODIUM 125 UG/1
125 TABLET ORAL
Status: ON HOLD | COMMUNITY
End: 2024-06-07 | Stop reason: HOSPADM

## 2024-06-06 RX ORDER — FUROSEMIDE 10 MG/ML
80 INJECTION INTRAMUSCULAR; INTRAVENOUS
Status: COMPLETED | OUTPATIENT
Start: 2024-06-06 | End: 2024-06-06

## 2024-06-06 RX ADMIN — ENOXAPARIN SODIUM 40 MG: 40 INJECTION SUBCUTANEOUS at 09:06

## 2024-06-06 RX ADMIN — Medication 6 MG: at 09:06

## 2024-06-06 RX ADMIN — FUROSEMIDE 60 MG: 10 INJECTION, SOLUTION INTRAMUSCULAR; INTRAVENOUS at 09:06

## 2024-06-06 RX ADMIN — CARVEDILOL 12.5 MG: 12.5 TABLET, FILM COATED ORAL at 09:06

## 2024-06-06 RX ADMIN — FUROSEMIDE 80 MG: 10 INJECTION, SOLUTION INTRAVENOUS at 11:06

## 2024-06-06 NOTE — ASSESSMENT & PLAN NOTE
Patient presents from NH for elevated BNP on labs.  - CHF Pathway initiated  - Last 2D echo 2019 with EF 60% and diastolic dysfunction.  Repeat 2D echo pending  -  and CXR with pulmonary edema  - IV diuresis with Lasix 60 mg IV BID and monitor response.  Goal diuresis 2-3L/day.    - Home diuretic dose: Torsemide 10 mg daily  - Strict I&Os with daily weights  - Low Sodium diet with 1.5L fluid restriction  Results for orders placed during the hospital encounter of 12/17/19    Echo Color Flow Doppler? Yes    Interpretation Summary  · Normal left ventricular systolic function. The estimated ejection fraction is 60%  · Concentric left ventricular remodeling.  · Grade I (mild) left ventricular diastolic dysfunction consistent with impaired relaxation.  · Normal right ventricular systolic function.  · Severe left atrial enlargement.  · Mild mitral regurgitation.  · Mild tricuspid regurgitation.  · The estimated PA systolic pressure is 37 mm Hg  · Normal central venous pressure (3 mm Hg).  · The ascending aorta is moderately dilated and measures 4 cm.

## 2024-06-06 NOTE — NURSING
Patient arrived to the unit.  Patient AAOX4. No distress noted. Patient has bilateral edema +2 in the lower extremities. O2 at 2 L NC sating at 99%. Call light within reach. Bed at lowest position.

## 2024-06-06 NOTE — ASSESSMENT & PLAN NOTE
Patient with known CAD, which is controlled Will continue ASA and Statin and monitor for S/Sx of angina/ACS. Continue to monitor on telemetry.

## 2024-06-06 NOTE — PLAN OF CARE
Patient AAOX4. No distress noted. Skin intact. Call light in place. Bed at lowest position.   Problem: Adult Inpatient Plan of Care  Goal: Plan of Care Review  Outcome: Progressing  Goal: Patient-Specific Goal (Individualized)  Outcome: Progressing  Goal: Absence of Hospital-Acquired Illness or Injury  Outcome: Progressing  Goal: Optimal Comfort and Wellbeing  Outcome: Progressing  Goal: Readiness for Transition of Care  Outcome: Progressing     Problem: Infection  Goal: Absence of Infection Signs and Symptoms  Outcome: Progressing

## 2024-06-06 NOTE — HPI
Laila Hanna is a 89 y.o. female with a hx of CHF, HTN, CAD, and hyponatremia presents to the ED from her NH for an abnormal lab. Patient states she was told she came to the hospital because she was retaining too much fluid. States she noticed last night she wasn't urinating as much as she usually does. Endorses lower extremity swelling that has worsened. Had some intermittent chest tightness a few days ago but denies any further episodes. Denies shortness of breath. States she was told she made need O2 at night so that she is more comfortable. Denies headache, fever, chills, chest pain, shortness of breath, abdominal pain, and n/v.    ED: AF, bradycardic. Sat 99% on 2L. CBC unremarkable. Na 128. . Trop wnl. CXR showed detrimental interval change in the appearance of the chest since 05/26/2024 is appreciated, relating to the development of bibasilar airspace consolidation and small amounts of pleural fluid on each side since that time. Given lasix in the ED.

## 2024-06-06 NOTE — ASSESSMENT & PLAN NOTE
Chronic, at baseline  Patient has hyponatremia which is controlled,We will aim to correct the sodium by 4-6mEq in 24 hours. We will monitor sodium Daily. The patient's sodium results have been reviewed and are listed below.  Recent Labs   Lab 06/06/24  1123   *

## 2024-06-06 NOTE — H&P
[facility] - Emergency Dept  [facility]  History & Physical    Patient Name: Laila Hanna  MRN: [id number]  Patient Class: OP- Observation  Admission Date: 6/6/2024  Attending Physician: Shivani Benoit MD   Primary Care Provider: Ama Graham MD         Patient information was obtained from patient and ER records.     Subjective:     Principal Problem:Acute on chronic diastolic heart failure    Chief Complaint:   Chief Complaint   Patient presents with    Abnormal Lab     Arrives via EMS with c/o abnormal labs coming from [facility]. Elevated BNP today at 500, hx of CHF denies sx         HPI: Laila Hanna is a 89 y.o. female with a hx of CHF, HTN, CAD, and hyponatremia presents to the ED from her NH for an abnormal lab. Patient states she was told she came to the hospital because she was retaining too much fluid. States she noticed last night she wasn't urinating as much as she usually does. Endorses lower extremity swelling that has worsened. Had some intermittent chest tightness a few days ago but denies any further episodes. Denies shortness of breath. States she was told she made need O2 at night so that she is more comfortable. Denies headache, fever, chills, chest pain, shortness of breath, abdominal pain, and n/v.    ED: AF, bradycardic. Sat 99% on 2L. CBC unremarkable. Na 128. . Trop wnl. CXR showed detrimental interval change in the appearance of the chest since 05/26/2024 is appreciated, relating to the development of bibasilar airspace consolidation and small amounts of pleural fluid on each side since that time. Given lasix in the ED.     Past Medical History:   Diagnosis Date    Basal cell carcinoma     nose    Benign essential hypertension     Cerebral microvascular disease 09/08/2014    Closed fracture of distal phalanx of left hand with routine healing 09/17/2015    Closed mallet fracture of distal phalanx of finger with routine healing 10/06/2015    Coronary artery  disease     DDD (degenerative disc disease), lumbar 04/12/2016    Depression     Fall at home 05/30/2016    Hyperlipidemia     Hypothyroidism due to acquired atrophy of thyroid     Left atrial enlargement     Meningiomas, multiple     MI (myocardial infarction) 2004    80% blockage per patient    Migraine aura without headache 12/01/2014    Post PTCA 12/12/2012    Transient cerebral ischemia 05/04/2014       Past Surgical History:   Procedure Laterality Date    CARDIAC CATHETERIZATION  03/29/2004    S/P LAD PTCA, coated stent    CATARACT EXTRACTION W/  INTRAOCULAR LENS IMPLANT Bilateral 2000    Dr Youssef     CHOLECYSTECTOMY      CORONARY ANGIOPLASTY WITH STENT PLACEMENT  2004    LAD    EYE SURGERY      TOTAL THYROIDECTOMY         Review of patient's allergies indicates:   Allergen Reactions    Thiazides Other (See Comments)     Severe hyponatremia       No current facility-administered medications on file prior to encounter.     Current Outpatient Medications on File Prior to Encounter   Medication Sig    acetaminophen (TYLENOL) 500 MG tablet Take 2 tablets (1,000 mg total) by mouth every 8 (eight) hours.    alendronate (FOSAMAX) 70 MG tablet TAKE 1 TABLET BY MOUTH EVERY 7 DAYS    aspirin (ECOTRIN) 81 MG EC tablet Take 1 tablet (81 mg total) by mouth once daily.    atorvastatin (LIPITOR) 40 MG tablet Take 1 tablet (40 mg total) by mouth once daily.    calcium-vitamin D3 (OS-DANICA 500 + D3) 500 mg-5 mcg (200 unit) per tablet Take 1 tablet by mouth once daily.     carvediloL (COREG) 12.5 MG tablet Take 1 tablet (12.5 mg total) by mouth 2 (two) times daily.    clotrimazole-betamethasone 1-0.05% (LOTRISONE) cream Apply topically 2 (two) times daily as needed. (Patient not taking: Reported on 9/5/2023)    cyanocobalamin (VITAMIN B-12) 100 MCG tablet Take 100 mcg by mouth once daily.    dextran 70-hypromellose (ARTIFICIAL TEARS) ophthalmic solution Place 1 drop into both eyes every 2 (two) hours as needed. (Patient not  taking: Reported on 2023)    levothyroxine (SYNTHROID) 150 MCG tablet Take 1 tablet (150 mcg total) by mouth once daily.    LINZESS 145 mcg Cap capsule TAKE 1 CAPSULE(145 MCG) BY MOUTH BEFORE BREAKFAST (Patient not taking: Reported on 2023)    LINZESS 145 mcg Cap capsule TAKE 1 CAPSULE(145 MCG) BY MOUTH BEFORE BREAKFAST (Patient not taking: Reported on 2023)    losartan (COZAAR) 25 MG tablet Take 1 tablet (25 mg total) by mouth once daily.    meclizine (ANTIVERT) 50 MG tablet Take 50 mg by mouth 3 (three) times daily as needed.    melatonin 10 mg Cap Take by mouth.    oxyCODONE (ROXICODONE) 10 mg Tab immediate release tablet Take 1 tablet (10 mg total) by mouth every 4 (four) hours as needed (severe pain). (Patient not taking: Reported on 2023)    polyethylene glycol (GLYCOLAX) 17 gram PwPk Take 17 g by mouth once daily. (Patient not taking: Reported on 2023)    senna-docusate 8.6-50 mg (PERICOLACE) 8.6-50 mg per tablet Take 1 tablet by mouth once daily. (Patient not taking: Reported on 2023)    torsemide (DEMADEX) 10 MG Tab Take 1 tablet (10 mg total) by mouth once daily. As need for weight gain of 3 lbs in 1 day or 5 lbs in 3 days. Do not take if weight stable or decreasing. Daily weights at home.     Family History       Problem Relation (Age of Onset)    Cancer Brother, Sister    Colon cancer Brother    Dementia Sister    Diabetes Mother, Paternal Grandmother    Glaucoma Maternal Aunt    Hypertension Father    No Known Problems Maternal Uncle, Paternal Aunt, Paternal Uncle, Maternal Grandmother, Maternal Grandfather, Paternal Grandfather    Skin cancer Brother    Stroke Mother, Father          Tobacco Use    Smoking status: Former     Current packs/day: 0.00     Average packs/day: 0.3 packs/day for 15.0 years (4.5 ttl pk-yrs)     Types: Cigarettes     Start date: 1958     Quit date: 1973     Years since quittin.3    Smokeless tobacco: Never   Substance and Sexual  Activity    Alcohol use: No     Alcohol/week: 0.0 standard drinks of alcohol    Drug use: No    Sexual activity: Never     Review of Systems   Constitutional:  Negative for chills and fever.   Respiratory:  Negative for chest tightness and shortness of breath.    Cardiovascular:  Positive for leg swelling. Negative for chest pain.   Gastrointestinal:  Negative for abdominal pain and nausea.   Neurological:  Negative for dizziness and weakness.     Objective:     Vital Signs (Most Recent):  Temp: 99 °F (37.2 °C) (06/06/24 0954)  Pulse: (!) 53 (06/06/24 1213)  Resp: 20 (06/06/24 1213)  BP: (!) 152/77 (06/06/24 1202)  SpO2: 100 % (06/06/24 1213) Vital Signs (24h Range):  Temp:  [99 °F (37.2 °C)] 99 °F (37.2 °C)  Pulse:  [53-68] 53  Resp:  [18-20] 20  SpO2:  [91 %-100 %] 100 %  BP: (132-160)/(61-80) 152/77     Weight: 80.3 kg (177 lb)  Body mass index is 27.72 kg/m².     Physical Exam  Vitals and nursing note reviewed.   Constitutional:       Appearance: She is well-developed. She is not ill-appearing.   HENT:      Head: Normocephalic and atraumatic.      Nose:      Comments: NC in place  Eyes:      Pupils: Pupils are equal, round, and reactive to light.   Cardiovascular:      Rate and Rhythm: Normal rate and regular rhythm.   Pulmonary:      Effort: Pulmonary effort is normal. No respiratory distress.      Breath sounds: Rales present. No wheezing.   Abdominal:      Palpations: Abdomen is soft.      Tenderness: There is no abdominal tenderness.   Musculoskeletal:         General: No tenderness.      Right lower leg: Edema (3+ pitting edema) present.      Left lower leg: Edema (3+ pittting edema) present.   Skin:     General: Skin is warm and dry.   Neurological:      General: No focal deficit present.      Mental Status: She is alert and oriented to person, place, and time.   Psychiatric:         Behavior: Behavior normal.              CRANIAL NERVES     CN III, IV, VI   Pupils are equal, round, and reactive to  light.       Significant Labs: All pertinent labs within the past 24 hours have been reviewed.  BMP:   Recent Labs   Lab 06/06/24  1123      *   K 4.6   CL 94*   CO2 25   BUN 29   CREATININE 1.0   CALCIUM 8.4*   MG 2.2     CBC:   Recent Labs   Lab 06/06/24  1123   WBC 9.91   HGB 10.4*   HCT 33.1*          Significant Imaging: I have reviewed all pertinent imaging results/findings within the past 24 hours.    Imaging Results              X-Ray Chest AP Portable (Final result)  Result time 06/06/24 12:42:55      Final result by Julian Duran MD (06/06/24 12:42:55)                   Impression:      Detrimental interval change in the appearance of the chest since 05/26/2024 is appreciated, relating to the development of bibasilar airspace consolidation and small amounts of pleural fluid on each side since that time.      Electronically signed by: Julian Duran MD  Date:    06/06/2024  Time:    12:42               Narrative:    EXAMINATION:  XR CHEST AP PORTABLE    CLINICAL HISTORY:  SOB;    TECHNIQUE:  One view    COMPARISON:  Comparison is made to 05/26/2024.    FINDINGS:  Heart size and the appearance of the cardiomediastinal silhouette have not changed appreciably since the examination referenced above.  Accentuation of pulmonary interstitial markings in both lungs is again observed.  Opacity is now seen in both inferior hemithoraces with blurring of the margin of the diaphragm on each side, findings which were not present on 05/26/2024 and which are consistent with the development of bibasilar airspace consolidation and likely small amounts of pleural fluid on each side.  No pneumothorax.  Calcification in the wall of the aortic arch is again incidentally noted.                                      Assessment/Plan:     * Acute on chronic diastolic heart failure  Patient presents from NH for elevated BNP on labs.  - CHF Pathway initiated  - Last 2D echo 2019 with EF 60% and diastolic dysfunction.   Repeat 2D echo pending  -  and CXR with pulmonary edema  - IV diuresis with Lasix 60 mg IV BID and monitor response.  Goal diuresis 2-3L/day.    - Home diuretic dose: Torsemide 10 mg daily  - Strict I&Os with daily weights  - Low Sodium diet with 1.5L fluid restriction  Results for orders placed during the hospital encounter of 12/17/19    Echo Color Flow Doppler? Yes    Interpretation Summary  · Normal left ventricular systolic function. The estimated ejection fraction is 60%  · Concentric left ventricular remodeling.  · Grade I (mild) left ventricular diastolic dysfunction consistent with impaired relaxation.  · Normal right ventricular systolic function.  · Severe left atrial enlargement.  · Mild mitral regurgitation.  · Mild tricuspid regurgitation.  · The estimated PA systolic pressure is 37 mm Hg  · Normal central venous pressure (3 mm Hg).  · The ascending aorta is moderately dilated and measures 4 cm.      Coronary artery disease involving native coronary artery of native heart  Patient with known CAD, which is controlled Will continue ASA and Statin and monitor for S/Sx of angina/ACS. Continue to monitor on telemetry.     Hyperlipemia  - continue statin    Essential hypertension  Chronic, controlled. Latest blood pressure and vitals reviewed-     Temp:  [99 °F (37.2 °C)]   Pulse:  [53-68]   Resp:  [18-20]   BP: (132-160)/(61-80)   SpO2:  [91 %-100 %] .   Home meds for hypertension were reviewed and noted below.   Hypertension Medications               carvediloL (COREG) 12.5 MG tablet Take 1 tablet (12.5 mg total) by mouth 2 (two) times daily.    losartan (COZAAR) 25 MG tablet Take 1 tablet (25 mg total) by mouth once daily.    torsemide (DEMADEX) 10 MG Tab Take 1 tablet (10 mg total) by mouth once daily. As need for weight gain of 3 lbs in 1 day or 5 lbs in 3 days. Do not take if weight stable or decreasing. Daily weights at home.            While in the hospital, will manage blood pressure as  follows; Continue home antihypertensive regimen    Will utilize p.r.n. blood pressure medication only if patient's blood pressure greater than 180/110 and she develops symptoms such as worsening chest pain or shortness of breath.    Hyponatremia  Chronic, at baseline  Patient has hyponatremia which is controlled,We will aim to correct the sodium by 4-6mEq in 24 hours. We will monitor sodium Daily. The patient's sodium results have been reviewed and are listed below.  Recent Labs   Lab 06/06/24  1123   *       Postoperative hypothyroidism  - continue home synthroid        VTE Risk Mitigation (From admission, onward)           Ordered     enoxaparin injection 40 mg  Daily         06/06/24 1308     IP VTE HIGH RISK PATIENT  Once         06/06/24 1308     Place sequential compression device  Until discontinued         06/06/24 1306                         On 06/06/2024, patient should be placed in hospital observation services under my care in collaboration with Dr. Shivani Benoit.           Nellie Severino PA-C  Department of Hospital Medicine  [facility] - Emergency Dept

## 2024-06-06 NOTE — ED NOTES
"Patient comes into the emergency department by EMS with complaints of abnormal lab. Patient states that she gets blood work done routinely, BNP came back elevated. Pt reports SOB and CP intermittently that has been going on "for a long time" according to pt. Pt also reports HA. Denies N/V/D.    LOC: The patient is awake, alert and aware of environment with an appropriate affect, the patient is oriented x 3 and speaking appropriately.   APPEARANCE: Patient appears comfortable and in no acute distress, patient is clean and well groomed.  SKIN: The skin is warm and dry, color consistent with ethnicity, patient has normal skin turgor and moist mucus membranes, skin intact, no breakdown or bruising noted.   MUSCULOSKELETAL: Patient moving all extremities spontaneously, pt reports using wheelchair at nursing home. Swelling to BLE, left leg more swollen than right.  RESPIRATORY: Airway is open and patent, respirations are spontaneous, patient has a normal effort and rate, no accessory muscle. Reports SOB while laying down, pt placed on 2L via NC for comfort, SATs 100%.  CARDIAC: Pt placed on cardiac monitor. Patient has a normal rate and regular rhythm, no edema noted, capillary refill < 3 seconds. No reports of CP at this time.  GASTRO: Soft but tender to palpation, no distention noted. Pt reports producing little BM.  : Pt denies any pain or frequency with urination.  NEURO: Pt opens eyes spontaneously, behavior appropriate to situation, follows commands, facial expression symmetrical, bilateral hand grasp equal and even, purposeful motor response noted, normal sensation in all extremities when touched with a finger.        "

## 2024-06-06 NOTE — ED NOTES
Assumed care of pt. Pt in hospital gown and moved into hospital bed from stretcher. Pt is on 2L of oxygen via NC. Reeceived report from [facility]

## 2024-06-06 NOTE — ED PROVIDER NOTES
Encounter Date: 6/6/2024       History     Chief Complaint   Patient presents with    Abnormal Lab     Arrives via EMS with c/o abnormal labs coming from [facility]. Elevated BNP today at 500, hx of CHF denies sx      89-year-old female with history of BCC, HTN, DDD, HLD, hypothyroidism, meningioma, MI, early-onset Alzheimer's, and cerebral microvascular disease who presents to the ED from [facility] for chief complaint of abnormal labs.  She was noted to have an elevated BNP lab and was sent to the emergency department for further evaluation.  Patient endorses some difficulty breathing and she states that she has intermittent sternal chest tightness, currently denies any chest pain.  She currently endorses a mild headache, decreased appetite, and a dry cough.  Patient denies any fevers, nausea, vomiting, abdominal pain, or changes in urination.  No known sick contacts.    The history is provided by the patient. No  was used.     Review of patient's allergies indicates:   Allergen Reactions    Thiazides Other (See Comments)     Severe hyponatremia     Past Medical History:   Diagnosis Date    Basal cell carcinoma     nose    Benign essential hypertension     Cerebral microvascular disease 09/08/2014    Closed fracture of distal phalanx of left hand with routine healing 09/17/2015    Closed mallet fracture of distal phalanx of finger with routine healing 10/06/2015    Coronary artery disease     DDD (degenerative disc disease), lumbar 04/12/2016    Depression     Fall at home 05/30/2016    Hyperlipidemia     Hypothyroidism due to acquired atrophy of thyroid     Left atrial enlargement     Meningiomas, multiple     MI (myocardial infarction) 2004    80% blockage per patient    Migraine aura without headache 12/01/2014    Post PTCA 12/12/2012    Transient cerebral ischemia 05/04/2014     Past Surgical History:   Procedure Laterality Date    CARDIAC CATHETERIZATION   2004    S/P LAD PTCA, coated stent    CATARACT EXTRACTION W/  INTRAOCULAR LENS IMPLANT Bilateral     Dr Youssef     CHOLECYSTECTOMY      CORONARY ANGIOPLASTY WITH STENT PLACEMENT      LAD    EYE SURGERY      TOTAL THYROIDECTOMY       Family History   Problem Relation Name Age of Onset    Stroke Mother      Diabetes Mother      Hypertension Father      Stroke Father      Cancer Brother          colon    Skin cancer Brother      Colon cancer Brother      Diabetes Paternal Grandmother      Cancer Sister          breast    Dementia Sister      Glaucoma Maternal Aunt      No Known Problems Maternal Uncle      No Known Problems Paternal Aunt      No Known Problems Paternal Uncle      No Known Problems Maternal Grandmother      No Known Problems Maternal Grandfather      No Known Problems Paternal Grandfather      Amblyopia Neg Hx      Blindness Neg Hx      Cataracts Neg Hx      Macular degeneration Neg Hx      Retinal detachment Neg Hx      Strabismus Neg Hx      Thyroid disease Neg Hx      Esophageal cancer Neg Hx       Social History     Tobacco Use    Smoking status: Former     Current packs/day: 0.00     Average packs/day: 0.3 packs/day for 15.0 years (4.5 ttl pk-yrs)     Types: Cigarettes     Start date: 1958     Quit date: 1973     Years since quittin.3    Smokeless tobacco: Never   Substance Use Topics    Alcohol use: No     Alcohol/week: 0.0 standard drinks of alcohol    Drug use: No     Review of Systems    Physical Exam     Initial Vitals [24 0954]   BP Pulse Resp Temp SpO2   (!) 160/80 68 18 99 °F (37.2 °C) 100 %      MAP       --         Physical Exam    Nursing note and vitals reviewed.  Constitutional: No distress.   Patient is laying in bed in no apparent distress   HENT:   Head: Normocephalic.   Eyes: Conjunctivae and EOM are normal. No scleral icterus.   Neck: Neck supple.   Normal range of motion.  Cardiovascular:  Normal rate, regular rhythm and normal heart sounds.            Pulmonary/Chest: No respiratory distress. She has no wheezes. She has no rhonchi. She has rales (Mild crackles in bilateral lower lung fields). She exhibits no tenderness.   Abdominal: Abdomen is soft. She exhibits no distension. There is no abdominal tenderness. There is no rebound and no guarding.   Musculoskeletal:         General: Edema (BLE edema, left lower extremity more edematous compared to right) present. Normal range of motion.      Cervical back: Normal range of motion and neck supple.     Neurological: She is alert.   Skin: Skin is warm. Capillary refill takes less than 2 seconds.   Psychiatric: She has a normal mood and affect.         ED Course   Procedures  Labs Reviewed   CBC W/ AUTO DIFFERENTIAL - Abnormal; Notable for the following components:       Result Value    RBC 3.40 (*)     Hemoglobin 10.4 (*)     Hematocrit 33.1 (*)     MCHC 31.4 (*)     Immature Granulocytes 1.2 (*)     Immature Grans (Abs) 0.12 (*)     Lymph # 0.9 (*)     Gran % 77.7 (*)     Lymph % 9.1 (*)     All other components within normal limits   COMPREHENSIVE METABOLIC PANEL - Abnormal; Notable for the following components:    Sodium 128 (*)     Chloride 94 (*)     Calcium 8.4 (*)     Albumin 2.4 (*)     eGFR 52.9 (*)     All other components within normal limits   B-TYPE NATRIURETIC PEPTIDE - Abnormal; Notable for the following components:     (*)     All other components within normal limits   TROPONIN I   MAGNESIUM          Imaging Results              X-Ray Chest AP Portable (Final result)  Result time 06/06/24 12:42:55      Final result by Julian Duran MD (06/06/24 12:42:55)                   Impression:      Detrimental interval change in the appearance of the chest since 05/26/2024 is appreciated, relating to the development of bibasilar airspace consolidation and small amounts of pleural fluid on each side since that time.      Electronically signed by: Julian Duran  MD  Date:    06/06/2024  Time:    12:42               Narrative:    EXAMINATION:  XR CHEST AP PORTABLE    CLINICAL HISTORY:  SOB;    TECHNIQUE:  One view    COMPARISON:  Comparison is made to 05/26/2024.    FINDINGS:  Heart size and the appearance of the cardiomediastinal silhouette have not changed appreciably since the examination referenced above.  Accentuation of pulmonary interstitial markings in both lungs is again observed.  Opacity is now seen in both inferior hemithoraces with blurring of the margin of the diaphragm on each side, findings which were not present on 05/26/2024 and which are consistent with the development of bibasilar airspace consolidation and likely small amounts of pleural fluid on each side.  No pneumothorax.  Calcification in the wall of the aortic arch is again incidentally noted.                                       Medications   acetaminophen tablet 1,000 mg (0 mg Oral Hold 6/6/24 1115)   sodium chloride 0.9% flush 10 mL (has no administration in time range)   furosemide injection 60 mg (has no administration in time range)   sodium chloride 0.9% flush 10 mL (has no administration in time range)   enoxaparin injection 40 mg (has no administration in time range)   albuterol-ipratropium 2.5 mg-0.5 mg/3 mL nebulizer solution 3 mL (has no administration in time range)   melatonin tablet 6 mg (has no administration in time range)   ondansetron disintegrating tablet 8 mg (has no administration in time range)   promethazine tablet 25 mg (has no administration in time range)   polyethylene glycol packet 17 g (has no administration in time range)   bisacodyL suppository 10 mg (has no administration in time range)   acetaminophen tablet 650 mg (has no administration in time range)   glucose chewable tablet 16 g (has no administration in time range)   glucose chewable tablet 24 g (has no administration in time range)   glucagon (human recombinant) injection 1 mg (has no administration in time  range)   dextrose 10% bolus 125 mL 125 mL (has no administration in time range)   dextrose 10% bolus 250 mL 250 mL (has no administration in time range)   aspirin EC tablet 81 mg (has no administration in time range)   atorvastatin tablet 40 mg (has no administration in time range)   carvediloL tablet 12.5 mg (has no administration in time range)   levothyroxine tablet 150 mcg (has no administration in time range)   losartan tablet 25 mg (has no administration in time range)   furosemide injection 80 mg (80 mg Intravenous Given 6/6/24 1148)     Medical Decision Making  89-year-old female who presents with elevated BNP lab.  She is hypertensive, other vitals WNL.  On exam, she has crackles in bilateral lower lung fields and BLE edema.  Please see physical exam findings above for additional details.  Differential diagnoses include but are not limited to CHF, fluid overload, ACS, URI, PNA, electrolyte abnormality.  High suspicion of CHF or fluid overload in the setting of her elevated BNP, lung crackles, and BLE edema.  Obtained labs and CXR.  Administered 80 mg of IV Lasix.  Please see ED course for additional details.    Patient will be admitted to the hospital for observation in the setting of her dyspnea and CHF.    Amount and/or Complexity of Data Reviewed  Labs: ordered. Decision-making details documented in ED Course.  Radiology: ordered and independent interpretation performed. Decision-making details documented in ED Course.  ECG/medicine tests: ordered and independent interpretation performed. Decision-making details documented in ED Course.    Risk  OTC drugs.  Prescription drug management.               ED Course as of 06/06/24 1746   u Jun 06, 2024   1110 EKG 12-lead  EKG shows sinus bradycardia with first-degree AV block, rate of 57 beats per minute, and no STEMI.  Prior EKG shows sinus bradycardia with first-degree AV block. [MD]   1116 Patient was saturating at 91-93 % on room air.  Placed on 2 L O2  NC [MD]   1212 Sodium(!): 128  Mild hyponatremia [MD]   1212 BNP(!): 846  Elevated BNP compared to prior lab.  Patient received 80 mg of IV Lasix. [MD]   1243 X-Ray Chest AP Portable  CXR shows bilateral pleural effusions. [MD]      ED Course User Index  [MD] Nickolas Segura MD                             Clinical Impression:  Final diagnoses:  [R06.02] SOB (shortness of breath)  [I50.9] CHF (congestive heart failure)  [R07.9] Chest pain          ED Disposition Condition    Observation                 Nickolas Segura MD  Resident  06/06/24 4388

## 2024-06-06 NOTE — NURSING
Nurses Note -- 4 Eyes      6/6/2024   6:18 PM      Skin assessed during: Admit      [x] No Altered Skin Integrity Present    [x]Prevention Measures Documented      [] Yes- Altered Skin Integrity Present or Discovered   [] LDA Added if Not in Epic (Describe Wound)   [] New Altered Skin Integrity was Present on Admit and Documented in LDA   [] Wound Image Taken    Wound Care Consulted? No    Attending Nurse:  Tamika Smith RN/Staff Member:   Trini

## 2024-06-06 NOTE — ASSESSMENT & PLAN NOTE
Chronic, controlled. Latest blood pressure and vitals reviewed-     Temp:  [99 °F (37.2 °C)]   Pulse:  [53-68]   Resp:  [18-20]   BP: (132-160)/(61-80)   SpO2:  [91 %-100 %] .   Home meds for hypertension were reviewed and noted below.   Hypertension Medications               carvediloL (COREG) 12.5 MG tablet Take 1 tablet (12.5 mg total) by mouth 2 (two) times daily.    losartan (COZAAR) 25 MG tablet Take 1 tablet (25 mg total) by mouth once daily.    torsemide (DEMADEX) 10 MG Tab Take 1 tablet (10 mg total) by mouth once daily. As need for weight gain of 3 lbs in 1 day or 5 lbs in 3 days. Do not take if weight stable or decreasing. Daily weights at home.            While in the hospital, will manage blood pressure as follows; Continue home antihypertensive regimen    Will utilize p.r.n. blood pressure medication only if patient's blood pressure greater than 180/110 and she develops symptoms such as worsening chest pain or shortness of breath.

## 2024-06-06 NOTE — SUBJECTIVE & OBJECTIVE
Past Medical History:   Diagnosis Date    Basal cell carcinoma     nose    Benign essential hypertension     Cerebral microvascular disease 09/08/2014    Closed fracture of distal phalanx of left hand with routine healing 09/17/2015    Closed mallet fracture of distal phalanx of finger with routine healing 10/06/2015    Coronary artery disease     DDD (degenerative disc disease), lumbar 04/12/2016    Depression     Fall at home 05/30/2016    Hyperlipidemia     Hypothyroidism due to acquired atrophy of thyroid     Left atrial enlargement     Meningiomas, multiple     MI (myocardial infarction) 2004    80% blockage per patient    Migraine aura without headache 12/01/2014    Post PTCA 12/12/2012    Transient cerebral ischemia 05/04/2014       Past Surgical History:   Procedure Laterality Date    CARDIAC CATHETERIZATION  03/29/2004    S/P LAD PTCA, coated stent    CATARACT EXTRACTION W/  INTRAOCULAR LENS IMPLANT Bilateral 2000    Dr Youssef     CHOLECYSTECTOMY      CORONARY ANGIOPLASTY WITH STENT PLACEMENT  2004    LAD    EYE SURGERY      TOTAL THYROIDECTOMY         Review of patient's allergies indicates:   Allergen Reactions    Thiazides Other (See Comments)     Severe hyponatremia       No current facility-administered medications on file prior to encounter.     Current Outpatient Medications on File Prior to Encounter   Medication Sig    acetaminophen (TYLENOL) 500 MG tablet Take 2 tablets (1,000 mg total) by mouth every 8 (eight) hours.    alendronate (FOSAMAX) 70 MG tablet TAKE 1 TABLET BY MOUTH EVERY 7 DAYS    aspirin (ECOTRIN) 81 MG EC tablet Take 1 tablet (81 mg total) by mouth once daily.    atorvastatin (LIPITOR) 40 MG tablet Take 1 tablet (40 mg total) by mouth once daily.    calcium-vitamin D3 (OS-DANICA 500 + D3) 500 mg-5 mcg (200 unit) per tablet Take 1 tablet by mouth once daily.     carvediloL (COREG) 12.5 MG tablet Take 1 tablet (12.5 mg total) by mouth 2 (two) times daily.     clotrimazole-betamethasone 1-0.05% (LOTRISONE) cream Apply topically 2 (two) times daily as needed. (Patient not taking: Reported on 9/5/2023)    cyanocobalamin (VITAMIN B-12) 100 MCG tablet Take 100 mcg by mouth once daily.    dextran 70-hypromellose (ARTIFICIAL TEARS) ophthalmic solution Place 1 drop into both eyes every 2 (two) hours as needed. (Patient not taking: Reported on 5/25/2023)    levothyroxine (SYNTHROID) 150 MCG tablet Take 1 tablet (150 mcg total) by mouth once daily.    LINZESS 145 mcg Cap capsule TAKE 1 CAPSULE(145 MCG) BY MOUTH BEFORE BREAKFAST (Patient not taking: Reported on 5/25/2023)    LINZESS 145 mcg Cap capsule TAKE 1 CAPSULE(145 MCG) BY MOUTH BEFORE BREAKFAST (Patient not taking: Reported on 5/25/2023)    losartan (COZAAR) 25 MG tablet Take 1 tablet (25 mg total) by mouth once daily.    meclizine (ANTIVERT) 50 MG tablet Take 50 mg by mouth 3 (three) times daily as needed.    melatonin 10 mg Cap Take by mouth.    oxyCODONE (ROXICODONE) 10 mg Tab immediate release tablet Take 1 tablet (10 mg total) by mouth every 4 (four) hours as needed (severe pain). (Patient not taking: Reported on 5/25/2023)    polyethylene glycol (GLYCOLAX) 17 gram PwPk Take 17 g by mouth once daily. (Patient not taking: Reported on 5/25/2023)    senna-docusate 8.6-50 mg (PERICOLACE) 8.6-50 mg per tablet Take 1 tablet by mouth once daily. (Patient not taking: Reported on 5/25/2023)    torsemide (DEMADEX) 10 MG Tab Take 1 tablet (10 mg total) by mouth once daily. As need for weight gain of 3 lbs in 1 day or 5 lbs in 3 days. Do not take if weight stable or decreasing. Daily weights at home.     Family History       Problem Relation (Age of Onset)    Cancer Brother, Sister    Colon cancer Brother    Dementia Sister    Diabetes Mother, Paternal Grandmother    Glaucoma Maternal Aunt    Hypertension Father    No Known Problems Maternal Uncle, Paternal Aunt, Paternal Uncle, Maternal Grandmother, Maternal Grandfather, Paternal  Grandfather    Skin cancer Brother    Stroke Mother, Father          Tobacco Use    Smoking status: Former     Current packs/day: 0.00     Average packs/day: 0.3 packs/day for 15.0 years (4.5 ttl pk-yrs)     Types: Cigarettes     Start date: 1958     Quit date: 1973     Years since quittin.3    Smokeless tobacco: Never   Substance and Sexual Activity    Alcohol use: No     Alcohol/week: 0.0 standard drinks of alcohol    Drug use: No    Sexual activity: Never     Review of Systems   Constitutional:  Negative for chills and fever.   Respiratory:  Negative for chest tightness and shortness of breath.    Cardiovascular:  Positive for leg swelling. Negative for chest pain.   Gastrointestinal:  Negative for abdominal pain and nausea.   Neurological:  Negative for dizziness and weakness.     Objective:     Vital Signs (Most Recent):  Temp: 99 °F (37.2 °C) (24 0954)  Pulse: (!) 53 (24 1213)  Resp: 20 (24 1213)  BP: (!) 152/77 (24 1202)  SpO2: 100 % (24 1213) Vital Signs (24h Range):  Temp:  [99 °F (37.2 °C)] 99 °F (37.2 °C)  Pulse:  [53-68] 53  Resp:  [18-20] 20  SpO2:  [91 %-100 %] 100 %  BP: (132-160)/(61-80) 152/77     Weight: 80.3 kg (177 lb)  Body mass index is 27.72 kg/m².     Physical Exam  Vitals and nursing note reviewed.   Constitutional:       Appearance: She is well-developed. She is not ill-appearing.   HENT:      Head: Normocephalic and atraumatic.      Nose:      Comments: NC in place  Eyes:      Pupils: Pupils are equal, round, and reactive to light.   Cardiovascular:      Rate and Rhythm: Normal rate and regular rhythm.   Pulmonary:      Effort: Pulmonary effort is normal. No respiratory distress.      Breath sounds: Rales present. No wheezing.   Abdominal:      Palpations: Abdomen is soft.      Tenderness: There is no abdominal tenderness.   Musculoskeletal:         General: No tenderness.      Right lower leg: Edema (3+ pitting edema) present.      Left lower  leg: Edema (3+ pittting edema) present.   Skin:     General: Skin is warm and dry.   Neurological:      General: No focal deficit present.      Mental Status: She is alert and oriented to person, place, and time.   Psychiatric:         Behavior: Behavior normal.              CRANIAL NERVES     CN III, IV, VI   Pupils are equal, round, and reactive to light.       Significant Labs: All pertinent labs within the past 24 hours have been reviewed.  BMP:   Recent Labs   Lab 06/06/24  1123      *   K 4.6   CL 94*   CO2 25   BUN 29   CREATININE 1.0   CALCIUM 8.4*   MG 2.2     CBC:   Recent Labs   Lab 06/06/24  1123   WBC 9.91   HGB 10.4*   HCT 33.1*          Significant Imaging: I have reviewed all pertinent imaging results/findings within the past 24 hours.    Imaging Results              X-Ray Chest AP Portable (Final result)  Result time 06/06/24 12:42:55      Final result by Julian Duran MD (06/06/24 12:42:55)                   Impression:      Detrimental interval change in the appearance of the chest since 05/26/2024 is appreciated, relating to the development of bibasilar airspace consolidation and small amounts of pleural fluid on each side since that time.      Electronically signed by: Julain uDran MD  Date:    06/06/2024  Time:    12:42               Narrative:    EXAMINATION:  XR CHEST AP PORTABLE    CLINICAL HISTORY:  SOB;    TECHNIQUE:  One view    COMPARISON:  Comparison is made to 05/26/2024.    FINDINGS:  Heart size and the appearance of the cardiomediastinal silhouette have not changed appreciably since the examination referenced above.  Accentuation of pulmonary interstitial markings in both lungs is again observed.  Opacity is now seen in both inferior hemithoraces with blurring of the margin of the diaphragm on each side, findings which were not present on 05/26/2024 and which are consistent with the development of bibasilar airspace consolidation and likely small amounts of pleural  fluid on each side.  No pneumothorax.  Calcification in the wall of the aortic arch is again incidentally noted.

## 2024-06-07 ENCOUNTER — DOCUMENTATION ONLY (OUTPATIENT)
Dept: CARDIOLOGY | Facility: CLINIC | Age: 89
End: 2024-06-07
Payer: MEDICARE

## 2024-06-07 VITALS
RESPIRATION RATE: 20 BRPM | DIASTOLIC BLOOD PRESSURE: 50 MMHG | HEIGHT: 66 IN | WEIGHT: 176 LBS | TEMPERATURE: 98 F | HEART RATE: 55 BPM | BODY MASS INDEX: 28.28 KG/M2 | SYSTOLIC BLOOD PRESSURE: 97 MMHG | OXYGEN SATURATION: 95 %

## 2024-06-07 LAB
ANION GAP SERPL CALC-SCNC: 9 MMOL/L (ref 8–16)
BASOPHILS # BLD AUTO: 0.06 K/UL (ref 0–0.2)
BASOPHILS NFR BLD: 0.7 % (ref 0–1.9)
BUN SERPL-MCNC: 27 MG/DL (ref 10–30)
CALCIUM SERPL-MCNC: 8.3 MG/DL (ref 8.7–10.5)
CHLORIDE SERPL-SCNC: 91 MMOL/L (ref 95–110)
CO2 SERPL-SCNC: 30 MMOL/L (ref 23–29)
CREAT SERPL-MCNC: 0.9 MG/DL (ref 0.5–1.4)
DIFFERENTIAL METHOD BLD: ABNORMAL
EOSINOPHIL # BLD AUTO: 0.4 K/UL (ref 0–0.5)
EOSINOPHIL NFR BLD: 4.5 % (ref 0–8)
ERYTHROCYTE [DISTWIDTH] IN BLOOD BY AUTOMATED COUNT: 13.6 % (ref 11.5–14.5)
EST. GFR  (NO RACE VARIABLE): 60 ML/MIN/1.73 M^2
GLUCOSE SERPL-MCNC: 84 MG/DL (ref 70–110)
HCT VFR BLD AUTO: 32.8 % (ref 37–48.5)
HGB BLD-MCNC: 10.6 G/DL (ref 12–16)
IMM GRANULOCYTES # BLD AUTO: 0.1 K/UL (ref 0–0.04)
IMM GRANULOCYTES NFR BLD AUTO: 1.2 % (ref 0–0.5)
LYMPHOCYTES # BLD AUTO: 1.4 K/UL (ref 1–4.8)
LYMPHOCYTES NFR BLD: 17.5 % (ref 18–48)
MAGNESIUM SERPL-MCNC: 2 MG/DL (ref 1.6–2.6)
MCH RBC QN AUTO: 30.5 PG (ref 27–31)
MCHC RBC AUTO-ENTMCNC: 32.3 G/DL (ref 32–36)
MCV RBC AUTO: 94 FL (ref 82–98)
MONOCYTES # BLD AUTO: 0.9 K/UL (ref 0.3–1)
MONOCYTES NFR BLD: 11 % (ref 4–15)
NEUTROPHILS # BLD AUTO: 5.2 K/UL (ref 1.8–7.7)
NEUTROPHILS NFR BLD: 65.1 % (ref 38–73)
NRBC BLD-RTO: 0 /100 WBC
PHOSPHATE SERPL-MCNC: 3.8 MG/DL (ref 2.7–4.5)
PLATELET # BLD AUTO: 408 K/UL (ref 150–450)
PMV BLD AUTO: 9.8 FL (ref 9.2–12.9)
POTASSIUM SERPL-SCNC: 3.8 MMOL/L (ref 3.5–5.1)
RBC # BLD AUTO: 3.48 M/UL (ref 4–5.4)
SODIUM SERPL-SCNC: 130 MMOL/L (ref 136–145)
TROPONIN I SERPL DL<=0.01 NG/ML-MCNC: <0.006 NG/ML (ref 0–0.03)
WBC # BLD AUTO: 8.06 K/UL (ref 3.9–12.7)

## 2024-06-07 PROCEDURE — G0378 HOSPITAL OBSERVATION PER HR: HCPCS

## 2024-06-07 PROCEDURE — 85025 COMPLETE CBC W/AUTO DIFF WBC: CPT

## 2024-06-07 PROCEDURE — 25000003 PHARM REV CODE 250

## 2024-06-07 PROCEDURE — 84100 ASSAY OF PHOSPHORUS: CPT

## 2024-06-07 PROCEDURE — 63600175 PHARM REV CODE 636 W HCPCS

## 2024-06-07 PROCEDURE — 36415 COLL VENOUS BLD VENIPUNCTURE: CPT

## 2024-06-07 PROCEDURE — 84484 ASSAY OF TROPONIN QUANT: CPT

## 2024-06-07 PROCEDURE — 96376 TX/PRO/DX INJ SAME DRUG ADON: CPT

## 2024-06-07 PROCEDURE — 83735 ASSAY OF MAGNESIUM: CPT

## 2024-06-07 PROCEDURE — 80048 BASIC METABOLIC PNL TOTAL CA: CPT

## 2024-06-07 RX ORDER — TORSEMIDE 10 MG/1
10 TABLET ORAL DAILY
Start: 2024-06-07 | End: 2025-06-07

## 2024-06-07 RX ORDER — LEVOTHYROXINE SODIUM 150 UG/1
150 TABLET ORAL DAILY
Start: 2024-06-08 | End: 2025-06-08

## 2024-06-07 RX ADMIN — ATORVASTATIN CALCIUM 40 MG: 40 TABLET, FILM COATED ORAL at 08:06

## 2024-06-07 RX ADMIN — ASPIRIN 81 MG: 81 TABLET, COATED ORAL at 08:06

## 2024-06-07 RX ADMIN — LEVOTHYROXINE SODIUM 150 MCG: 75 TABLET ORAL at 08:06

## 2024-06-07 RX ADMIN — CARVEDILOL 12.5 MG: 12.5 TABLET, FILM COATED ORAL at 09:06

## 2024-06-07 RX ADMIN — LOSARTAN POTASSIUM 25 MG: 25 TABLET, FILM COATED ORAL at 08:06

## 2024-06-07 RX ADMIN — ACETAMINOPHEN 650 MG: 325 TABLET ORAL at 11:06

## 2024-06-07 RX ADMIN — FUROSEMIDE 60 MG: 10 INJECTION, SOLUTION INTRAMUSCULAR; INTRAVENOUS at 08:06

## 2024-06-07 NOTE — NURSING
Called to give report to [facility] x3. I was place on hold the first time. Second time , I heard someone laughing and said call back right away. I called the third time and I was place on hold again and I was  told by  to call back again. X3 attempts failed

## 2024-06-07 NOTE — PLAN OF CARE
Problem: Adult Inpatient Plan of Care  Goal: Plan of Care Review  Outcome: Met  Goal: Patient-Specific Goal (Individualized)  Outcome: Met  Goal: Absence of Hospital-Acquired Illness or Injury  Outcome: Met  Goal: Optimal Comfort and Wellbeing  Outcome: Met  Goal: Readiness for Transition of Care  Outcome: Met     Problem: Infection  Goal: Absence of Infection Signs and Symptoms  Outcome: Met     Problem: Skin Injury Risk Increased  Goal: Skin Health and Integrity  Outcome: Met   Pt discharged, AVS given,education completed. Pt verbalized understanding. All questions and concerns were met.

## 2024-06-07 NOTE — PLAN OF CARE
[facility] - Observation 11H  Initial Discharge Assessment       Primary Care Provider: Ama Graham MD    Admission Diagnosis: CHF (congestive heart failure) [I50.9]  SOB (shortness of breath) [R06.02]  Chest pain [R07.9]    Admission Date: 6/6/2024  Expected Discharge Date: 6/8/2024    Transition of Care Barriers: None    Payor: MEDICARE / Plan: MEDICARE PART A & B / Product Type: Government /     Extended Emergency Contact Information  Primary Emergency Contact: Anamika Baugh  Address: [address] [place] [address] [place]  Mobile Phone: [phone]  Relation: Relative  Secondary Emergency Contact: patsy gonzalez  Mobile Phone: [phone]  Relation: None    Discharge Plan A: Return to nursing home  Discharge Plan B: Return to Nursing Home      [facility] - [place], [place] - [address]  [address] [address]  Phone: [phone] Fax: [phone]    [id number] - [place] - [address] [facility] [address]  [address] [place] [address]  Phone: [phone] Fax: [phone]    [facility] [address]  [address]  [address] [place] [address]  Phone: [phone] Fax: [phone]      Initial Assessment (most recent)       Adult Discharge Assessment - 06/07/24 0947          Discharge Assessment    Assessment Type Discharge Planning Assessment     Confirmed/corrected address, phone number and insurance Yes     Confirmed Demographics Correct on Facesheet     Source of Information patient     If unable to respond/provide information was family/caregiver contacted? No Contact Information Available     Communicated RACHAEL with patient/caregiver Date not available/Unable to determine     Reason For Admission Acute on chronic diastolic heart failure     People in Home alone     Facility Arrived From: [facility]     Do you  expect to return to your current living situation? Yes     Do you have help at home or someone to help you manage your care at home? Yes     Who are your caregiver(s) and their phone number(s)? [facility]     Prior to hospitilization cognitive status: Alert/Oriented     Current cognitive status: Alert/Oriented     Dressing/Bathing Difficulty no     Equipment Currently Used at Home wheelchair     Patient currently being followed by outpatient case management? No     Do you currently have service(s) that help you manage your care at home? No     Do you take prescription medications? Yes     Do you have prescription coverage? Yes     Coverage Medicare Part A/B/Medicaid     Do you have any problems affording any of your prescribed medications? No     Is the patient taking medications as prescribed? yes     Who is going to help you get home at discharge? EMS     How do you get to doctors appointments? health plan transportation     Are you on dialysis? No     Do you take coumadin? No     Discharge Plan A Return to nursing home     Discharge Plan B Return to Nursing Home     DME Needed Upon Discharge  none     Discharge Plan discussed with: Patient     Transition of Care Barriers None                   Patient is a detention resident at [facility] and will return at [place]. She ambulated by wheelchair and will need EMS transportation back to facility.     Jaclyn Mcclain, AMANDA  [facility]  Ext. [phone]

## 2024-06-07 NOTE — DISCHARGE SUMMARY
[facility] Observation [address] [place] [address] Medicine  Discharge Summary      Patient Name: Laila Hanna  MRN: [id number]  GURVINDER: [id number]  Patient Class: OP- Observation  Admission Date: 6/6/2024  Hospital Length of Stay: 0 days  Discharge Date and Time:  06/07/2024 3:33 PM  Attending Physician: Erick Johnson MD   Discharging Provider: Magda Restrepo PA-C  Primary Care Provider: Ama Graham MD  [facility] Medicine Team: [facility] Magda Restrepo PA-C  Primary Care Team: [facility]    HPI:   Laila Hanna is a 89 y.o. female with a hx of CHF, HTN, CAD, and hyponatremia presents to the ED from her NH for an abnormal lab. Patient states she was told she came to the hospital because she was retaining too much fluid. States she noticed last night she wasn't urinating as much as she usually does. Endorses lower extremity swelling that has worsened. Had some intermittent chest tightness a few days ago but denies any further episodes. Denies shortness of breath. States she was told she made need O2 at night so that she is more comfortable. Denies headache, fever, chills, chest pain, shortness of breath, abdominal pain, and n/v.    ED: AF, bradycardic. Sat 99% on 2L. CBC unremarkable. Na 128. . Trop wnl. CXR showed detrimental interval change in the appearance of the chest since 05/26/2024 is appreciated, relating to the development of bibasilar airspace consolidation and small amounts of pleural fluid on each side since that time. Given lasix in the ED.     * No surgery found *      Hospital Course:   Laila Hanna was placed in  observation for management of acute on chronic CHF. Echo with preserved EF, but abnormalities in wall motion. There is moderate pulmonary hypertension with estimated pulmonary artery systolic pressure is 58 mmHg. Trops wnl. BNP >3000. Diuresis with IV lasix - UOP >2L. On repeat physical exam patient near euvolemia. Will change home dose of prn torsemide to daily  dosing. LLE > RLE, mild tenderness on L calf palpation. Will check doppler US to rule out DVT. US negative. Maintaining sats on room air. Referral to transitional HF clinic. On exam prior to discharge patient doing well and medically stable. Discharge plan discussed and patient expressed understanding. All questions answered.      Goals of Care Treatment Preferences:  Code Status: Full Code      Consults:     No new Assessment & Plan notes have been filed under this hospital service since the last note was generated.  Service: Hospital Medicine    Final Active Diagnoses:    Diagnosis Date Noted POA    PRINCIPAL PROBLEM:  Acute on chronic diastolic heart failure [I50.33] 08/23/2023 Yes    Coronary artery disease involving native coronary artery of native heart [I25.10] 01/15/2020 Yes     Chronic    Hyperlipemia [E78.5] 06/07/2016 Yes    Essential hypertension [I10] 05/29/2016 Yes     Chronic    Hyponatremia [E87.1] 05/05/2014 Yes    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]  Yes     Chronic      Problems Resolved During this Admission:       Discharged Condition: stable    Disposition: Home or Self Care    Follow Up:    Patient Instructions:      Ambulatory referral/consult to Heart Failure Transitional Care Clinic   Standing Status: Future   Referral Priority: Urgent Referral Type: Consultation   Referral Reason: Specialty Services Required   Requested Specialty: Cardiology   Number of Visits Requested: 1     Ambulatory referral/consult to Heart Failure Transitional Care Clinic   Standing Status: Future   Referral Priority: Routine Referral Type: Consultation   Referral Reason: Specialty Services Required   Requested Specialty: Cardiology   Number of Visits Requested: 1     Call MD for:  temperature >100.4     Call MD for:  persistent nausea and vomiting or diarrhea     Call MD for:  severe uncontrolled pain     Call MD for:  redness, tenderness, or signs of infection (pain, swelling, redness, odor or green/yellow  discharge around incision site)     Call MD for:  difficulty breathing or increased cough     Call MD for:  severe persistent headache     Call MD for:  worsening rash     Call MD for:  persistent dizziness, light-headedness, or visual disturbances     Call MD for:  increased confusion or weakness       Significant Diagnostic Studies: N/A    Pending Diagnostic Studies:       Procedure Component Value Units Date/Time    Troponin I [[id number]]     Order Status: Sent Lab Status: No result     Specimen: Blood     US Lower Extremity Veins Left [[id number]]     Order Status: Sent Lab Status: No result            Medications:  Reconciled Home Medications:      Medication List        CHANGE how you take these medications      levothyroxine 150 MCG tablet  Commonly known as: SYNTHROID  Take 1 tablet (150 mcg total) by mouth once daily.  Start taking on: June 8, 2024  What changed: Another medication with the same name was removed. Continue taking this medication, and follow the directions you see here.     torsemide 10 MG Tab  Commonly known as: DEMADEX  Take 1 tablet (10 mg total) by mouth once daily. Take this medication every day.  What changed: additional instructions            CONTINUE taking these medications      acetaminophen 500 MG tablet  Commonly known as: TYLENOL  Take 2 tablets (1,000 mg total) by mouth every 8 (eight) hours.     * albuterol 2.5 mg /3 mL (0.083 %) nebulizer solution  Commonly known as: PROVENTIL  Take 2.5 mg by nebulization every 4 (four) hours as needed for Shortness of Breath.     * albuterol 90 mcg/actuation inhaler  Commonly known as: PROVENTIL/VENTOLIN HFA  Inhale 2 puffs into the lungs every 6 (six) hours as needed for Shortness of Breath.     alendronate 70 MG tablet  Commonly known as: FOSAMAX  TAKE 1 TABLET BY MOUTH EVERY 7 DAYS     aspirin 81 MG EC tablet  Commonly known as: ECOTRIN  Take 1 tablet (81 mg total) by mouth once daily.     atorvastatin 40 MG tablet  Commonly known as:  LIPITOR  Take 1 tablet (40 mg total) by mouth once daily.     BIOTENE DRY MOUTH ORAL RINSE MM  Rinse by mouth daily as needed for dry mouth.     bisacodyL 5 mg EC tablet  Commonly known as: DULCOLAX  Take 2 tablets by mouth daily as needed for Constipation.     buPROPion 300 MG 24 hr tablet  Commonly known as: WELLBUTRIN XL  Take 300 mg by mouth once daily.     calcium-vitamin D3 500 mg-5 mcg (200 unit) per tablet  Commonly known as: OS-DANICA 500 + D3  Take 1 tablet by mouth once daily.     clotrimazole-betamethasone lotion  Commonly known as: LOTRISONE  Apply topically 2 (two) times daily as needed.     cyanocobalamin 100 MCG tablet  Commonly known as: VITAMIN B-12  Take 100 mcg by mouth once daily.     dextran 70-hypromellose ophthalmic solution  Commonly known as: ARTIFICIAL TEARS([id number])  Place 1 drop into both eyes every 2 (two) hours as needed.     diclofenac sodium 1 % Gel  Commonly known as: VOLTAREN  Apply topically. Apply topically to hands and knees daily as needed for pain.     EScitalopram oxalate 10 MG tablet  Commonly known as: LEXAPRO  Take 20 mg by mouth every evening.     guaiFENesin 100 mg/5 ml 100 mg/5 mL syrup  Commonly known as: ROBITUSSIN  Take 200 mg by mouth every 4 (four) hours as needed for Cough.     levoFLOXacin 500 MG tablet  Commonly known as: LEVAQUIN  Take 500 mg by mouth once daily. X 7 days     lisinopriL 10 MG tablet  Take 10 mg by mouth once daily.     meclizine 50 MG tablet  Commonly known as: ANTIVERT  Take 50 mg by mouth every 6 (six) hours as needed for Dizziness.     melatonin 3 mg tablet  Commonly known as: MELATIN  Take 2 tablets by mouth nightly. 10 pm     polyethylene glycol 17 gram Pwpk  Commonly known as: GLYCOLAX  Take 17 g by mouth once daily.     senna-docusate 8.6-50 mg 8.6-50 mg per tablet  Commonly known as: PERICOLACE  Take 1 tablet by mouth once daily.     traZODone 50 MG tablet  Commonly known as: DESYREL  Take 25 mg by mouth nightly as needed.           *  This list has 2 medication(s) that are the same as other medications prescribed for you. Read the directions carefully, and ask your doctor or other care provider to review them with you.                STOP taking these medications      carvediloL 25 MG tablet  Commonly known as: COREG            ASK your doctor about these medications      oxyCODONE 10 mg Tab immediate release tablet  Commonly known as: ROXICODONE  Take 1 tablet (10 mg total) by mouth every 4 (four) hours as needed (severe pain).              Indwelling Lines/Drains at time of discharge:   Lines/Drains/Airways       Drain  Duration             Female External Urinary Catheter w/ Suction 06/06/24 1307 1 day                    Time spent on the discharge of patient: 36 minutes         Magda Restrepo PA-C  Department of Hospital Medicine  [facility] Observ[facility]

## 2024-06-07 NOTE — PLAN OF CARE
"NARENDRA spoke to Jody with [facility] to inform that pt may DC over the weekend. Per Jody, once received progress notes have been reviewed they will determine if pt can or cannot. NARENDRA notified PA to upload NH orders for NH review. NARENDRA will continue to follow up.     11:24 AM faxed NH orders to [facility]     1:57 PM NARENDRA spoke to Vicky with [facility] who stated that their clinical team is gone for the day and their cut off time to return is at 12 noon. Due to not accepting pt's over the weekend, she will have to DC on Monday. Treatment team notified.     2:17 PM NARENDRA spoke to Vicky again with [facility] to receive more clarification on why the pt can't DC back today due to her being a [facility] pt. NARENDRA informed Vicky that per Jody "  [facility] can accept return either today or Monday, not over the weekend.  " (response via careport). Per Vicky she will reach out to DON to see if they can accept and follow up.     2:43 PM NARENDRA received call back from Vicky with [facility] to inform that DON has approved for her to return today.     Report Info: Spoke to NH: Please call report at 3pm to [phone], , ask for [facility] Nurse.     2:54 PM   NARENDRA arranged ambulance transport via [facility]. Requested  time is 4:00 PM.  Requested  time does not guarantee arrival time.  If transport does not arrive by 6:00 pm please contact assigned SW or on-call for assistance.     3:15 PM NARENDRA spoke to Chema with  to push transport back to 6pm for pickup. Treatment team notified. No other CM needs at this time.     Patient's bedside nurse notified of the above.      Jaclyn Mcclain, MSW  [facility]  Ext. [phone]    "

## 2024-06-07 NOTE — NURSING
Patient AAOX4 this morning. No distress noted. Patient has bilateral edema in the lower extremities +2. Oxygen at 2 L per NC. Call light within reach. Bed at lowest position.

## 2024-06-07 NOTE — PLAN OF CARE
NURSING HOME ORDERS    06/07/2024  [facility]  [facility] OBSERVATION 11H  [address]  [facility] [address]  Dept: [phone]  Loc: [phone]     Admit to Nursing Home:  [facility]    Diagnoses:  Active Hospital Problems    Diagnosis  POA    *Acute on chronic diastolic heart failure [I50.33]  Yes    Coronary artery disease involving native coronary artery of native heart [I25.10]  Yes     Chronic    Hyperlipemia [E78.5]  Yes    Essential hypertension [I10]  Yes     Chronic    Hyponatremia [E87.1]  Yes    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]  Yes     Chronic      Resolved Hospital Problems   No resolved problems to display.       Patient is homebound due to:  Acute on chronic diastolic heart failure    Allergies:  Review of patient's allergies indicates:   Allergen Reactions    Thiazides Other (See Comments)     Severe hyponatremia       Vitals:  Routine    Diet: cardiac diet    Activities:   Activity as tolerated    Goals of Care Treatment Preferences:  Code Status: Full Code      Labs:  per facility protocol     Nursing Precautions:  Fall and Pressure ulcer prevention    Consults:   as needed    Miscellaneous Care: CHF Care: Daily Weight with notification of MD/NP of 2lb or > increase in 24 hours    v/s and O2 sat every shift    Oxygen as needed for sats <90%    Report abnormal breath sounds to MD/NP    Edema checks q shift- notify MD/NP of increased edema    Task segmentation by nursing for daily care to decrease exertion     Referral placed to Heart failure clinic - follow up as scheduled.    CHF education to include diet ,medication, and CHF flags for MD notification                      Medications: Discontinue all previous medication orders, if any. See new list below.     Medication List        CHANGE how you take these medications      levothyroxine 150 MCG tablet  Commonly known as: SYNTHROID  Take 1 tablet (150 mcg total) by mouth once daily.  Start taking on: June 8,  2024  What changed: Another medication with the same name was removed. Continue taking this medication, and follow the directions you see here.     torsemide 10 MG Tab  Commonly known as: DEMADEX  Take 1 tablet (10 mg total) by mouth once daily. Take this medication every day.  What changed: additional instructions            CONTINUE taking these medications      acetaminophen 500 MG tablet  Commonly known as: TYLENOL  Take 2 tablets (1,000 mg total) by mouth every 8 (eight) hours.     * albuterol 2.5 mg /3 mL (0.083 %) nebulizer solution  Commonly known as: PROVENTIL  Take 2.5 mg by nebulization every 4 (four) hours as needed for Shortness of Breath.     * albuterol 90 mcg/actuation inhaler  Commonly known as: PROVENTIL/VENTOLIN HFA  Inhale 2 puffs into the lungs every 6 (six) hours as needed for Shortness of Breath.     alendronate 70 MG tablet  Commonly known as: FOSAMAX  TAKE 1 TABLET BY MOUTH EVERY 7 DAYS     aspirin 81 MG EC tablet  Commonly known as: ECOTRIN  Take 1 tablet (81 mg total) by mouth once daily.     atorvastatin 40 MG tablet  Commonly known as: LIPITOR  Take 1 tablet (40 mg total) by mouth once daily.     BIOTENE DRY MOUTH ORAL RINSE MM  Rinse by mouth daily as needed for dry mouth.     bisacodyL 5 mg EC tablet  Commonly known as: DULCOLAX  Take 2 tablets by mouth daily as needed for Constipation.     buPROPion 300 MG 24 hr tablet  Commonly known as: WELLBUTRIN XL  Take 300 mg by mouth once daily.     calcium-vitamin D3 500 mg-5 mcg (200 unit) per tablet  Commonly known as: OS-DANICA 500 + D3  Take 1 tablet by mouth once daily.     clotrimazole-betamethasone lotion  Commonly known as: LOTRISONE  Apply topically 2 (two) times daily as needed.     cyanocobalamin 100 MCG tablet  Commonly known as: VITAMIN B-12  Take 100 mcg by mouth once daily.     dextran 70-hypromellose ophthalmic solution  Commonly known as: ARTIFICIAL TEARS([id number])  Place 1 drop into both eyes every 2 (two) hours as needed.      diclofenac sodium 1 % Gel  Commonly known as: VOLTAREN  Apply topically. Apply topically to hands and knees daily as needed for pain.     EScitalopram oxalate 10 MG tablet  Commonly known as: LEXAPRO  Take 20 mg by mouth every evening.     guaiFENesin 100 mg/5 ml 100 mg/5 mL syrup  Commonly known as: ROBITUSSIN  Take 200 mg by mouth every 4 (four) hours as needed for Cough.     levoFLOXacin 500 MG tablet  Commonly known as: LEVAQUIN  Take 500 mg by mouth once daily. X 7 days     lisinopriL 10 MG tablet  Take 10 mg by mouth once daily.     meclizine 50 MG tablet  Commonly known as: ANTIVERT  Take 50 mg by mouth every 6 (six) hours as needed for Dizziness.     melatonin 3 mg tablet  Commonly known as: MELATIN  Take 2 tablets by mouth nightly. 10 pm     polyethylene glycol 17 gram Pwpk  Commonly known as: GLYCOLAX  Take 17 g by mouth once daily.     senna-docusate 8.6-50 mg 8.6-50 mg per tablet  Commonly known as: PERICOLACE  Take 1 tablet by mouth once daily.     traZODone 50 MG tablet  Commonly known as: DESYREL  Take 25 mg by mouth nightly as needed.           * This list has 2 medication(s) that are the same as other medications prescribed for you. Read the directions carefully, and ask your doctor or other care provider to review them with you.                STOP taking these medications      carvediloL 25 MG tablet  Commonly known as: COREG            ASK your doctor about these medications      oxyCODONE 10 mg Tab immediate release tablet  Commonly known as: ROXICODONE  Take 1 tablet (10 mg total) by mouth every 4 (four) hours as needed (severe pain).                Immunizations Administered as of 6/7/2024       Name Date Dose VIS Date Route Exp Date    COVID-19, vector-nr, [id number] (J&J) 3/5/2021  7:44 AM 0.5 mL 1/13/2021 Intramuscular 5/25/2021    Site: Right deltoid     Given By: Ginger Muniz, RN     : [employer]     Lot: [id number]             Second dose to be given in  overdue    Some patients may experience side effects after vaccination.  These may include fever, headache, muscle or joint aches.  Most symptoms resolve with 24-48 hours and do not require urgent medical evaluation unless they persist for more than 72 hours or symptoms are concerning for an unrelated medical condition.          _________________________________  Magda Restrepo PA-C  06/07/2024

## 2024-06-07 NOTE — HOSPITAL COURSE
Laila Hanna was placed in  observation for management of acute on chronic CHF. Echo with preserved EF, but abnormalities in wall motion. There is moderate pulmonary hypertension with estimated pulmonary artery systolic pressure is 58 mmHg. Trops wnl. BNP >3000. Diuresis with IV lasix - UOP >2L. On repeat physical exam patient near euvolemia. Will change home dose of prn torsemide to daily dosing. LLE > RLE, mild tenderness on L calf palpation. Will check doppler US to rule out DVT. US negative. Maintaining sats on room air. Referral to transitional HF clinic. On exam prior to discharge patient doing well and medically stable. Discharge plan discussed and patient expressed understanding. All questions answered.

## 2024-06-07 NOTE — PROGRESS NOTES
Heart Failure Transitional Care Clinic (HFTCC) Team notified of pt referral via [facility] (automated inbasket notification) .    Patient screened today 6/7/2024 by provider and LPN for enrollment to program.      Pt was deemed not a candidate for enrollment at this time related to  pt's resides in [facility].     Pt will require additional follow up planning per primary team.     If pt status, diagnosis, or treatment plan changes , please place AMB referral to [facility] Failure [facility]itional [facility] ([id number]) for HFTCC enrollment re-evalution.

## 2024-06-07 NOTE — PLAN OF CARE
06/07/24 1457   Final Note   Assessment Type Final Discharge Note   Anticipated Discharge Disposition [facility]   Hospital Resources/Appts/Education Provided Provided patient/caregiver with written discharge plan information   Post-Acute Status   Patient choice form signed by patient/caregiver List with quality metrics by geographic area provided   Discharge Delays None known at this time     [facility] 11H  Discharge Final Note    Primary Care Provider: Ama Graham MD    Expected Discharge Date: 6/7/2024    Patient returning back to [facility] for detention placement.     Patient cleared for discharge from case management standpoint.     Discharge Plan A and Plan B have been determined by review of patient's clinical status, future medical and therapeutic needs, and coverage/benefits for post-acute care in coordination with multidisciplinary team members.        Final Discharge Note (most recent)       Final Note - 06/07/24 1457          Final Note    Assessment Type Final Discharge Note (P)      Anticipated Discharge Disposition [facility] (P)      Hospital Resources/Appts/Education Provided Provided patient/caregiver with written discharge plan information (P)         Post-Acute Status    Patient choice form signed by patient/caregiver List with quality metrics by geographic area provided (P)      Discharge Delays None known at this time (P)                      Important Message from Medicare                 No future appointments.    SW scheduled post-discharge follow-up appointment and information added to AVS.     Jaclyn Mcclain, AMANDA  [facility]  Ext. [phone]

## 2024-06-25 ENCOUNTER — HOSPITAL ENCOUNTER (EMERGENCY)
Facility: HOSPITAL | Age: 89
Discharge: HOME | End: 2024-06-25
Attending: EMERGENCY MEDICINE
Payer: MEDICARE

## 2024-06-25 VITALS
DIASTOLIC BLOOD PRESSURE: 58 MMHG | HEIGHT: 66 IN | WEIGHT: 175 LBS | BODY MASS INDEX: 28.12 KG/M2 | TEMPERATURE: 98 F | RESPIRATION RATE: 13 BRPM | HEART RATE: 64 BPM | SYSTOLIC BLOOD PRESSURE: 128 MMHG | OXYGEN SATURATION: 97 %

## 2024-06-25 DIAGNOSIS — R07.9 CHEST PAIN: ICD-10-CM

## 2024-06-25 LAB
ALBUMIN SERPL BCP-MCNC: 2.7 G/DL (ref 3.5–5.2)
ALP SERPL-CCNC: 83 U/L (ref 55–135)
ALT SERPL W/O P-5'-P-CCNC: 5 U/L (ref 10–44)
ANION GAP SERPL CALC-SCNC: 8 MMOL/L (ref 8–16)
AST SERPL-CCNC: 12 U/L (ref 10–40)
BASOPHILS # BLD AUTO: 0.03 K/UL (ref 0–0.2)
BASOPHILS NFR BLD: 0.3 % (ref 0–1.9)
BILIRUB SERPL-MCNC: 0.6 MG/DL (ref 0.1–1)
BNP SERPL-MCNC: 217 PG/ML (ref 0–99)
BUN SERPL-MCNC: 17 MG/DL (ref 10–30)
CALCIUM SERPL-MCNC: 8.7 MG/DL (ref 8.7–10.5)
CHLORIDE SERPL-SCNC: 98 MMOL/L (ref 95–110)
CO2 SERPL-SCNC: 27 MMOL/L (ref 23–29)
CREAT SERPL-MCNC: 0.8 MG/DL (ref 0.5–1.4)
DIFFERENTIAL METHOD BLD: ABNORMAL
EOSINOPHIL # BLD AUTO: 0.4 K/UL (ref 0–0.5)
EOSINOPHIL NFR BLD: 4.3 % (ref 0–8)
ERYTHROCYTE [DISTWIDTH] IN BLOOD BY AUTOMATED COUNT: 13.5 % (ref 11.5–14.5)
EST. GFR  (NO RACE VARIABLE): >60 ML/MIN/1.73 M^2
GLUCOSE SERPL-MCNC: 90 MG/DL (ref 70–110)
HCT VFR BLD AUTO: 37.2 % (ref 37–48.5)
HCV AB SERPL QL IA: NORMAL
HGB BLD-MCNC: 11.8 G/DL (ref 12–16)
HIV 1+2 AB+HIV1 P24 AG SERPL QL IA: NORMAL
IMM GRANULOCYTES # BLD AUTO: 0.04 K/UL (ref 0–0.04)
IMM GRANULOCYTES NFR BLD AUTO: 0.5 % (ref 0–0.5)
LYMPHOCYTES # BLD AUTO: 1.2 K/UL (ref 1–4.8)
LYMPHOCYTES NFR BLD: 13.5 % (ref 18–48)
MCH RBC QN AUTO: 30.1 PG (ref 27–31)
MCHC RBC AUTO-ENTMCNC: 31.7 G/DL (ref 32–36)
MCV RBC AUTO: 95 FL (ref 82–98)
MONOCYTES # BLD AUTO: 0.8 K/UL (ref 0.3–1)
MONOCYTES NFR BLD: 8.7 % (ref 4–15)
NEUTROPHILS # BLD AUTO: 6.3 K/UL (ref 1.8–7.7)
NEUTROPHILS NFR BLD: 72.7 % (ref 38–73)
NRBC BLD-RTO: 0 /100 WBC
OHS QRS DURATION: 112 MS
OHS QRS DURATION: 118 MS
OHS QRS DURATION: 120 MS
OHS QRS DURATION: 124 MS
OHS QTC CALCULATION: 460 MS
OHS QTC CALCULATION: 471 MS
OHS QTC CALCULATION: 472 MS
OHS QTC CALCULATION: 480 MS
PLATELET # BLD AUTO: 247 K/UL (ref 150–450)
PMV BLD AUTO: 10.1 FL (ref 9.2–12.9)
POTASSIUM SERPL-SCNC: 4.5 MMOL/L (ref 3.5–5.1)
PROT SERPL-MCNC: 6.6 G/DL (ref 6–8.4)
RBC # BLD AUTO: 3.92 M/UL (ref 4–5.4)
SODIUM SERPL-SCNC: 133 MMOL/L (ref 136–145)
TROPONIN I SERPL DL<=0.01 NG/ML-MCNC: <0.006 NG/ML (ref 0–0.03)
TROPONIN I SERPL DL<=0.01 NG/ML-MCNC: <0.006 NG/ML (ref 0–0.03)
WBC # BLD AUTO: 8.66 K/UL (ref 3.9–12.7)

## 2024-06-25 PROCEDURE — 25000003 PHARM REV CODE 250: Performed by: EMERGENCY MEDICINE

## 2024-06-25 PROCEDURE — 99285 EMERGENCY DEPT VISIT HI MDM: CPT | Mod: 25

## 2024-06-25 PROCEDURE — 85025 COMPLETE CBC W/AUTO DIFF WBC: CPT | Performed by: EMERGENCY MEDICINE

## 2024-06-25 PROCEDURE — 84484 ASSAY OF TROPONIN QUANT: CPT | Performed by: EMERGENCY MEDICINE

## 2024-06-25 PROCEDURE — 87389 HIV-1 AG W/HIV-1&-2 AB AG IA: CPT | Performed by: PHYSICIAN ASSISTANT

## 2024-06-25 PROCEDURE — 83880 ASSAY OF NATRIURETIC PEPTIDE: CPT | Performed by: EMERGENCY MEDICINE

## 2024-06-25 PROCEDURE — 93010 ELECTROCARDIOGRAM REPORT: CPT | Mod: ,,, | Performed by: INTERNAL MEDICINE

## 2024-06-25 PROCEDURE — 86803 HEPATITIS C AB TEST: CPT | Performed by: PHYSICIAN ASSISTANT

## 2024-06-25 PROCEDURE — 80053 COMPREHEN METABOLIC PANEL: CPT | Performed by: EMERGENCY MEDICINE

## 2024-06-25 PROCEDURE — 93005 ELECTROCARDIOGRAM TRACING: CPT

## 2024-06-25 RX ORDER — ACETAMINOPHEN 500 MG
1000 TABLET ORAL
Status: COMPLETED | OUTPATIENT
Start: 2024-06-25 | End: 2024-06-25

## 2024-06-25 RX ADMIN — ACETAMINOPHEN 1000 MG: 500 TABLET ORAL at 02:06

## 2024-06-25 NOTE — ED PROVIDER NOTES
Encounter Date: 6/25/2024       History     Chief Complaint   Patient presents with    Chest Pain     Reproducible with movement     89-year-old female with history of coronary artery disease, CHF, hyponatremia, rib fractures and by fractures after a fall 2 years ago, nursing home dependent and wheelchair dependent presents with chest pain.  She states she has a soreness to her left chest.  It has been there for few days.  It was more intense last night.  It is an aching pain that is worse with movement.  When she describes the pain she moves her chest side-to-side to demonstrated.  There has been no recent injuries.  She denies any shortness of breath.  She notes that she was in the hospital earlier in the month for congestive heart failure.  She states that her edema is much improved.  There has no hemoptysis.        Review of patient's allergies indicates:   Allergen Reactions    Thiazides Other (See Comments)     Severe hyponatremia     Past Medical History:   Diagnosis Date    Basal cell carcinoma     nose    Benign essential hypertension     Cerebral microvascular disease 09/08/2014    Closed fracture of distal phalanx of left hand with routine healing 09/17/2015    Closed mallet fracture of distal phalanx of finger with routine healing 10/06/2015    Coronary artery disease     DDD (degenerative disc disease), lumbar 04/12/2016    Depression     Fall at home 05/30/2016    Hyperlipidemia     Hypothyroidism due to acquired atrophy of thyroid     Left atrial enlargement     Meningiomas, multiple     MI (myocardial infarction) 2004    80% blockage per patient    Migraine aura without headache 12/01/2014    Post PTCA 12/12/2012    Transient cerebral ischemia 05/04/2014     Past Surgical History:   Procedure Laterality Date    CARDIAC CATHETERIZATION  03/29/2004    S/P LAD PTCA, coated stent    CATARACT EXTRACTION W/  INTRAOCULAR LENS IMPLANT Bilateral 2000    Dr Youssef     CHOLECYSTECTOMY       CORONARY ANGIOPLASTY WITH STENT PLACEMENT      LAD    EYE SURGERY      TOTAL THYROIDECTOMY       Family History   Problem Relation Name Age of Onset    Stroke Mother      Diabetes Mother      Hypertension Father      Stroke Father      Cancer Brother          colon    Skin cancer Brother      Colon cancer Brother      Diabetes Paternal Grandmother      Cancer Sister          breast    Dementia Sister      Glaucoma Maternal Aunt      No Known Problems Maternal Uncle      No Known Problems Paternal Aunt      No Known Problems Paternal Uncle      No Known Problems Maternal Grandmother      No Known Problems Maternal Grandfather      No Known Problems Paternal Grandfather      Amblyopia Neg Hx      Blindness Neg Hx      Cataracts Neg Hx      Macular degeneration Neg Hx      Retinal detachment Neg Hx      Strabismus Neg Hx      Thyroid disease Neg Hx      Esophageal cancer Neg Hx       Social History     Tobacco Use    Smoking status: Former     Current packs/day: 0.00     Average packs/day: 0.3 packs/day for 15.0 years (4.5 ttl pk-yrs)     Types: Cigarettes     Start date: 1958     Quit date: 1973     Years since quittin.4    Smokeless tobacco: Never   Substance Use Topics    Alcohol use: No     Alcohol/week: 0.0 standard drinks of alcohol    Drug use: No     Review of Systems    Physical Exam     Initial Vitals [24 0935]   BP Pulse Resp Temp SpO2   114/70 78 (!) 21 97.2 °F (36.2 °C) 96 %      MAP       --         Physical Exam    Constitutional: She appears well-developed and well-nourished. No distress.   HENT:   Head: Normocephalic.   Eyes: Pupils are equal, round, and reactive to light.   Neck: Neck supple.   Normal range of motion.  Cardiovascular:  Normal rate, regular rhythm, normal heart sounds and intact distal pulses.           Pulses:       Radial pulses are 2+ on the right side.   Pulmonary/Chest: Breath sounds normal. No respiratory distress. She  has no wheezes. She has no rales. She exhibits tenderness.   Abdominal: Abdomen is soft. Bowel sounds are normal. She exhibits no distension. There is no abdominal tenderness.   Musculoskeletal:         General: No edema. Normal range of motion.      Cervical back: Normal range of motion and neck supple.     Neurological: She is alert. She has normal strength. No sensory deficit.   Psychiatric: She has a normal mood and affect.         ED Course   Procedures  Labs Reviewed   CBC W/ AUTO DIFFERENTIAL - Abnormal; Notable for the following components:       Result Value    RBC 3.92 (*)     Hemoglobin 11.8 (*)     MCHC 31.7 (*)     Lymph % 13.5 (*)     All other components within normal limits   COMPREHENSIVE METABOLIC PANEL - Abnormal; Notable for the following components:    Sodium 133 (*)     Albumin 2.7 (*)     ALT 5 (*)     All other components within normal limits   B-TYPE NATRIURETIC PEPTIDE - Abnormal; Notable for the following components:     (*)     All other components within normal limits   HIV 1 / 2 ANTIBODY    Narrative:     Release to patient->Immediate   HEPATITIS C ANTIBODY    Narrative:     Release to patient->Immediate   TROPONIN I   TROPONIN I          Imaging Results              X-Ray Chest 1 View (Final result)  Result time 06/25/24 13:45:06      Final result by Mirian Mancilla MD (06/25/24 13:45:06)                   Impression:      No definite acute intrathoracic process seen.    Suboptimal positioning.  I suspect bi basilar subsegmental atelectasis.    Hiatal hernia.      Electronically signed by: Mirian Mancilla MD  Date:    06/25/2024  Time:    13:45               Narrative:    EXAMINATION:  XR CHEST 1 VIEW    CLINICAL HISTORY:  Chest pain, unspecified    TECHNIQUE:  Single frontal view of the chest was performed.    COMPARISON:  06/06/2024    FINDINGS:  Suboptimal positioning.  The cardiac silhouette is midline.    No definite acute focal area of airspace  "consolidation.    I suspect minimal bibasilar atelectatic changes.    Hiatal hernia.    No large pleural effusion.  No pneumothorax.    The osseous structures appear within normal limits.                                       Medications   acetaminophen tablet 1,000 mg (1,000 mg Oral Given 6/25/24 1436)     Medical Decision Making  89-year-old with chest wall pain.  She does have a history of coronary artery disease.  I doubt this is acute MI but will check a troponin.  This does not seem to be dissection infectious or pulmonary embolus by history and physical.  I reviewed her labs.  Initial troponin was negative.  Chest x-ray shows no sign of pneumonia or dissection.  Repeating troponin.  Case signed out to colleague  with 2nd troponin pending.  If negative will discharge back to nursing home on Tylenol    Amount and/or Complexity of Data Reviewed  Labs: ordered.  Radiology: ordered.    Risk  OTC drugs.               ED Course as of 06/26/24 1347   Tue Jun 25, 2024   [id number]     This patient was received in sign-out from Dr. Ortega, pending delta troponin.  Patient reporting atypical chest pain following recent diagnosis of "pneumonia/fluid on the lungs".  I suspect this is more associated with acute CHF.  At this time the patient's ACS workup including chest x-ray and delta troponin are within normal limits.  Suspect patient has reproducible mild focal left chest wall pain, worse with deep inspiration this probably associated with a musculoskeletal etiology.  She and her assistant at bedside are educated about supportive care.  They are asked to have her follow up with her primary care doctor as soon as possible.  Asked to return to the emergency department immediately for any new, concerning, or worsening symptoms.  Both are agreeable with this plan and she was discharged in stable condition.    [ST]      ED Course User Index  [ST] Jose Coe MD                           Clinical Impression:  Final " diagnoses:  [R07.9] Chest pain                 Casey Ortega MD  06/25/24 1455       Casey Ortega MD  06/26/24 1344

## 2024-06-25 NOTE — ED TRIAGE NOTES
Laila Hanna, a 89 y.o. female presents to the ED w/ complaint of chest pain and right sided pain under breast. Denies SOB. Reports pain when deep breathing.     Triage note:  Chief Complaint   Patient presents with    Chest Pain     Reproducible with movement     Review of patient's allergies indicates:   Allergen Reactions    Thiazides Other (See Comments)     Severe hyponatremia     Past Medical History:   Diagnosis Date    Basal cell carcinoma     nose    Benign essential hypertension     Cerebral microvascular disease 09/08/2014    Closed fracture of distal phalanx of left hand with routine healing 09/17/2015    Closed mallet fracture of distal phalanx of finger with routine healing 10/06/2015    Coronary artery disease     DDD (degenerative disc disease), lumbar 04/12/2016    Depression     Fall at home 05/30/2016    Hyperlipidemia     Hypothyroidism due to acquired atrophy of thyroid     Left atrial enlargement     Meningiomas, multiple     MI (myocardial infarction) 2004    80% blockage per patient    Migraine aura without headache 12/01/2014    Post PTCA 12/12/2012    Transient cerebral ischemia 05/04/2014    LOC/ APPEARANCE: The patient is AAOx4. Pt is speaking appropriately, no slurred speech. Pt changed into hospital gown. Continuous cardiac monitor, cont pulse ox, and auto BP cuff applied to patient. Pt is clean and well groomed. Pt reports pain level of 4. Pt updated on POC. Bed low and locked with side rails up x2, call bell in pt reach.  SKIN: Skin is warm dry and intact, and color is consistent with ethnicity. No breakdown or brusing visible. Mucus membranes moist, acyanotic.  RESPIRATORY: Airway is open and patent. Respirations-spontaneous, unlabored, regular rate, equal bilaterally on inspiration and expiration. No accessory muscle use noted.   CARDIAC: Patient has regular heart rate.  No peripheral edema noted, and patient has  c/o chest pain. Peripheral pulses present equal and strong  throughout.  ABDOMEN: Soft and non-tender to palpation with no distention noted. Pt has no complaints of abnormal bowel movements, denies blood in stool. Pt reports normal appetite.  Pt reports pain to right side under breast.   NEUROLOGIC: Eyes open spontaneously and facial expression symmetrical. Pt behavior appropriate to situation, and pt follows commands. Pt reports sensation present in all extremities when touched with a finger, denies any numbness or tingling. PERRLA  MUSCULOSKELETAL: Spontaneous movement noted to all extremities. Hand  equal and leg strength strong +5 bilaterally.   : No complaints of frequency, burning, urgency or blood in the urine. No complaints of incontinence.

## 2024-06-26 NOTE — ED NOTES
Bed: [facility]  Expected date:   Expected time:   Means of arrival:   Comments:  admh1

## 2024-09-27 NOTE — TELEPHONE ENCOUNTER
----- Message from Mirian Bailey sent at 7/21/2017 12:28 PM CDT -----  Contact: Self/[phone] home  Pt said that she is calling in regards to needing to speak with the nurse about getting a new rx pt did not tell me the name of the medication. Please call and advise                Thanks!!!   warm

## 2025-05-20 ENCOUNTER — TELEPHONE (OUTPATIENT)
Dept: INTERNAL MEDICINE | Facility: CLINIC | Age: OVER 89
End: 2025-05-20
Payer: MEDICARE

## 2025-06-18 ENCOUNTER — HOSPITAL ENCOUNTER (INPATIENT)
Facility: HOSPITAL | Age: OVER 89
LOS: 4 days | Discharge: NURSING, REHAB OR LONG-TERM CARE | DRG: 291 | End: 2025-06-23
Attending: STUDENT IN AN ORGANIZED HEALTH CARE EDUCATION/TRAINING PROGRAM | Admitting: STUDENT IN AN ORGANIZED HEALTH CARE EDUCATION/TRAINING PROGRAM
Payer: MEDICARE

## 2025-06-18 DIAGNOSIS — J96.01 ACUTE RESPIRATORY FAILURE WITH HYPOXIA AND HYPERCARBIA: ICD-10-CM

## 2025-06-18 DIAGNOSIS — J96.02 ACUTE RESPIRATORY FAILURE WITH HYPOXIA AND HYPERCARBIA: ICD-10-CM

## 2025-06-18 DIAGNOSIS — I50.33 ACUTE ON CHRONIC DIASTOLIC CHF (CONGESTIVE HEART FAILURE): Primary | ICD-10-CM

## 2025-06-18 DIAGNOSIS — R07.9 CHEST PAIN: ICD-10-CM

## 2025-06-18 DIAGNOSIS — I50.30 (HFPEF) HEART FAILURE WITH PRESERVED EJECTION FRACTION: ICD-10-CM

## 2025-06-18 DIAGNOSIS — R06.02 SHORTNESS OF BREATH: ICD-10-CM

## 2025-06-18 LAB
ABSOLUTE EOSINOPHIL (OHS): 0.26 K/UL
ABSOLUTE MONOCYTE (OHS): 0.75 K/UL (ref 0.3–1)
ABSOLUTE NEUTROPHIL COUNT (OHS): 6.79 K/UL (ref 1.8–7.7)
ALBUMIN SERPL BCP-MCNC: 3.1 G/DL (ref 3.5–5.2)
ALLENS TEST: ABNORMAL
ALLENS TEST: NORMAL
ALP SERPL-CCNC: 84 UNIT/L (ref 40–150)
ALT SERPL W/O P-5'-P-CCNC: 10 UNIT/L (ref 10–44)
ANION GAP (OHS): 7 MMOL/L (ref 8–16)
AST SERPL-CCNC: 18 UNIT/L (ref 11–45)
BASOPHILS # BLD AUTO: 0.05 K/UL
BASOPHILS NFR BLD AUTO: 0.6 %
BILIRUB SERPL-MCNC: 0.5 MG/DL (ref 0.1–1)
BILIRUB UR QL STRIP.AUTO: NEGATIVE
BIPAP: 0
BIPAP: 0
BNP SERPL-MCNC: 1096 PG/ML (ref 0–99)
BUN SERPL-MCNC: 21 MG/DL (ref 10–30)
CALCIUM SERPL-MCNC: 8.2 MG/DL (ref 8.7–10.5)
CHLORIDE SERPL-SCNC: 102 MMOL/L (ref 95–110)
CLARITY UR: CLEAR
CO2 SERPL-SCNC: 30 MMOL/L (ref 23–29)
COLOR UR AUTO: YELLOW
CORRECTED TEMPERATURE (PCO2): 55.5 MMHG
CORRECTED TEMPERATURE (PCO2): 61.7 MMHG
CORRECTED TEMPERATURE (PH): 7.3
CORRECTED TEMPERATURE (PH): 7.38
CORRECTED TEMPERATURE (PO2): 31.8 MMHG
CORRECTED TEMPERATURE (PO2): 92.4 MMHG
CREAT SERPL-MCNC: 0.8 MG/DL (ref 0.5–1.4)
ERYTHROCYTE [DISTWIDTH] IN BLOOD BY AUTOMATED COUNT: 15.3 % (ref 11.5–14.5)
FIO2: 21 %
FIO2: 21 %
FLUAV AG UPPER RESP QL IA.RAPID: NEGATIVE
FLUBV AG UPPER RESP QL IA.RAPID: NEGATIVE
GFR SERPLBLD CREATININE-BSD FMLA CKD-EPI: >60 ML/MIN/1.73/M2
GLUCOSE SERPL-MCNC: 83 MG/DL (ref 70–110)
GLUCOSE UR QL STRIP: NEGATIVE
HCO3 UR-SCNC: 37.4 MMOL/L (ref 24–28)
HCT VFR BLD AUTO: 36.9 % (ref 37–48.5)
HCT VFR BLD CALC: 36.8 % (ref 36–54)
HGB BLD-MCNC: 11.4 GM/DL (ref 12–16)
HGB UR QL STRIP: NEGATIVE
HOLD SPECIMEN: NORMAL
IMM GRANULOCYTES # BLD AUTO: 0.03 K/UL (ref 0–0.04)
IMM GRANULOCYTES NFR BLD AUTO: 0.3 % (ref 0–0.5)
KETONES UR QL STRIP: NEGATIVE
LDH SERPL L TO P-CCNC: 0.97 MMOL/L (ref 0.5–2.2)
LDH SERPL L TO P-CCNC: 1 MMOL/L (ref 0.5–2.2)
LEUKOCYTE ESTERASE UR QL STRIP: NEGATIVE
LYMPHOCYTES # BLD AUTO: 1.01 K/UL (ref 1–4.8)
MCH RBC QN AUTO: 30.5 PG (ref 27–31)
MCHC RBC AUTO-ENTMCNC: 30.9 G/DL (ref 32–36)
MCV RBC AUTO: 99 FL (ref 82–98)
NITRITE UR QL STRIP: NEGATIVE
NUCLEATED RBC (/100WBC) (OHS): 0 /100 WBC
PCO2 BLDA: 55.5 MMHG (ref 35–45)
PCO2 BLDA: 61.7 MMHG (ref 35–45)
PCO2 BLDA: 71.5 MMHG (ref 35–45)
PH SMN: 7.3 [PH] (ref 7.35–7.45)
PH SMN: 7.33 [PH] (ref 7.35–7.45)
PH SMN: 7.38 [PH] (ref 7.35–7.45)
PH UR STRIP: 6 [PH]
PLATELET # BLD AUTO: 256 K/UL (ref 150–450)
PMV BLD AUTO: 10.9 FL (ref 9.2–12.9)
PO2 BLDA: 23 MMHG (ref 40–60)
PO2 BLDA: 31.8 MMHG (ref 40–60)
PO2 BLDA: 92.4 MMHG (ref 40–60)
POC BASE DEFICIT: 2.7 MMOL/L
POC BASE DEFICIT: 6.1 MMOL/L
POC BE: 11 MMOL/L (ref -2–2)
POC HCO3: 26.8 MMOL/L (ref 24–28)
POC HCO3: 29.1 MMOL/L (ref 24–28)
POC PERFORMED BY: ABNORMAL
POC PERFORMED BY: ABNORMAL
POC SATURATED O2: 33 % (ref 95–100)
POC TCO2: 40 MMOL/L (ref 24–29)
POC TEMPERATURE: 37 C
POC TEMPERATURE: 37 C
POTASSIUM SERPL-SCNC: 3.9 MMOL/L (ref 3.5–5.1)
PROT SERPL-MCNC: 6.4 GM/DL (ref 6–8.4)
PROT UR QL STRIP: NEGATIVE
RBC # BLD AUTO: 3.74 M/UL (ref 4–5.4)
RELATIVE EOSINOPHIL (OHS): 2.9 %
RELATIVE LYMPHOCYTE (OHS): 11.4 % (ref 18–48)
RELATIVE MONOCYTE (OHS): 8.4 % (ref 4–15)
RELATIVE NEUTROPHIL (OHS): 76.4 % (ref 38–73)
RSV A 5' UTR RNA NPH QL NAA+PROBE: NEGATIVE
SAMPLE: ABNORMAL
SAMPLE: NORMAL
SARS-COV-2 RNA RESP QL NAA+PROBE: NEGATIVE
SITE: ABNORMAL
SITE: NORMAL
SODIUM SERPL-SCNC: 139 MMOL/L (ref 136–145)
SP GR UR STRIP: 1.01
SPECIMEN SOURCE: ABNORMAL
SPECIMEN SOURCE: ABNORMAL
TROPONIN I SERPL HS-MCNC: 16 NG/L
UROBILINOGEN UR STRIP-ACNC: NEGATIVE EU/DL
WBC # BLD AUTO: 8.89 K/UL (ref 3.9–12.7)

## 2025-06-18 PROCEDURE — 94660 CPAP INITIATION&MGMT: CPT

## 2025-06-18 PROCEDURE — 84443 ASSAY THYROID STIM HORMONE: CPT

## 2025-06-18 PROCEDURE — 63600175 PHARM REV CODE 636 W HCPCS

## 2025-06-18 PROCEDURE — 96365 THER/PROPH/DIAG IV INF INIT: CPT

## 2025-06-18 PROCEDURE — 84484 ASSAY OF TROPONIN QUANT: CPT | Performed by: STUDENT IN AN ORGANIZED HEALTH CARE EDUCATION/TRAINING PROGRAM

## 2025-06-18 PROCEDURE — 94761 N-INVAS EAR/PLS OXIMETRY MLT: CPT | Mod: XB

## 2025-06-18 PROCEDURE — 93010 ELECTROCARDIOGRAM REPORT: CPT | Mod: ,,, | Performed by: INTERNAL MEDICINE

## 2025-06-18 PROCEDURE — 0241U SARS-COV2 (COVID) WITH FLU/RSV BY PCR: CPT

## 2025-06-18 PROCEDURE — 83605 ASSAY OF LACTIC ACID: CPT

## 2025-06-18 PROCEDURE — 93005 ELECTROCARDIOGRAM TRACING: CPT

## 2025-06-18 PROCEDURE — 87040 BLOOD CULTURE FOR BACTERIA: CPT | Performed by: STUDENT IN AN ORGANIZED HEALTH CARE EDUCATION/TRAINING PROGRAM

## 2025-06-18 PROCEDURE — 82803 BLOOD GASES ANY COMBINATION: CPT

## 2025-06-18 PROCEDURE — 84439 ASSAY OF FREE THYROXINE: CPT

## 2025-06-18 PROCEDURE — 99285 EMERGENCY DEPT VISIT HI MDM: CPT | Mod: 25

## 2025-06-18 PROCEDURE — 99900035 HC TECH TIME PER 15 MIN (STAT)

## 2025-06-18 PROCEDURE — 85025 COMPLETE CBC W/AUTO DIFF WBC: CPT | Performed by: STUDENT IN AN ORGANIZED HEALTH CARE EDUCATION/TRAINING PROGRAM

## 2025-06-18 PROCEDURE — 25000003 PHARM REV CODE 250: Performed by: STUDENT IN AN ORGANIZED HEALTH CARE EDUCATION/TRAINING PROGRAM

## 2025-06-18 PROCEDURE — 27100171 HC OXYGEN HIGH FLOW UP TO 24 HOURS

## 2025-06-18 PROCEDURE — 80053 COMPREHEN METABOLIC PANEL: CPT | Performed by: STUDENT IN AN ORGANIZED HEALTH CARE EDUCATION/TRAINING PROGRAM

## 2025-06-18 PROCEDURE — 63600175 PHARM REV CODE 636 W HCPCS: Performed by: STUDENT IN AN ORGANIZED HEALTH CARE EDUCATION/TRAINING PROGRAM

## 2025-06-18 PROCEDURE — 83880 ASSAY OF NATRIURETIC PEPTIDE: CPT | Performed by: STUDENT IN AN ORGANIZED HEALTH CARE EDUCATION/TRAINING PROGRAM

## 2025-06-18 PROCEDURE — 96375 TX/PRO/DX INJ NEW DRUG ADDON: CPT

## 2025-06-18 PROCEDURE — 81003 URINALYSIS AUTO W/O SCOPE: CPT

## 2025-06-18 PROCEDURE — 25500020 PHARM REV CODE 255: Performed by: STUDENT IN AN ORGANIZED HEALTH CARE EDUCATION/TRAINING PROGRAM

## 2025-06-18 PROCEDURE — 27000190 HC CPAP FULL FACE MASK W/VALVE

## 2025-06-18 PROCEDURE — 82800 BLOOD PH: CPT

## 2025-06-18 RX ORDER — CEFTRIAXONE 2 G/1
2 INJECTION, POWDER, FOR SOLUTION INTRAMUSCULAR; INTRAVENOUS ONCE
Status: COMPLETED | OUTPATIENT
Start: 2025-06-18 | End: 2025-06-18

## 2025-06-18 RX ORDER — FUROSEMIDE 10 MG/ML
80 INJECTION INTRAMUSCULAR; INTRAVENOUS
Status: COMPLETED | OUTPATIENT
Start: 2025-06-18 | End: 2025-06-18

## 2025-06-18 RX ADMIN — CEFTRIAXONE 2 G: 2 INJECTION, POWDER, FOR SOLUTION INTRAMUSCULAR; INTRAVENOUS at 09:06

## 2025-06-18 RX ADMIN — AZITHROMYCIN MONOHYDRATE 500 MG: 500 INJECTION, POWDER, LYOPHILIZED, FOR SOLUTION INTRAVENOUS at 09:06

## 2025-06-18 RX ADMIN — FUROSEMIDE 80 MG: 10 INJECTION, SOLUTION INTRAVENOUS at 07:06

## 2025-06-18 RX ADMIN — IOHEXOL 75 ML: 350 INJECTION, SOLUTION INTRAVENOUS at 11:06

## 2025-06-18 NOTE — FIRST PROVIDER EVALUATION
"Medical screening examination initiated.  I have conducted a focused provider triage encounter, findings are as follows:    Brief history of present illness:  Patient presents via EMS for reported shortness of breath and leg swelling.  Last echo 06/06/2024 shows EF 55-60% and normal diastolic function.    Spoke with her nurse at nursing home. O2 sats dropped today, CXR showed pulmonary edema. Complaining she couldn't breathe. Tired and sleepy the past 5 days or so. Nurse there noticed some slurred speech starting this morning since waking.    Vitals:    06/18/25 1705   BP: (!) 155/87   Pulse: 74   Resp: (!) 22   Temp: 97.7 °F (36.5 °C)   TempSrc: Oral   SpO2: 98%   Weight: 79.4 kg (175 lb)   Height: 5' 6" (1.676 m)       Pertinent physical exam:  General: A&Ox3, NAD  Pulmonary: Crackles at the basese  Neuro: Asleep, awakens easily. Follows basic commands. Strength grossly intact in the upper and lower extremities. Speech is slightly slurred.    Brief workup plan:  CT head, chest x-ray, labs, EKG    Preliminary workup initiated; this workup will be continued and followed by the physician or advanced practice provider that is assigned to the patient when roomed.  "

## 2025-06-19 PROBLEM — G30.0 ALZHEIMER'S DISEASE WITH EARLY ONSET: Chronic | Status: ACTIVE | Noted: 2023-08-23

## 2025-06-19 PROBLEM — J96.02 ACUTE RESPIRATORY FAILURE WITH HYPOXIA AND HYPERCARBIA: Status: ACTIVE | Noted: 2025-06-19

## 2025-06-19 PROBLEM — I50.9 ACUTE CHF: Status: ACTIVE | Noted: 2025-06-19

## 2025-06-19 PROBLEM — I27.20 PULMONARY HYPERTENSION: Status: ACTIVE | Noted: 2025-06-19

## 2025-06-19 PROBLEM — J96.01 ACUTE HYPOXEMIC RESPIRATORY FAILURE: Status: ACTIVE | Noted: 2025-06-19

## 2025-06-19 PROBLEM — F41.9 ANXIETY AND DEPRESSION: Status: ACTIVE | Noted: 2025-06-19

## 2025-06-19 PROBLEM — I50.33 ACUTE ON CHRONIC DIASTOLIC CHF (CONGESTIVE HEART FAILURE): Status: ACTIVE | Noted: 2025-06-19

## 2025-06-19 PROBLEM — F32.A ANXIETY AND DEPRESSION: Status: ACTIVE | Noted: 2025-06-19

## 2025-06-19 PROBLEM — I38 CHF DUE TO VALVULAR DISEASE: Status: ACTIVE | Noted: 2025-06-19

## 2025-06-19 PROBLEM — F02.80 ALZHEIMER'S DISEASE WITH EARLY ONSET: Chronic | Status: ACTIVE | Noted: 2023-08-23

## 2025-06-19 LAB
ABSOLUTE EOSINOPHIL (OHS): 0.36 K/UL
ABSOLUTE MONOCYTE (OHS): 0.82 K/UL (ref 0.3–1)
ABSOLUTE NEUTROPHIL COUNT (OHS): 6.59 K/UL (ref 1.8–7.7)
ALBUMIN SERPL BCP-MCNC: 3.3 G/DL (ref 3.5–5.2)
ALLENS TEST: ABNORMAL
ALP SERPL-CCNC: 98 UNIT/L (ref 40–150)
ALT SERPL W/O P-5'-P-CCNC: 10 UNIT/L (ref 10–44)
ANION GAP (OHS): 12 MMOL/L (ref 8–16)
AORTIC SIZE INDEX (SOV): 1.7 CM/M2
AORTIC SIZE INDEX: 1.7 CM/M2
ASCENDING AORTA: 3 CM
AST SERPL-CCNC: 16 UNIT/L (ref 11–45)
AV AREA BY CONTINUOUS VTI: 0.9 CM2
AV INDEX (PROSTH): 0.3
AV LVOT MEAN GRADIENT: 2 MMHG
AV LVOT PEAK GRADIENT: 3 MMHG
AV MEAN GRADIENT: 24 MMHG
AV PEAK GRADIENT: 41 MMHG
AV VALVE AREA BY VELOCITY RATIO: 0.9 CM²
AV VALVE AREA: 0.9 CM²
AV VELOCITY RATIO: 0.28
BASOPHILS # BLD AUTO: 0.05 K/UL
BASOPHILS NFR BLD AUTO: 0.6 %
BILIRUB SERPL-MCNC: 0.5 MG/DL (ref 0.1–1)
BSA FOR ECHO PROCEDURE: 1.83 M2
BUN SERPL-MCNC: 15 MG/DL (ref 10–30)
CALCIUM SERPL-MCNC: 8.5 MG/DL (ref 8.7–10.5)
CHLORIDE SERPL-SCNC: 97 MMOL/L (ref 95–110)
CO2 SERPL-SCNC: 30 MMOL/L (ref 23–29)
CREAT SERPL-MCNC: 0.9 MG/DL (ref 0.5–1.4)
CV ECHO LV RWT: 0.5 CM
DOP CALC AO PEAK VEL: 3.2 M/S
DOP CALC AO VTI: 78.9 CM
DOP CALC LVOT AREA: 3.1 CM2
DOP CALC LVOT DIAMETER: 2 CM
DOP CALC LVOT PEAK VEL: 0.9 M/S
DOP CALC LVOT STROKE VOLUME: 73.2 CM3
DOP CALC MV VTI: 65 CM
DOP CALCLVOT PEAK VEL VTI: 23.3 CM
ECHO EF ESTIMATED: 63 %
ECHO LV POSTERIOR WALL: 1 CM (ref 0.6–1.1)
ERYTHROCYTE [DISTWIDTH] IN BLOOD BY AUTOMATED COUNT: 15.1 % (ref 11.5–14.5)
FOLATE SERPL-MCNC: 9.6 NG/ML (ref 4–24)
FRACTIONAL SHORTENING: 40 % (ref 28–44)
GFR SERPLBLD CREATININE-BSD FMLA CKD-EPI: 60 ML/MIN/1.73/M2
GLUCOSE SERPL-MCNC: 78 MG/DL (ref 70–110)
HCO3 UR-SCNC: 37.6 MMOL/L (ref 24–28)
HCT VFR BLD AUTO: 43.5 % (ref 37–48.5)
HGB BLD-MCNC: 13.1 GM/DL (ref 12–16)
HR MV ECHO: 86 BPM
IMM GRANULOCYTES # BLD AUTO: 0.02 K/UL (ref 0–0.04)
IMM GRANULOCYTES NFR BLD AUTO: 0.2 % (ref 0–0.5)
INTERVENTRICULAR SEPTUM: 1.2 CM (ref 0.6–1.1)
LA MAJOR: 5.8 CM
LA MINOR: 6.1 CM
LA WIDTH: 5 CM
LEFT ATRIUM SIZE: 3.6 CM
LEFT ATRIUM VOLUME INDEX MOD: 68 ML/M2
LEFT ATRIUM VOLUME INDEX: 50 ML/M2
LEFT ATRIUM VOLUME MOD: 123 ML
LEFT ATRIUM VOLUME: 91 CM3
LEFT INTERNAL DIMENSION IN SYSTOLE: 2.4 CM (ref 2.1–4)
LEFT VENTRICLE DIASTOLIC VOLUME INDEX: 30.94 ML/M2
LEFT VENTRICLE DIASTOLIC VOLUME: 56 ML
LEFT VENTRICLE MASS INDEX: 80.5 G/M2
LEFT VENTRICLE SYSTOLIC VOLUME INDEX: 11 ML/M2
LEFT VENTRICLE SYSTOLIC VOLUME: 20 ML
LEFT VENTRICULAR INTERNAL DIMENSION IN DIASTOLE: 4 CM (ref 3.5–6)
LEFT VENTRICULAR MASS: 145.6 G
LYMPHOCYTES # BLD AUTO: 0.98 K/UL (ref 1–4.8)
MAGNESIUM SERPL-MCNC: 2 MG/DL (ref 1.6–2.6)
MCH RBC QN AUTO: 30.3 PG (ref 27–31)
MCHC RBC AUTO-ENTMCNC: 30.1 G/DL (ref 32–36)
MCV RBC AUTO: 101 FL (ref 82–98)
MV MEAN GRADIENT: 7 MMHG
MV PEAK GRADIENT: 12 MMHG
MV VALVE AREA BY CONTINUITY EQUATION: 1.13 CM2
MV VALVE AREA BY PLANIMETRY: 1.46 CM2
MV VALVE AREA BY PLANIMETRY: 1.49 CM2
NUCLEATED RBC (/100WBC) (OHS): 0 /100 WBC
OHS CV RV/LV RATIO: 0.85 CM
OHS QRS DURATION: 118 MS
OHS QTC CALCULATION: 438 MS
PCO2 BLDA: 64.9 MMHG (ref 35–45)
PH SMN: 7.37 [PH] (ref 7.35–7.45)
PHOSPHATE SERPL-MCNC: 4.9 MG/DL (ref 2.7–4.5)
PISA TR MAX VEL: 3.7 M/S
PLATELET # BLD AUTO: 266 K/UL (ref 150–450)
PMV BLD AUTO: 10.4 FL (ref 9.2–12.9)
PO2 BLDA: 29 MMHG (ref 40–60)
POC BE: 12 MMOL/L (ref -2–2)
POC SATURATED O2: 50 % (ref 95–100)
POC TCO2: 40 MMOL/L (ref 24–29)
POTASSIUM SERPL-SCNC: 3.8 MMOL/L (ref 3.5–5.1)
PROT SERPL-MCNC: 7 GM/DL (ref 6–8.4)
RA MAJOR: 5.83 CM
RA PRESSURE ESTIMATED: 3 MMHG
RA WIDTH: 3.59 CM
RBC # BLD AUTO: 4.33 M/UL (ref 4–5.4)
RELATIVE EOSINOPHIL (OHS): 4.1 %
RELATIVE LYMPHOCYTE (OHS): 11.1 % (ref 18–48)
RELATIVE MONOCYTE (OHS): 9.3 % (ref 4–15)
RELATIVE NEUTROPHIL (OHS): 74.7 % (ref 38–73)
RIGHT ATRIAL AREA: 22 CM2
RIGHT ATRIUM END SYSTOLIC VOLUME APICAL 4 CHAMBER INDEX BSA: 37.02 ML/M2
RIGHT ATRIUM VOLUME AREA LENGTH APICAL 4 CHAMBER: 67 ML
RIGHT VENTRICLE DIASTOLIC BASEL DIMENSION: 3.4 CM
RV TB RVSP: 7 MMHG
SAMPLE: ABNORMAL
SINUS: 3.02 CM
SITE: ABNORMAL
SODIUM SERPL-SCNC: 139 MMOL/L (ref 136–145)
STJ: 2.8 CM
T4 FREE SERPL-MCNC: 1.05 NG/DL (ref 0.71–1.51)
TDI LATERAL: 0.07 M/S
TDI SEPTAL: 0.05 M/S
TDI: 0.06 M/S
TRICUSPID ANNULAR PLANE SYSTOLIC EXCURSION: 2.6 CM
TSH SERPL-ACNC: 13.47 UIU/ML (ref 0.4–4)
TV PEAK GRADIENT: 56 MMHG
TV REST PULMONARY ARTERY PRESSURE: 58 MMHG
VIT B12 SERPL-MCNC: 679 PG/ML (ref 210–950)
WBC # BLD AUTO: 8.82 K/UL (ref 3.9–12.7)
Z-SCORE OF LEFT VENTRICULAR DIMENSION IN END DIASTOLE: -2.25
Z-SCORE OF LEFT VENTRICULAR DIMENSION IN END SYSTOLE: -2.01

## 2025-06-19 PROCEDURE — 82607 VITAMIN B-12: CPT

## 2025-06-19 PROCEDURE — 25000003 PHARM REV CODE 250

## 2025-06-19 PROCEDURE — 27100171 HC OXYGEN HIGH FLOW UP TO 24 HOURS

## 2025-06-19 PROCEDURE — 99900035 HC TECH TIME PER 15 MIN (STAT)

## 2025-06-19 PROCEDURE — 85025 COMPLETE CBC W/AUTO DIFF WBC: CPT

## 2025-06-19 PROCEDURE — 80053 COMPREHEN METABOLIC PANEL: CPT

## 2025-06-19 PROCEDURE — 83921 ORGANIC ACID SINGLE QUANT: CPT

## 2025-06-19 PROCEDURE — 94640 AIRWAY INHALATION TREATMENT: CPT

## 2025-06-19 PROCEDURE — 20600001 HC STEP DOWN PRIVATE ROOM

## 2025-06-19 PROCEDURE — 83735 ASSAY OF MAGNESIUM: CPT

## 2025-06-19 PROCEDURE — 82803 BLOOD GASES ANY COMBINATION: CPT

## 2025-06-19 PROCEDURE — 25000242 PHARM REV CODE 250 ALT 637 W/ HCPCS

## 2025-06-19 PROCEDURE — 84100 ASSAY OF PHOSPHORUS: CPT

## 2025-06-19 PROCEDURE — 94660 CPAP INITIATION&MGMT: CPT

## 2025-06-19 PROCEDURE — 92610 EVALUATE SWALLOWING FUNCTION: CPT

## 2025-06-19 PROCEDURE — 36415 COLL VENOUS BLD VENIPUNCTURE: CPT

## 2025-06-19 PROCEDURE — 82746 ASSAY OF FOLIC ACID SERUM: CPT

## 2025-06-19 PROCEDURE — 63700000 PHARM REV CODE 250 ALT 637 W/O HCPCS

## 2025-06-19 PROCEDURE — 63600175 PHARM REV CODE 636 W HCPCS

## 2025-06-19 PROCEDURE — 94761 N-INVAS EAR/PLS OXIMETRY MLT: CPT | Mod: XB

## 2025-06-19 RX ORDER — CEFTRIAXONE 1 G/1
1 INJECTION, POWDER, FOR SOLUTION INTRAMUSCULAR; INTRAVENOUS
Status: DISCONTINUED | OUTPATIENT
Start: 2025-06-19 | End: 2025-06-19

## 2025-06-19 RX ORDER — ENOXAPARIN SODIUM 100 MG/ML
40 INJECTION SUBCUTANEOUS EVERY 24 HOURS
Status: DISCONTINUED | OUTPATIENT
Start: 2025-06-19 | End: 2025-06-23 | Stop reason: HOSPADM

## 2025-06-19 RX ORDER — LEVOTHYROXINE SODIUM 150 UG/1
150 TABLET ORAL DAILY
Status: DISCONTINUED | OUTPATIENT
Start: 2025-06-19 | End: 2025-06-23 | Stop reason: HOSPADM

## 2025-06-19 RX ORDER — ASPIRIN 81 MG/1
81 TABLET ORAL DAILY
Status: DISCONTINUED | OUTPATIENT
Start: 2025-06-19 | End: 2025-06-23 | Stop reason: HOSPADM

## 2025-06-19 RX ORDER — LISINOPRIL 10 MG/1
10 TABLET ORAL DAILY
Status: DISCONTINUED | OUTPATIENT
Start: 2025-06-19 | End: 2025-06-23 | Stop reason: HOSPADM

## 2025-06-19 RX ORDER — AZITHROMYCIN 250 MG/1
500 TABLET, FILM COATED ORAL DAILY
Status: DISCONTINUED | OUTPATIENT
Start: 2025-06-19 | End: 2025-06-19

## 2025-06-19 RX ORDER — IBUPROFEN 200 MG
16 TABLET ORAL
Status: DISCONTINUED | OUTPATIENT
Start: 2025-06-19 | End: 2025-06-23 | Stop reason: HOSPADM

## 2025-06-19 RX ORDER — CEFTRIAXONE 2 G/1
2 INJECTION, POWDER, FOR SOLUTION INTRAMUSCULAR; INTRAVENOUS
Status: DISCONTINUED | OUTPATIENT
Start: 2025-06-19 | End: 2025-06-19

## 2025-06-19 RX ORDER — BUPROPION HYDROCHLORIDE 150 MG/1
300 TABLET ORAL DAILY
Status: DISCONTINUED | OUTPATIENT
Start: 2025-06-19 | End: 2025-06-23 | Stop reason: HOSPADM

## 2025-06-19 RX ORDER — ACETAMINOPHEN 325 MG/1
650 TABLET ORAL EVERY 6 HOURS PRN
Status: DISCONTINUED | OUTPATIENT
Start: 2025-06-19 | End: 2025-06-23 | Stop reason: HOSPADM

## 2025-06-19 RX ORDER — IBUPROFEN 200 MG
24 TABLET ORAL
Status: DISCONTINUED | OUTPATIENT
Start: 2025-06-19 | End: 2025-06-23 | Stop reason: HOSPADM

## 2025-06-19 RX ORDER — GLUCAGON 1 MG
1 KIT INJECTION
Status: DISCONTINUED | OUTPATIENT
Start: 2025-06-19 | End: 2025-06-23 | Stop reason: HOSPADM

## 2025-06-19 RX ORDER — IPRATROPIUM BROMIDE AND ALBUTEROL SULFATE 2.5; .5 MG/3ML; MG/3ML
3 SOLUTION RESPIRATORY (INHALATION)
Status: DISCONTINUED | OUTPATIENT
Start: 2025-06-19 | End: 2025-06-19

## 2025-06-19 RX ORDER — PSYLLIUM HUSK 0.4 G
1 CAPSULE ORAL DAILY
Status: DISCONTINUED | OUTPATIENT
Start: 2025-06-19 | End: 2025-06-23 | Stop reason: HOSPADM

## 2025-06-19 RX ORDER — ATORVASTATIN CALCIUM 40 MG/1
40 TABLET, FILM COATED ORAL DAILY
Status: DISCONTINUED | OUTPATIENT
Start: 2025-06-19 | End: 2025-06-23 | Stop reason: HOSPADM

## 2025-06-19 RX ORDER — SODIUM CHLORIDE 0.9 % (FLUSH) 0.9 %
10 SYRINGE (ML) INJECTION EVERY 12 HOURS PRN
Status: DISCONTINUED | OUTPATIENT
Start: 2025-06-19 | End: 2025-06-23 | Stop reason: HOSPADM

## 2025-06-19 RX ORDER — CYANOCOBALAMIN (VITAMIN B-12) 250 MCG
250 TABLET ORAL DAILY
Status: DISCONTINUED | OUTPATIENT
Start: 2025-06-19 | End: 2025-06-23 | Stop reason: HOSPADM

## 2025-06-19 RX ORDER — POTASSIUM CHLORIDE 20 MEQ/1
20 TABLET, EXTENDED RELEASE ORAL ONCE
Status: COMPLETED | OUTPATIENT
Start: 2025-06-19 | End: 2025-06-19

## 2025-06-19 RX ORDER — ESCITALOPRAM OXALATE 20 MG/1
20 TABLET ORAL NIGHTLY
Status: DISCONTINUED | OUTPATIENT
Start: 2025-06-19 | End: 2025-06-23 | Stop reason: HOSPADM

## 2025-06-19 RX ORDER — NALOXONE HCL 0.4 MG/ML
0.02 VIAL (ML) INJECTION
Status: DISCONTINUED | OUTPATIENT
Start: 2025-06-19 | End: 2025-06-23 | Stop reason: HOSPADM

## 2025-06-19 RX ORDER — IPRATROPIUM BROMIDE AND ALBUTEROL SULFATE 2.5; .5 MG/3ML; MG/3ML
3 SOLUTION RESPIRATORY (INHALATION)
Status: DISCONTINUED | OUTPATIENT
Start: 2025-06-19 | End: 2025-06-23

## 2025-06-19 RX ORDER — POLYETHYLENE GLYCOL 3350 17 G/17G
17 POWDER, FOR SOLUTION ORAL DAILY
Status: DISCONTINUED | OUTPATIENT
Start: 2025-06-19 | End: 2025-06-23 | Stop reason: HOSPADM

## 2025-06-19 RX ADMIN — POTASSIUM CHLORIDE 20 MEQ: 1500 TABLET, EXTENDED RELEASE ORAL at 09:06

## 2025-06-19 RX ADMIN — DOCUSATE SODIUM 50 MG: 50 CAPSULE, LIQUID FILLED ORAL at 12:06

## 2025-06-19 RX ADMIN — ASPIRIN 81 MG: 81 TABLET, COATED ORAL at 09:06

## 2025-06-19 RX ADMIN — AZITHROMYCIN DIHYDRATE 500 MG: 250 TABLET ORAL at 09:06

## 2025-06-19 RX ADMIN — ATORVASTATIN CALCIUM 40 MG: 40 TABLET, FILM COATED ORAL at 09:06

## 2025-06-19 RX ADMIN — CYANOCOBALAMIN TAB 250 MCG 250 MCG: 250 TAB at 09:06

## 2025-06-19 RX ADMIN — ACETAMINOPHEN 650 MG: 325 TABLET ORAL at 09:06

## 2025-06-19 RX ADMIN — LISINOPRIL 10 MG: 10 TABLET ORAL at 09:06

## 2025-06-19 RX ADMIN — LEVOTHYROXINE SODIUM 150 MCG: 150 TABLET ORAL at 07:06

## 2025-06-19 RX ADMIN — IPRATROPIUM BROMIDE AND ALBUTEROL SULFATE 3 ML: 2.5; .5 SOLUTION RESPIRATORY (INHALATION) at 08:06

## 2025-06-19 RX ADMIN — Medication 1 TABLET: at 09:06

## 2025-06-19 RX ADMIN — POLYETHYLENE GLYCOL 3350 17 G: 17 POWDER, FOR SOLUTION ORAL at 12:06

## 2025-06-19 RX ADMIN — ENOXAPARIN SODIUM 40 MG: 40 INJECTION SUBCUTANEOUS at 05:06

## 2025-06-19 RX ADMIN — ESCITALOPRAM OXALATE 20 MG: 20 TABLET ORAL at 08:06

## 2025-06-19 RX ADMIN — BUPROPION HYDROCHLORIDE 300 MG: 150 TABLET, FILM COATED, EXTENDED RELEASE ORAL at 09:06

## 2025-06-19 NOTE — ASSESSMENT & PLAN NOTE
Patient's blood pressure range in the last 24 hours was: BP  Min: 131/71  Max: 169/81.The patient's inpatient anti-hypertensive regimen is listed below:  Current Antihypertensives       Plan  - Continue home Lisinopril 10

## 2025-06-19 NOTE — ASSESSMENT & PLAN NOTE
She is not on home oxygen. Supplemental oxygen was provided and noted- Oxygen Concentration (%):  [21] 21  Signs/symptoms of respiratory failure include- lethargy. Contributing diagnoses includes - Aspiration, CHF, Pleural effusion, and Pneumonia Labs and images were reviewed. Will treat underlying causes and adjust management of respiratory failure as follows-     Plan:  -BNP 1096 with RLL pleural effusion  -s/p IV Lasix 80 in ED  -cautious diuresis 2/2 MS  -follow up DVT US  -CTX/Azithro started for CAP  -repeat VBG to reassess hypercarbia

## 2025-06-19 NOTE — NURSING
Patient admitted to [facility] Patient alert and oriented to room. Cardiac monitoring maintained throughout transfer. Patient assessed, denies any pain, oriented to room, and instructed to call for assistance or any needs. Pt aaox3, disorientated to situation. Fluids running at ordered rate. Call bell in reach.

## 2025-06-19 NOTE — ED TRIAGE NOTES
Laila Hanna, a 89 y.o. female presents to the ED w/ complaint of SOB and edema in the lower limbs. Pt is from [facility], pt hx of heart failure, was 88% on RA, EMS placed pt on 2 L NC.     Triage note:  Chief Complaint   Patient presents with    Shortness of Breath     Pt presents from [facility] for SOB and increased swelling. Pt has hx of heart failure. Pt 88% on room air, 98% on 2L.      Review of patient's allergies indicates:   Allergen Reactions    Thiazides Other (See Comments)     Severe hyponatremia     Past Medical History:   Diagnosis Date    Basal cell carcinoma     nose    Benign essential hypertension     Cerebral microvascular disease 09/08/2014    Closed fracture of distal phalanx of left hand with routine healing 09/17/2015    Closed mallet fracture of distal phalanx of finger with routine healing 10/06/2015    Coronary artery disease     DDD (degenerative disc disease), lumbar 04/12/2016    Depression     Fall at home 05/30/2016    Hyperlipidemia     Hypothyroidism due to acquired atrophy of thyroid     Left atrial enlargement     Meningiomas, multiple     MI (myocardial infarction) 2004    80% blockage per patient    Migraine aura without headache 12/01/2014    Post PTCA 12/12/2012    Transient cerebral ischemia 05/04/2014

## 2025-06-19 NOTE — HPI
Laila Hanna is a 89 year old female with hx of HTN, HLD, CAD s/p LAD PTCA with stent placement (2004), acquired hypothyroidism, HFpEF (LVEF 55-60%), CKD stage 3a (baseline creatinine 0.8-1.0), recurrent falls c/b previously noted rib and vertebral fractures, hyponatremia, nursing home resident, wheelchair bound who presented to the ED for concerns of shortness of breath and worsening lower extremity swelling. History provided per nursing [place] with reports of patient having progressively worsening fatigue/malaise, increased lethargy, poor appetite, and intermittent episodes of confusion over the past 5 days prior to presentation. Patient was noted to be hypoxic with SpO2 88% on room air with altered mental status, which ultimately prompted NH staff to bring patient for ED evaluation.     In the ED, patient with noted hypoxia but otherwise hemodynamically stable and afebrile. Initial VBG with pH 7.3, pCO2 71.5. She was placed on BIPAP with some improvement of hypercapnia. Briefly more alert and awake on exam. Labs notable for WBC 8.89, stable Hgb 11.4, PLT wnl and without significant electrolyte abnormalities. Troponin 16 and BNP 1,096. CXR with findings of pulmonary edema, right pleural effusion and possible RLL consolidation. CT head with no evidence of acute intracranial abnormality. CT abdomen/pelvis with bilateral pulmonary opacities suggstive of infectious/inflammatory process. Given azithromycin, ceftriaxone for empiric treatment of CAP, IV diuresis with furosemide 80 mg x1. Most recent VBG at time of consult with pH  7.326, pCO2 71.5.    Critical care medicine consulted for concerns of worsening hypercapnic and hypoxemic respiratory failure.

## 2025-06-19 NOTE — ED NOTES
Bed: [facility]  Expected date:   Expected time:   Means of arrival:   Comments:  cheyanne

## 2025-06-19 NOTE — SUBJECTIVE & OBJECTIVE
Past Medical History:   Diagnosis Date    Basal cell carcinoma     nose    Benign essential hypertension     Cerebral microvascular disease 09/08/2014    Closed fracture of distal phalanx of left hand with routine healing 09/17/2015    Closed mallet fracture of distal phalanx of finger with routine healing 10/06/2015    Coronary artery disease     DDD (degenerative disc disease), lumbar 04/12/2016    Depression     Fall at home 05/30/2016    Hyperlipidemia     Hypothyroidism due to acquired atrophy of thyroid     Left atrial enlargement     Meningiomas, multiple     MI (myocardial infarction) 2004    80% blockage per patient    Migraine aura without headache 12/01/2014    Post PTCA 12/12/2012    Transient cerebral ischemia 05/04/2014       Past Surgical History:   Procedure Laterality Date    CARDIAC CATHETERIZATION  03/29/2004    S/P LAD PTCA, coated stent    CATARACT EXTRACTION W/  INTRAOCULAR LENS IMPLANT Bilateral 2000    Dr Youssef     CHOLECYSTECTOMY      CORONARY ANGIOPLASTY WITH STENT PLACEMENT  2004    LAD    EYE SURGERY      TOTAL THYROIDECTOMY         Review of patient's allergies indicates:   Allergen Reactions    Thiazides Other (See Comments)     Severe hyponatremia       No current facility-administered medications on file prior to encounter.     Current Outpatient Medications on File Prior to Encounter   Medication Sig    acetaminophen (TYLENOL) 500 MG tablet Take 2 tablets (1,000 mg total) by mouth every 8 (eight) hours. (Patient taking differently: Take 500 mg by mouth every 8 (eight) hours.)    albuterol (PROVENTIL) 2.5 mg /3 mL (0.083 %) nebulizer solution Take 2.5 mg by nebulization every 4 (four) hours as needed for Shortness of Breath.    albuterol (PROVENTIL/VENTOLIN HFA) 90 mcg/actuation inhaler Inhale 2 puffs into the lungs every 6 (six) hours as needed for Shortness of Breath.    alendronate (FOSAMAX) 70 MG tablet TAKE 1 TABLET BY MOUTH EVERY 7 DAYS    aspirin (ECOTRIN) 81 MG EC tablet  Take 1 tablet (81 mg total) by mouth once daily.    atorvastatin (LIPITOR) 40 MG tablet Take 1 tablet (40 mg total) by mouth once daily.    bisacodyL (DULCOLAX) 5 mg EC tablet Take 2 tablets by mouth daily as needed for Constipation.    buPROPion (WELLBUTRIN XL) 300 MG 24 hr tablet Take 300 mg by mouth once daily.    calcium-vitamin D3 (OS-DANICA 500 + D3) 500 mg-5 mcg (200 unit) per tablet Take 1 tablet by mouth once daily.     clotrimazole-betamethasone (LOTRISONE) lotion Apply topically 2 (two) times daily as needed.    cyanocobalamin (VITAMIN B-12) 100 MCG tablet Take 100 mcg by mouth once daily.    dextran 70-hypromellose (ARTIFICIAL TEARS) ophthalmic solution Place 1 drop into both eyes every 2 (two) hours as needed.    diclofenac sodium (VOLTAREN) 1 % Gel Apply topically. Apply topically to hands and knees daily as needed for pain.    EScitalopram oxalate (LEXAPRO) 10 MG tablet Take 20 mg by mouth every evening.    guaiFENesin 100 mg/5 ml (ROBITUSSIN) 100 mg/5 mL syrup Take 200 mg by mouth every 4 (four) hours as needed for Cough.    levoFLOXacin (LEVAQUIN) 500 MG tablet Take 500 mg by mouth once daily. X 7 days    levothyroxine (SYNTHROID) 150 MCG tablet Take 1 tablet (150 mcg total) by mouth once daily.    lisinopriL 10 MG tablet Take 10 mg by mouth once daily.    meclizine (ANTIVERT) 50 MG tablet Take 50 mg by mouth every 6 (six) hours as needed for Dizziness.    melatonin (MELATIN) 3 mg tablet Take 2 tablets by mouth nightly. 10 pm    oxyCODONE (ROXICODONE) 10 mg Tab immediate release tablet Take 1 tablet (10 mg total) by mouth every 4 (four) hours as needed (severe pain). (Patient not taking: Reported on 5/25/2023)    polyethylene glycol (GLYCOLAX) 17 gram PwPk Take 17 g by mouth once daily.    saliva substitute combo no.9 (BIOTENE DRY MOUTH ORAL RINSE MM) Rinse by mouth daily as needed for dry mouth.    senna-docusate 8.6-50 mg (PERICOLACE) 8.6-50 mg per tablet Take 1 tablet by mouth once daily.     torsemide (DEMADEX) 10 MG Tab Take 1 tablet (10 mg total) by mouth once daily. Take this medication every day.    traZODone (DESYREL) 50 MG tablet Take 25 mg by mouth nightly as needed.     Family History       Problem Relation (Age of Onset)    Cancer Brother, Sister    Colon cancer Brother    Dementia Sister    Diabetes Mother, Paternal Grandmother    Glaucoma Maternal Aunt    Hypertension Father    No Known Problems Maternal Uncle, Paternal Aunt, Paternal Uncle, Maternal Grandmother, Maternal Grandfather, Paternal Grandfather    Skin cancer Brother    Stroke Mother, Father          Tobacco Use    Smoking status: Former     Current packs/day: 0.00     Average packs/day: 0.3 packs/day for 15.0 years (4.5 ttl pk-yrs)     Types: Cigarettes     Start date: 1958     Quit date: 1973     Years since quittin.4    Smokeless tobacco: Never   Substance and Sexual Activity    Alcohol use: No     Alcohol/week: 0.0 standard drinks of alcohol    Drug use: No    Sexual activity: Never     Review of Systems  Objective:     Vital Signs (Most Recent):  Temp: 97.5 °F (36.4 °C) (25 2307)  Pulse: 67 (25 0300)  Resp: 16 (25 0200)  BP: (!) 149/71 (25 0300)  SpO2: 95 % (25 0300) Vital Signs (24h Range):  Temp:  [97.5 °F (36.4 °C)-98.1 °F (36.7 °C)] 97.5 °F (36.4 °C)  Pulse:  [61-80] 67  Resp:  [10-22] 16  SpO2:  [89 %-100 %] 95 %  BP: (131-160)/(71-92) 149/71     Weight: 79.4 kg (175 lb)  Body mass index is 28.25 kg/m².     Physical Exam  Constitutional:       General: She is not in acute distress.     Appearance: She is ill-appearing.      Comments: Faraz (name)   HENT:      Head: Normocephalic and atraumatic.   Cardiovascular:      Rate and Rhythm: Normal rate and regular rhythm.      Heart sounds: Murmur heard.      Comments: Diastolic murmur at apex   Pulmonary:      Effort: No respiratory distress.      Breath sounds: Rhonchi present. No wheezing.      Comments: Rhonchi on  RML/RLL  Abdominal:      General: There is no distension.      Palpations: Abdomen is soft.      Tenderness: There is no abdominal tenderness.   Musculoskeletal:      Right lower leg: Edema present.      Left lower leg: Edema present.      Comments: 2+ pitting edema in BLE   Skin:     General: Skin is warm.   Neurological:      Mental Status: She is alert. She is disoriented.   Psychiatric:      Comments: Somnolent                 Significant Labs: All pertinent labs within the past 24 hours have been reviewed.    Significant Imaging: I have reviewed all pertinent imaging results/findings within the past 24 hours.

## 2025-06-19 NOTE — ED PROVIDER NOTES
Encounter Date: 6/18/2025       History     Chief Complaint   Patient presents with    Shortness of Breath     Pt presents from [facility] for SOB and increased swelling. Pt has hx of heart failure. Pt 88% on room air, 98% on 2L.      Laila Hanna is a 89 y.o. female with PMH CHF, CAD, hyponatremia, lives at nursing home, wheelchair dependent who presents with shortness of breath and leg swelling. Patient not responding to questions on exam, so history was given by nursing home. Reportedly has not been eating or drinking well and tired for past 5 days. Today nurses noted some confusion and that she was hypoxic.        Patient more alert and talkative after being briefly placed on BIPAP. Reported having a cough with grey mucus production and a sore throat for the past week.         Review of patient's allergies indicates:   Allergen Reactions    Thiazides Other (See Comments)     Severe hyponatremia     Past Medical History:   Diagnosis Date    Basal cell carcinoma     nose    Benign essential hypertension     Cerebral microvascular disease 09/08/2014    Closed fracture of distal phalanx of left hand with routine healing 09/17/2015    Closed mallet fracture of distal phalanx of finger with routine healing 10/06/2015    Coronary artery disease     DDD (degenerative disc disease), lumbar 04/12/2016    Depression     Fall at home 05/30/2016    Hyperlipidemia     Hypothyroidism due to acquired atrophy of thyroid     Left atrial enlargement     Meningiomas, multiple     MI (myocardial infarction) 2004    80% blockage per patient    Migraine aura without headache 12/01/2014    Post PTCA 12/12/2012    Transient cerebral ischemia 05/04/2014     Past Surgical History:   Procedure Laterality Date    CARDIAC CATHETERIZATION  03/29/2004    S/P LAD PTCA, coated stent    CATARACT EXTRACTION W/  INTRAOCULAR LENS IMPLANT Bilateral 2000    Dr Youssef     CHOLECYSTECTOMY      CORONARY ANGIOPLASTY WITH STENT PLACEMENT  2004     LAD    EYE SURGERY      TOTAL THYROIDECTOMY       Family History   Problem Relation Name Age of Onset    Stroke Mother      Diabetes Mother      Hypertension Father      Stroke Father      Cancer Brother          colon    Skin cancer Brother      Colon cancer Brother      Diabetes Paternal Grandmother      Cancer Sister          breast    Dementia Sister      Glaucoma Maternal Aunt      No Known Problems Maternal Uncle      No Known Problems Paternal Aunt      No Known Problems Paternal Uncle      No Known Problems Maternal Grandmother      No Known Problems Maternal Grandfather      No Known Problems Paternal Grandfather      Amblyopia Neg Hx      Blindness Neg Hx      Cataracts Neg Hx      Macular degeneration Neg Hx      Retinal detachment Neg Hx      Strabismus Neg Hx      Thyroid disease Neg Hx      Esophageal cancer Neg Hx       Social History[1]  Review of Systems    Physical Exam     Initial Vitals [06/18/25 1705]   BP Pulse Resp Temp SpO2   (!) 155/87 74 (!) 22 97.7 °F (36.5 °C) 98 %      MAP       --         Physical Exam    Constitutional: She appears lethargic. She is not diaphoretic. She appears distressed.   HENT:   Head: Normocephalic and atraumatic.   Cardiovascular:  Normal rate and regular rhythm.           No murmur heard.  Pulmonary/Chest: No respiratory distress.   Decreased breath sounds bilaterally   Abdominal: Abdomen is soft. She exhibits no distension. There is abdominal tenderness (grimacing on palpation).   Musculoskeletal:         General: Tenderness and edema present.     Neurological: She appears lethargic. She is disoriented.   Lethargic, wakes up briefly to sternal rub on exam. Oriented to person, but not place and time.          ED Course   Procedures  Labs Reviewed   COMPREHENSIVE METABOLIC PANEL - Abnormal       Result Value    Sodium 139      Potassium 3.9      Chloride 102      CO2 30 (*)     Glucose 83      BUN 21      Creatinine 0.8      Calcium 8.2 (*)     Protein Total  6.4      Albumin 3.1 (*)     Bilirubin Total 0.5      ALP 84      AST 18      ALT 10      Anion Gap 7 (*)     eGFR >60     TROPONIN I HIGH SENSITIVITY - Abnormal    Troponin High Sensitive 16 (*)    B-TYPE NATRIURETIC PEPTIDE - Abnormal    BNP 1,096 (*)    CBC WITH DIFFERENTIAL - Abnormal    WBC 8.89      RBC 3.74 (*)     HGB 11.4 (*)     HCT 36.9 (*)     MCV 99 (*)     MCH 30.5      MCHC 30.9 (*)     RDW 15.3 (*)     Platelet Count 256      MPV 10.9      Nucleated RBC 0      Neut % 76.4 (*)     Lymph % 11.4 (*)     Mono % 8.4      Eos % 2.9      Basophil % 0.6      Imm Grans % 0.3      Neut # 6.79      Lymph # 1.01      Mono # 0.75      Eos # 0.26      Baso # 0.05      Imm Grans # 0.03     ISTAT PROCEDURE - Abnormal    POC PH 7.326 (*)     POC PCO2 71.5 (*)     POC PO2 23 (*)     POC HCO3 37.4 (*)     POC BE 11 (*)     POC SATURATED O2 33      POC TCO2 40 (*)     Sample VENOUS      Site Other      Allens Test N/A     ISTAT PROCEDURE - Abnormal    POC PH 7.370      POC PCO2 64.9 (*)     POC PO2 29 (*)     POC HCO3 37.6 (*)     POC BE 12 (*)     POC SATURATED O2 50      POC TCO2 40 (*)     Sample VENOUS      Site Other      Allens Test N/A     SARS-COV2 (COVID) WITH FLU/RSV BY PCR - Normal    INFLUENZA A BY PCR Negative      INFLUENZA B BY PCR Negative      Respiratory Syncytial Virus PCR Negative      SARS-CoV-2 PCR Negative     URINALYSIS, REFLEX TO URINE CULTURE - Normal    Color, UA Yellow      Appearance, UA Clear      pH, UA 6.0      Spec Grav UA 1.010      Protein, UA Negative      Glucose, UA Negative      Ketones, UA Negative      Bilirubin, UA Negative      Blood, UA Negative      Nitrites, UA Negative      Urobilinogen, UA Negative      Leukocyte Esterase, UA Negative     CULTURE, BLOOD   CULTURE, BLOOD   CBC W/ AUTO DIFFERENTIAL    Narrative:     The following orders were created for panel order CBC auto differential.  Procedure                               Abnormality         Status                      ---------                               -----------         ------                     CBC with Differential[[id number]]       Abnormal            Final result                 Please view results for these tests on the individual orders.   GREY TOP URINE HOLD    Extra Tube Hold for add-ons.     ISTAT LACTATE    POC Lactate 0.97      Sample VENOUS      Site Other      Allens Test N/A            Imaging Results              CT Head Without Contrast (Final result)  Result time 06/19/25 00:32:33      Final result by Reynaldo Marino MD (06/19/25 00:32:33)                   Impression:      No CT evidence of acute intracranial abnormality, allowing for motion and artifact limitations.    Generalized cerebral volume loss and chronic ischemic changes.  Similar probable meningioma in the right paraclinoid region.      Electronically signed by: Reynaldo Marino MD  Date:    06/19/2025  Time:    00:32               Narrative:    EXAMINATION:  CT HEAD WITHOUT CONTRAST    CLINICAL HISTORY:  Mental status change, unknown cause;    TECHNIQUE:  Low dose axial images were obtained through the head.  Coronal and sagittal reformations were also performed. Contrast was not administered.    COMPARISON:  07/24/2022.    FINDINGS:  Exam quality is limited by motion, large field of view, and streak artifact.    Generalized cerebral volume loss with ex vacuo dilation of the ventricles and sulci. Mild patchy periventricular white matter hypoattenuation suggestive of chronic microvascular ischemic change, though nonspecific.  Question few scattered remote appearing lacunar infarcts.    No evidence of acute territorial infarct, hemorrhage, mass effect, or midline shift.  Similar probable calcified meningioma in the right paraclinoid region.    Ventricles are similar in size and configuration.    No displaced calvarial fracture.    Visualized paranasal sinuses are essentially clear.  Trace right mastoid fluid.  Left mastoid air cells are  essentially clear.                                       CT Abdomen Pelvis With IV Contrast NO Oral Contrast (Final result)  Result time 06/19/25 00:43:32      Final result by Reynaldo Marino MD (06/19/25 00:43:32)                   Impression:      Motion and artifact limited study with suboptimal bolus timing and suboptimal patient positioning.    Enlarged heart with bilateral pleural effusions and bilateral pulmonary opacities suggestive edema or infectious/inflammatory process.    Moderate-sized hiatal hernia.    Mild-to-moderate stool burden in the colon.  Rectal wall thickening with presacral edema and small volume pelvic free fluid.  Consider endoscopy follow-up if not recently performed.  Correlation with patient's symptoms also recommended in this patient with acute nonlocalized abdominal pain.    Multiple additional findings discussed in the body of the report.      Electronically signed by: Reynaldo Marino MD  Date:    06/19/2025  Time:    00:43               Narrative:    EXAMINATION:  CT ABDOMEN PELVIS WITH IV CONTRAST    CLINICAL HISTORY:  Abdominal pain, acute, nonlocalized;    TECHNIQUE:  Low dose axial images, sagittal and coronal reformations were obtained from the lung bases to the pubic symphysis following the IV administration of 75 mL of Omnipaque 350 .  Oral contrast was not given.    COMPARISON:  CT chest and spine, 07/24/2022.    FINDINGS:  Lower Chest:    Heart is enlarged with enlargement of all 4 cardiac chambers.  Coronary artery calcifications.  Calcifications of the mitral annulus.  No significant pericardial fluid.  Small bilateral pleural effusions.  Interstitial opacities and ground-glass opacities in the lung bases, likely pulmonary edema or infectious/inflammatory process.  Moderate-sized hiatal hernia as seen previously.    Abdomen:    Exam quality is limited by suboptimal positioning including the patient's arms overlying the entire field of view from the lower chest through  the pelvis.  Exam quality also limited by motion and suboptimal bolus timing.    Liver is negative for acute finding.  There is a small hypodensity in the anterior aspect of the liver, presumably a cyst.  Gallbladder is absent.  No significant intrahepatic biliary duct dilatation.    Spleen, adrenals, and pancreas are negative for acute finding.  There is a probable right adrenal adenoma measuring roughly 2.3 cm in size, similar to prior study.    Kidneys are symmetric.  No gross hydronephrosis.    Moderate-sized hiatal hernia as seen previously.  Stomach is not overly distended.  No small bowel obstruction.  Moderate stool burden in the colon.  Scattered colonic diverticula.    No gross pneumoperitoneum.  Trace free fluid in the pelvis.    No bulky retroperitoneal lymphadenopathy.    Abdominal aorta is similar in course and caliber with moderate calcific atherosclerosis.    Portal venous system is not well opacified.  No gross portal venous gas.    Pelvis:    Urinary bladder is negative for acute finding.  Small uterine calcifications potentially from small uterine fibroids.  Suspect mild rectal wall thickening with presacral edema and trace pelvic free fluid.    Bones and soft tissues:    No aggressive osseous lesions.  Degenerative changes in the spine.  Chronic appearing compression fracture deformity L2 vertebral body with minimal retropulsion into the central canal.  Bones are demineralized.  Remote appearing rib fractures.  Extraperitoneal soft tissues are negative for acute finding.  Minimal body wall edema.                                       X-Ray Chest AP Portable (Final result)  Result time 06/18/25 20:34:40      Final result by Jose L Easton MD (06/18/25 20:34:40)                   Impression:      1. Pulmonary findings suggest edema noting right pleural effusion.  Developing right lower lung zone consolidation not excluded.      Electronically signed by: Jose L Easton  MD  Date:    06/18/2025  Time:    20:34               Narrative:    EXAMINATION:  XR CHEST AP PORTABLE    CLINICAL HISTORY:  CHF;    TECHNIQUE:  Single frontal view of the chest was performed.    COMPARISON:  06/25/2024    FINDINGS:  The cardiomediastinal silhouette is prominent noting calcification of the aorta..  There obscuration of the right costophrenic angle suggesting effusion..  The trachea is midline.  The lungs are symmetrically expanded bilaterally with patchy increased interstitial and parenchymal attenuation bilaterally, worsened since the previous exam suggesting edema.  Developing right lower lung zone consolidation not excluded..  There is no pneumothorax.  The osseous structures are remarkable for degenerative change..                                       Medications   furosemide injection 80 mg (80 mg Intravenous Given 6/18/25 1954)   cefTRIAXone injection 2 g (2 g Intravenous Given 6/18/25 2116)   azithromycin (ZITHROMAX) 500 mg in 0.9% NaCl 250 mL IVPB (admixture device) (0 mg Intravenous Stopped 6/18/25 2217)   iohexoL (OMNIPAQUE 350) injection 75 mL (75 mLs Intravenous Given 6/18/25 2349)     Medical Decision Making  Presented from NH with hypoxia. Labs notable for WBC 8.89, Hgb stably low 11.4, Troponin 16 and BNP 1,096. Ddx includes CHF exacerbation, UTI, URI, Pneumonia, and diverticulitis.   Most likely heart failure exacerbation in setting of possible pneumonia.     Initial VBG with ph 7.3 and PCO2 up to 71.5. Placed on BIPAP and PCO2 improved to 55.5. After BIPAP, patient was more alert & oriented. Repeat VBG Ph 7.3 and PCO2 64.9. ICU consulted and reported stable for stepdown and that hypercapnia is likely chronic related to kyphoscoliosis.   Patient now stable on ~3L NC.     CXR with pulmonary edema, right pleural effusion and possible RLL consolidation.    CT head negative. CT abdomen/pelvis with bilateral pulmonary opacities suggstive of infectious/inflammatory process. Mild to  moderate stool burden in colon. Rectal wall thickening with presacral edema and small volume pelvic free fluid.    - Given IV Lasix 80 mg  - Given CTX/Azithromycin   - Plan to admit to hospital medicine       Amount and/or Complexity of Data Reviewed  Labs: ordered.  Radiology: ordered.    Risk  Prescription drug management.  Decision regarding hospitalization.                 ED Course as of 25 pH 7.30, pCO2 61, hco3 26.8 [MB]    EKG at 1852 shows sinus rhythm with first-degree AV block rate 66.  LAFB.  LVH with repolarization abnormality.  No acute T-wave abnormalities.  No ST-elevation. [MB]    Repeat pCO2 significantly improved, no longer acidotic.  Mentation is greatly improved as well.  Taken off BiPAP, we will monitor for a period prior to admission to ensure she will be stable off BiPAP. [MB]      ED Course User Index  [MB] Jason Harper MD                           Clinical Impression:  Final diagnoses:  [R06.02] Shortness of breath          ED Disposition Condition    Admit                       [1]   Social History  Tobacco Use    Smoking status: Former     Current packs/day: 0.00     Average packs/day: 0.3 packs/day for 15.0 years (4.5 ttl pk-yrs)     Types: Cigarettes     Start date: 1958     Quit date: 1973     Years since quittin.4    Smokeless tobacco: Never   Substance Use Topics    Alcohol use: No     Alcohol/week: 0.0 standard drinks of alcohol    Drug use: No        Jacquelin Sexton MD  Resident  25

## 2025-06-19 NOTE — ED NOTES
Telemetry Verification   Patient placed on Telemetry Box  Verified with War Room  Box # [id number]   Monitor Tech WARROO   Rate 67   Rhythm ns

## 2025-06-19 NOTE — HPI
89 year old woman with Alzheimer dementia (AAOx4 at baseline), HFpEF, HTN, CAD s/p PCI to LAD in 2004, hypothyroidism, HLD, depression who presented from [facility] for altered mental status over the past week. Initially she kept complaining that she felt uncomfortable and her UA was negative at the time. Over the past few days, she was AAOx1 (name) and was brought into the hospital. The NH was called for collateral as she was AAOx1 and somnolent on exam (arousable to voice). Upon arrival, she was lethargic with a pH 7.302/PCO2 61.7 on VBG. CTH negative. She was briefly placed on Bipap with improvement to pH 7.377/CO2 55.5. Patient was AAOx3 at that point. MICU was consulted and deemed her stable for a stepdown unit. They believed her chronic hypercapnia was not the cause of her encephalopathy. CXR significant for RLL pleural effusion. They believed her hypoxia was driven by pulmonary edema but recommended cautious diuresis due to her MS. They also recommended CAP coverage, RIP, bowel regimen, TSH and repeating a  TTE. A bedside US showed preserved LVEF with Armani enlargement RAP 5-10 mmHg. She was admitted to hospital medicine for acute encephalopathy.

## 2025-06-19 NOTE — ASSESSMENT & PLAN NOTE
89 yo woman with acute encephalopathy for the past week. TSH 13.5 with normal T4 in the setting of acute illness. Ddx includes infection vs hypercapnia.     Plan:  -CT head negative  -UA negative  -follow up blood cultures  -follow up B12/folic acid levels  -follow up VBG  -R pleural effusion on CXR  -CTX/Azithro started for CAP

## 2025-06-19 NOTE — PT/OT/SLP EVAL
Speech Language Pathology Evaluation  Bedside Swallow    Patient Name:  Laila Hanna   MRN:  [id number]  Admitting Diagnosis: Acute encephalopathy    Recommendations:                 General Recommendations:  Dysphagia therapy  Diet recommendations:  NPO, NPO   Aspiration Precautions:   Pleasure feedings of ice chips when awake/upright/participating   Meds crushed in pudding/applesauce    General Precautions: Standard, aspiration, fall, NPO  Communication strategies:  provide increased time to answer  Discharge recommendations:  Moderate Intensity Therapy   Barriers to Discharge:  Level of Skilled Assistance Needed      Assessment:     Laila Hanna is a 89 y.o. female with an SLP diagnosis of Dysphagia. She presents with persistent confusion and oral phase deficits resulting in poor performance with low level PO intake. SLP to continue to follow.      History:     Past Medical History:   Diagnosis Date    Acute CHF 6/19/2025    Basal cell carcinoma     nose    Benign essential hypertension     Cerebral microvascular disease 09/08/2014    Closed fracture of distal phalanx of left hand with routine healing 09/17/2015    Closed mallet fracture of distal phalanx of finger with routine healing 10/06/2015    Coronary artery disease 2004    MI s/p PCI to LAD in 2004    DDD (degenerative disc disease), lumbar 04/12/2016    Depression     Fall at home 05/30/2016    Hyperlipidemia     Hypothyroidism due to acquired atrophy of thyroid     Meningiomas, multiple     Migraine aura without headache 12/01/2014    Transient cerebral ischemia 05/04/2014       Past Surgical History:   Procedure Laterality Date    CARDIAC CATHETERIZATION  03/29/2004    S/P LAD PTCA, coated stent    CATARACT EXTRACTION W/  INTRAOCULAR LENS IMPLANT Bilateral 2000    Dr Youssef     CHOLECYSTECTOMY      CORONARY ANGIOPLASTY WITH STENT PLACEMENT  2004    LAD    EYE SURGERY      TOTAL THYROIDECTOMY         Social History: Patient is a nursing 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 There obscuration of the right costophrenic angle suggesting effusion.. The trachea is midline. The lungs are symmetrically expanded bilaterally with patchy increased interstitial and parenchymal attenuation bilaterally, worsened since the previous exam suggesting edema. Developing right lower lung zone consolidation not excluded.. There is no pneumothorax. The osseous structures are remarkable for degenerative change.    Prior diet: Unknown- pt unable to state    Occupation/hobbies/homemaking: Unknown- pt unable to state    Subjective     Spoke with nursing prior to session. Pt found resting in bed upon SLP entry into room.     Patient goals: "Take it away from here" in reference to blankets     Pain/Comfort:  Pain Rating 1: 0/10    Respiratory Status: Nasal cannula, flow 2 L/min    Objective:     Oral Musculature Evaluation  Oral Musculature: unable to assess due to poor participation/comprehension  Mucosal Quality: adequate  Oral Labial Strength and Mobility: functional seal  Lingual Strength and Mobility: functional protrusion  Voice Prior to PO Intake: adequate clarity and volume    Bedside Swallow Eval:   Consistencies Assessed:  Ice chips x3  Puree: clinician-fed by the 1/2 tsp of pudding    Oral Phase:   Decreased closure around utensil  Excess chewing  Slow oral transit time    Pharyngeal Phase:   Decreased hyolaryngeal movement during palpation though this appeared to improve with ongoing oral trials  Spontaneous dry coughing following 1/3 puree trials     Compensatory Strategies  None    Treatment: Pt with persistent confusion throughout session and often becoming agitated during attempts to reposition. She was unable to consistently follow single step commands and required encouragement to participate in " minimal PO trials. SLP provided education regarding ongoing SLP POC though pt unable to exhibit full understanding this date and no family/caregivers present at bedside.Whiteboard updated. Pt left with bed in lowest locked position upon SLP exit.      Goals:   Multidisciplinary Problems       SLP Goals          Problem: SLP    Goal Priority Disciplines Outcome   SLP Goal     SLP Progressing   Description: Speech Pathology Goals  To be met by 7/3/25    1. Pt will participate in ongoing diagnostic dysphagia therapy                              Plan:     Patient to be seen:  4 x/week   Plan of Care expires:  07/19/25  Plan of Care reviewed with:  patient   SLP Follow-Up:  Yes       Time Tracking:     SLP Treatment Date:   06/19/25  Speech Start Time:  1040  Speech Stop Time:  1052     Speech Total Time (min):  12 min    Billable Minutes: Eval Swallow and Oral Function 12      06/19/2025

## 2025-06-19 NOTE — H&P
[facility] - Telemetry [facility] Medicine  History & Physical    Patient Name: Laila Hanna  MRN: [id number]  Patient Class: IP- Inpatient  Admission Date: 6/18/2025  Attending Physician: Abdullahi Salazar III*   Primary Care Provider: Ama Graham MD         Patient information was obtained from patient and ER records.     Subjective:     Principal Problem:Acute encephalopathy    Chief Complaint:   Chief Complaint   Patient presents with    Shortness of Breath     Pt presents from [facility] for SOB and increased swelling. Pt has hx of heart failure. Pt 88% on room air, 98% on 2L.         HPI: 89 year old woman with Alzheimer dementia (AAOx4 at baseline), HFpEF, HTN, CAD s/p PCI to LAD in 2004, hypothyroidism, HLD, depression who presented from [facility] for altered mental status over the past week. Initially she kept complaining that she felt uncomfortable and her UA was negative at the time. Over the past few days, she was AAOx1 (name) and was brought into the hospital. The NH was called for collateral as she was AAOx1 and somnolent on exam (arousable to voice). Upon arrival, she was lethargic with a pH 7.302/PCO2 61.7 on VBG. CTH negative. She was briefly placed on Bipap with improvement to pH 7.377/CO2 55.5. MICU was consulted and deemed her stable for a stepdown unit. They believed her chronic hypercapnia was not the cause of her encephalopathy. CXR significant for RLL pleural effusion. They believed her hypoxia was driven by pulmonary edema but recommended cautious diuresis due to her MS. They also recommended CAP coverage, RIP, bowel regimen, TSH and repeating a  TTE. A bedside US showed preserved LVEF with Armani enlargement RAP 5-10 mmHg. She was admitted to hospital medicine for acute encephalopathy.     Past Medical History:   Diagnosis Date    Basal cell carcinoma     nose    Benign essential hypertension     Cerebral microvascular disease 09/08/2014    Closed  fracture of distal phalanx of left hand with routine healing 09/17/2015    Closed mallet fracture of distal phalanx of finger with routine healing 10/06/2015    Coronary artery disease     DDD (degenerative disc disease), lumbar 04/12/2016    Depression     Fall at home 05/30/2016    Hyperlipidemia     Hypothyroidism due to acquired atrophy of thyroid     Left atrial enlargement     Meningiomas, multiple     MI (myocardial infarction) 2004    80% blockage per patient    Migraine aura without headache 12/01/2014    Post PTCA 12/12/2012    Transient cerebral ischemia 05/04/2014       Past Surgical History:   Procedure Laterality Date    CARDIAC CATHETERIZATION  03/29/2004    S/P LAD PTCA, coated stent    CATARACT EXTRACTION W/  INTRAOCULAR LENS IMPLANT Bilateral 2000    Dr Youssef     CHOLECYSTECTOMY      CORONARY ANGIOPLASTY WITH STENT PLACEMENT  2004    LAD    EYE SURGERY      TOTAL THYROIDECTOMY         Review of patient's allergies indicates:   Allergen Reactions    Thiazides Other (See Comments)     Severe hyponatremia       No current facility-administered medications on file prior to encounter.     Current Outpatient Medications on File Prior to Encounter   Medication Sig    acetaminophen (TYLENOL) 500 MG tablet Take 2 tablets (1,000 mg total) by mouth every 8 (eight) hours. (Patient taking differently: Take 500 mg by mouth every 8 (eight) hours.)    albuterol (PROVENTIL) 2.5 mg /3 mL (0.083 %) nebulizer solution Take 2.5 mg by nebulization every 4 (four) hours as needed for Shortness of Breath.    albuterol (PROVENTIL/VENTOLIN HFA) 90 mcg/actuation inhaler Inhale 2 puffs into the lungs every 6 (six) hours as needed for Shortness of Breath.    alendronate (FOSAMAX) 70 MG tablet TAKE 1 TABLET BY MOUTH EVERY 7 DAYS    aspirin (ECOTRIN) 81 MG EC tablet Take 1 tablet (81 mg total) by mouth once daily.    atorvastatin (LIPITOR) 40 MG tablet Take 1 tablet (40 mg total) by mouth once daily.    bisacodyL (DULCOLAX)  5 mg EC tablet Take 2 tablets by mouth daily as needed for Constipation.    buPROPion (WELLBUTRIN XL) 300 MG 24 hr tablet Take 300 mg by mouth once daily.    calcium-vitamin D3 (OS-DANICA 500 + D3) 500 mg-5 mcg (200 unit) per tablet Take 1 tablet by mouth once daily.     clotrimazole-betamethasone (LOTRISONE) lotion Apply topically 2 (two) times daily as needed.    cyanocobalamin (VITAMIN B-12) 100 MCG tablet Take 100 mcg by mouth once daily.    dextran 70-hypromellose (ARTIFICIAL TEARS) ophthalmic solution Place 1 drop into both eyes every 2 (two) hours as needed.    diclofenac sodium (VOLTAREN) 1 % Gel Apply topically. Apply topically to hands and knees daily as needed for pain.    EScitalopram oxalate (LEXAPRO) 10 MG tablet Take 20 mg by mouth every evening.    guaiFENesin 100 mg/5 ml (ROBITUSSIN) 100 mg/5 mL syrup Take 200 mg by mouth every 4 (four) hours as needed for Cough.    levoFLOXacin (LEVAQUIN) 500 MG tablet Take 500 mg by mouth once daily. X 7 days    levothyroxine (SYNTHROID) 150 MCG tablet Take 1 tablet (150 mcg total) by mouth once daily.    lisinopriL 10 MG tablet Take 10 mg by mouth once daily.    meclizine (ANTIVERT) 50 MG tablet Take 50 mg by mouth every 6 (six) hours as needed for Dizziness.    melatonin (MELATIN) 3 mg tablet Take 2 tablets by mouth nightly. 10 pm    oxyCODONE (ROXICODONE) 10 mg Tab immediate release tablet Take 1 tablet (10 mg total) by mouth every 4 (four) hours as needed (severe pain). (Patient not taking: Reported on 5/25/2023)    polyethylene glycol (GLYCOLAX) 17 gram PwPk Take 17 g by mouth once daily.    saliva substitute combo no.9 (BIOTENE DRY MOUTH ORAL RINSE MM) Rinse by mouth daily as needed for dry mouth.    senna-docusate 8.6-50 mg (PERICOLACE) 8.6-50 mg per tablet Take 1 tablet by mouth once daily.    torsemide (DEMADEX) 10 MG Tab Take 1 tablet (10 mg total) by mouth once daily. Take this medication every day.    traZODone (DESYREL) 50 MG tablet Take 25 mg by mouth  nightly as needed.     Family History       Problem Relation (Age of Onset)    Cancer Brother, Sister    Colon cancer Brother    Dementia Sister    Diabetes Mother, Paternal Grandmother    Glaucoma Maternal Aunt    Hypertension Father    No Known Problems Maternal Uncle, Paternal Aunt, Paternal Uncle, Maternal Grandmother, Maternal Grandfather, Paternal Grandfather    Skin cancer Brother    Stroke Mother, Father          Tobacco Use    Smoking status: Former     Current packs/day: 0.00     Average packs/day: 0.3 packs/day for 15.0 years (4.5 ttl pk-yrs)     Types: Cigarettes     Start date: 1958     Quit date: 1973     Years since quittin.4    Smokeless tobacco: Never   Substance and Sexual Activity    Alcohol use: No     Alcohol/week: 0.0 standard drinks of alcohol    Drug use: No    Sexual activity: Never     Review of Systems  Objective:     Vital Signs (Most Recent):  Temp: 97.5 °F (36.4 °C) (25 2307)  Pulse: 67 (25 0300)  Resp: 16 (25 0200)  BP: (!) 149/71 (25 0300)  SpO2: 95 % (25 0300) Vital Signs (24h Range):  Temp:  [97.5 °F (36.4 °C)-98.1 °F (36.7 °C)] 97.5 °F (36.4 °C)  Pulse:  [61-80] 67  Resp:  [10-22] 16  SpO2:  [89 %-100 %] 95 %  BP: (131-160)/(71-92) 149/71     Weight: 79.4 kg (175 lb)  Body mass index is 28.25 kg/m².     Physical Exam  Constitutional:       General: She is not in acute distress.     Appearance: She is ill-appearing.      Comments: AAOx1 (name)   HENT:      Head: Normocephalic and atraumatic.   Cardiovascular:      Rate and Rhythm: Normal rate and regular rhythm.      Heart sounds: Murmur heard.      Comments: Diastolic murmur at apex   Pulmonary:      Effort: No respiratory distress.      Breath sounds: Rhonchi present. No wheezing.      Comments: Rhonchi on RML/RLL  Abdominal:      General: There is no distension.      Palpations: Abdomen is soft.      Tenderness: There is no abdominal tenderness.   Musculoskeletal:      Right lower leg:  Edema present.      Left lower leg: Edema present.      Comments: 2+ pitting edema in BLE   Skin:     General: Skin is warm.   Neurological:      Mental Status: She is alert. She is disoriented.   Psychiatric:      Comments: Somnolent                 Significant Labs: All pertinent labs within the past 24 hours have been reviewed.    Significant Imaging: I have reviewed all pertinent imaging results/findings within the past 24 hours.  Assessment/Plan:     Assessment & Plan  Acute encephalopathy  89 yo woman with acute encephalopathy for the past week. TSH 13.5 with normal T4 in the setting of acute illness. Ddx includes infection vs hypercapnia.     Plan:  -CT head negative  -UA negative  -follow up blood cultures  -follow up B12/folic acid levels  -follow up VBG  -R pleural effusion on CXR  -CTX/Azithro started for CAP        Postoperative hypothyroidism  Plan:   Continue home Synthroid 175 mcg   Follow up TSH    Essential hypertension  Patient's blood pressure range in the last 24 hours was: BP  Min: 131/71  Max: 169/81.The patient's inpatient anti-hypertensive regimen is listed below:  Current Antihypertensives       Plan  - Continue home Lisinopril 10  Coronary artery disease involving native coronary artery of native heart  Patient with known CAD s/p PCI to LAD in 2004    Plan:  -Continue ASA 81 and Lipitor 40  Acute hypoxemic respiratory failure  She is not on home oxygen. Supplemental oxygen was provided and noted- Oxygen Concentration (%):  [21] 21  Signs/symptoms of respiratory failure include- lethargy. Contributing diagnoses includes - Aspiration, CHF, Pleural effusion, and Pneumonia Labs and images were reviewed. Will treat underlying causes and adjust management of respiratory failure as follows-     Plan:  -BNP 1096 with RLL pleural effusion  -s/p IV Lasix 80 in ED  -cautious diuresis 2/2 MS  -follow up DVT US  -CTX/Azithro started for CAP  -repeat VBG to reassess hypercarbia  Anxiety and  depression  Plan:  Continue Bupropion and Lexapro    VTE Risk Mitigation (From admission, onward)           Ordered     enoxaparin injection 40 mg  Daily         06/19/25 0349     IP VTE HIGH RISK PATIENT  Once         06/19/25 0349     Place sequential compression device  Until discontinued         06/19/25 0349                                    Alberta Elena DO  Department of Hospital Medicine  [facility] - Telemetry Stepdown

## 2025-06-19 NOTE — MEDICAL/APP STUDENT
[facility] - Telemetry [facility] Medicine  Progress Note    Patient Name: Laila Hanna  MRN: [id number]  Patient Class: IP- Inpatient   Admission Date: 6/18/2025  Length of Stay: 0 days  Attending Physician: Samm Lennon MD  Primary Care Provider: Ama Graham MD        Subjective:     Chief Complaint   Patient presents with    Shortness of Breath     Pt presents from [facility] for SOB and increased swelling. Pt has hx of heart failure. Pt 88% on room air, 98% on 2L.        Principal Problem:Acute encephalopathy        HPI:  89 year old woman with Alzheimer dementia (AAOx4 at baseline), HFpEF, HTN, CAD s/p PCI to LAD in 2004, hypothyroidism, HLD, depression who presented from [facility] for altered mental status over the past week. Initially she kept complaining that she felt uncomfortable and her UA was negative at the time. Over the past few days, she was AAOx1 (name) and was brought into the hospital. The NH was called for collateral as she was AAOx1 and somnolent on exam (arousable to voice). Upon arrival, she was lethargic with a pH 7.302/PCO2 61.7 on VBG. CTH negative. She was briefly placed on Bipap with improvement to pH 7.377/CO2 55.5. Patient was AAOx3 at that point. MICU was consulted and deemed her stable for a stepdown unit. They believed her chronic hypercapnia was not the cause of her encephalopathy. CXR significant for RLL pleural effusion. They believed her hypoxia was driven by pulmonary edema but recommended cautious diuresis due to her MS. They also recommended CAP coverage, RIP, bowel regimen, TSH and repeating a  TTE. A bedside US showed preserved LVEF with Armani enlargement RAP 5-10 mmHg. She was admitted to hospital medicine for acute encephalopathy.       Interval History:   Patient is AAOx3, but slow in responses with slurred speech. Patient states she has a headache. Patient states shortness of breath when laying down but denies any chest pain.  Patient also denies other pain. Per nurse, patient has difficulty swallowing when given her meds.    Review of Systems   Constitutional:  Negative for diaphoresis and fever.   HENT:  Positive for trouble swallowing.    Respiratory:  Negative for wheezing and stridor.    Cardiovascular:  Positive for leg swelling.        L > R   Gastrointestinal:  Negative for abdominal distention and abdominal pain.   Genitourinary:  Negative for difficulty urinating.   Neurological:  Positive for headaches.     Objective:   Weight: 72.1 kg (159 lb)  Body mass index is 25.66 kg/m².    Intake/Output Summary (Last 24 hours) at 6/19/2025 0906  Last data filed at 6/19/2025 0713  Gross per 24 hour   Intake --   Output 2300 ml   Net -2300 ml     Vitals:    06/19/25 0752   BP: (!) 156/82   Pulse: 66   Resp: 18   Temp:      Physical Exam  Constitutional:       General: She is not in acute distress.     Appearance: She is not diaphoretic.   Pulmonary:      Effort: No respiratory distress.      Breath sounds: Normal breath sounds. No wheezing.   Musculoskeletal:      Right lower leg: Edema present.      Left lower leg: Edema present.   Skin:     General: Skin is warm and dry.   Neurological:      Mental Status: She is alert and oriented to person, place, and time.         Significant Labs: ABGs:   Recent Labs   Lab 06/18/25  2138 06/18/25  2244 06/19/25  0031   PH 7.326* 7.377 7.370   PCO2 71.5* 55.5* 64.9*   HCO3 37.4* 29.1* 37.6*   POCSATURATED 33  --  50   BE 11*  --  12*   PO2 23* 31.8* 29*     BMP:   Recent Labs   Lab 06/19/25  0637   GLU 78      K 3.8   CL 97   CO2 30*   BUN 15   CREATININE 0.9   CALCIUM 8.5*   MG 2.0     CBC:   Recent Labs   Lab 06/18/25  1852 06/18/25  1858 06/19/25  0627   WBC 8.89  --  8.82   HGB 11.4*  --  13.1   HCT 36.9* 36.8 43.5     --  266     TSH:   Recent Labs   Lab 06/18/25  1852   TSH 13.471*       Significant Imaging:   CT Chest/Abdomen/Pelvis  FINDINGS:   Lower Chest:   Heart is enlarged  with enlargement of all 4 cardiac chambers.  Coronary artery calcifications.  Calcifications of the mitral annulus.  No significant pericardial fluid.  Small bilateral pleural effusions.  Interstitial opacities and ground-glass opacities in the lung bases, likely pulmonary edema or infectious/inflammatory process. Moderate-sized hiatal hernia as seen previously.     Impression:  Motion and artifact limited study with suboptimal bolus timing and suboptimal patient positioning.     Enlarged heart with bilateral pleural effusions and bilateral pulmonary opacities suggestive edema or infectious/inflammatory process.     See report for complete findings and impression.     CT Head without Contrast  Impression:  No CT evidence of acute intracranial abnormality, allowing for motion and artifact limitations.     Generalized cerebral volume loss and chronic ischemic changes.  Similar probable meningioma in the right paraclinoid region.     X-Ray Chest AP Portable  Impression  Pulmonary findings suggest edema noting right pleural effusion.  Developing right lower lung zone consolidation not excluded.       Assessment/Plan:     Acute Hypoxemic Respiratory Failure  - possible volume overload causing pleural edema due to CHF  - Echocardiogram - murmur at apex, heard by other providers   - one dose of Lasix 40  - continue oxygen 2L   - Stop CTX/Azithro - as Pneumonia unlikely    Acute Encephalopathy  - most likely related to Hypercapnia caused by Respiratory Failure    Difficulty Swallowing  - Speech Eval  - Clinical swallow evaluation completed. Recommend NPO status with pleasure feedings of ice chips and meds crushed in small amounts of pudding/applesauce. SLP to continue to follow.     Postoperative Hypothyroidism  - continue home Synthroid 175 mcg  - Follow up TSH    Essential Hypertension  - 24-hour range - 131//81  - continue home Lisinopril 10    Coronary Artery Disease  - continue ASA 81 and Lipitor 40    Anxiety  and Depression  - continue Bupropion and Lexapro      VTE Risk Mitigation (From admission, onward)           Ordered     enoxaparin injection 40 mg  Daily         06/19/25 0349     IP VTE HIGH RISK PATIENT  Once         06/19/25 0349     Place sequential compression device  Until discontinued         06/19/25 0349                    Discharge Planning   RACHAEL:  6/23/2025    Code Status: Full Code   Is the patient medically ready for discharge?:     Reason for patient still in hospital (select all that apply): Treatment                     Daniel Beidokhti, [facility] MS3  Department of Hospital Medicine   [facility] - Telemetry Stepdown

## 2025-06-19 NOTE — PLAN OF CARE
Problem: SLP  Goal: SLP Goal  Description: Speech Pathology Goals  To be met by 7/3/25    1. Pt will participate in ongoing diagnostic dysphagia therapy         Outcome: Progressing     Clinical swallow evaluation completed. Recommend NPO status with pleasure feedings of ice chips and meds crushed in small amounts of pudding/applesauce. SLP to continue to follow.

## 2025-06-19 NOTE — CONSULTS
PULMONARY & CRITICAL CARE MEDICINE   CONSULT NOTE    A/P  # Hypoactive delirium in patient with Alzheimer dementia  # Acute hypoxemic respiratory failure due to pulmonary edema  # Chronic hypercapnic respiratory failure   # HFpEF w/mod MS & mild AS  # Pulmonary hypertension, likely Gr2  # Acquired hypothyroidism  # Constipation  - Consulted for concerns that patient requires NIV. Plain film pulmonary edema. VBG after being off NIV for 2 hrs 7.377 65 +12. This is compensated and hypercapnia is not the cause of her encephalopathy. She is chronically hypercapnic based off these data likely related to her kyphoscoliosis and may have some degree of obstruction (emphysema and mosaicism on old CT). SpO2 92-95% on 2L NC. Not tachypneic. Normal work of breathing. No wheezing  - Her hypoxia is mainly driven by pulmonary edema. Asymmetric airspace disease on R and reported cough so I'd cover for CAP. Check a multiplex PCR for other respiratory viruses  - This is likely hypoactive delirium in a 89 y.o. with Alzheimer dementia who is constipated. Recommend bowel regimen   - Bedside TTE. LVEF preserved. Normal RV. Armani enlargement. RAP 5-10 mmHg.   - Check a TSH & fT4 to rule-out inadequate levothyroxine (dispense history is consistent so likely receiving it)  - Agree with cautious diuresis. She had moderate MS on TTE last year so wouldn't be too aggressive as needs higher preload for adequate filling pressures  - Telemetry. She has substrate for AF with her MS and will not tolerate RVR. She's been in sinus in the ED  - Repeat TTE  - Mildly hypoxemic with normal work of breathing. Lactate normal. Good capillary refill. Making urine  - Appropriate for care outside ICU in stepdown unit. Please reach out to us if any other concerns or questions and happy to reevaluate    S:   Ms. Laila Hanna is a 89 y.o. woman with below medical history who was referred from SNF for evaluation of dyspnea and lower extremity edema. Hypoxic.  "Somnolent these past several days per nursing notes. Reportedly had been experiencing cough. Slurred speech yesterday AM since waking. She is hypoactive and unable to provide any history.     PMHx:   Nursing home resident  Recurrent major depressive disorder  Anxiety disorder  Previous TIA  HTN  CAD s/p PCI LAD (2004)  HFpEF w/mod MS & mild AS  Pulmonary hypertension, likely Gr2 versus less likely Gr3  Constipation  Acquired hypothyroidism  SIADH    PSHx  PCI with stent  Cholecystectomy  Thyroidectomy    O:   BP (!) 160/77   Pulse 66   Temp 97.5 °F (36.4 °C) (Axillary)   Resp 16   Ht 5' 6" (1.676 m)   Wt 79.4 kg (175 lb)   LMP  (LMP Unknown) Comment: years ago  SpO2 (!) 93%   BMI 28.25 kg/m²    CONSTITUTIONAL: Thin elderly female.   HEENT: Normocephalic. External ears normal. Extraocular movements intact.   CV: Regular rate and rhythm. Diastolic murmur at apex. Symmetric radial pulses. Warm extremities. Normal capillary refill.    PULM: Symmetric chest wall excursion. No accessory muscle use. Bibasilar crackles  GI: Abdomen is soft and nondistended. Lower abdominal tenderness without peritoneal signs  MSK: No gross deformity.  Lower extremity edema to proximal shins  NEURO: Disoriented and hypoactive. Withdraws to pain. No facial asymmetry or dysarthria. Moves all extremities.   DERM: No rash or ecchymoses.     Labs:  Macrocytic anemia. No leukocytosis. Metabolic alkalosis. Hypoalbuminemia. Hs-TnI 16. BNP 1100. Stark UA.   Flu/RSV/Covid -  VBG off noninvasive for 2 hrs 7.37 65 +12    Imaging:   Plain film. Pulmonary edema  CT Head Generalized volume loss. Microvascular changes. Unchanged likely meningioma  CT Abd Pelvis: Large stool burden with rectal wall thickening and trace free fluid. Ground glass in lung bases    Yahir Armendariz DO  1:24 AM 06/19/2025     "

## 2025-06-19 NOTE — PLAN OF CARE
[facility] - Telemetry Stepdown  Initial Discharge Assessment       Primary Care Provider: Ama Graham MD    Admission Diagnosis: Shortness of breath [R06.02]    Admission Date: 6/18/2025  Expected Discharge Date: 6/23/2025    Transition of Care Barriers: None    Payor: MEDICARE / Plan: MEDICARE PART A & B / Product Type: Government /     Extended Emergency Contact Information  Primary Emergency Contact: Anamika Baugh  Address: [address] [place] [address]  Mobile Phone: [phone]  Relation: Relative  Secondary Emergency Contact: gonzalezpatsy cates  Mobile Phone: [phone]  Relation: None    Discharge Plan A: Return to nursing home  Discharge Plan B: Return to Nursing Home      [facility] - [place], [place] - [address]  [address] [address]  Phone: [phone] Fax: [phone]    [address] [id number]  [place] [facility] [place] [facility]  [facility] [place]  [place] [address]  Phone: [phone] Fax: [phone]    [facility] [address]  [address]  [address] [place] [address]  Phone: [phone] Fax: [phone]      Initial Assessment (most recent)       Adult Discharge Assessment - 06/19/25 1132          Discharge Assessment    Assessment Type Discharge Planning Assessment     Confirmed/corrected address, phone number and insurance Yes     Confirmed Demographics Correct on Facesheet     Source of Information patient     Communicated RACHAEL with patient/caregiver Yes     People in Home facility resident     Facility Arrived From: [facility]     Do you expect to return to your current living situation? Yes     Do you have help at home or someone to help you manage your care at home? No     Prior to hospitilization cognitive status: Alert/Oriented     Current cognitive status: Alert/Oriented     Walking or Climbing  Stairs Difficulty yes     Walking or Climbing Stairs ambulation difficulty, requires equipment     Dressing/Bathing Difficulty yes     Dressing/Bathing bathing difficulty, requires equipment     Home Layout Able to live on 1st floor     Equipment Currently Used at Home none     Readmission within 30 days? No     Patient currently being followed by outpatient case management? No     Do you currently have service(s) that help you manage your care at home? No     Do you take prescription medications? Yes     Do you have prescription coverage? Yes     Do you have any problems affording any of your prescribed medications? No     Is the patient taking medications as prescribed? yes     How do you get to doctors appointments? other (see comments)   Facility    Are you on dialysis? No     Do you take coumadin? No     Discharge Plan A Return to nursing home     Discharge Plan B Return to Nursing Home     DME Needed Upon Discharge  none     Discharge Plan discussed with: Patient     Transition of Care Barriers None        Physical Activity    On average, how many days per week do you engage in moderate to strenuous exercise (like a brisk walk)? 0 days     On average, how many minutes do you engage in exercise at this level? 0 min        Financial Resource Strain    How hard is it for you to pay for the very basics like food, housing, medical care, and heating? Not very hard        Housing Stability    In the last 12 months, was there a time when you were not able to pay the mortgage or rent on time? No     At any time in the past 12 months, were you homeless or living in a shelter (including now)? No        Transportation Needs    In the past 12 months, has lack of transportation kept you from medical appointments or from getting medications? No     In the past 12 months, has lack of transportation kept you from meetings, work, or from getting things needed for daily living? No        Food Insecurity    Within the past 12  months, you worried that your food would run out before you got the money to buy more. Never true     Within the past 12 months, the food you bought just didn't last and you didn't have money to get more. Never true        Stress    Do you feel stress - tense, restless, nervous, or anxious, or unable to sleep at night because your mind is troubled all the time - these days? Only a little        Social Isolation    How often do you feel lonely or isolated from those around you?  Never        Alcohol Use    Q1: How often do you have a drink containing alcohol? Never     Q2: How many drinks containing alcohol do you have on a typical day when you are drinking? Patient does not drink     Q3: How often do you have six or more drinks on one occasion? Never        Utilities    In the past 12 months has the electric, gas, oil, or water company threatened to shut off services in your home? No        Health Literacy    How often do you need to have someone help you when you read instructions, pamphlets, or other written material from your doctor or pharmacy? Never                   Pt lives at [facility]. Referral made through Epic.  CM will continue to follow and offer support as needed. Discharge Plan A and Plan B have been determined by review of patient's clinical status, future medical and therapeutic needs, and coverage/benefits for post-acute care in coordination with multidisciplinary team members.  Jacobo Kelly, [facility]    [facility]  [phone]

## 2025-06-19 NOTE — PLAN OF CARE
PULMONARY & CRITICAL CARE MEDICINE   CONSULT NOTE    A/P  # Hypoactive delirium in patient with Alzheimer dementia  # Acute hypoxemic respiratory failure due to pulmonary edema  # Chronic hypercapnic respiratory failure   # HFpEF w/mod MS & mild AS  # Pulmonary hypertension, likely Gr2  # Acquired hypothyroidism  # Constipation  - Consulted for concerns that patient requires NIV. Plain film pulmonary edema. VBG after being off NIV for 2 hrs 7.377 65 +12. This is compensated and hypercapnia is not the cause of her encephalopathy. She is chronically hypercapnic based off these data likely related to her kyphoscoliosis and may have some degree of obstruction (emphysema and mosaicism on old CT). SpO2 92-95% on 2L NC. Not tachypneic. Normal work of breathing. No wheezing  - Her hypoxia is mainly driven by pulmonary edema. Asymmetric airspace disease on R and reported cough so I'd cover for CAP. Check a multiplex PCR for other respiratory viruses  - This is likely hypoactive delirium in a 89 y.o. with Alzheimer dementia who is constipated. Recommend bowel regimen   - Check a TSH & fT4 to rule-out inadequate levothyroxine (dispense history is consistent so likely receiving it)  - Agree with cautious diuresis. She had moderate MS on TTE last year so wouldn't be too aggressive as needs higher preload for adequate filling pressures  - Telemetry. She has substrate for AF with her MS and will not tolerate RVR. She's been in sinus in the ED  - Repeat TTE  - Mildly hypoxemic with normal work of breathing. Lactate normal. Good capillary refill. Making urine  - Appropriate for care outside ICU in stepdown unit    S:   Ms. Laila Hanna is a 89 y.o. woman with below medical history who was referred from SNF for evaluation of dyspnea and lower extremity edema. Hypoxic. Somnolent these past several days per nursing notes. Reportedly had been experiencing cough. Slurred speech yesterday AM since waking. She is hypoactive and unable  "to provide any history.     PMHx:   Nursing home resident  Recurrent major depressive disorder  Anxiety disorder  Previous TIA  HTN  CAD s/p PCI LAD (2004)  HFpEF w/mod MS & mild AS  Pulmonary hypertension, likely Gr2 versus less likely Gr3  Constipation  Acquired hypothyroidism  SIADH    PSHx  PCI with stent  Cholecystectomy  Thyroidectomy    O:   BP (!) 160/77   Pulse 66   Temp 97.5 °F (36.4 °C) (Axillary)   Resp 16   Ht 5' 6" (1.676 m)   Wt 79.4 kg (175 lb)   LMP  (LMP Unknown) Comment: years ago  SpO2 (!) 93%   BMI 28.25 kg/m²    CONSTITUTIONAL: Thin elderly female.   HEENT: Normocephalic. External ears normal. Extraocular movements intact.   CV: Regular rate and rhythm. Diastolic murmur at apex. Symmetric radial pulses. Warm extremities. Normal capillary refill.    PULM: Symmetric chest wall excursion. No accessory muscle use. Bibasilar crackles  GI: Abdomen is soft and nondistended. Lower abdominal tenderness without peritoneal signs  MSK: No gross deformity.  Lower extremity edema to proximal shins  NEURO: Disoriented and hypoactive. Withdraws to pain. No facial asymmetry or dysarthria. Moves all extremities.   DERM: No rash or ecchymoses.     Labs:  Macrocytic anemia. No leukocytosis. Metabolic alkalosis. Hypoalbuminemia. Hs-TnI 16. BNP 1100. [place] UA.   Flu/RSV/Covid -  VBG off noninvasive for 2 hrs 7.37 65 +12    Imaging:   Plain film. Pulmonary edema  CT Head Generalized volume loss. Microvascular changes. Unchanged likely meningioma  CT Abd Pelvis: Large stool burden with rectal wall thickening and trace free fluid. Ground glass in lung bases    Yahir Armendariz DO  1:24 AM 06/19/2025     "

## 2025-06-20 PROBLEM — M40.209 KYPHOSIS: Status: ACTIVE | Noted: 2025-06-20

## 2025-06-20 LAB
ABSOLUTE EOSINOPHIL (OHS): 0.28 K/UL
ABSOLUTE MONOCYTE (OHS): 0.72 K/UL (ref 0.3–1)
ABSOLUTE NEUTROPHIL COUNT (OHS): 4.93 K/UL (ref 1.8–7.7)
ALBUMIN SERPL BCP-MCNC: 2.8 G/DL (ref 3.5–5.2)
ALP SERPL-CCNC: 82 UNIT/L (ref 40–150)
ALT SERPL W/O P-5'-P-CCNC: 9 UNIT/L (ref 10–44)
ANION GAP (OHS): 9 MMOL/L (ref 8–16)
AST SERPL-CCNC: 14 UNIT/L (ref 11–45)
BASOPHILS # BLD AUTO: 0.05 K/UL
BASOPHILS NFR BLD AUTO: 0.7 %
BILIRUB SERPL-MCNC: 0.5 MG/DL (ref 0.1–1)
BUN SERPL-MCNC: 18 MG/DL (ref 10–30)
CALCIUM SERPL-MCNC: 8.4 MG/DL (ref 8.7–10.5)
CHLORIDE SERPL-SCNC: 96 MMOL/L (ref 95–110)
CO2 SERPL-SCNC: 33 MMOL/L (ref 23–29)
CREAT SERPL-MCNC: 0.8 MG/DL (ref 0.5–1.4)
ERYTHROCYTE [DISTWIDTH] IN BLOOD BY AUTOMATED COUNT: 15.1 % (ref 11.5–14.5)
GFR SERPLBLD CREATININE-BSD FMLA CKD-EPI: >60 ML/MIN/1.73/M2
GLUCOSE SERPL-MCNC: 62 MG/DL (ref 70–110)
HCT VFR BLD AUTO: 38.2 % (ref 37–48.5)
HGB BLD-MCNC: 11.6 GM/DL (ref 12–16)
IMM GRANULOCYTES # BLD AUTO: 0.03 K/UL (ref 0–0.04)
IMM GRANULOCYTES NFR BLD AUTO: 0.4 % (ref 0–0.5)
LYMPHOCYTES # BLD AUTO: 0.83 K/UL (ref 1–4.8)
MAGNESIUM SERPL-MCNC: 2 MG/DL (ref 1.6–2.6)
MCH RBC QN AUTO: 30.6 PG (ref 27–31)
MCHC RBC AUTO-ENTMCNC: 30.4 G/DL (ref 32–36)
MCV RBC AUTO: 101 FL (ref 82–98)
NUCLEATED RBC (/100WBC) (OHS): 0 /100 WBC
PHOSPHATE SERPL-MCNC: 4 MG/DL (ref 2.7–4.5)
PLATELET # BLD AUTO: 244 K/UL (ref 150–450)
PMV BLD AUTO: 10.3 FL (ref 9.2–12.9)
POTASSIUM SERPL-SCNC: 3.8 MMOL/L (ref 3.5–5.1)
PROCALCITONIN SERPL-MCNC: 0.02 NG/ML
PROT SERPL-MCNC: 5.9 GM/DL (ref 6–8.4)
RBC # BLD AUTO: 3.79 M/UL (ref 4–5.4)
RELATIVE EOSINOPHIL (OHS): 4.1 %
RELATIVE LYMPHOCYTE (OHS): 12.1 % (ref 18–48)
RELATIVE MONOCYTE (OHS): 10.5 % (ref 4–15)
RELATIVE NEUTROPHIL (OHS): 72.2 % (ref 38–73)
SODIUM SERPL-SCNC: 138 MMOL/L (ref 136–145)
WBC # BLD AUTO: 6.84 K/UL (ref 3.9–12.7)

## 2025-06-20 PROCEDURE — 84100 ASSAY OF PHOSPHORUS: CPT

## 2025-06-20 PROCEDURE — 84145 PROCALCITONIN (PCT): CPT | Performed by: HOSPITALIST

## 2025-06-20 PROCEDURE — 85025 COMPLETE CBC W/AUTO DIFF WBC: CPT

## 2025-06-20 PROCEDURE — 25000003 PHARM REV CODE 250

## 2025-06-20 PROCEDURE — 25000242 PHARM REV CODE 250 ALT 637 W/ HCPCS

## 2025-06-20 PROCEDURE — 83735 ASSAY OF MAGNESIUM: CPT

## 2025-06-20 PROCEDURE — 94761 N-INVAS EAR/PLS OXIMETRY MLT: CPT

## 2025-06-20 PROCEDURE — 36415 COLL VENOUS BLD VENIPUNCTURE: CPT

## 2025-06-20 PROCEDURE — 99900035 HC TECH TIME PER 15 MIN (STAT)

## 2025-06-20 PROCEDURE — 63600175 PHARM REV CODE 636 W HCPCS

## 2025-06-20 PROCEDURE — 97535 SELF CARE MNGMENT TRAINING: CPT

## 2025-06-20 PROCEDURE — 27000221 HC OXYGEN, UP TO 24 HOURS

## 2025-06-20 PROCEDURE — 20600001 HC STEP DOWN PRIVATE ROOM

## 2025-06-20 PROCEDURE — 94640 AIRWAY INHALATION TREATMENT: CPT

## 2025-06-20 PROCEDURE — 82040 ASSAY OF SERUM ALBUMIN: CPT

## 2025-06-20 PROCEDURE — 92526 ORAL FUNCTION THERAPY: CPT

## 2025-06-20 RX ORDER — LEVOTHYROXINE SODIUM 175 UG/1
175 TABLET ORAL DAILY
COMMUNITY

## 2025-06-20 RX ORDER — MECLIZINE HYDROCHLORIDE 25 MG/1
25 TABLET ORAL 3 TIMES DAILY PRN
COMMUNITY

## 2025-06-20 RX ORDER — FUROSEMIDE 10 MG/ML
80 INJECTION INTRAMUSCULAR; INTRAVENOUS ONCE
Status: COMPLETED | OUTPATIENT
Start: 2025-06-20 | End: 2025-06-20

## 2025-06-20 RX ORDER — NITROGLYCERIN 0.4 MG/1
0.4 TABLET SUBLINGUAL EVERY 5 MIN PRN
COMMUNITY

## 2025-06-20 RX ORDER — LORATADINE 10 MG/1
10 TABLET ORAL DAILY
COMMUNITY

## 2025-06-20 RX ORDER — ACETAMINOPHEN 500 MG
5000 TABLET ORAL WEEKLY
COMMUNITY

## 2025-06-20 RX ORDER — ZINC GLUCONATE 10 MG
1 LOZENGE ORAL DAILY
COMMUNITY

## 2025-06-20 RX ORDER — CRANBERRY FRUIT 450 MG
1 TABLET ORAL EVERY MORNING
COMMUNITY

## 2025-06-20 RX ADMIN — ESCITALOPRAM OXALATE 20 MG: 20 TABLET ORAL at 09:06

## 2025-06-20 RX ADMIN — LEVOTHYROXINE SODIUM 150 MCG: 150 TABLET ORAL at 06:06

## 2025-06-20 RX ADMIN — IPRATROPIUM BROMIDE AND ALBUTEROL SULFATE 3 ML: 2.5; .5 SOLUTION RESPIRATORY (INHALATION) at 08:06

## 2025-06-20 RX ADMIN — IPRATROPIUM BROMIDE AND ALBUTEROL SULFATE 3 ML: 2.5; .5 SOLUTION RESPIRATORY (INHALATION) at 07:06

## 2025-06-20 RX ADMIN — ACETAMINOPHEN 650 MG: 325 TABLET ORAL at 02:06

## 2025-06-20 RX ADMIN — ATORVASTATIN CALCIUM 40 MG: 40 TABLET, FILM COATED ORAL at 08:06

## 2025-06-20 RX ADMIN — BUPROPION HYDROCHLORIDE 300 MG: 150 TABLET, FILM COATED, EXTENDED RELEASE ORAL at 08:06

## 2025-06-20 RX ADMIN — IPRATROPIUM BROMIDE AND ALBUTEROL SULFATE 3 ML: 2.5; .5 SOLUTION RESPIRATORY (INHALATION) at 03:06

## 2025-06-20 RX ADMIN — DOCUSATE SODIUM 50 MG: 50 CAPSULE, LIQUID FILLED ORAL at 08:06

## 2025-06-20 RX ADMIN — CYANOCOBALAMIN TAB 250 MCG 250 MCG: 250 TAB at 08:06

## 2025-06-20 RX ADMIN — POLYETHYLENE GLYCOL 3350 17 G: 17 POWDER, FOR SOLUTION ORAL at 08:06

## 2025-06-20 RX ADMIN — Medication 1 TABLET: at 08:06

## 2025-06-20 RX ADMIN — FUROSEMIDE 80 MG: 10 INJECTION, SOLUTION INTRAVENOUS at 12:06

## 2025-06-20 RX ADMIN — ASPIRIN 81 MG: 81 TABLET, COATED ORAL at 08:06

## 2025-06-20 RX ADMIN — LISINOPRIL 10 MG: 10 TABLET ORAL at 08:06

## 2025-06-20 RX ADMIN — ENOXAPARIN SODIUM 40 MG: 40 INJECTION SUBCUTANEOUS at 05:06

## 2025-06-20 NOTE — ASSESSMENT & PLAN NOTE
Patient's blood pressure range in the last 24 hours was: BP  Min: 103/58  Max: 155/70.  Continue home Lisinopril 10  Adjust as needed

## 2025-06-20 NOTE — PROGRESS NOTES
[facility] - Telemetry [facility] Medicine  Progress Note    Patient Name: Laila Hanna  MRN: [id number]  Patient Class: IP- Inpatient   Admission Date: 6/18/2025  Length of Stay: 1 days  Attending Physician: Samm Lennon MD  Primary Care Provider: Ama Graham MD        Subjective     Principal Problem:Acute respiratory failure with hypoxia and hypercarbia        HPI:  89 year old woman with Alzheimer dementia (AAOx4 at baseline), HFpEF, HTN, CAD s/p PCI to LAD in 2004, hypothyroidism, HLD, depression who presented from [facility] for altered mental status over the past week. Initially she kept complaining that she felt uncomfortable and her UA was negative at the time. Over the past few days, she was AAOx1 (name) and was brought into the hospital. The NH was called for collateral as she was AAOx1 and somnolent on exam (arousable to voice). Upon arrival, she was lethargic with a pH 7.302/PCO2 61.7 on VBG. CTH negative. She was briefly placed on Bipap with improvement to pH 7.377/CO2 55.5. Patient was AAOx3 at that point. MICU was consulted and deemed her stable for a stepdown unit. They believed her chronic hypercapnia was not the cause of her encephalopathy. CXR significant for RLL pleural effusion. They believed her hypoxia was driven by pulmonary edema but recommended cautious diuresis due to her MS. They also recommended CAP coverage, RIP, bowel regimen, TSH and repeating a  TTE. A bedside US showed preserved LVEF with Armani enlargement RAP 5-10 mmHg. She was admitted to hospital medicine for acute encephalopathy.     Overview/Hospital Course:  Admitted for AMS and CHF exacerbation. Received one time lasix dose in ED.    Interval History: Mentation improving this morning. Endorses some difficulty with swallowing. VSS. Labs stable. She reports feeling some improvement overall.     Review of Systems  Objective:     Vital Signs (Most Recent):  Temp: 97.6 °F (36.4 °C) (06/20/25  0753)  Pulse: 70 (06/20/25 1044)  Resp: 16 (06/20/25 0823)  BP: 138/76 (06/20/25 0753)  SpO2: 96 % (06/20/25 0823) Vital Signs (24h Range):  Temp:  [97.5 °F (36.4 °C)-98.6 °F (37 °C)] 97.6 °F (36.4 °C)  Pulse:  [62-77] 70  Resp:  [16-19] 16  SpO2:  [96 %-100 %] 96 %  BP: (103-138)/(58-88) 138/76     Weight: 72.1 kg (159 lb)  Body mass index is 25.66 kg/m².    Intake/Output Summary (Last 24 hours) at 6/20/2025 1145  Last data filed at 6/20/2025 0929  Gross per 24 hour   Intake 100 ml   Output 550 ml   Net -450 ml         Physical Exam  Constitutional:       General: She is not in acute distress.     Appearance: She is ill-appearing.   HENT:      Head: Normocephalic and atraumatic.   Cardiovascular:      Rate and Rhythm: Normal rate and regular rhythm.      Heart sounds: Murmur heard.      Comments: Diastolic murmur at apex   Pulmonary:      Effort: No respiratory distress.      Breath sounds: Rhonchi present. No wheezing.      Comments: Rhonchi on RML/RLL  Abdominal:      General: There is no distension.      Palpations: Abdomen is soft.      Tenderness: There is no abdominal tenderness.   Musculoskeletal:      Right lower leg: Edema present.      Left lower leg: Edema present.      Comments: 1+ pitting edema in BLE   Skin:     General: Skin is warm.   Neurological:      Mental Status: She is alert.               Significant Labs: All pertinent labs within the past 24 hours have been reviewed.    Significant Imaging: I have reviewed all pertinent imaging results/findings within the past 24 hours.      Assessment & Plan  Acute encephalopathy  89 yo woman with acute encephalopathy for the past week. TSH 13.5 with normal T4 in the setting of acute illness. Ddx includes infection vs hypercapnia. CT head negative, UA negative. R pleural effusions on CXR and CT chest   follow up blood cultures  follow up B12/folic acid levels  follow up VBG  Postoperative hypothyroidism  Continue home Synthroid 175 mcg   Follow up TSH -   wnl    Essential hypertension  Patient's blood pressure range in the last 24 hours was: BP  Min: 103/58  Max: 155/70.  Continue home Lisinopril 10  Adjust as needed  Coronary artery disease involving native coronary artery of native heart  Patient with known CAD s/p PCI to LAD in 2004.  Continue ASA 81 and Lipitor 40  Acute respiratory failure with hypoxia and hypercarbia  She sometimes uses oxygen at the nursing home. Supplemental oxygen was provided and noted- Oxygen Concentration (%):  [28] 28  Signs/symptoms of respiratory failure include- lethargy. Contributing diagnoses includes - Aspiration, CHF, Pleural effusion, and Pneumonia Labs and images were reviewed. JVD noted on physical exam - CHF more likely. D/c abx, infectious less likely.   DVT US without dvt present.     BNP 1096 with RLL pleural effusion  cautious diuresis 2/2 MS  repeat VBG to reassess hypercarbia respiratory status worsens or mentation worsens.   Anxiety and depression  Continue Bupropion and Lexapro    Slow transit constipation      Pulmonary hypertension      Acute on chronic diastolic CHF (congestive heart failure)  Patient has Diastolic (HFpEF) heart failure that is Acute on chronic. On presentation their CHF was decompensated. Evidence of decompensated CHF on presentation includes: edema, elevated JVD, crackles on lung auscultation, and shortness of breath. The etiology of their decompensation is likely multifactorial, possibly progression of valvular disease. Most recent BNP and echo results are listed below.  Recent Labs     06/18/25  1852   BNP 1,096*     Latest ECHO  Results for orders placed during the hospital encounter of 06/18/25    Echo    Interpretation Summary    Left Ventricle: The left ventricle is normal in size. Ventricular mass is normal. Normal wall thickness. There is concentric remodeling. Normal wall motion. There is normal systolic function with a visually estimated ejection fraction of 60 - 65%. Diastolic function  cannot be reliably determined in the presence of mitral annular calcification.    Right Ventricle: The right ventricle is normal in size Wall thickness is normal. Systolic function is normal.    Left Atrium: The left atrium is severely dilated    Right Atrium: The right atrium is mildly dilated .    Aortic Valve: The aortic valve is a trileaflet valve. There is severe aortic valve sclerosis. Severely calcified cusps. There is moderate annular calcification present. Moderately restricted motion. There is moderatestenosis. Aortic valve area by VTI is 0.9 cm². Aortic valve peak velocity is 3.2 m/s. Mean gradient is 24 mmHg. The dimensionless index is 0.30. MUSTAPHA is likely underestimated.    Mitral Valve: There is at least moderate or moderate to severe stenosis. The mean pressure gradient across the mitral valve is 7 mmHg at a heart rate of 86 bpm. MV area by continuity equation is 1.13 cm2. MVA in SAX is not well seen.    Tricuspid Valve: There is severe regurgitation with a centrally directed jet.    Aorta: The aortic root is normal in size measuring 3.02 cm. The ascending aorta is normal in size measuring 3.0 cm.    Pulmonary Artery: There is moderate pulmonary hypertension. The estimated pulmonary artery systolic pressure is 58 mmHg.    IVC/SVC: Normal venous pressure at 3 mmHg.    Pericardium: There is no pericardial effusion.    Current Heart Failure Medications  lisinopriL tablet 10 mg, Daily, Oral  furosemide injection 80 mg, Once, Intravenous  Monitor strict I&Os and daily weights.    Place on telemetry  Low sodium diet  Place on fluid restriction of 1.5 L.     VTE Risk Mitigation (From admission, onward)           Ordered     enoxaparin injection 40 mg  Daily         06/19/25 0349     IP VTE HIGH RISK PATIENT  Once         06/19/25 0349     Place sequential compression device  Until discontinued         06/19/25 0349                    Discharge Planning   RACHAEL: 6/23/2025     Code Status: Full Code   Patient is  Medically Not Yet Ready for discharge.  Medical Readiness for Discharge Date:   Discharge Plan A: Return to nursing home          SDOH Screening:  The patient was screened for utility difficulties, food insecurity, transport difficulties, housing insecurity, and interpersonal safety and there were no concerns identified this admission.                      Safia Wolfe DO  Department of Hospital Medicine   [facility] - Telemetry Stepdown

## 2025-06-20 NOTE — HOSPITAL COURSE
Admitted for AMS and CHF exacerbation. Given lasix with good UOP and some improvement in symptoms. She was also noted to be slightly hypercapnic (7.302/PCO2 61.7) which improved with bipap. CT chest was ordered which showed diffuse interlobular septal thickening and minimal ground-glass opacities in bilateral lungs (infectious vs edema). Antibiotics were also discontinued as infectious etiology was less likely. Given patient's altered mentation, speech was consulted and patient was made NPO 2/2 to dysphagia. Patient later became more alert and interactive, discussed the risks and benefits of initiating a diet, she understood that choking and aspiration were risks, but she decided to proceed with a diet. Patient was diuresed with IV lasix with appropriate UOP. Developed a slight contraction alkalosis and diuretics were held. Given initial volume status, decision was made to increase home torsemide to 20.    Patient was medically cleared for discharge back to nursing home. She was instructed to follow up with her PCP and cardiologist and instructed to establish care with pulmonology and palliative care. Nursing home orders were sent.    PCP follow up  Torsemide was increased from 10 to 20, monitor volume status  Cardiology follow up was recommended to discuss further options of valvular disease  Pulmonology follow up for CT findings.   Palliative was recommended for GOC discussions

## 2025-06-20 NOTE — ASSESSMENT & PLAN NOTE
89 yo woman with acute encephalopathy for the past week. TSH 13.5 with normal T4 in the setting of acute illness. Ddx includes infection vs hypercapnia. CT head negative, UA negative. R pleural effusions on CXR and CT chest   follow up blood cultures  follow up B12/folic acid levels  follow up VBG

## 2025-06-20 NOTE — ASSESSMENT & PLAN NOTE
Patient has Diastolic (HFpEF) heart failure that is Acute on chronic. On presentation their CHF was decompensated. Evidence of decompensated CHF on presentation includes: edema, elevated JVD, crackles on lung auscultation, and shortness of breath. The etiology of their decompensation is likely multifactorial, possibly progression of valvular disease. Most recent BNP and echo results are listed below.  Recent Labs     06/18/25  1852   BNP 1,096*     Latest ECHO  Results for orders placed during the hospital encounter of 06/18/25    Echo    Interpretation Summary    Left Ventricle: The left ventricle is normal in size. Ventricular mass is normal. Normal wall thickness. There is concentric remodeling. Normal wall motion. There is normal systolic function with a visually estimated ejection fraction of 60 - 65%. Diastolic function cannot be reliably determined in the presence of mitral annular calcification.    Right Ventricle: The right ventricle is normal in size Wall thickness is normal. Systolic function is normal.    Left Atrium: The left atrium is severely dilated    Right Atrium: The right atrium is mildly dilated .    Aortic Valve: The aortic valve is a trileaflet valve. There is severe aortic valve sclerosis. Severely calcified cusps. There is moderate annular calcification present. Moderately restricted motion. There is moderatestenosis. Aortic valve area by VTI is 0.9 cm². Aortic valve peak velocity is 3.2 m/s. Mean gradient is 24 mmHg. The dimensionless index is 0.30. MUSTAPHA is likely underestimated.    Mitral Valve: There is at least moderate or moderate to severe stenosis. The mean pressure gradient across the mitral valve is 7 mmHg at a heart rate of 86 bpm. MV area by continuity equation is 1.13 cm2. MVA in SAX is not well seen.    Tricuspid Valve: There is severe regurgitation with a centrally directed jet.    Aorta: The aortic root is normal in size measuring 3.02 cm. The ascending aorta is normal in size  measuring 3.0 cm.    Pulmonary Artery: There is moderate pulmonary hypertension. The estimated pulmonary artery systolic pressure is 58 mmHg.    IVC/SVC: Normal venous pressure at 3 mmHg.    Pericardium: There is no pericardial effusion.    Current Heart Failure Medications  lisinopriL tablet 10 mg, Daily, Oral  furosemide injection 80 mg, Once, Intravenous  Monitor strict I&Os and daily weights.    Place on telemetry  Low sodium diet  Place on fluid restriction of 1.5 L.

## 2025-06-20 NOTE — PHARMACY MED REC
"            Admission Medication History     The home medication history was taken by Faith Wilson.    You may go to "Admission" then "Reconcile Home Medications" tabs to review and/or act upon these items.     The home medication list has been updated by the Pharmacy department.   Please read ALL comments highlighted in yellow.   Please address this information as you see fit.    Feel free to contact us if you have any questions or require assistance.    Medications listed below were obtained from: Patient/family and Analytic software- [employer] Medications   Medication Sig    acetaminophen (TYLENOL) 500 MG tablet Take 1 Tablet (500 Mg Total) By Mouth Every 8 (Eight) Hours.      albuterol (PROVENTIL) 2.5 mg /3 mL (0.083 %) nebulizer solution Take 2.5 Mg By Nebulization Every 4 (Four) Hours As Needed For Shortness Of Breath.      alendronate (FOSAMAX) 70 MG tablet Take 1 Tablet By Mouth Every 7 Days      aspirin (ECOTRIN) 81 MG EC tablet Take 1 Tablet (81 Mg Total) By Mouth Once Daily.      atorvastatin (LIPITOR) 40 MG tablet Take 1 Tablet (40 Mg Total) By Mouth Once Daily.      bisacodyL (DULCOLAX) 5 mg EC tablet Take 2 Tablets By Mouth Daily As Needed For Constipation.      buPROPion (WELLBUTRIN XL) 300 MG 24 hr tablet Take 300 Mg By Mouth Once Daily.      cholecalciferol, vitamin D3, (VITAMIN D3) 125 mcg (5,000 unit) Tab Take 5,000 Units By Mouth Once A Week. Saturday Morning      cranberry fruit (CRANBERRY) 450 mg Tab Take 1 Tablet By Mouth Every Morning. For UTI      dextran 70-hypromellose (ARTIFICIAL TEARS) ophthalmic solution Place 1 Drop Into Both Eyes Every 2 (Two) Hours As Needed.      diclofenac sodium (VOLTAREN) 1 % Gel Apply Topically To Hands And Knees Daily As Needed For Pain.      EScitalopram oxalate (LEXAPRO) 10 MG tablet Take 20 Mg By Mouth Every Evening.      guaifenesin/dextromethorphan (COUGH-CHEST CONGESTION DM ORAL) Take 5 Mls By Mouth Every 6 (Six) Hours.      levothyroxine (SYNTHROID, " LEVOTHROID) 175 MCG tablet Take 175 Mcg By Mouth Once Daily.      lisinopriL 10 MG tablet Take 10 Mg By Mouth Once Daily.      loratadine (CLARITIN) 10 mg tablet Take 10 Mg By Mouth Once Daily.      meclizine (ANTIVERT) 25 mg tablet Take 25 Mg By Mouth 3 (Three) Times Daily As Needed For Dizziness.      melatonin (MELATIN) 3 mg tablet Take 2 Tablets By Mouth Nightly.       multivitamin with minerals tablet Take 1 Tablet By Mouth Once Daily.      nitroGLYCERIN (NITROSTAT) 0.4 MG SL tablet Place 0.4 Mg Under The Tongue Every 5 (Five) Minutes As Needed For Chest Pain.      peg 400-propylene glycol (LUBRICANT EYE, PG-,) 0.4-0.3 % Drop Place 1 Drop Into Both Eyes Once Daily.      polyethylene glycol (GLYCOLAX) 17 gram PwPk Take 17 G By Mouth Once Daily.      saliva substitute combo no.9 (BIOTENE DRY MOUTH ORAL RINSE MM) Rinse By Mouth Daily As Needed For Dry Mouth.      senna-docusate 8.6-50 mg (PERICOLACE) 8.6-50 mg per tablet Take 1 Tablet By Mouth Once Daily.      torsemide (DEMADEX) 10 MG Tab Take 1 Tablet (10 Mg Total) By Mouth Once Daily. Take This Medication Every Day.       Potential issues to be addressed PRIOR TO DISCHARGE  The listed medications were obtained from another facility ([facility]). The patient may not have been able to fill these prescriptions prior to this admission and may require new scripts upon discharge.     Faith Wilson  [id number]

## 2025-06-20 NOTE — SUBJECTIVE & OBJECTIVE
Interval History: Mentation improving this morning. Endorses some difficulty with swallowing. VSS. Labs stable. She reports feeling some improvement overall.     Review of Systems  Objective:     Vital Signs (Most Recent):  Temp: 97.6 °F (36.4 °C) (06/20/25 0753)  Pulse: 70 (06/20/25 1044)  Resp: 16 (06/20/25 0823)  BP: 138/76 (06/20/25 0753)  SpO2: 96 % (06/20/25 0823) Vital Signs (24h Range):  Temp:  [97.5 °F (36.4 °C)-98.6 °F (37 °C)] 97.6 °F (36.4 °C)  Pulse:  [62-77] 70  Resp:  [16-19] 16  SpO2:  [96 %-100 %] 96 %  BP: (103-138)/(58-88) 138/76     Weight: 72.1 kg (159 lb)  Body mass index is 25.66 kg/m².    Intake/Output Summary (Last 24 hours) at 6/20/2025 1145  Last data filed at 6/20/2025 0929  Gross per 24 hour   Intake 100 ml   Output 550 ml   Net -450 ml         Physical Exam  Constitutional:       General: She is not in acute distress.     Appearance: She is ill-appearing.   HENT:      Head: Normocephalic and atraumatic.   Cardiovascular:      Rate and Rhythm: Normal rate and regular rhythm.      Heart sounds: Murmur heard.      Comments: Diastolic murmur at apex   Pulmonary:      Effort: No respiratory distress.      Breath sounds: Rhonchi present. No wheezing.      Comments: Rhonchi on RML/RLL  Abdominal:      General: There is no distension.      Palpations: Abdomen is soft.      Tenderness: There is no abdominal tenderness.   Musculoskeletal:      Right lower leg: Edema present.      Left lower leg: Edema present.      Comments: 1+ pitting edema in BLE   Skin:     General: Skin is warm.   Neurological:      Mental Status: She is alert.               Significant Labs: All pertinent labs within the past 24 hours have been reviewed.    Significant Imaging: I have reviewed all pertinent imaging results/findings within the past 24 hours.

## 2025-06-20 NOTE — PLAN OF CARE
Problem: Adult Inpatient Plan of Care  Goal: Absence of Hospital-Acquired Illness or Injury  Outcome: Progressing  Intervention: Identify and Manage Fall Risk  Flowsheets (Taken 6/20/2025 1601)  Safety Promotion/Fall Prevention:   assistive device/personal item within reach   side rails raised x 3   lighting adjusted   medications reviewed   Fall Risk reviewed with patient/family  Plan of care was reviewed with the pt. AAOx2. VSS. Cardiac monitoring was done. Pt was turned  Q2. Intake and output was documented. No major changes throughout the day. Safety precautions were in placed.

## 2025-06-20 NOTE — ASSESSMENT & PLAN NOTE
She sometimes uses oxygen at the nursing home. Supplemental oxygen was provided and noted- Oxygen Concentration (%):  [28] 28  Signs/symptoms of respiratory failure include- lethargy. Contributing diagnoses includes - Aspiration, CHF, Pleural effusion, and Pneumonia Labs and images were reviewed. JVD noted on physical exam - CHF more likely. D/c abx, infectious less likely.   DVT US without dvt present.     BNP 1096 with RLL pleural effusion  cautious diuresis 2/2 MS  repeat VBG to reassess hypercarbia respiratory status worsens or mentation worsens.

## 2025-06-21 LAB
ABSOLUTE EOSINOPHIL (OHS): 0.34 K/UL
ABSOLUTE MONOCYTE (OHS): 0.72 K/UL (ref 0.3–1)
ABSOLUTE NEUTROPHIL COUNT (OHS): 5.31 K/UL (ref 1.8–7.7)
ALBUMIN SERPL BCP-MCNC: 2.9 G/DL (ref 3.5–5.2)
ALP SERPL-CCNC: 86 UNIT/L (ref 40–150)
ALT SERPL W/O P-5'-P-CCNC: 9 UNIT/L (ref 10–44)
ANION GAP (OHS): 13 MMOL/L (ref 8–16)
AST SERPL-CCNC: 21 UNIT/L (ref 11–45)
BASOPHILS # BLD AUTO: 0.07 K/UL
BASOPHILS NFR BLD AUTO: 1 %
BILIRUB SERPL-MCNC: 0.6 MG/DL (ref 0.1–1)
BUN SERPL-MCNC: 14 MG/DL (ref 10–30)
CALCIUM SERPL-MCNC: 8.7 MG/DL (ref 8.7–10.5)
CHLORIDE SERPL-SCNC: 92 MMOL/L (ref 95–110)
CO2 SERPL-SCNC: 31 MMOL/L (ref 23–29)
CREAT SERPL-MCNC: 0.7 MG/DL (ref 0.5–1.4)
ERYTHROCYTE [DISTWIDTH] IN BLOOD BY AUTOMATED COUNT: 14.6 % (ref 11.5–14.5)
GFR SERPLBLD CREATININE-BSD FMLA CKD-EPI: >60 ML/MIN/1.73/M2
GLUCOSE SERPL-MCNC: 65 MG/DL (ref 70–110)
HCT VFR BLD AUTO: 42.4 % (ref 37–48.5)
HGB BLD-MCNC: 12.8 GM/DL (ref 12–16)
IMM GRANULOCYTES # BLD AUTO: 0.06 K/UL (ref 0–0.04)
IMM GRANULOCYTES NFR BLD AUTO: 0.8 % (ref 0–0.5)
LYMPHOCYTES # BLD AUTO: 0.76 K/UL (ref 1–4.8)
MAGNESIUM SERPL-MCNC: 1.9 MG/DL (ref 1.6–2.6)
MCH RBC QN AUTO: 30.5 PG (ref 27–31)
MCHC RBC AUTO-ENTMCNC: 30.2 G/DL (ref 32–36)
MCV RBC AUTO: 101 FL (ref 82–98)
NUCLEATED RBC (/100WBC) (OHS): 0 /100 WBC
PHOSPHATE SERPL-MCNC: 3.8 MG/DL (ref 2.7–4.5)
PLATELET # BLD AUTO: 221 K/UL (ref 150–450)
PMV BLD AUTO: 10.2 FL (ref 9.2–12.9)
POTASSIUM SERPL-SCNC: 4 MMOL/L (ref 3.5–5.1)
PROT SERPL-MCNC: 6.4 GM/DL (ref 6–8.4)
RBC # BLD AUTO: 4.19 M/UL (ref 4–5.4)
RELATIVE EOSINOPHIL (OHS): 4.7 %
RELATIVE LYMPHOCYTE (OHS): 10.5 % (ref 18–48)
RELATIVE MONOCYTE (OHS): 9.9 % (ref 4–15)
RELATIVE NEUTROPHIL (OHS): 73.1 % (ref 38–73)
SODIUM SERPL-SCNC: 136 MMOL/L (ref 136–145)
WBC # BLD AUTO: 7.26 K/UL (ref 3.9–12.7)

## 2025-06-21 PROCEDURE — 63600175 PHARM REV CODE 636 W HCPCS

## 2025-06-21 PROCEDURE — 83735 ASSAY OF MAGNESIUM: CPT

## 2025-06-21 PROCEDURE — 25000003 PHARM REV CODE 250

## 2025-06-21 PROCEDURE — 84100 ASSAY OF PHOSPHORUS: CPT

## 2025-06-21 PROCEDURE — 27000221 HC OXYGEN, UP TO 24 HOURS

## 2025-06-21 PROCEDURE — 85025 COMPLETE CBC W/AUTO DIFF WBC: CPT

## 2025-06-21 PROCEDURE — 99900035 HC TECH TIME PER 15 MIN (STAT)

## 2025-06-21 PROCEDURE — 36415 COLL VENOUS BLD VENIPUNCTURE: CPT

## 2025-06-21 PROCEDURE — 25000242 PHARM REV CODE 250 ALT 637 W/ HCPCS

## 2025-06-21 PROCEDURE — 80053 COMPREHEN METABOLIC PANEL: CPT

## 2025-06-21 PROCEDURE — 94799 UNLISTED PULMONARY SVC/PX: CPT

## 2025-06-21 PROCEDURE — 20600001 HC STEP DOWN PRIVATE ROOM

## 2025-06-21 PROCEDURE — 94761 N-INVAS EAR/PLS OXIMETRY MLT: CPT

## 2025-06-21 PROCEDURE — 94640 AIRWAY INHALATION TREATMENT: CPT

## 2025-06-21 RX ORDER — FUROSEMIDE 10 MG/ML
80 INJECTION INTRAMUSCULAR; INTRAVENOUS ONCE
Status: COMPLETED | OUTPATIENT
Start: 2025-06-21 | End: 2025-06-21

## 2025-06-21 RX ORDER — GLYCERIN 1 G/1
1 SUPPOSITORY RECTAL ONCE
Status: COMPLETED | OUTPATIENT
Start: 2025-06-21 | End: 2025-06-21

## 2025-06-21 RX ADMIN — GLYCERIN 1 SUPPOSITORY: 2 SUPPOSITORY RECTAL at 05:06

## 2025-06-21 RX ADMIN — LEVOTHYROXINE SODIUM 150 MCG: 150 TABLET ORAL at 05:06

## 2025-06-21 RX ADMIN — ACETAMINOPHEN 650 MG: 325 TABLET ORAL at 05:06

## 2025-06-21 RX ADMIN — BUPROPION HYDROCHLORIDE 300 MG: 150 TABLET, FILM COATED, EXTENDED RELEASE ORAL at 09:06

## 2025-06-21 RX ADMIN — IPRATROPIUM BROMIDE AND ALBUTEROL SULFATE 3 ML: 2.5; .5 SOLUTION RESPIRATORY (INHALATION) at 07:06

## 2025-06-21 RX ADMIN — ENOXAPARIN SODIUM 40 MG: 40 INJECTION SUBCUTANEOUS at 05:06

## 2025-06-21 RX ADMIN — FUROSEMIDE 80 MG: 10 INJECTION, SOLUTION INTRAMUSCULAR; INTRAVENOUS at 12:06

## 2025-06-21 RX ADMIN — CYANOCOBALAMIN TAB 250 MCG 250 MCG: 250 TAB at 09:06

## 2025-06-21 RX ADMIN — Medication 1 TABLET: at 09:06

## 2025-06-21 RX ADMIN — IPRATROPIUM BROMIDE AND ALBUTEROL SULFATE 3 ML: 2.5; .5 SOLUTION RESPIRATORY (INHALATION) at 01:06

## 2025-06-21 RX ADMIN — POLYETHYLENE GLYCOL 3350 17 G: 17 POWDER, FOR SOLUTION ORAL at 09:06

## 2025-06-21 RX ADMIN — ATORVASTATIN CALCIUM 40 MG: 40 TABLET, FILM COATED ORAL at 09:06

## 2025-06-21 RX ADMIN — DOCUSATE SODIUM 50 MG: 50 CAPSULE, LIQUID FILLED ORAL at 09:06

## 2025-06-21 RX ADMIN — LISINOPRIL 10 MG: 10 TABLET ORAL at 09:06

## 2025-06-21 RX ADMIN — ASPIRIN 81 MG: 81 TABLET, COATED ORAL at 09:06

## 2025-06-21 NOTE — ASSESSMENT & PLAN NOTE
Dysphagia present on admission, per chart review has been present for the last few years. Speech following, recommended NPO. Patient more alert on subsequent examinations - discussed risk/benefit of giving back a diet. Pt understood that chocking and aspiration are risks associated with eating, decided to proceed with diet.   Diet - advanced as tolerated  Continue SLP evaluation  Aspiration precautions  Assistance for feeding

## 2025-06-21 NOTE — ASSESSMENT & PLAN NOTE
Patient's blood pressure range in the last 24 hours was: BP  Min: 127/57  Max: 156/87.  Continue home Lisinopril 10  Adjust as needed

## 2025-06-21 NOTE — PLAN OF CARE
Problem: Skin Injury Risk Increased  Goal: Skin Health and Integrity  Outcome: Not Progressing     Problem: Adult Inpatient Plan of Care  Goal: Plan of Care Review  Outcome: Not Progressing  Goal: Patient-Specific Goal (Individualized)  Outcome: Not Progressing  Goal: Absence of Hospital-Acquired Illness or Injury  Outcome: Not Progressing  Goal: Optimal Comfort and Wellbeing  Outcome: Not Progressing  Goal: Readiness for Transition of Care  Outcome: Not Progressing

## 2025-06-21 NOTE — PROGRESS NOTES
[facility] - Tele[facility] Medicine  Progress Note    Patient Name: Laila Hanna  MRN: [id number]  Patient Class: IP- Inpatient   Admission Date: 6/18/2025  Length of Stay: 2 days  Attending Physician: Samm Lennon MD  Primary Care Provider: Ama Graham MD        Subjective     Principal Problem:Acute on chronic diastolic CHF (congestive heart failure)        HPI:  89 year old woman with Alzheimer dementia (AAOx4 at baseline), HFpEF, HTN, CAD s/p PCI to LAD in 2004, hypothyroidism, HLD, depression who presented from [facility] for altered mental status over the past week. Initially she kept complaining that she felt uncomfortable and her UA was negative at the time. Over the past few days, she was AAOx1 (name) and was brought into the hospital. The NH was called for collateral as she was AAOx1 and somnolent on exam (arousable to voice). Upon arrival, she was lethargic with a pH 7.302/PCO2 61.7 on VBG. CTH negative. She was briefly placed on Bipap with improvement to pH 7.377/CO2 55.5. Patient was AAOx3 at that point. MICU was consulted and deemed her stable for a stepdown unit. They believed her chronic hypercapnia was not the cause of her encephalopathy. CXR significant for RLL pleural effusion. They believed her hypoxia was driven by pulmonary edema but recommended cautious diuresis due to her MS. They also recommended CAP coverage, RIP, bowel regimen, TSH and repeating a  TTE. A bedside US showed preserved LVEF with Armani enlargement RAP 5-10 mmHg. She was admitted to hospital medicine for acute encephalopathy.     Overview/Hospital Course:  Admitted for AMS and CHF exacerbation. Given lasix with good UOP and some improvement in symptoms. She was also noted to be slightly hypercapnic (7.302/PCO2 61.7) which improved with bipap. CT chest was ordered which showed diffuse interlobular septal thickening and minimal ground-glass opacities in bilateral lungs (infectious vs  edema). Antibiotics were also discontinued as infectious etiology was less likely. Given patient's altered mentation, speech was consulted and patient was made NPO 2/2 to dysphagia.     Interval History: F/u speech eval yesterday reported continued dysphagia, consequently, pt remained NPO. Diuresis was continued with good UOP. VSS. Labs stable.       Review of Systems  Objective:     Vital Signs (Most Recent):  Temp: 98.3 °F (36.8 °C) (06/21/25 0535)  Pulse: 68 (06/21/25 0659)  Resp: 18 (06/21/25 0359)  BP: (!) 156/87 (06/21/25 0359)  SpO2: 100 % (06/21/25 0359) Vital Signs (24h Range):  Temp:  [97.5 °F (36.4 °C)-98.3 °F (36.8 °C)] 98.3 °F (36.8 °C)  Pulse:  [66-75] 68  Resp:  [16-19] 18  SpO2:  [96 %-100 %] 100 %  BP: (127-156)/(57-87) 156/87     Weight: 72.1 kg (159 lb)  Body mass index is 25.66 kg/m².    Intake/Output Summary (Last 24 hours) at 6/21/2025 0714  Last data filed at 6/20/2025 1701  Gross per 24 hour   Intake 260 ml   Output 2600 ml   Net -2340 ml         Physical Exam  Constitutional:       General: She is not in acute distress.     Appearance: She is ill-appearing.   HENT:      Head: Normocephalic and atraumatic.   Cardiovascular:      Rate and Rhythm: Normal rate and regular rhythm.      Heart sounds: Murmur heard.      Comments: Diastolic murmur at apex   Pulmonary:      Effort: No respiratory distress.      Breath sounds: Rhonchi present. No wheezing.      Comments: Rhonchi on RML/RLL  Abdominal:      General: There is no distension.      Palpations: Abdomen is soft.      Tenderness: There is no abdominal tenderness.   Musculoskeletal:      Right lower leg: Edema present.      Left lower leg: Edema present.      Comments: 1+ pitting edema in BLE   Skin:     General: Skin is warm.   Neurological:      Mental Status: She is alert.               Significant Labs: All pertinent labs within the past 24 hours have been reviewed.    Significant Imaging: I have reviewed all pertinent imaging  results/findings within the past 24 hours.      Assessment & Plan  Acute encephalopathy  Improving  89 yo woman with acute encephalopathy for the past week. TSH 13.5 with normal T4 in the setting of acute illness. Ddx includes infection vs hypercapnia. CT head negative, UA negative. R pleural effusions on CXR and CT chest.   B12/folic acid levels wnl. VBG improved   follow up blood cultures - NGTD  Postoperative hypothyroidism  Continue home Synthroid 175 mcg   Follow up TSH -  wnl  Essential hypertension  Patient's blood pressure range in the last 24 hours was: BP  Min: 127/57  Max: 156/87.  Continue home Lisinopril 10  Adjust as needed  Coronary artery disease involving native coronary artery of native heart  Patient with known CAD s/p PCI to LAD in 2004.  Continue ASA 81 and Lipitor 40  Acute respiratory failure with hypoxia and hypercarbia  She sometimes uses oxygen at the nursing home. Supplemental oxygen was provided and noted-    Signs/symptoms of respiratory failure include- lethargy. Contributing diagnoses includes - Aspiration, CHF, Pleural effusion, and Pneumonia Labs and images were reviewed. JVD noted on physical exam - CHF more likely. D/c abx, infectious less likely.   DVT US without dvt present.   cautious diuresis 2/2 MS  repeat VBG to reassess hypercarbia respiratory status worsens or mentation worsens.   Anxiety and depression  Continue Bupropion and Lexapro    Slow transit constipation      Pulmonary hypertension      Acute on chronic diastolic CHF (congestive heart failure)  Patient has Diastolic (HFpEF) heart failure that is Acute on chronic. On presentation their CHF was decompensated. Evidence of decompensated CHF on presentation includes: edema, elevated JVD, crackles on lung auscultation, and shortness of breath. The etiology of their decompensation is likely multifactorial, possibly progression of valvular disease. BNP elevated at 1096.    Echo  Interpretation Summary    Left Ventricle:  The left ventricle is normal in size. Ventricular mass is normal. Normal wall thickness. There is concentric remodeling. Normal wall motion. There is normal systolic function with a visually estimated ejection fraction of 60 - 65%. Diastolic function cannot be reliably determined in the presence of mitral annular calcification.    Right Ventricle: The right ventricle is normal in size Wall thickness is normal. Systolic function is normal.    Left Atrium: The left atrium is severely dilated    Right Atrium: The right atrium is mildly dilated .    Aortic Valve: The aortic valve is a trileaflet valve. There is severe aortic valve sclerosis. Severely calcified cusps. There is moderate annular calcification present. Moderately restricted motion. There is moderatestenosis. Aortic valve area by VTI is 0.9 cm². Aortic valve peak velocity is 3.2 m/s. Mean gradient is 24 mmHg. The dimensionless index is 0.30. MUSTAPHA is likely underestimated.    Mitral Valve: There is at least moderate or moderate to severe stenosis. The mean pressure gradient across the mitral valve is 7 mmHg at a heart rate of 86 bpm. MV area by continuity equation is 1.13 cm2. MVA in SAX is not well seen.    Tricuspid Valve: There is severe regurgitation with a centrally directed jet.    Aorta: The aortic root is normal in size measuring 3.02 cm. The ascending aorta is normal in size measuring 3.0 cm.    Pulmonary Artery: There is moderate pulmonary hypertension. The estimated pulmonary artery systolic pressure is 58 mmHg.    IVC/SVC: Normal venous pressure at 3 mmHg.    Pericardium: There is no pericardial effusion.    Current Heart Failure Medications  lisinopriL tablet 10 mg, Daily, Oral  Monitor strict I&Os and daily weights.    Place on telemetry  VTE Risk Mitigation (From admission, onward)           Ordered     enoxaparin injection 40 mg  Daily         06/19/25 0349     IP VTE HIGH RISK PATIENT  Once         06/19/25 0349     Place sequential  compression device  Until discontinued         06/19/25 0349                    Discharge Planning   RACHAEL: 6/23/2025     Code Status: Full Code   Medical Readiness for Discharge Date:   Discharge Plan A: Return to nursing home            Safia Wolfe DO  Department of Hospital Medicine   [facility] - Telemetry Stepdown

## 2025-06-21 NOTE — ASSESSMENT & PLAN NOTE
Patient's blood pressure range in the last 24 hours was: BP  Min: 118/57  Max: 156/87.  Continue home Lisinopril 10  Adjust as needed

## 2025-06-21 NOTE — PROGRESS NOTES
[facility] - Telemetry [facility] Medicine  Progress Note    Patient Name: Laila Hanna  MRN: [id number]  Patient Class: IP- Inpatient   Admission Date: 6/18/2025  Length of Stay: 2 days  Attending Physician: Samm Lennon MD  Primary Care Provider: Ama Graham MD        Subjective     Principal Problem:Acute on chronic diastolic CHF (congestive heart failure)        HPI:  89 year old woman with Alzheimer dementia (AAOx4 at baseline), HFpEF, HTN, CAD s/p PCI to LAD in 2004, hypothyroidism, HLD, depression who presented from [facility] for altered mental status over the past week. Initially she kept complaining that she felt uncomfortable and her UA was negative at the time. Over the past few days, she was AAOx1 (name) and was brought into the hospital. The NH was called for collateral as she was AAOx1 and somnolent on exam (arousable to voice). Upon arrival, she was lethargic with a pH 7.302/PCO2 61.7 on VBG. CTH negative. She was briefly placed on Bipap with improvement to pH 7.377/CO2 55.5. Patient was AAOx3 at that point. MICU was consulted and deemed her stable for a stepdown unit. They believed her chronic hypercapnia was not the cause of her encephalopathy. CXR significant for RLL pleural effusion. They believed her hypoxia was driven by pulmonary edema but recommended cautious diuresis due to her MS. They also recommended CAP coverage, RIP, bowel regimen, TSH and repeating a  TTE. A bedside US showed preserved LVEF with Armani enlargement RAP 5-10 mmHg. She was admitted to hospital medicine for acute encephalopathy.     Overview/Hospital Course:  Admitted for AMS and CHF exacerbation. Given lasix with good UOP and some improvement in symptoms. She was also noted to be slightly hypercapnic (7.302/PCO2 61.7) which improved with bipap. CT chest was ordered which showed diffuse interlobular septal thickening and minimal ground-glass opacities in bilateral lungs (infectious vs  edema). Antibiotics were also discontinued as infectious etiology was less likely. Given patient's altered mentation, speech was consulted and patient was made NPO 2/2 to dysphagia.     Interval History: F/u speech eval yesterday reported continued dysphagia, consequently, pt remained NPO. Diuresis was continued with good UOP. VSS. Labs stable.       Review of Systems  Objective:     Vital Signs (Most Recent):  Temp: 98.3 °F (36.8 °C) (06/21/25 0535)  Pulse: 68 (06/21/25 0659)  Resp: 18 (06/21/25 0359)  BP: (!) 156/87 (06/21/25 0359)  SpO2: 100 % (06/21/25 0359) Vital Signs (24h Range):  Temp:  [97.5 °F (36.4 °C)-98.3 °F (36.8 °C)] 98.3 °F (36.8 °C)  Pulse:  [66-75] 68  Resp:  [16-19] 18  SpO2:  [96 %-100 %] 100 %  BP: (127-156)/(57-87) 156/87     Weight: 72.1 kg (159 lb)  Body mass index is 25.66 kg/m².    Intake/Output Summary (Last 24 hours) at 6/21/2025 0714  Last data filed at 6/20/2025 1701  Gross per 24 hour   Intake 260 ml   Output 2600 ml   Net -2340 ml         Physical Exam  Constitutional:       General: She is not in acute distress.     Appearance: She is ill-appearing.   HENT:      Head: Normocephalic and atraumatic.   Cardiovascular:      Rate and Rhythm: Normal rate and regular rhythm.      Heart sounds: Murmur heard.      Comments: Diastolic murmur at apex   Pulmonary:      Effort: No respiratory distress.      Breath sounds: Rhonchi present. No wheezing.      Comments: Rhonchi on RML/RLL  Abdominal:      General: There is no distension.      Palpations: Abdomen is soft.      Tenderness: There is no abdominal tenderness.   Musculoskeletal:      Right lower leg: Edema present.      Left lower leg: Edema present.      Comments: 1+ pitting edema in BLE   Skin:     General: Skin is warm.   Neurological:      Mental Status: She is alert.               Significant Labs: All pertinent labs within the past 24 hours have been reviewed.    Significant Imaging: I have reviewed all pertinent imaging  results/findings within the past 24 hours.      Assessment & Plan  Acute encephalopathy  89 yo woman with acute encephalopathy for the past week. TSH 13.5 with normal T4 in the setting of acute illness. Ddx includes infection vs hypercapnia. CT head negative, UA negative. R pleural effusions on CXR and CT chest   follow up blood cultures  follow up B12/folic acid levels  follow up VBG  Postoperative hypothyroidism  Continue home Synthroid 175 mcg   Follow up TSH -  wnl    Essential hypertension  Patient's blood pressure range in the last 24 hours was: BP  Min: 118/57  Max: 156/87.  Continue home Lisinopril 10  Adjust as needed  Coronary artery disease involving native coronary artery of native heart  Patient with known CAD s/p PCI to LAD in 2004.  Continue ASA 81 and Lipitor 40  Acute respiratory failure with hypoxia and hypercarbia  She sometimes uses oxygen at the nursing home. Supplemental oxygen was provided and noted-    Signs/symptoms of respiratory failure include- lethargy. Contributing diagnoses includes - Aspiration, CHF, Pleural effusion, and Pneumonia Labs and images were reviewed. JVD noted on physical exam - CHF more likely. D/c abx, infectious less likely.   DVT US without dvt present.     BNP 1096 with RLL pleural effusion  cautious diuresis 2/2 MS  repeat VBG to reassess hypercarbia respiratory status worsens or mentation worsens.   Anxiety and depression  Continue Bupropion and Lexapro    Slow transit constipation      Pulmonary hypertension      Acute on chronic diastolic CHF (congestive heart failure)  Patient has Diastolic (HFpEF) heart failure that is Acute on chronic. On presentation their CHF was decompensated. Evidence of decompensated CHF on presentation includes: edema, elevated JVD, crackles on lung auscultation, and shortness of breath. The etiology of their decompensation is likely multifactorial, possibly progression of valvular disease. Most recent BNP and echo results are listed  below.  Recent Labs     06/18/25  1852   BNP 1,096*     Latest ECHO  Results for orders placed during the hospital encounter of 06/18/25    Echo    Interpretation Summary    Left Ventricle: The left ventricle is normal in size. Ventricular mass is normal. Normal wall thickness. There is concentric remodeling. Normal wall motion. There is normal systolic function with a visually estimated ejection fraction of 60 - 65%. Diastolic function cannot be reliably determined in the presence of mitral annular calcification.    Right Ventricle: The right ventricle is normal in size Wall thickness is normal. Systolic function is normal.    Left Atrium: The left atrium is severely dilated    Right Atrium: The right atrium is mildly dilated .    Aortic Valve: The aortic valve is a trileaflet valve. There is severe aortic valve sclerosis. Severely calcified cusps. There is moderate annular calcification present. Moderately restricted motion. There is moderatestenosis. Aortic valve area by VTI is 0.9 cm². Aortic valve peak velocity is 3.2 m/s. Mean gradient is 24 mmHg. The dimensionless index is 0.30. MUSTAPHA is likely underestimated.    Mitral Valve: There is at least moderate or moderate to severe stenosis. The mean pressure gradient across the mitral valve is 7 mmHg at a heart rate of 86 bpm. MV area by continuity equation is 1.13 cm2. MVA in SAX is not well seen.    Tricuspid Valve: There is severe regurgitation with a centrally directed jet.    Aorta: The aortic root is normal in size measuring 3.02 cm. The ascending aorta is normal in size measuring 3.0 cm.    Pulmonary Artery: There is moderate pulmonary hypertension. The estimated pulmonary artery systolic pressure is 58 mmHg.    IVC/SVC: Normal venous pressure at 3 mmHg.    Pericardium: There is no pericardial effusion.    Current Heart Failure Medications  lisinopriL tablet 10 mg, Daily, Oral  furosemide injection 80 mg, Once, Intravenous  Monitor strict I&Os and daily  weights.    Place on telemetry  Low sodium diet  Place on fluid restriction of 1.5 L.     Dysphagia  Dysphagia present on admission, per chart review has been present for the last few years. Speech following, recommended NPO. Patient more alert on subsequent examinations - discussed risk/benefit of giving back a diet. Pt understood that chocking and aspiration are risks associated with eating, decided to proceed with diet.   Diet - advanced as tolerated  Continue SLP evaluation  Aspiration precautions  Assistance for feeding    VTE Risk Mitigation (From admission, onward)           Ordered     enoxaparin injection 40 mg  Daily         06/19/25 0349     IP VTE HIGH RISK PATIENT  Once         06/19/25 0349     Place sequential compression device  Until discontinued         06/19/25 0349                    Discharge Planning   RACHAEL: 6/23/2025     Code Status: Full Code   Medical Readiness for Discharge Date:   Discharge Plan A: Return to nursing home                        Safia Wolfe DO  Department of Hospital Medicine   [facility] - Telemetry Stepdown

## 2025-06-21 NOTE — ASSESSMENT & PLAN NOTE
Improving  89 yo woman with acute encephalopathy for the past week. TSH 13.5 with normal T4 in the setting of acute illness. Ddx includes infection vs hypercapnia. CT head negative, UA negative. R pleural effusions on CXR and CT chest.   B12/folic acid levels wnl. VBG improved   follow up blood cultures - NGTD

## 2025-06-21 NOTE — ASSESSMENT & PLAN NOTE
She sometimes uses oxygen at the nursing home. Supplemental oxygen was provided and noted-    Signs/symptoms of respiratory failure include- lethargy. Contributing diagnoses includes - Aspiration, CHF, Pleural effusion, and Pneumonia Labs and images were reviewed. JVD noted on physical exam - CHF more likely. D/c abx, infectious less likely.   DVT US without dvt present.     BNP 1096 with RLL pleural effusion  cautious diuresis 2/2 MS  repeat VBG to reassess hypercarbia respiratory status worsens or mentation worsens.

## 2025-06-21 NOTE — ASSESSMENT & PLAN NOTE
Patient has Diastolic (HFpEF) heart failure that is Acute on chronic. On presentation their CHF was decompensated. Evidence of decompensated CHF on presentation includes: edema, elevated JVD, crackles on lung auscultation, and shortness of breath. The etiology of their decompensation is likely multifactorial, possibly progression of valvular disease. BNP elevated at 1096.    Echo  Interpretation Summary    Left Ventricle: The left ventricle is normal in size. Ventricular mass is normal. Normal wall thickness. There is concentric remodeling. Normal wall motion. There is normal systolic function with a visually estimated ejection fraction of 60 - 65%. Diastolic function cannot be reliably determined in the presence of mitral annular calcification.    Right Ventricle: The right ventricle is normal in size Wall thickness is normal. Systolic function is normal.    Left Atrium: The left atrium is severely dilated    Right Atrium: The right atrium is mildly dilated .    Aortic Valve: The aortic valve is a trileaflet valve. There is severe aortic valve sclerosis. Severely calcified cusps. There is moderate annular calcification present. Moderately restricted motion. There is moderatestenosis. Aortic valve area by VTI is 0.9 cm². Aortic valve peak velocity is 3.2 m/s. Mean gradient is 24 mmHg. The dimensionless index is 0.30. MUSTAPHA is likely underestimated.    Mitral Valve: There is at least moderate or moderate to severe stenosis. The mean pressure gradient across the mitral valve is 7 mmHg at a heart rate of 86 bpm. MV area by continuity equation is 1.13 cm2. MVA in SAX is not well seen.    Tricuspid Valve: There is severe regurgitation with a centrally directed jet.    Aorta: The aortic root is normal in size measuring 3.02 cm. The ascending aorta is normal in size measuring 3.0 cm.    Pulmonary Artery: There is moderate pulmonary hypertension. The estimated pulmonary artery systolic pressure is 58 mmHg.    IVC/SVC: Normal  venous pressure at 3 mmHg.    Pericardium: There is no pericardial effusion.    Current Heart Failure Medications  lisinopriL tablet 10 mg, Daily, Oral  Monitor strict I&Os and daily weights.    Place on telemetry

## 2025-06-21 NOTE — ASSESSMENT & PLAN NOTE
She sometimes uses oxygen at the nursing home. Supplemental oxygen was provided and noted-    Signs/symptoms of respiratory failure include- lethargy. Contributing diagnoses includes - Aspiration, CHF, Pleural effusion, and Pneumonia Labs and images were reviewed. JVD noted on physical exam - CHF more likely. D/c abx, infectious less likely.   DVT US without dvt present.   cautious diuresis 2/2 MS  repeat VBG to reassess hypercarbia respiratory status worsens or mentation worsens.

## 2025-06-22 LAB
ABSOLUTE EOSINOPHIL (OHS): 0.34 K/UL
ABSOLUTE MONOCYTE (OHS): 0.86 K/UL (ref 0.3–1)
ABSOLUTE NEUTROPHIL COUNT (OHS): 6.45 K/UL (ref 1.8–7.7)
ALBUMIN SERPL BCP-MCNC: 3 G/DL (ref 3.5–5.2)
ALP SERPL-CCNC: 88 UNIT/L (ref 40–150)
ALT SERPL W/O P-5'-P-CCNC: 7 UNIT/L (ref 10–44)
ANION GAP (OHS): 9 MMOL/L (ref 8–16)
AST SERPL-CCNC: 15 UNIT/L (ref 11–45)
BASOPHILS # BLD AUTO: 0.05 K/UL
BASOPHILS NFR BLD AUTO: 0.6 %
BILIRUB SERPL-MCNC: 0.7 MG/DL (ref 0.1–1)
BUN SERPL-MCNC: 14 MG/DL (ref 10–30)
CALCIUM SERPL-MCNC: 8.6 MG/DL (ref 8.7–10.5)
CHLORIDE SERPL-SCNC: 86 MMOL/L (ref 95–110)
CO2 SERPL-SCNC: 42 MMOL/L (ref 23–29)
CREAT SERPL-MCNC: 0.7 MG/DL (ref 0.5–1.4)
ERYTHROCYTE [DISTWIDTH] IN BLOOD BY AUTOMATED COUNT: 14.6 % (ref 11.5–14.5)
GFR SERPLBLD CREATININE-BSD FMLA CKD-EPI: >60 ML/MIN/1.73/M2
GLUCOSE SERPL-MCNC: 84 MG/DL (ref 70–110)
HCT VFR BLD AUTO: 41.6 % (ref 37–48.5)
HGB BLD-MCNC: 13 GM/DL (ref 12–16)
IMM GRANULOCYTES # BLD AUTO: 0.02 K/UL (ref 0–0.04)
IMM GRANULOCYTES NFR BLD AUTO: 0.2 % (ref 0–0.5)
LYMPHOCYTES # BLD AUTO: 0.74 K/UL (ref 1–4.8)
MAGNESIUM SERPL-MCNC: 1.8 MG/DL (ref 1.6–2.6)
MCH RBC QN AUTO: 30.6 PG (ref 27–31)
MCHC RBC AUTO-ENTMCNC: 31.3 G/DL (ref 32–36)
MCV RBC AUTO: 98 FL (ref 82–98)
NUCLEATED RBC (/100WBC) (OHS): 0 /100 WBC
PHOSPHATE SERPL-MCNC: 3.3 MG/DL (ref 2.7–4.5)
PLATELET # BLD AUTO: 231 K/UL (ref 150–450)
PMV BLD AUTO: 10.3 FL (ref 9.2–12.9)
POTASSIUM SERPL-SCNC: 3.7 MMOL/L (ref 3.5–5.1)
PROT SERPL-MCNC: 6.3 GM/DL (ref 6–8.4)
RBC # BLD AUTO: 4.25 M/UL (ref 4–5.4)
RELATIVE EOSINOPHIL (OHS): 4 %
RELATIVE LYMPHOCYTE (OHS): 8.7 % (ref 18–48)
RELATIVE MONOCYTE (OHS): 10.2 % (ref 4–15)
RELATIVE NEUTROPHIL (OHS): 76.3 % (ref 38–73)
SODIUM SERPL-SCNC: 137 MMOL/L (ref 136–145)
WBC # BLD AUTO: 8.46 K/UL (ref 3.9–12.7)

## 2025-06-22 PROCEDURE — 63600175 PHARM REV CODE 636 W HCPCS

## 2025-06-22 PROCEDURE — 27000221 HC OXYGEN, UP TO 24 HOURS

## 2025-06-22 PROCEDURE — 25000242 PHARM REV CODE 250 ALT 637 W/ HCPCS

## 2025-06-22 PROCEDURE — 20600001 HC STEP DOWN PRIVATE ROOM

## 2025-06-22 PROCEDURE — 85025 COMPLETE CBC W/AUTO DIFF WBC: CPT

## 2025-06-22 PROCEDURE — 99900035 HC TECH TIME PER 15 MIN (STAT)

## 2025-06-22 PROCEDURE — 83735 ASSAY OF MAGNESIUM: CPT

## 2025-06-22 PROCEDURE — 94640 AIRWAY INHALATION TREATMENT: CPT

## 2025-06-22 PROCEDURE — 94799 UNLISTED PULMONARY SVC/PX: CPT

## 2025-06-22 PROCEDURE — 80053 COMPREHEN METABOLIC PANEL: CPT

## 2025-06-22 PROCEDURE — 36415 COLL VENOUS BLD VENIPUNCTURE: CPT

## 2025-06-22 PROCEDURE — 25000003 PHARM REV CODE 250

## 2025-06-22 PROCEDURE — 94761 N-INVAS EAR/PLS OXIMETRY MLT: CPT

## 2025-06-22 PROCEDURE — 84100 ASSAY OF PHOSPHORUS: CPT

## 2025-06-22 RX ORDER — TORSEMIDE 20 MG/1
20 TABLET ORAL DAILY
Status: DISCONTINUED | OUTPATIENT
Start: 2025-06-22 | End: 2025-06-22

## 2025-06-22 RX ORDER — TORSEMIDE 20 MG/1
20 TABLET ORAL DAILY
Status: DISCONTINUED | OUTPATIENT
Start: 2025-06-23 | End: 2025-06-23 | Stop reason: HOSPADM

## 2025-06-22 RX ADMIN — ESCITALOPRAM OXALATE 20 MG: 20 TABLET ORAL at 08:06

## 2025-06-22 RX ADMIN — ACETAMINOPHEN 650 MG: 325 TABLET ORAL at 05:06

## 2025-06-22 RX ADMIN — DOCUSATE SODIUM 50 MG: 50 CAPSULE, LIQUID FILLED ORAL at 10:06

## 2025-06-22 RX ADMIN — IPRATROPIUM BROMIDE AND ALBUTEROL SULFATE 3 ML: 2.5; .5 SOLUTION RESPIRATORY (INHALATION) at 08:06

## 2025-06-22 RX ADMIN — CYANOCOBALAMIN TAB 250 MCG 250 MCG: 250 TAB at 10:06

## 2025-06-22 RX ADMIN — ACETAMINOPHEN 650 MG: 325 TABLET ORAL at 12:06

## 2025-06-22 RX ADMIN — POLYETHYLENE GLYCOL 3350 17 G: 17 POWDER, FOR SOLUTION ORAL at 09:06

## 2025-06-22 RX ADMIN — LISINOPRIL 10 MG: 10 TABLET ORAL at 10:06

## 2025-06-22 RX ADMIN — BUPROPION HYDROCHLORIDE 300 MG: 150 TABLET, FILM COATED, EXTENDED RELEASE ORAL at 10:06

## 2025-06-22 RX ADMIN — IPRATROPIUM BROMIDE AND ALBUTEROL SULFATE 3 ML: 2.5; .5 SOLUTION RESPIRATORY (INHALATION) at 01:06

## 2025-06-22 RX ADMIN — ATORVASTATIN CALCIUM 40 MG: 40 TABLET, FILM COATED ORAL at 10:06

## 2025-06-22 RX ADMIN — Medication 1 TABLET: at 10:06

## 2025-06-22 RX ADMIN — ENOXAPARIN SODIUM 40 MG: 40 INJECTION SUBCUTANEOUS at 05:06

## 2025-06-22 RX ADMIN — ASPIRIN 81 MG: 81 TABLET, COATED ORAL at 10:06

## 2025-06-22 RX ADMIN — LEVOTHYROXINE SODIUM 150 MCG: 150 TABLET ORAL at 06:06

## 2025-06-22 NOTE — ASSESSMENT & PLAN NOTE
Improving  89 yo woman with acute encephalopathy for the past week. TSH 13.5 with normal T4 in the setting of acute illness. Ddx includes infection vs hypercapnia. CT head negative, UA negative. R pleural effusions on CXR and CT chest. Blood cx NTGD. VBG improved after bipap.   AMS could also be 2/2 to age.  Continue to monitor  Delirium precautions

## 2025-06-22 NOTE — ASSESSMENT & PLAN NOTE
Patient has Diastolic (HFpEF) heart failure that is Acute on chronic. On presentation their CHF was decompensated. Evidence of decompensated CHF on presentation includes: edema, elevated JVD, crackles on lung auscultation, and shortness of breath. The etiology of their decompensation is likely multifactorial, possibly progression of valvular disease.    Echo    Interpretation Summary    Left Ventricle: The left ventricle is normal in size. Ventricular mass is normal. Normal wall thickness. There is concentric remodeling. Normal wall motion. There is normal systolic function with a visually estimated ejection fraction of 60 - 65%. Diastolic function cannot be reliably determined in the presence of mitral annular calcification.    Right Ventricle: The right ventricle is normal in size Wall thickness is normal. Systolic function is normal.    Left Atrium: The left atrium is severely dilated    Right Atrium: The right atrium is mildly dilated .    Aortic Valve: The aortic valve is a trileaflet valve. There is severe aortic valve sclerosis. Severely calcified cusps. There is moderate annular calcification present. Moderately restricted motion. There is moderatestenosis. Aortic valve area by VTI is 0.9 cm². Aortic valve peak velocity is 3.2 m/s. Mean gradient is 24 mmHg. The dimensionless index is 0.30. MUSTAPHA is likely underestimated.    Mitral Valve: There is at least moderate or moderate to severe stenosis. The mean pressure gradient across the mitral valve is 7 mmHg at a heart rate of 86 bpm. MV area by continuity equation is 1.13 cm2. MVA in SAX is not well seen.    Tricuspid Valve: There is severe regurgitation with a centrally directed jet.    Aorta: The aortic root is normal in size measuring 3.02 cm. The ascending aorta is normal in size measuring 3.0 cm.    Pulmonary Artery: There is moderate pulmonary hypertension. The estimated pulmonary artery systolic pressure is 58 mmHg.    IVC/SVC: Normal venous pressure at 3  mmHg.    Pericardium: There is no pericardial effusion.    Current Heart Failure Medications  lisinopriL tablet 10 mg, Daily, Oral  torsemide tablet 20 mg, Daily, Oral  Monitor strict I&Os and daily weights.    Place on telemetry  Low sodium diet  Place on fluid restriction of 1.5 L.   Transition lasix to torsemide  Palliative care referral outpatient

## 2025-06-22 NOTE — SUBJECTIVE & OBJECTIVE
Interval History: Pt reports feeling well, no acute concerns at this time. No concerns with aspiration or chocking since starting diet. Lasix was given yesterday, unclear if UOP were charted accurately.     Review of Systems   Constitutional:  Negative for chills and fever.   Respiratory:  Negative for choking and shortness of breath.    Cardiovascular:  Negative for chest pain and palpitations.   Gastrointestinal:  Positive for constipation. Negative for abdominal distention, diarrhea, nausea and vomiting.   Genitourinary:  Negative for difficulty urinating.   Neurological:  Negative for weakness and headaches.     Objective:     Vital Signs (Most Recent):  Temp: 97.4 °F (36.3 °C) (06/22/25 0522)  Pulse: 67 (06/22/25 0659)  Resp: 15 (06/21/25 2334)  BP: (!) 121/57 (06/22/25 0522)  SpO2: (!) 93 % (06/22/25 0522) Vital Signs (24h Range):  Temp:  [97.4 °F (36.3 °C)-98.5 °F (36.9 °C)] 97.4 °F (36.3 °C)  Pulse:  [65-71] 67  Resp:  [15-19] 15  SpO2:  [93 %-99 %] 93 %  BP: (105-128)/(57-68) 121/57     Weight: 72.1 kg (159 lb)  Body mass index is 25.66 kg/m².    Intake/Output Summary (Last 24 hours) at 6/22/2025 0720  Last data filed at 6/21/2025 1750  Gross per 24 hour   Intake 100 ml   Output 900 ml   Net -800 ml         Physical Exam  Constitutional:       General: She is not in acute distress.     Appearance: She is ill-appearing.   HENT:      Head: Normocephalic and atraumatic.   Cardiovascular:      Rate and Rhythm: Normal rate and regular rhythm.      Heart sounds: Murmur heard.      Comments: Diastolic murmur at apex   Pulmonary:      Effort: No respiratory distress.      Breath sounds: Rhonchi present. No wheezing.      Comments: Rhonchi on RML/RLL  Abdominal:      General: There is no distension.      Palpations: Abdomen is soft.      Tenderness: There is no abdominal tenderness.   Musculoskeletal:      Right lower leg: Edema present.      Left lower leg: Edema present.      Comments: 1+ pitting edema in BLE    Skin:     General: Skin is warm.   Neurological:      Mental Status: She is alert.               Significant Labs: All pertinent labs within the past 24 hours have been reviewed.    Significant Imaging: I have reviewed all pertinent imaging results/findings within the past 24 hours.

## 2025-06-22 NOTE — PROGRESS NOTES
[facility] - Telemetry [facility] Medicine  Progress Note    Patient Name: Laila Hanna  MRN: [id number]  Patient Class: IP- Inpatient   Admission Date: 6/18/2025  Length of Stay: 3 days  Attending Physician: Samm Lennon MD  Primary Care Provider: Ama Graham MD        Subjective     Principal Problem:Acute on chronic diastolic CHF (congestive heart failure)        HPI:  89 year old woman with Alzheimer dementia (AAOx4 at baseline), HFpEF, HTN, CAD s/p PCI to LAD in 2004, hypothyroidism, HLD, depression who presented from [facility] for altered mental status over the past week. Initially she kept complaining that she felt uncomfortable and her UA was negative at the time. Over the past few days, she was AAOx1 (name) and was brought into the hospital. The NH was called for collateral as she was AAOx1 and somnolent on exam (arousable to voice). Upon arrival, she was lethargic with a pH 7.302/PCO2 61.7 on VBG. CTH negative. She was briefly placed on Bipap with improvement to pH 7.377/CO2 55.5. Patient was AAOx3 at that point. MICU was consulted and deemed her stable for a stepdown unit. They believed her chronic hypercapnia was not the cause of her encephalopathy. CXR significant for RLL pleural effusion. They believed her hypoxia was driven by pulmonary edema but recommended cautious diuresis due to her MS. They also recommended CAP coverage, RIP, bowel regimen, TSH and repeating a  TTE. A bedside US showed preserved LVEF with Armani enlargement RAP 5-10 mmHg. She was admitted to hospital medicine for acute encephalopathy.     Overview/Hospital Course:  Admitted for AMS and CHF exacerbation. Given lasix with good UOP and some improvement in symptoms. She was also noted to be slightly hypercapnic (7.302/PCO2 61.7) which improved with bipap. CT chest was ordered which showed diffuse interlobular septal thickening and minimal ground-glass opacities in bilateral lungs (infectious vs  edema). Antibiotics were also discontinued as infectious etiology was less likely. Given patient's altered mentation, speech was consulted and patient was made NPO 2/2 to dysphagia. Patient later became more alert and interactive, discussed the risks and benefits of initiating a diet, she understood that choking and aspiration were risks, but she decided to proceed with a diet.     Interval History: Pt reports feeling well, no acute concerns at this time. No concerns with aspiration or chocking since starting diet. Lasix was given yesterday, unclear if UOP were charted accurately.     Review of Systems   Constitutional:  Negative for chills and fever.   Respiratory:  Negative for choking and shortness of breath.    Cardiovascular:  Negative for chest pain and palpitations.   Gastrointestinal:  Positive for constipation. Negative for abdominal distention, diarrhea, nausea and vomiting.   Genitourinary:  Negative for difficulty urinating.   Neurological:  Negative for weakness and headaches.     Objective:     Vital Signs (Most Recent):  Temp: 97.4 °F (36.3 °C) (06/22/25 0522)  Pulse: 67 (06/22/25 0659)  Resp: 15 (06/21/25 2334)  BP: (!) 121/57 (06/22/25 0522)  SpO2: (!) 93 % (06/22/25 0522) Vital Signs (24h Range):  Temp:  [97.4 °F (36.3 °C)-98.5 °F (36.9 °C)] 97.4 °F (36.3 °C)  Pulse:  [65-71] 67  Resp:  [15-19] 15  SpO2:  [93 %-99 %] 93 %  BP: (105-128)/(57-68) 121/57     Weight: 72.1 kg (159 lb)  Body mass index is 25.66 kg/m².    Intake/Output Summary (Last 24 hours) at 6/22/2025 0720  Last data filed at 6/21/2025 1750  Gross per 24 hour   Intake 100 ml   Output 900 ml   Net -800 ml         Physical Exam  Constitutional:       General: She is not in acute distress.     Appearance: She is ill-appearing.   HENT:      Head: Normocephalic and atraumatic.   Cardiovascular:      Rate and Rhythm: Normal rate and regular rhythm.      Heart sounds: Murmur heard.      Comments: Diastolic murmur at apex   Pulmonary:       Effort: No respiratory distress.      Breath sounds: Rhonchi present. No wheezing.      Comments: Rhonchi on RML/RLL  Abdominal:      General: There is no distension.      Palpations: Abdomen is soft.      Tenderness: There is no abdominal tenderness.   Musculoskeletal:      Right lower leg: Edema present.      Left lower leg: Edema present.      Comments: 1+ pitting edema in BLE   Skin:     General: Skin is warm.   Neurological:      Mental Status: She is alert.               Significant Labs: All pertinent labs within the past 24 hours have been reviewed.    Significant Imaging: I have reviewed all pertinent imaging results/findings within the past 24 hours.      Assessment & Plan  Acute encephalopathy  Improving  89 yo woman with acute encephalopathy for the past week. TSH 13.5 with normal T4 in the setting of acute illness. Ddx includes infection vs hypercapnia. CT head negative, UA negative. R pleural effusions on CXR and CT chest. Blood cx NTGD. VBG improved after bipap.   AMS could also be 2/2 to age.  Continue to monitor  Delirium precautions  Postoperative hypothyroidism  Continue home Synthroid 175 mcg   Follow up TSH -  wnl    Essential hypertension  Patient's blood pressure range in the last 24 hours was: BP  Min: 105/62  Max: 131/66.  Continue home Lisinopril 10  Adjust as needed  Coronary artery disease involving native coronary artery of native heart  Patient with known CAD s/p PCI to LAD in 2004.  Continue ASA 81 and Lipitor 40  Acute respiratory failure with hypoxia and hypercarbia  She sometimes uses oxygen at the nursing home. Supplemental oxygen was provided and noted-    Signs/symptoms of respiratory failure include- lethargy. Contributing diagnoses includes - Aspiration, CHF, Pleural effusion, and Pneumonia Labs and images were reviewed. JVD noted on physical exam - CHF more likely. D/c abx, infectious less likely.   DVT US without dvt present.     BNP 1096 with RLL pleural effusion  cautious  diuresis 2/2 MS  repeat VBG to reassess hypercarbia respiratory status worsens or mentation worsens.   Anxiety and depression  Continue Bupropion and Lexapro    Slow transit constipation      Pulmonary hypertension      Acute on chronic diastolic CHF (congestive heart failure)  Patient has Diastolic (HFpEF) heart failure that is Acute on chronic. On presentation their CHF was decompensated. Evidence of decompensated CHF on presentation includes: edema, elevated JVD, crackles on lung auscultation, and shortness of breath. The etiology of their decompensation is likely multifactorial, possibly progression of valvular disease.    Echo    Interpretation Summary    Left Ventricle: The left ventricle is normal in size. Ventricular mass is normal. Normal wall thickness. There is concentric remodeling. Normal wall motion. There is normal systolic function with a visually estimated ejection fraction of 60 - 65%. Diastolic function cannot be reliably determined in the presence of mitral annular calcification.    Right Ventricle: The right ventricle is normal in size Wall thickness is normal. Systolic function is normal.    Left Atrium: The left atrium is severely dilated    Right Atrium: The right atrium is mildly dilated .    Aortic Valve: The aortic valve is a trileaflet valve. There is severe aortic valve sclerosis. Severely calcified cusps. There is moderate annular calcification present. Moderately restricted motion. There is moderatestenosis. Aortic valve area by VTI is 0.9 cm². Aortic valve peak velocity is 3.2 m/s. Mean gradient is 24 mmHg. The dimensionless index is 0.30. MUSTAPHA is likely underestimated.    Mitral Valve: There is at least moderate or moderate to severe stenosis. The mean pressure gradient across the mitral valve is 7 mmHg at a heart rate of 86 bpm. MV area by continuity equation is 1.13 cm2. MVA in SAX is not well seen.    Tricuspid Valve: There is severe regurgitation with a centrally directed jet.     Aorta: The aortic root is normal in size measuring 3.02 cm. The ascending aorta is normal in size measuring 3.0 cm.    Pulmonary Artery: There is moderate pulmonary hypertension. The estimated pulmonary artery systolic pressure is 58 mmHg.    IVC/SVC: Normal venous pressure at 3 mmHg.    Pericardium: There is no pericardial effusion.    Current Heart Failure Medications  lisinopriL tablet 10 mg, Daily, Oral  torsemide tablet 20 mg, Daily, Oral  Monitor strict I&Os and daily weights.    Place on telemetry  Low sodium diet  Place on fluid restriction of 1.5 L.   Transition lasix to torsemide  Palliative care referral outpatient    Dysphagia  Dysphagia present on admission, per chart review has been present for the last few years. Speech following, recommended NPO. Patient more alert on subsequent examinations - discussed risk/benefit of giving back a diet. Pt understood that chocking and aspiration are risks associated with eating, decided to proceed with diet.   Diet - advanced as tolerated  Continue SLP evaluation  Aspiration precautions  Assistance for feeding    VTE Risk Mitigation (From admission, onward)           Ordered     enoxaparin injection 40 mg  Daily         06/19/25 0349     IP VTE HIGH RISK PATIENT  Once         06/19/25 0349     Place sequential compression device  Until discontinued         06/19/25 0349                    Discharge Planning   RACHAEL: 6/23/2025     Code Status: Full Code   Medical Readiness for Discharge Date:   Discharge Plan A: Return to nursing home            Safia Wolfe DO  Department of Hospital Medicine   [facility] - Telemetry Stepdown

## 2025-06-22 NOTE — ASSESSMENT & PLAN NOTE
Patient's blood pressure range in the last 24 hours was: BP  Min: 105/62  Max: 131/66.  Continue home Lisinopril 10  Adjust as needed

## 2025-06-23 VITALS
TEMPERATURE: 98 F | OXYGEN SATURATION: 98 % | HEART RATE: 69 BPM | HEIGHT: 66 IN | DIASTOLIC BLOOD PRESSURE: 69 MMHG | BODY MASS INDEX: 25.55 KG/M2 | WEIGHT: 159 LBS | SYSTOLIC BLOOD PRESSURE: 135 MMHG | RESPIRATION RATE: 20 BRPM

## 2025-06-23 LAB
ABSOLUTE EOSINOPHIL (OHS): 0.33 K/UL
ABSOLUTE MONOCYTE (OHS): 0.74 K/UL (ref 0.3–1)
ABSOLUTE NEUTROPHIL COUNT (OHS): 5 K/UL (ref 1.8–7.7)
ALBUMIN SERPL BCP-MCNC: 2.9 G/DL (ref 3.5–5.2)
ALP SERPL-CCNC: 85 UNIT/L (ref 40–150)
ALT SERPL W/O P-5'-P-CCNC: 9 UNIT/L (ref 10–44)
ANION GAP (OHS): 10 MMOL/L (ref 8–16)
AST SERPL-CCNC: 18 UNIT/L (ref 11–45)
BASOPHILS # BLD AUTO: 0.04 K/UL
BASOPHILS NFR BLD AUTO: 0.6 %
BILIRUB SERPL-MCNC: 0.6 MG/DL (ref 0.1–1)
BUN SERPL-MCNC: 12 MG/DL (ref 10–30)
CALCIUM SERPL-MCNC: 8.9 MG/DL (ref 8.7–10.5)
CHLORIDE SERPL-SCNC: 87 MMOL/L (ref 95–110)
CO2 SERPL-SCNC: 37 MMOL/L (ref 23–29)
CREAT SERPL-MCNC: 0.6 MG/DL (ref 0.5–1.4)
ERYTHROCYTE [DISTWIDTH] IN BLOOD BY AUTOMATED COUNT: 14.5 % (ref 11.5–14.5)
GFR SERPLBLD CREATININE-BSD FMLA CKD-EPI: >60 ML/MIN/1.73/M2
GLUCOSE SERPL-MCNC: 93 MG/DL (ref 70–110)
HCT VFR BLD AUTO: 42.5 % (ref 37–48.5)
HGB BLD-MCNC: 13.6 GM/DL (ref 12–16)
IMM GRANULOCYTES # BLD AUTO: 0.04 K/UL (ref 0–0.04)
IMM GRANULOCYTES NFR BLD AUTO: 0.6 % (ref 0–0.5)
LYMPHOCYTES # BLD AUTO: 0.81 K/UL (ref 1–4.8)
MAGNESIUM SERPL-MCNC: 1.9 MG/DL (ref 1.6–2.6)
MCH RBC QN AUTO: 30.6 PG (ref 27–31)
MCHC RBC AUTO-ENTMCNC: 32 G/DL (ref 32–36)
MCV RBC AUTO: 96 FL (ref 82–98)
NUCLEATED RBC (/100WBC) (OHS): 0 /100 WBC
PHOSPHATE SERPL-MCNC: 2.7 MG/DL (ref 2.7–4.5)
PLATELET # BLD AUTO: 221 K/UL (ref 150–450)
PMV BLD AUTO: 10.6 FL (ref 9.2–12.9)
POTASSIUM SERPL-SCNC: 3.6 MMOL/L (ref 3.5–5.1)
PROT SERPL-MCNC: 6.5 GM/DL (ref 6–8.4)
RBC # BLD AUTO: 4.44 M/UL (ref 4–5.4)
RELATIVE EOSINOPHIL (OHS): 4.7 %
RELATIVE LYMPHOCYTE (OHS): 11.6 % (ref 18–48)
RELATIVE MONOCYTE (OHS): 10.6 % (ref 4–15)
RELATIVE NEUTROPHIL (OHS): 71.9 % (ref 38–73)
SODIUM SERPL-SCNC: 134 MMOL/L (ref 136–145)
W METHYLMALONIC ACID: 0.33 UMOL/L
WBC # BLD AUTO: 6.96 K/UL (ref 3.9–12.7)

## 2025-06-23 PROCEDURE — 85025 COMPLETE CBC W/AUTO DIFF WBC: CPT

## 2025-06-23 PROCEDURE — 25000003 PHARM REV CODE 250

## 2025-06-23 PROCEDURE — 97535 SELF CARE MNGMENT TRAINING: CPT

## 2025-06-23 PROCEDURE — 84100 ASSAY OF PHOSPHORUS: CPT

## 2025-06-23 PROCEDURE — 94761 N-INVAS EAR/PLS OXIMETRY MLT: CPT

## 2025-06-23 PROCEDURE — 36415 COLL VENOUS BLD VENIPUNCTURE: CPT

## 2025-06-23 PROCEDURE — 83735 ASSAY OF MAGNESIUM: CPT

## 2025-06-23 PROCEDURE — 92526 ORAL FUNCTION THERAPY: CPT

## 2025-06-23 PROCEDURE — 84132 ASSAY OF SERUM POTASSIUM: CPT

## 2025-06-23 RX ORDER — IPRATROPIUM BROMIDE AND ALBUTEROL SULFATE 2.5; .5 MG/3ML; MG/3ML
3 SOLUTION RESPIRATORY (INHALATION) EVERY 6 HOURS PRN
Status: DISCONTINUED | OUTPATIENT
Start: 2025-06-23 | End: 2025-06-23 | Stop reason: HOSPADM

## 2025-06-23 RX ORDER — TORSEMIDE 20 MG/1
20 TABLET ORAL DAILY
Qty: 90 TABLET | Refills: 0 | Status: SHIPPED | OUTPATIENT
Start: 2025-06-23 | End: 2025-09-21

## 2025-06-23 RX ORDER — LEVOTHYROXINE SODIUM 150 UG/1
150 TABLET ORAL DAILY
Start: 2025-06-23 | End: 2025-06-23 | Stop reason: HOSPADM

## 2025-06-23 RX ADMIN — BUPROPION HYDROCHLORIDE 300 MG: 150 TABLET, FILM COATED, EXTENDED RELEASE ORAL at 09:06

## 2025-06-23 RX ADMIN — CYANOCOBALAMIN TAB 250 MCG 250 MCG: 250 TAB at 09:06

## 2025-06-23 RX ADMIN — Medication 1 TABLET: at 09:06

## 2025-06-23 RX ADMIN — ASPIRIN 81 MG: 81 TABLET, COATED ORAL at 09:06

## 2025-06-23 RX ADMIN — DOCUSATE SODIUM 50 MG: 50 CAPSULE, LIQUID FILLED ORAL at 09:06

## 2025-06-23 RX ADMIN — LEVOTHYROXINE SODIUM 150 MCG: 150 TABLET ORAL at 06:06

## 2025-06-23 RX ADMIN — POLYETHYLENE GLYCOL 3350 17 G: 17 POWDER, FOR SOLUTION ORAL at 09:06

## 2025-06-23 RX ADMIN — LISINOPRIL 10 MG: 10 TABLET ORAL at 09:06

## 2025-06-23 RX ADMIN — ATORVASTATIN CALCIUM 40 MG: 40 TABLET, FILM COATED ORAL at 09:06

## 2025-06-23 RX ADMIN — TORSEMIDE 20 MG: 20 TABLET ORAL at 09:06

## 2025-06-23 NOTE — PLAN OF CARE
Pt accepted back to [facility]. Nurse can call report to [phone] and ask for 3 south. Transport setup for 4 by ambulance.     Jacobo Kelly, [facility] [place]    [facility]  [phone]

## 2025-06-23 NOTE — ASSESSMENT & PLAN NOTE
Patient has Diastolic (HFpEF) heart failure that is Acute on chronic. On presentation their CHF was decompensated. Evidence of decompensated CHF on presentation includes: edema, elevated JVD, crackles on lung auscultation, and shortness of breath. The etiology of their decompensation is likely multifactorial, possibly progression of valvular disease.    Echo    Interpretation Summary    Left Ventricle: The left ventricle is normal in size. Ventricular mass is normal. Normal wall thickness. There is concentric remodeling. Normal wall motion. There is normal systolic function with a visually estimated ejection fraction of 60 - 65%. Diastolic function cannot be reliably determined in the presence of mitral annular calcification.    Right Ventricle: The right ventricle is normal in size Wall thickness is normal. Systolic function is normal.    Left Atrium: The left atrium is severely dilated    Right Atrium: The right atrium is mildly dilated .    Aortic Valve: The aortic valve is a trileaflet valve. There is severe aortic valve sclerosis. Severely calcified cusps. There is moderate annular calcification present. Moderately restricted motion. There is moderatestenosis. Aortic valve area by VTI is 0.9 cm². Aortic valve peak velocity is 3.2 m/s. Mean gradient is 24 mmHg. The dimensionless index is 0.30. MUSTAPHA is likely underestimated.    Mitral Valve: There is at least moderate or moderate to severe stenosis. The mean pressure gradient across the mitral valve is 7 mmHg at a heart rate of 86 bpm. MV area by continuity equation is 1.13 cm2. MVA in SAX is not well seen.    Tricuspid Valve: There is severe regurgitation with a centrally directed jet.    Aorta: The aortic root is normal in size measuring 3.02 cm. The ascending aorta is normal in size measuring 3.0 cm.    Pulmonary Artery: There is moderate pulmonary hypertension. The estimated pulmonary artery systolic pressure is 58 mmHg.    IVC/SVC: Normal venous pressure at 3  mmHg.    Pericardium: There is no pericardial effusion.    Current Heart Failure Medications  lisinopriL tablet 10 mg, Daily, Oral  torsemide tablet 20 mg, Daily, Oral  torsemide (DEMADEX) tablet, Daily, Oral  Monitor strict I&Os and daily weights.    Place on telemetry  Low sodium diet  Place on fluid restriction of 1.5 L.   Transition lasix to torsemide  Palliative care referral outpatient

## 2025-06-23 NOTE — PHYSICIAN QUERY
Due to the conflicting clinical picture, please clinically validate the diagnosis of Acute respiratory failure with hypoxia and hypercarbia. If validated, please provide additional clinical support for the diagnosis.  The respiratory condition is confirmed. Additional clinical support/decision making indicators for the diagnosis include (please specify):       SOB  Pt 88% on room air, 98% on 2L.   Initial VBG with ph 7.3 and PCO2 up to 71.5.

## 2025-06-23 NOTE — PLAN OF CARE
Problem: Skin Injury Risk Increased  Goal: Skin Health and Integrity  Outcome: Progressing     Problem: Adult Inpatient Plan of Care  Goal: Patient-Specific Goal (Individualized)  Outcome: Progressing  Goal: Optimal Comfort and Wellbeing  Outcome: Progressing     Problem: Fall Injury Risk  Goal: Absence of Fall and Fall-Related Injury  Outcome: Progressing

## 2025-06-23 NOTE — DISCHARGE INSTRUCTIONS
Please follow up with your PCP and your cardiologist.  Make an appointment with pulmonology (lung doctors) and palliative care.   Return to the emergency department if you experience fevers, chills, chest pain, difficulty breathing, or confusion.

## 2025-06-23 NOTE — ASSESSMENT & PLAN NOTE
Patient's blood pressure range in the last 24 hours was: BP  Min: 114/56  Max: 146/73.  Continue home Lisinopril 10  Adjust as needed

## 2025-06-23 NOTE — ASSESSMENT & PLAN NOTE
Improving  89 yo woman with acute encephalopathy for the past week. TSH 13.5 with normal T4 in the setting of acute illness. Ddx includes infection vs hypercapnia. CT head negative, UA negative. R pleural effusions on CXR and CT chest. Blood cx NTGD. VBG improved after bipap.   AMS could also be 2/2 to age.  Continue to monitor  Delirium precautions

## 2025-06-23 NOTE — PT/OT/SLP PROGRESS
"Speech Language Pathology Treatment  Discharge    Patient Name:  Laila Hanna   MRN:  [id number]  [id number]    Admitting Diagnosis: Acute on chronic diastolic CHF (congestive heart failure)    Recommendations:                 General Recommendations:  Follow-up not indicated  Diet recommendations:  Regular Diet - IDDSI Level 7, Liquid Diet Level: Thin liquids - IDDSI Level 0   Aspiration Precautions: assistance setting up meal tray, HOB to 90 degrees, Monitor for s/s of aspiration, Small bites/sips, and Standard aspiration precautions   General Precautions: Standard, fall, aspiration  Communication strategies:  none  Discharge recommendations:  No Therapy Indicated   Barriers to Discharge:  None    Assessment:     Laila Hanna is a 89 y.o. female with improved WILVER and tolerance of po trials. Patient tolerating regular solids and thin liquids. No further skilled acute Speech Therapy services warranted at this time. Please re-consult as needed.       Subjective     Patient awake and cooperative.   Patient states, "I can't eat that chopped stuff they keep sending."   Patient also states, "I really only chew on one side because of my teeth."     Objective:     Has the patient been evaluated by SLP for swallowing?   Yes  Keep patient NPO? No     Patient seen for an ongoing swallowing assessment. Patient with significantly improved WILVER from initial ST evaluation. SLP assessed patient with sips of water and juice via straw, bites of yogurt, bites of dry Cheerios, and bites of Cheerios softened in milk. Patient presents with timely, adequate oral clearance and no overt s/s of aspiration. SLP provided patient education on SLP recommendations, SLP role, s/s and risks of aspiration, safe swallow precautions, and POC. All questions addressed and patient in agreement with POC and diet upgrade.       Goals:   Multidisciplinary Problems       SLP Goals          Problem: SLP    Goal Priority Disciplines Outcome   SLP Goal     " SLP Progressing   Description: Speech Pathology Goals  To be met by 7/3/25    1. Pt will participate in ongoing diagnostic dysphagia therapy                              Plan:       Plan of Care reviewed with:  patient   SLP Follow-Up:  No       Time Tracking:     SLP Treatment Date:   06/23/25  Speech Start Time:  0919  Speech Stop Time:  0935     Speech Total Time (min):  16 min    Billable Minutes: Treatment Swallowing Dysfunction 8 and Self Care/Home Management Training 8    06/23/2025

## 2025-06-23 NOTE — DISCHARGE SUMMARY
[facility] - Telemetry [facility] Medicine  Discharge Summary      Patient Name: Laila Hanna  MRN: [id number]  GURVINDER: [id number]  Patient Class: IP- Inpatient  Admission Date: 6/18/2025  Hospital Length of Stay: 4 days  Discharge Date and Time: 06/23/2025 1:25 PM  Attending Physician: Samm Lennon MD   Discharging Provider: Safia Wolfe DO  Primary Care Provider: Ama Graham MD  Hospital Medicine Team: [facility] 2 Safia Wolfe DO  Primary Care Team: [facility] MED 2    HPI:   89 year old woman with Alzheimer dementia (AAOx4 at baseline), HFpEF, HTN, CAD s/p PCI to LAD in 2004, hypothyroidism, HLD, depression who presented from [facility] for altered mental status over the past week. Initially she kept complaining that she felt uncomfortable and her UA was negative at the time. Over the past few days, she was AAOx1 (name) and was brought into the hospital. The NH was called for collateral as she was AAOx1 and somnolent on exam (arousable to voice). Upon arrival, she was lethargic with a pH 7.302/PCO2 61.7 on VBG. CTH negative. She was briefly placed on Bipap with improvement to pH 7.377/CO2 55.5. Patient was AAOx3 at that point. MICU was consulted and deemed her stable for a stepdown unit. They believed her chronic hypercapnia was not the cause of her encephalopathy. CXR significant for RLL pleural effusion. They believed her hypoxia was driven by pulmonary edema but recommended cautious diuresis due to her MS. They also recommended CAP coverage, RIP, bowel regimen, TSH and repeating a  TTE. A bedside US showed preserved LVEF with Armani enlargement RAP 5-10 mmHg. She was admitted to hospital medicine for acute encephalopathy.     * No surgery found *      Hospital Course:   Admitted for AMS and CHF exacerbation. Given lasix with good UOP and some improvement in symptoms. She was also noted to be slightly hypercapnic (7.302/PCO2 61.7) which improved with bipap. CT chest was  ordered which showed diffuse interlobular septal thickening and minimal ground-glass opacities in bilateral lungs (infectious vs edema). Antibiotics were also discontinued as infectious etiology was less likely. Given patient's altered mentation, speech was consulted and patient was made NPO 2/2 to dysphagia. Patient later became more alert and interactive, discussed the risks and benefits of initiating a diet, she understood that choking and aspiration were risks, but she decided to proceed with a diet. Patient was diuresed with IV lasix with appropriate UOP. Developed a slight contraction alkalosis and diuretics were held. Given initial volume status, decision was made to increase home torsemide to 20 from 10 on discharge.    Patient was medically cleared for discharge back to nursing home. She was instructed to follow up with her PCP and cardiologist and instructed to establish care with pulmonology and palliative care. Nursing home orders were sent.    PCP follow up  Torsemide was increased from 10 to 20, monitor volume status  Cardiology follow up was recommended to discuss further options of valvular disease  Pulmonology follow up for CT findings.   Palliative was recommended for GOC discussions     Physical Exam  Vitals and nursing note reviewed.   Constitutional:       General: She is not in acute distress.  HENT:      Head: Normocephalic and atraumatic.   Eyes:      General: No scleral icterus.     Conjunctiva/sclera: Conjunctivae normal.   Cardiovascular:      Rate and Rhythm: Normal rate.   Pulmonary:      Effort: Pulmonary effort is normal. No respiratory distress.      Breath sounds: Rales present. No wheezing.   Abdominal:      General: Abdomen is flat. There is no distension.      Palpations: Abdomen is soft.      Tenderness: There is no abdominal tenderness.   Musculoskeletal:      Right lower leg: No edema.      Left lower leg: No edema.   Skin:     General: Skin is warm and dry.      Coloration:  Skin is not jaundiced.      Findings: No bruising.   Neurological:      Mental Status: She is alert.         Goals of Care Treatment Preferences:  Code Status: Full Code         Consults:   Consults (From admission, onward)          Status Ordering Provider     Inpatient consult to Critical Care Medicine  Once        Provider:  (Not yet assigned)    Completed KAPIL WHITE            Assessment & Plan  Acute encephalopathy  Improving  89 yo woman with acute encephalopathy for the past week. TSH 13.5 with normal T4 in the setting of acute illness. Ddx includes infection vs hypercapnia. CT head negative, UA negative. R pleural effusions on CXR and CT chest. Blood cx NTGD. VBG improved after bipap.   AMS could also be 2/2 to age.  Continue to monitor  Delirium precautions  Postoperative hypothyroidism  Continue home Synthroid 175 mcg   Follow up TSH -  wnl    Essential hypertension  Patient's blood pressure range in the last 24 hours was: BP  Min: 114/56  Max: 146/73.  Continue home Lisinopril 10  Adjust as needed  Coronary artery disease involving native coronary artery of native heart  Patient with known CAD s/p PCI to LAD in 2004.  Continue ASA 81 and Lipitor 40  Acute respiratory failure with hypoxia and hypercarbia  She sometimes uses oxygen at the nursing home. Supplemental oxygen was provided and noted-    Signs/symptoms of respiratory failure include- lethargy. Contributing diagnoses includes - Aspiration, CHF, Pleural effusion, and Pneumonia Labs and images were reviewed. JVD noted on physical exam - CHF more likely. D/c abx, infectious less likely.   DVT US without dvt present.     BNP 1096 with RLL pleural effusion  cautious diuresis 2/2 MS  repeat VBG to reassess hypercarbia respiratory status worsens or mentation worsens.   Anxiety and depression  Continue Bupropion and Lexapro    Slow transit constipation      Pulmonary hypertension      Acute on chronic diastolic CHF (congestive heart failure)  Patient  has Diastolic (HFpEF) heart failure that is Acute on chronic. On presentation their CHF was decompensated. Evidence of decompensated CHF on presentation includes: edema, elevated JVD, crackles on lung auscultation, and shortness of breath. The etiology of their decompensation is likely multifactorial, possibly progression of valvular disease.    Echo    Interpretation Summary    Left Ventricle: The left ventricle is normal in size. Ventricular mass is normal. Normal wall thickness. There is concentric remodeling. Normal wall motion. There is normal systolic function with a visually estimated ejection fraction of 60 - 65%. Diastolic function cannot be reliably determined in the presence of mitral annular calcification.    Right Ventricle: The right ventricle is normal in size Wall thickness is normal. Systolic function is normal.    Left Atrium: The left atrium is severely dilated    Right Atrium: The right atrium is mildly dilated .    Aortic Valve: The aortic valve is a trileaflet valve. There is severe aortic valve sclerosis. Severely calcified cusps. There is moderate annular calcification present. Moderately restricted motion. There is moderatestenosis. Aortic valve area by VTI is 0.9 cm². Aortic valve peak velocity is 3.2 m/s. Mean gradient is 24 mmHg. The dimensionless index is 0.30. MUSTAPHA is likely underestimated.    Mitral Valve: There is at least moderate or moderate to severe stenosis. The mean pressure gradient across the mitral valve is 7 mmHg at a heart rate of 86 bpm. MV area by continuity equation is 1.13 cm2. MVA in SAX is not well seen.    Tricuspid Valve: There is severe regurgitation with a centrally directed jet.    Aorta: The aortic root is normal in size measuring 3.02 cm. The ascending aorta is normal in size measuring 3.0 cm.    Pulmonary Artery: There is moderate pulmonary hypertension. The estimated pulmonary artery systolic pressure is 58 mmHg.    IVC/SVC: Normal venous pressure at 3 mmHg.     Pericardium: There is no pericardial effusion.    Current Heart Failure Medications  lisinopriL tablet 10 mg, Daily, Oral  torsemide tablet 20 mg, Daily, Oral  torsemide (DEMADEX) tablet, Daily, Oral  Monitor strict I&Os and daily weights.    Place on telemetry  Low sodium diet  Place on fluid restriction of 1.5 L.   Transition lasix to torsemide  Palliative care referral outpatient    Dysphagia  Dysphagia present on admission, per chart review has been present for the last few years. Speech following, recommended NPO. Patient more alert on subsequent examinations - discussed risk/benefit of giving back a diet. Pt understood that chocking and aspiration are risks associated with eating, decided to proceed with diet.   Diet - advanced as tolerated  Continue SLP evaluation  Aspiration precautions  Assistance for feeding    Final Active Diagnoses:    Diagnosis Date Noted POA    PRINCIPAL PROBLEM:  Acute on chronic diastolic CHF (congestive heart failure) [I50.33] 06/19/2025 Yes    Acute respiratory failure with hypoxia and hypercarbia [J96.01, J96.02] 06/19/2025 Yes    Anxiety and depression [F41.9, F32.A] 06/19/2025 Yes    Pulmonary hypertension [I27.20] 06/19/2025 Yes    Alzheimer's disease with early onset [G30.0, F02.80] 08/23/2023 Yes     Chronic    Acute encephalopathy [G93.40] 07/27/2022 Yes    Coronary artery disease involving native coronary artery of native heart [I25.10] 01/15/2020 Yes     Chronic    Dysphagia [R13.10] 11/22/2017 Yes    Slow transit constipation [K59.01] 06/07/2016 Yes     Chronic    Essential hypertension [I10] 05/29/2016 Yes     Chronic    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]  Yes     Chronic      Problems Resolved During this Admission:       Discharged Condition: stable    Disposition: Home or Self Care    Follow Up:   Follow-up Information       Ama Graham MD. Schedule an appointment as soon as possible for a visit in 1 week(s).    Specialty: Internal Medicine  Why: Hospital  Follow up  Contact information:  [address]  [facility] [address]  [phone]               Bubba Severino MD. Schedule an appointment as soon as possible for a visit in 1 week(s).    Specialty: Cardiology  Why: Hospital Follow up  Contact information:  [address]  [facility] [address]  [phone]               [facility] PALLIATIVE MEDICINE. Schedule an appointment as soon as possible for a visit in 1 week(s).    Specialty: Hospice and Palliative Medicine  Contact information:  [address]  [facility] [id number] [phone]             [facility]. Schedule an appointment as soon as possible for a visit in 1 week(s).    Specialty: Pulmonology  Contact information:  [address]  [facility] [id number] [phone]  Additional information:  Main Building, 9th Floor   Please park in [facility] and use Clinic elevator                         Patient Instructions:      Ambulatory referral/consult to Pulmonology   Standing Status: Future   Referral Priority: Routine Referral Type: Consultation   Referral Reason: Specialty Services Required   Requested Specialty: Pulmonary Disease   Number of Visits Requested: 1     Ambulatory referral/consult to Palliative Care   Standing Status: Future   Referral Priority: Routine Referral Type: Consultation   Requested Specialty: Palliative Medicine   Number of Visits Requested: 1     Activity as tolerated       Significant Diagnostic Studies: N/A    Pending Diagnostic Studies:       None           Medications:  Reconciled Home Medications:      Medication List        CHANGE how you take these medications      torsemide 20 MG Tab  Commonly known as: DEMADEX  Take 1 tablet (20 mg total) by mouth once daily.  What changed:   medication strength  how much to take  additional instructions            CONTINUE taking these medications      acetaminophen 500 MG tablet  Commonly known as: TYLENOL  Take 2 tablets  (1,000 mg total) by mouth every 8 (eight) hours.     albuterol 2.5 mg /3 mL (0.083 %) nebulizer solution  Commonly known as: PROVENTIL  Take 2.5 mg by nebulization every 4 (four) hours as needed for Shortness of Breath.     alendronate 70 MG tablet  Commonly known as: FOSAMAX  TAKE 1 TABLET BY MOUTH EVERY 7 DAYS     aspirin 81 MG EC tablet  Commonly known as: ECOTRIN  Take 1 tablet (81 mg total) by mouth once daily.     atorvastatin 40 MG tablet  Commonly known as: LIPITOR  Take 1 tablet (40 mg total) by mouth once daily.     BIOTENE DRY MOUTH ORAL RINSE MM  Rinse by mouth daily as needed for dry mouth.     bisacodyL 5 mg EC tablet  Commonly known as: DULCOLAX  Take 2 tablets by mouth daily as needed for Constipation.     buPROPion 300 MG 24 hr tablet  Commonly known as: WELLBUTRIN XL  Take 300 mg by mouth once daily.     COUGH-CHEST CONGESTION DM ORAL  Take 5 mLs by mouth every 6 (six) hours.     CRANBERRY 450 mg Tab  Generic drug: cranberry fruit  Take 1 tablet by mouth every morning. For UTI     dextran 70-hypromellose ophthalmic solution  Commonly known as: ARTIFICIAL TEARS([id number])  Place 1 drop into both eyes every 2 (two) hours as needed.     diclofenac sodium 1 % Gel  Commonly known as: VOLTAREN  Apply topically. Apply topically to hands and knees daily as needed for pain.     EScitalopram oxalate 10 MG tablet  Commonly known as: LEXAPRO  Take 20 mg by mouth every evening.     levothyroxine 175 MCG tablet  Commonly known as: SYNTHROID, LEVOTHROID  Take 175 mcg by mouth once daily.     lisinopriL 10 MG tablet  Take 10 mg by mouth once daily.     loratadine 10 mg tablet  Commonly known as: CLARITIN  Take 10 mg by mouth once daily.     LUBRICANT EYE (PG-) 0.4-0.3 % Drop  Generic drug: peg 400-propylene glycol  Place 1 drop into both eyes once daily.     meclizine 25 mg tablet  Commonly known as: ANTIVERT  Take 25 mg by mouth 3 (three) times daily as needed for Dizziness.     melatonin 3 mg  tablet  Commonly known as: MELATIN  Take 2 tablets by mouth nightly. 10 pm     multivitamin with minerals tablet  Take 1 tablet by mouth once daily.     nitroGLYCERIN 0.4 MG SL tablet  Commonly known as: NITROSTAT  Place 0.4 mg under the tongue every 5 (five) minutes as needed for Chest pain.     polyethylene glycol 17 gram Pwpk  Commonly known as: GLYCOLAX  Take 17 g by mouth once daily.     senna-docusate 8.6-50 mg per tablet  Commonly known as: PERICOLACE  Take 1 tablet by mouth once daily.     VITAMIN D3 125 mcg (5,000 unit) Tab  Generic drug: cholecalciferol (vitamin D3)  Take 5,000 Units by mouth once a week. SATURDAY MORNING              Indwelling Lines/Drains at time of discharge:   Lines/Drains/Airways       Drain  Duration             Female External Urinary Catheter w/ Suction 06/18/25 1949 4 days                        Time spent on the discharge of patient: 40 minutes         Safia Wolfe DO  Department of Hospital Medicine  [facility] - Telemetry Stepdown

## 2025-06-23 NOTE — PLAN OF CARE
06/23/25 1143   Medicare Message   Important Message from Medicare regarding Discharge Appeal Rights Given to patient/caregiver;Explained to patient/caregiver;Signed/date by patient/caregiver   Date IMM was signed 06/23/25   Time IMM was signed 1115       Jacobo Kelly LMSW    [facility]  [phone]

## 2025-06-24 LAB
BACTERIA BLD CULT: NORMAL
BACTERIA BLD CULT: NORMAL

## 2025-06-24 NOTE — PHYSICIAN QUERY
Please further specify the acute encephalopathy diagnosis associated with the below clinical findings.  Metabolic encephalopathy

## 2025-06-24 NOTE — PLAN OF CARE
[facility] - Telemetry Stepdown  Discharge Final Note    Primary Care Provider: Ama Graham MD    Expected Discharge Date: 6/23/2025    Final Discharge Note (most recent)       Final Note - 06/24/25 0827          Final Note    Assessment Type Final Discharge Note     Anticipated Discharge Disposition Skilled Nursing Facility        Post-Acute Status    Post-Acute Authorization Placement     Post-Acute Placement Status Set-up Complete/Auth obtained     Discharge Delays None known at this time                     Important Message from Medicare  Important Message from Medicare regarding Discharge Appeal Rights: Given to patient/caregiver, Explained to patient/caregiver, Signed/date by patient/caregiver     Date IMM was signed: 06/23/25  Time IMM was signed: 1115     Follow-up providers       Ama Graham MD   Specialty: Internal Medicine   Relationship: PCP - General    [address]  [place] [address]   Phone: [phone]       Next Steps: Schedule an appointment as soon as possible for a visit in 1 week(s)    Instructions: Hospital Follow up    Bubba Severino MD   Specialty: Cardiology   Relationship: Consulting Physician    [address]  [place] [address]   Phone: [phone]       Next Steps: Schedule an appointment as soon as possible for a visit in 1 week(s)    Instructions: Hospital Follow up    [facility] PALLIATIVE MEDICINE   Specialty: Hospice and Palliative Medicine    [address] [facility] [address]   Phone: [phone]       Next Steps: Schedule an appointment as soon as possible for a visit in 1 week(s)    [facility] - Pulmonary Svcs 9th Fl   Specialty: Pulmonology    [address]  [place] [address]   Phone: [phone]       Next Steps: Schedule an appointment as soon as possible for a visit in 1 week(s)              After-discharge care                Destination       [facility]   Service: Skilled Nursing    [address] [place] [address]  [facility] [address]   Phone: [phone]                               Pt DC to [facility].  CM will continue to follow and offer support as needed. Discharge Plan A and Plan B have been determined by review of patient's clinical status, future medical and therapeutic needs, and coverage/benefits for post-acute care in coordination with multidisciplinary team members.  Jacobo Kelly, [facility]    [facility]  [phone]

## 2025-06-25 ENCOUNTER — TELEPHONE (OUTPATIENT)
Dept: PALLIATIVE MEDICINE | Facility: CLINIC | Age: OVER 89
End: 2025-06-25
Payer: MEDICARE

## 2025-08-15 ENCOUNTER — TELEPHONE (OUTPATIENT)
Dept: PALLIATIVE MEDICINE | Facility: CLINIC | Age: OVER 89
End: 2025-08-15
Payer: MEDICARE

## (undated) DEVICE — SKINMARKER & RULER REGULAR X-F

## (undated) DEVICE — GOWN SURGICAL X-LARGE

## (undated) DEVICE — ELECTRODE REM PLYHSV RETURN 9

## (undated) DEVICE — NDL 22GA X1 1/2 REG BEVEL

## (undated) DEVICE — SEE MEDLINE ITEM 152496

## (undated) DEVICE — SEE MEDLINE ITEM 152487

## (undated) DEVICE — FORCEP CURVED DISP

## (undated) DEVICE — NDL STRAIGHT 4CM LEIBINGER

## (undated) DEVICE — SEE MEDLINE ITEM 152622

## (undated) DEVICE — SUT CTD VICRYL 5-0 P-2 UNDB

## (undated) DEVICE — SOL WATER STRL IRR 1000ML

## (undated) DEVICE — CUP MEDICINE STERILE 2OZ

## (undated) DEVICE — DRAPE STERI INSTRUMENT 1018

## (undated) DEVICE — SOL 9P NACL IRR PIC IL

## (undated) DEVICE — PENCIL ROCKER SWITCH 10FT CORD

## (undated) DEVICE — DROPPER MEDICINE

## (undated) DEVICE — TRAY MUSCLE LID EYE

## (undated) DEVICE — BLADE SURG #15 CARBON STEEL

## (undated) DEVICE — SUCTION COAGULATOR 10FR 6IN

## (undated) DEVICE — SUT CTD VICRYL 6-0 VIL S-14

## (undated) DEVICE — CLEANER TIP ELECSURG 2X2IN

## (undated) DEVICE — COVER LIGHT HANDLE 80/CA

## (undated) DEVICE — NDL HYPO REG 25G X 1 1/2

## (undated) DEVICE — CORD BIPOLAR 12 FOOT

## (undated) DEVICE — GAUZE SPONGE 4'X4 12 PLY

## (undated) DEVICE — SYR 10CC LUER LOCK

## (undated) DEVICE — SHEILD & GARTERS FOX METAL EYE

## (undated) DEVICE — SOL BETADINE 5%

## (undated) DEVICE — SHEET EENT SPLIT